# Patient Record
Sex: MALE | Race: WHITE | HISPANIC OR LATINO | URBAN - METROPOLITAN AREA
[De-identification: names, ages, dates, MRNs, and addresses within clinical notes are randomized per-mention and may not be internally consistent; named-entity substitution may affect disease eponyms.]

---

## 2017-11-30 ENCOUNTER — OUTPATIENT (OUTPATIENT)
Dept: OUTPATIENT SERVICES | Facility: HOSPITAL | Age: 67
LOS: 1 days | End: 2017-11-30

## 2017-11-30 ENCOUNTER — APPOINTMENT (OUTPATIENT)
Dept: NUCLEAR MEDICINE | Facility: HOSPITAL | Age: 67
End: 2017-11-30

## 2017-11-30 DIAGNOSIS — D75.1 SECONDARY POLYCYTHEMIA: ICD-10-CM

## 2020-02-04 ENCOUNTER — INPATIENT (INPATIENT)
Facility: HOSPITAL | Age: 70
LOS: 3 days | Discharge: HOME | End: 2020-02-08
Attending: HOSPITALIST | Admitting: HOSPITALIST
Payer: MEDICARE

## 2020-02-04 VITALS
HEART RATE: 121 BPM | TEMPERATURE: 100 F | OXYGEN SATURATION: 93 % | DIASTOLIC BLOOD PRESSURE: 72 MMHG | RESPIRATION RATE: 22 BRPM | SYSTOLIC BLOOD PRESSURE: 118 MMHG

## 2020-02-04 LAB
ALBUMIN SERPL ELPH-MCNC: 2.9 G/DL — LOW (ref 3.3–5)
ALP SERPL-CCNC: 38 U/L — LOW (ref 40–120)
ALT FLD-CCNC: 44 U/L — HIGH (ref 4–41)
ANION GAP SERPL CALC-SCNC: 12 MMO/L — SIGNIFICANT CHANGE UP (ref 7–14)
ANISOCYTOSIS BLD QL: SLIGHT — SIGNIFICANT CHANGE UP
AST SERPL-CCNC: 68 U/L — HIGH (ref 4–40)
B PERT DNA SPEC QL NAA+PROBE: NOT DETECTED — SIGNIFICANT CHANGE UP
BASOPHILS # BLD AUTO: 0.03 K/UL — SIGNIFICANT CHANGE UP (ref 0–0.2)
BASOPHILS NFR BLD AUTO: 0.5 % — SIGNIFICANT CHANGE UP (ref 0–2)
BASOPHILS NFR SPEC: 0.9 % — SIGNIFICANT CHANGE UP (ref 0–2)
BILIRUB SERPL-MCNC: 1.2 MG/DL — SIGNIFICANT CHANGE UP (ref 0.2–1.2)
BLASTS # FLD: 0 % — SIGNIFICANT CHANGE UP (ref 0–0)
BUN SERPL-MCNC: 45 MG/DL — HIGH (ref 7–23)
C PNEUM DNA SPEC QL NAA+PROBE: NOT DETECTED — SIGNIFICANT CHANGE UP
CALCIUM SERPL-MCNC: 8.4 MG/DL — SIGNIFICANT CHANGE UP (ref 8.4–10.5)
CHLORIDE SERPL-SCNC: 105 MMOL/L — SIGNIFICANT CHANGE UP (ref 98–107)
CO2 SERPL-SCNC: 20 MMOL/L — LOW (ref 22–31)
CREAT SERPL-MCNC: 2.66 MG/DL — HIGH (ref 0.5–1.3)
EOSINOPHIL # BLD AUTO: 0.01 K/UL — SIGNIFICANT CHANGE UP (ref 0–0.5)
EOSINOPHIL NFR BLD AUTO: 0.2 % — SIGNIFICANT CHANGE UP (ref 0–6)
EOSINOPHIL NFR FLD: 0 % — SIGNIFICANT CHANGE UP (ref 0–6)
FLUAV H1 2009 PAND RNA SPEC QL NAA+PROBE: NOT DETECTED — SIGNIFICANT CHANGE UP
FLUAV H1 RNA SPEC QL NAA+PROBE: NOT DETECTED — SIGNIFICANT CHANGE UP
FLUAV H3 RNA SPEC QL NAA+PROBE: NOT DETECTED — SIGNIFICANT CHANGE UP
FLUAV SUBTYP SPEC NAA+PROBE: NOT DETECTED — SIGNIFICANT CHANGE UP
FLUBV RNA SPEC QL NAA+PROBE: DETECTED — HIGH
GIANT PLATELETS BLD QL SMEAR: PRESENT — SIGNIFICANT CHANGE UP
GLUCOSE SERPL-MCNC: 129 MG/DL — HIGH (ref 70–99)
HADV DNA SPEC QL NAA+PROBE: NOT DETECTED — SIGNIFICANT CHANGE UP
HCOV PNL SPEC NAA+PROBE: SIGNIFICANT CHANGE UP
HCT VFR BLD CALC: 54.1 % — HIGH (ref 39–50)
HGB BLD-MCNC: 17.7 G/DL — HIGH (ref 13–17)
HMPV RNA SPEC QL NAA+PROBE: NOT DETECTED — SIGNIFICANT CHANGE UP
HPIV1 RNA SPEC QL NAA+PROBE: NOT DETECTED — SIGNIFICANT CHANGE UP
HPIV2 RNA SPEC QL NAA+PROBE: NOT DETECTED — SIGNIFICANT CHANGE UP
HPIV3 RNA SPEC QL NAA+PROBE: NOT DETECTED — SIGNIFICANT CHANGE UP
HPIV4 RNA SPEC QL NAA+PROBE: NOT DETECTED — SIGNIFICANT CHANGE UP
IMM GRANULOCYTES NFR BLD AUTO: 0.6 % — SIGNIFICANT CHANGE UP (ref 0–1.5)
LACTATE BLDV-MCNC: 1.6 MMOL/L — SIGNIFICANT CHANGE UP (ref 0.5–2)
LACTATE BLDV-MCNC: 4.6 MMOL/L — CRITICAL HIGH (ref 0.5–2)
LYMPHOCYTES # BLD AUTO: 1.77 K/UL — SIGNIFICANT CHANGE UP (ref 1–3.3)
LYMPHOCYTES # BLD AUTO: 28 % — SIGNIFICANT CHANGE UP (ref 13–44)
LYMPHOCYTES NFR SPEC AUTO: 10.3 % — LOW (ref 13–44)
MANUAL SMEAR VERIFICATION: SIGNIFICANT CHANGE UP
MCHC RBC-ENTMCNC: 25.7 PG — LOW (ref 27–34)
MCHC RBC-ENTMCNC: 32.7 % — SIGNIFICANT CHANGE UP (ref 32–36)
MCV RBC AUTO: 78.5 FL — LOW (ref 80–100)
METAMYELOCYTES # FLD: 0 % — SIGNIFICANT CHANGE UP (ref 0–1)
MONOCYTES # BLD AUTO: 0.43 K/UL — SIGNIFICANT CHANGE UP (ref 0–0.9)
MONOCYTES NFR BLD AUTO: 6.8 % — SIGNIFICANT CHANGE UP (ref 2–14)
MONOCYTES NFR BLD: 10.3 % — HIGH (ref 2–9)
MYELOCYTES NFR BLD: 0 % — SIGNIFICANT CHANGE UP (ref 0–0)
NEUTROPHIL AB SER-ACNC: 73.3 % — SIGNIFICANT CHANGE UP (ref 43–77)
NEUTROPHILS # BLD AUTO: 4.04 K/UL — SIGNIFICANT CHANGE UP (ref 1.8–7.4)
NEUTROPHILS NFR BLD AUTO: 63.9 % — SIGNIFICANT CHANGE UP (ref 43–77)
NEUTS BAND # BLD: 0 % — SIGNIFICANT CHANGE UP (ref 0–6)
NRBC # FLD: 0 K/UL — SIGNIFICANT CHANGE UP (ref 0–0)
OTHER - HEMATOLOGY %: 0 — SIGNIFICANT CHANGE UP
PLATELET # BLD AUTO: 19 K/UL — CRITICAL LOW (ref 150–400)
PLATELET COUNT - ESTIMATE: SIGNIFICANT CHANGE UP
PMV BLD: 11.1 FL — SIGNIFICANT CHANGE UP (ref 7–13)
POIKILOCYTOSIS BLD QL AUTO: SIGNIFICANT CHANGE UP
POLYCHROMASIA BLD QL SMEAR: SLIGHT — SIGNIFICANT CHANGE UP
POTASSIUM SERPL-MCNC: 4.9 MMOL/L — SIGNIFICANT CHANGE UP (ref 3.5–5.3)
POTASSIUM SERPL-SCNC: 4.9 MMOL/L — SIGNIFICANT CHANGE UP (ref 3.5–5.3)
PROMYELOCYTES # FLD: 0 % — SIGNIFICANT CHANGE UP (ref 0–0)
PROT SERPL-MCNC: 5.4 G/DL — LOW (ref 6–8.3)
RBC # BLD: 6.89 M/UL — HIGH (ref 4.2–5.8)
RBC # FLD: 16.1 % — HIGH (ref 10.3–14.5)
RSV RNA SPEC QL NAA+PROBE: NOT DETECTED — SIGNIFICANT CHANGE UP
RV+EV RNA SPEC QL NAA+PROBE: NOT DETECTED — SIGNIFICANT CHANGE UP
SMUDGE CELLS # BLD: PRESENT — SIGNIFICANT CHANGE UP
SODIUM SERPL-SCNC: 137 MMOL/L — SIGNIFICANT CHANGE UP (ref 135–145)
VARIANT LYMPHS # BLD: 5.2 % — SIGNIFICANT CHANGE UP
WBC # BLD: 6.32 K/UL — SIGNIFICANT CHANGE UP (ref 3.8–10.5)
WBC # FLD AUTO: 6.32 K/UL — SIGNIFICANT CHANGE UP (ref 3.8–10.5)

## 2020-02-04 PROCEDURE — 71045 X-RAY EXAM CHEST 1 VIEW: CPT | Mod: 26

## 2020-02-04 RX ORDER — BENZOCAINE AND MENTHOL 5; 1 G/100ML; G/100ML
1 LIQUID ORAL ONCE
Refills: 0 | Status: COMPLETED | OUTPATIENT
Start: 2020-02-04 | End: 2020-02-04

## 2020-02-04 RX ORDER — ACETAMINOPHEN 500 MG
650 TABLET ORAL ONCE
Refills: 0 | Status: COMPLETED | OUTPATIENT
Start: 2020-02-04 | End: 2020-02-04

## 2020-02-04 RX ORDER — SODIUM CHLORIDE 9 MG/ML
1000 INJECTION INTRAMUSCULAR; INTRAVENOUS; SUBCUTANEOUS ONCE
Refills: 0 | Status: COMPLETED | OUTPATIENT
Start: 2020-02-04 | End: 2020-02-04

## 2020-02-04 RX ORDER — CEFTRIAXONE 500 MG/1
1000 INJECTION, POWDER, FOR SOLUTION INTRAMUSCULAR; INTRAVENOUS ONCE
Refills: 0 | Status: COMPLETED | OUTPATIENT
Start: 2020-02-04 | End: 2020-02-04

## 2020-02-04 RX ORDER — AZITHROMYCIN 500 MG/1
500 TABLET, FILM COATED ORAL ONCE
Refills: 0 | Status: COMPLETED | OUTPATIENT
Start: 2020-02-04 | End: 2020-02-04

## 2020-02-04 RX ADMIN — CEFTRIAXONE 100 MILLIGRAM(S): 500 INJECTION, POWDER, FOR SOLUTION INTRAMUSCULAR; INTRAVENOUS at 20:45

## 2020-02-04 RX ADMIN — SODIUM CHLORIDE 1000 MILLILITER(S): 9 INJECTION INTRAMUSCULAR; INTRAVENOUS; SUBCUTANEOUS at 20:32

## 2020-02-04 RX ADMIN — Medication 650 MILLIGRAM(S): at 20:45

## 2020-02-04 RX ADMIN — SODIUM CHLORIDE 1000 MILLILITER(S): 9 INJECTION INTRAMUSCULAR; INTRAVENOUS; SUBCUTANEOUS at 21:45

## 2020-02-04 RX ADMIN — AZITHROMYCIN 255 MILLIGRAM(S): 500 TABLET, FILM COATED ORAL at 21:45

## 2020-02-04 RX ADMIN — BENZOCAINE AND MENTHOL 1 LOZENGE: 5; 1 LIQUID ORAL at 20:45

## 2020-02-04 RX ADMIN — SODIUM CHLORIDE 1000 MILLILITER(S): 9 INJECTION INTRAMUSCULAR; INTRAVENOUS; SUBCUTANEOUS at 20:20

## 2020-02-04 NOTE — ED PROVIDER NOTE - CLINICAL SUMMARY MEDICAL DECISION MAKING FREE TEXT BOX
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 CBC, CMP, lactate, blood cx x 2, CXR, EKG. Tylenol.

## 2020-02-04 NOTE — ED PROVIDER NOTE - OBJECTIVE STATEMENT
70 yo PMH Hairy Cell Leukemia, HLD p/w subjective fever/cough. Pt reports symptoms started end January, initially febrile no Temp at home, w/ rhinorrhea, dry cough and chills. Progressively worsened throughout week, went to urgent care past Sunday flu positive, was prescribed tamiflu. Went to PCP today, was prescribed tessalon pearls for cough, no antibiotics. Pt with worsening HAZEL over last 48 hours w/ associated chest tightness, non radiating pain, no prior cardiac history.  Denies any recent sick contacts or outside travel. 70 yo PMH Hairy Cell Leukemia, HLD p/w subjective fever/cough. Pt reports symptoms started end January, initially febrile no Temp at home, w/ rhinorrhea, dry cough and chills. Progressively worsened throughout week, went to urgent care past Sunday flu positive, was prescribed tamiflu. Went to PCP today, was prescribed tessalon pearls for cough, no antibiotics. Pt with worsening HAZEL over last 48 hours w/ associated chest tightness, non radiating pain, no prior cardiac history.  Denies any recent sick contacts or outside travel. Pt is Quaker.

## 2020-02-04 NOTE — ED PROVIDER NOTE - PROGRESS NOTE DETAILS
After 2 L NS BP 75/49, starting 3rd liter, will repeat lactate. Platelet 19, pt reports having chronic thrombocytopenia count (). Pt is Anabaptist does not accept blood products. pt SBP in mid 90 w a MAP 70's pt mentating, no distress. plan admit w IVF no pressor for now. pt refusing any blood product. Endorse to Dr Rubio.

## 2020-02-04 NOTE — ED ADULT NURSE NOTE - OBJECTIVE STATEMENT
patient alert ox3 came in c/o flu like symptoms since 1 week with cough and congestion. patient is tachypneic and coughing and hypotensive in the room. labs done for sepsis as ordered . IVF infusing well. continue to monitor.

## 2020-02-04 NOTE — ED PROVIDER NOTE - ATTENDING CONTRIBUTION TO CARE
Attending Statement: I have personally seen and examined this patient. I have fully participated in the care of this patient. I have reviewed all pertinent clinical information, including history physical exam, plan and the Resident's note and agree except as noted  70yo M hx of hairy cell leukemia, HLD< HTN, from home co fever, cough. DX w flu last week, saw pmd earlier today for persistent cough was given tessalon Perles. +non productive cough. no chest pain no sob. no abdominal pain no vomit or diarrhea +myalgia no travel no recent abx or hosp   Vital signs noted. coughing, nontoxic appearing. mmm. +tachycardic. +coarse, ronchi bl. obese male nt nondistended abdomen  no pedal edema. no calf tenderness. normal pulses bilateral feet.  plan sepsis workup, IVF, IV abx, rvp, admit

## 2020-02-04 NOTE — ED PROVIDER NOTE - PHYSICAL EXAMINATION
Constitutional: NAD, awake and alert  EYES: EOMI  ENT:  Normal Hearing, no tonsillar exudates, no lymphadenopathy   Neck: Soft and supple , no thyromegaly   Respiratory: Breath sounds are clear bilaterally, No wheezing, rales or rhonchi  Cardiovascular: S1 and S2, regular rate and rhythm, no Murmurs, gallops or rubs, no JVD,    Gastrointestinal: Bowel Sounds present, soft, nontender, nondistended, no guarding, no rebound  Extremities: No cyanosis or clubbing; warm to touch  Vascular: 2+ peripheral pulses lower ex  Neurological: No focal deficits, CN II-XII intact bilaterally, sensation to light touch intact in all extremities, gait intact. Pupils are equally reactive to light and symmetrical in size.   Musculoskeletal: 5/5 strength b/l upper and lower extremities; no joint swelling.  Skin: No rashes  HEME: no bruises, no nose bleeds

## 2020-02-04 NOTE — ED ADULT NURSE NOTE - CHIEF COMPLAINT QUOTE
Patient complains of shortness of breath, chest pain, fevers/chills, cough.  Patient states was diagnosed with the Flu Sunday at [facility], saw his PCP afterwards and sent for further eval for fevers, chest congestion.  On Tamiflu since Sunday.  Hx HLD, HTN, Thrombocytopenia.  Tachycardic to 120, BP Stable, respirations tachypneic, o2sat 93%. EKG in progress.

## 2020-02-04 NOTE — ED ADULT TRIAGE NOTE - CHIEF COMPLAINT QUOTE
Patient complains of shortness of breath, chest pain.  Patient states was diagnosed with the Flu this week at [facility], saw his PCP afterwards and sent for further eval.  On Tamiflu since Sunday.  Hx HLD, HTN, Thrombocytopenia.  Tachycardic to 120, BP Stable, respirations tachypneic, o2sat 93%. Patient complains of shortness of breath, chest pain, fevers/chills, cough.  Patient states was diagnosed with the Flu Sunday at [facility], saw his PCP afterwards and sent for further eval for fevers, chest congestion.  On Tamiflu since Sunday.  Hx HLD, HTN, Thrombocytopenia.  Tachycardic to 120, BP Stable, respirations tachypneic, o2sat 93%. EKG in progress.

## 2020-02-04 NOTE — ED ADULT NURSE REASSESSMENT NOTE - NS ED NURSE REASSESS COMMENT FT1
Patient vital signs as noted, MD aware of Patients Blood Pressure, Patient receiving third liter of fluid at present time. Patient has low platelet count, MD and patient aware, Patient reports being a Jehovah Witness and does not want any type of blood transfusion.

## 2020-02-04 NOTE — ED PROVIDER NOTE - PMH
Benign essential HTN    H/O bacterial pneumonia  2011- RLL, s/p course of avelox  H/O splenomegaly    Hairy cell leukemia    Hx of thrombocytopenia  refused blood products transfusion- Ep[employer]  Peripheral Neuropathy    Systolic heart failure    Varicella zoster    Vasculitis    Waldenstroms Macroglobulinemia

## 2020-02-04 NOTE — ED ADULT NURSE NOTE - PMH
Benign essential HTN    H/O bacterial pneumonia  2011- RLL, s/p course of avelox  H/O splenomegaly    Hairy cell leukemia    Hx of thrombocytopenia  refused blood products transfusion- Lutheran  Peripheral Neuropathy    Systolic heart failure    Varicella zoster    Vasculitis    Waldenstroms Macroglobulinemia

## 2020-02-04 NOTE — ED ADULT NURSE REASSESSMENT NOTE - NS ED NURSE REASSESS COMMENT FT1
Received report from HARRISON Dumont. Patient A&OX4, ambulatory. Facilitator HARRISON Rollins at bedside medicating patient at present time. Patient in no acute distress, respirations even and unlabored. Will continue to monitor.

## 2020-02-05 DIAGNOSIS — I10 ESSENTIAL (PRIMARY) HYPERTENSION: ICD-10-CM

## 2020-02-05 DIAGNOSIS — R19.7 DIARRHEA, UNSPECIFIED: ICD-10-CM

## 2020-02-05 DIAGNOSIS — Z29.9 ENCOUNTER FOR PROPHYLACTIC MEASURES, UNSPECIFIED: ICD-10-CM

## 2020-02-05 DIAGNOSIS — C91.40 HAIRY CELL LEUKEMIA NOT HAVING ACHIEVED REMISSION: ICD-10-CM

## 2020-02-05 DIAGNOSIS — J11.1 INFLUENZA DUE TO UNIDENTIFIED INFLUENZA VIRUS WITH OTHER RESPIRATORY MANIFESTATIONS: ICD-10-CM

## 2020-02-05 DIAGNOSIS — R07.89 OTHER CHEST PAIN: ICD-10-CM

## 2020-02-05 DIAGNOSIS — A41.9 SEPSIS, UNSPECIFIED ORGANISM: ICD-10-CM

## 2020-02-05 DIAGNOSIS — N17.9 ACUTE KIDNEY FAILURE, UNSPECIFIED: ICD-10-CM

## 2020-02-05 DIAGNOSIS — Z02.9 ENCOUNTER FOR ADMINISTRATIVE EXAMINATIONS, UNSPECIFIED: ICD-10-CM

## 2020-02-05 DIAGNOSIS — D69.6 THROMBOCYTOPENIA, UNSPECIFIED: ICD-10-CM

## 2020-02-05 DIAGNOSIS — E78.5 HYPERLIPIDEMIA, UNSPECIFIED: ICD-10-CM

## 2020-02-05 LAB
ALBUMIN SERPL ELPH-MCNC: 2.7 G/DL — LOW (ref 3.3–5)
ALP SERPL-CCNC: 36 U/L — LOW (ref 40–120)
ALT FLD-CCNC: 40 U/L — SIGNIFICANT CHANGE UP (ref 4–41)
ANION GAP SERPL CALC-SCNC: 13 MMO/L — SIGNIFICANT CHANGE UP (ref 7–14)
APPEARANCE UR: SIGNIFICANT CHANGE UP
APTT BLD: 34.8 SEC — SIGNIFICANT CHANGE UP (ref 27.5–36.3)
AST SERPL-CCNC: 56 U/L — HIGH (ref 4–40)
BACTERIA # UR AUTO: HIGH
BASOPHILS # BLD AUTO: 0.04 K/UL — SIGNIFICANT CHANGE UP (ref 0–0.2)
BASOPHILS NFR BLD AUTO: 0.6 % — SIGNIFICANT CHANGE UP (ref 0–2)
BILIRUB SERPL-MCNC: 0.8 MG/DL — SIGNIFICANT CHANGE UP (ref 0.2–1.2)
BILIRUB UR-MCNC: NEGATIVE — SIGNIFICANT CHANGE UP
BLOOD UR QL VISUAL: SIGNIFICANT CHANGE UP
BUN SERPL-MCNC: 46 MG/DL — HIGH (ref 7–23)
CALCIUM SERPL-MCNC: 8 MG/DL — LOW (ref 8.4–10.5)
CHLORIDE SERPL-SCNC: 108 MMOL/L — HIGH (ref 98–107)
CO2 SERPL-SCNC: 14 MMOL/L — LOW (ref 22–31)
COLOR SPEC: SIGNIFICANT CHANGE UP
CREAT SERPL-MCNC: 1.96 MG/DL — HIGH (ref 0.5–1.3)
EOSINOPHIL # BLD AUTO: 0.04 K/UL — SIGNIFICANT CHANGE UP (ref 0–0.5)
EOSINOPHIL NFR BLD AUTO: 0.6 % — SIGNIFICANT CHANGE UP (ref 0–6)
EPI CELLS # UR: SIGNIFICANT CHANGE UP
GLUCOSE SERPL-MCNC: 114 MG/DL — HIGH (ref 70–99)
GLUCOSE UR-MCNC: NEGATIVE — SIGNIFICANT CHANGE UP
GRAN CASTS # UR COMP ASSIST: SIGNIFICANT CHANGE UP
HCT VFR BLD CALC: 52.8 % — HIGH (ref 39–50)
HGB BLD-MCNC: 18 G/DL — HIGH (ref 13–17)
IMM GRANULOCYTES NFR BLD AUTO: 0.6 % — SIGNIFICANT CHANGE UP (ref 0–1.5)
INR BLD: 1.13 — SIGNIFICANT CHANGE UP (ref 0.88–1.17)
KETONES UR-MCNC: NEGATIVE — SIGNIFICANT CHANGE UP
LEUKOCYTE ESTERASE UR-ACNC: NEGATIVE — SIGNIFICANT CHANGE UP
LYMPHOCYTES # BLD AUTO: 1.59 K/UL — SIGNIFICANT CHANGE UP (ref 1–3.3)
LYMPHOCYTES # BLD AUTO: 23.3 % — SIGNIFICANT CHANGE UP (ref 13–44)
MAGNESIUM SERPL-MCNC: 1.8 MG/DL — SIGNIFICANT CHANGE UP (ref 1.6–2.6)
MANUAL SMEAR VERIFICATION: SIGNIFICANT CHANGE UP
MCHC RBC-ENTMCNC: 26.2 PG — LOW (ref 27–34)
MCHC RBC-ENTMCNC: 34.1 % — SIGNIFICANT CHANGE UP (ref 32–36)
MCV RBC AUTO: 76.7 FL — LOW (ref 80–100)
MONOCYTES # BLD AUTO: 0.45 K/UL — SIGNIFICANT CHANGE UP (ref 0–0.9)
MONOCYTES NFR BLD AUTO: 6.6 % — SIGNIFICANT CHANGE UP (ref 2–14)
NEUTROPHILS # BLD AUTO: 4.67 K/UL — SIGNIFICANT CHANGE UP (ref 1.8–7.4)
NEUTROPHILS NFR BLD AUTO: 68.3 % — SIGNIFICANT CHANGE UP (ref 43–77)
NITRITE UR-MCNC: NEGATIVE — SIGNIFICANT CHANGE UP
NRBC # FLD: 0 K/UL — SIGNIFICANT CHANGE UP (ref 0–0)
PH UR: 5.5 — SIGNIFICANT CHANGE UP (ref 5–8)
PHOSPHATE SERPL-MCNC: 2.5 MG/DL — SIGNIFICANT CHANGE UP (ref 2.5–4.5)
PLATELET # BLD AUTO: 26 K/UL — LOW (ref 150–400)
PMV BLD: SIGNIFICANT CHANGE UP FL (ref 7–13)
POTASSIUM SERPL-MCNC: 4.3 MMOL/L — SIGNIFICANT CHANGE UP (ref 3.5–5.3)
POTASSIUM SERPL-SCNC: 4.3 MMOL/L — SIGNIFICANT CHANGE UP (ref 3.5–5.3)
PROT SERPL-MCNC: 5.1 G/DL — LOW (ref 6–8.3)
PROT UR-MCNC: SIGNIFICANT CHANGE UP
PROTHROM AB SERPL-ACNC: 12.9 SEC — SIGNIFICANT CHANGE UP (ref 9.8–13.1)
RBC # BLD: 6.88 M/UL — HIGH (ref 4.2–5.8)
RBC # FLD: 16.2 % — HIGH (ref 10.3–14.5)
RBC CASTS # UR COMP ASSIST: SIGNIFICANT CHANGE UP (ref 0–?)
SODIUM SERPL-SCNC: 135 MMOL/L — SIGNIFICANT CHANGE UP (ref 135–145)
SP GR SPEC: 1.03 — SIGNIFICANT CHANGE UP (ref 1–1.04)
SPECIMEN SOURCE: SIGNIFICANT CHANGE UP
UROBILINOGEN FLD QL: NORMAL — SIGNIFICANT CHANGE UP
WBC # BLD: 6.83 K/UL — SIGNIFICANT CHANGE UP (ref 3.8–10.5)
WBC # FLD AUTO: 6.83 K/UL — SIGNIFICANT CHANGE UP (ref 3.8–10.5)
WBC UR QL: HIGH (ref 0–?)

## 2020-02-05 PROCEDURE — 99223 1ST HOSP IP/OBS HIGH 75: CPT

## 2020-02-05 PROCEDURE — 93970 EXTREMITY STUDY: CPT | Mod: 26

## 2020-02-05 PROCEDURE — 12345: CPT | Mod: NC

## 2020-02-05 PROCEDURE — 78582 LUNG VENTILAT&PERFUS IMAGING: CPT | Mod: 26,GC

## 2020-02-05 PROCEDURE — 71250 CT THORAX DX C-: CPT | Mod: 26

## 2020-02-05 PROCEDURE — 76770 US EXAM ABDO BACK WALL COMP: CPT | Mod: 26

## 2020-02-05 RX ORDER — METOPROLOL TARTRATE 50 MG
1 TABLET ORAL
Qty: 0 | Refills: 0 | DISCHARGE

## 2020-02-05 RX ORDER — ATORVASTATIN CALCIUM 80 MG/1
20 TABLET, FILM COATED ORAL AT BEDTIME
Refills: 0 | Status: DISCONTINUED | OUTPATIENT
Start: 2020-02-05 | End: 2020-02-08

## 2020-02-05 RX ORDER — CEFTRIAXONE 500 MG/1
1000 INJECTION, POWDER, FOR SOLUTION INTRAMUSCULAR; INTRAVENOUS EVERY 24 HOURS
Refills: 0 | Status: DISCONTINUED | OUTPATIENT
Start: 2020-02-05 | End: 2020-02-07

## 2020-02-05 RX ORDER — NOREPINEPHRINE BITARTRATE/D5W 8 MG/250ML
0.05 PLASTIC BAG, INJECTION (ML) INTRAVENOUS
Qty: 8 | Refills: 0 | Status: DISCONTINUED | OUTPATIENT
Start: 2020-02-05 | End: 2020-02-05

## 2020-02-05 RX ORDER — PREGABALIN 225 MG/1
1000 CAPSULE ORAL DAILY
Refills: 0 | Status: DISCONTINUED | OUTPATIENT
Start: 2020-02-05 | End: 2020-02-08

## 2020-02-05 RX ORDER — ATORVASTATIN CALCIUM 80 MG/1
1 TABLET, FILM COATED ORAL
Qty: 0 | Refills: 0 | DISCHARGE

## 2020-02-05 RX ORDER — AZITHROMYCIN 500 MG/1
500 TABLET, FILM COATED ORAL EVERY 24 HOURS
Refills: 0 | Status: DISCONTINUED | OUTPATIENT
Start: 2020-02-05 | End: 2020-02-07

## 2020-02-05 RX ADMIN — AZITHROMYCIN 255 MILLIGRAM(S): 500 TABLET, FILM COATED ORAL at 12:42

## 2020-02-05 RX ADMIN — Medication 100 MILLIGRAM(S): at 22:12

## 2020-02-05 RX ADMIN — CEFTRIAXONE 100 MILLIGRAM(S): 500 INJECTION, POWDER, FOR SOLUTION INTRAMUSCULAR; INTRAVENOUS at 09:33

## 2020-02-05 RX ADMIN — Medication 100 MILLIGRAM(S): at 09:40

## 2020-02-05 RX ADMIN — ATORVASTATIN CALCIUM 20 MILLIGRAM(S): 80 TABLET, FILM COATED ORAL at 22:12

## 2020-02-05 RX ADMIN — Medication 1 TABLET(S): at 12:42

## 2020-02-05 RX ADMIN — Medication 100 MILLIGRAM(S): at 13:45

## 2020-02-05 RX ADMIN — PREGABALIN 1000 MICROGRAM(S): 225 CAPSULE ORAL at 12:42

## 2020-02-05 RX ADMIN — Medication 30 MILLIGRAM(S): at 12:42

## 2020-02-05 NOTE — PROGRESS NOTE ADULT - PROBLEM SELECTOR PLAN 5
Patient with elevated Cr of 2.6 now improved to 1.96; unknown baseline Cr but per patient, no history of kidney disease; most likely pre-renal in the setting of decreased PO intake and diarrhea  -trend Cr  -s/p 3L IVF; if no improvement; send urine lytes and obtain renal US   - avoid nephrotoxic agents  -renal US Patient with elevated Cr of 2.6 now improved to 1.96 after IVFs; unknown baseline Cr but per patient, no history of kidney disease; most likely pre-renal in the setting of decreased PO intake and diarrhea  -trend Cr  -s/p 3L IVFs  -Renal US negative  - avoid nephrotoxic agents.

## 2020-02-05 NOTE — CONSULT NOTE ADULT - SUBJECTIVE AND OBJECTIVE BOX
Pt is not in the room, has gone for nuclear medicine scan, wife and daughters in the room. 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 No bleeding from any where as per the wife.     will recommend:  - monitor plts closely  - continue Rx as per medicine  - he is Jehova's witness and will accept blood products  - even IVIG may not be acceptable to him  - will treat ITP with steroids if needed  - all other supportive Rx  - discussed issues in great detail with the pt's family and PA    Please call for questions - [phone]

## 2020-02-05 NOTE — PROGRESS NOTE ADULT - PROBLEM SELECTOR PLAN 4
Patient with plt of 26; Patient with hairy cell leukemia (per patient is in remission) c/b severe sepsis  -pt is Quaker and refusing transfusion  -patient with petechiae but no bleeding; continue to monitor  -no pharmacologic AC in the setting of thrombocytopenia  -peripheral smear   -consult Dr. Anne (hematology in AM) Patient with plt of 26; Patient with hairy cell leukemia (per patient is in remission) c/b severe sepsis  -pt is Zoroastrianism and refusing transfusion  -patient with petechiae but no bleeding; continue to monitor  -no pharmacologic AC in the setting of thrombocytopenia  -peripheral smear   -consult Dr. Anne

## 2020-02-05 NOTE — H&P ADULT - ATTENDING COMMENTS
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 Has received 3 L IVF thus far with normalization of BP. CXR prelim with no obvious infiltrate. + Diarrhea, profound HAZEL for last week. LADI; reports baseline voiding habits. Platelets 19k.   -Will cover empirically for PNA pending non-con CT chest. C/w  2 more days tamiflu  -GI PCR   -Pleurisy, 2/2 flu and /or pna. Modified wells low, but given mild leg asymmetry (L>R), profound HAZEL, will order Duplex of legs. If suspicion for VTE persists , may require VQ scan. 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 Has received 3 L IVF thus far with normalization of BP. CXR prelim with no obvious infiltrate. + Diarrhea, profound HAZEL for last week. LADI; reports baseline voiding habits. Platelets 19k.   -Will cover empirically for PNA pending non-con CT chest, legionella. C/w  2 more days tamiflu  -GI PCR   -Pleurisy, 2/2 flu and /or pna. Modified wells low, but given mild leg asymmetry (L>R), profound HAZEL, will order Duplex of legs. If suspicion for VTE persists , may require VQ scan. 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 Has received 3 L IVF thus far with normalization of BP. CXR prelim with no obvious infiltrate. + Diarrhea, profound HAZEL for last week. LADI; reports baseline voiding habits. Platelets 19k.   -Will cover empirically for PNA pending non-con CT chest, legionella. C/w  2 more days tamiflu  -GI PCR   -Pleurisy, 2/2 flu and /or pna. Modified wells low, but given mild leg asymmetry (L>R), profound HAZEL, will order Duplex of legs. If suspicion for VTE persists , may require VQ scan. TTE ordered to evaluate EF  - platelet count 19K: spurious? sepsis? primary blood disorder? periph smear, repeat cbc.  Dr Anne (hem) to be called  -LADI: Unclear baseline. suspect prerenal component. repeat renal function, renal sono ordered

## 2020-02-05 NOTE — PROVIDER CONTACT NOTE (OTHER) - ASSESSMENT
patient presented to signs and symptoms of distress, patient didn't complain of shortness of breath or other symptoms

## 2020-02-05 NOTE — ED ADULT NURSE REASSESSMENT NOTE - NS ED NURSE REASSESS COMMENT FT1
break coverage RN: pt A&Ox4, resting comfortable, has persistent dry cough. flu B +, pt admitted to medicine. report given to HARRISON Phillips in ESSU 1, VS as noted.

## 2020-02-05 NOTE — PROGRESS NOTE ADULT - PROBLEM SELECTOR PLAN 3
-Pleurisy 2/2 flu and /or pna. Modified wells low, but given mild leg asymmetry (L>R), profound HAZEL, will order Duplex of legs. If suspicion for VTE persists , may require VQ scan. TTE ordered to evaluate EF. Consider Outpt stress test -Pleurisy 2/2 flu and /or pna. Modified wells low, but given mild leg asymmetry (L>R), profound HAZEL, will order Duplex of legs.   -will check V/Q scan r/o PE.   -check TTE to evaluate EF.

## 2020-02-05 NOTE — H&P ADULT - PROBLEM SELECTOR PLAN 9
DVT ppx: SCDs  Diet: DASH/TLC  Consults: None  Needs medrec in AM -- patient does not fully recall his medications

## 2020-02-05 NOTE — H&P ADULT - PROBLEM SELECTOR PLAN 2
-c/w tamiflu 75mg BID (day 4 of 5)  -c/w ceftriaxone and azithromycin for possible superimposed pneumonia ? -c/w tamiflu 30mg daily (renally dosed)  -c/w ceftriaxone and azithromycin for possible superimposed pneumonia  -obtain CT chest

## 2020-02-05 NOTE — H&P ADULT - HISTORY OF PRESENT ILLNESS
Patient is a 69 year old male (Alevism) with hairy cell lymphoma (not on chemo) and HLD presenting with worsening HAZEL and chest tightness.  Patient has been having subjective fever and cough for the past week.  Patient went to urgent care 3 days prior to hospitalization and tested flu positive and was prescribed tamiflu, currently day 4 of treatment.  1 day prior to hospitalization patient went to PCP and was prescribed tessalon pearle for cough.  Patietn continued to have chest tightness and HAZEL and decided to come to the ED.  Patient currently denies any chest pain, SOB, lightheadedness, dizziness.  Patient denies any sick contacts or recent travel.      In the ED, pt had Tmax of 104.5, , BP 76/57, 99% on RA.  Patient received 3L IVF and blood pressure improved to 128/80.  Labs significant for plt 19, Cr 2.66, lactate 4.6 --> 1.6.  UA with moderate bacteria but negative LE and nitrites.  RVP positive for influenza B. 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  Patient has been having subjective fever and cough for the past week.  Patient went to urgent care 3 days prior to hospitalization and tested flu positive and was prescribed tamiflu, currently day 4 of treatment.  1 day prior to hospitalization patient went to PCP and was prescribed tessalon pearle for cough.  Patient states he had chest tightness and HAZEL since the flu started.  Patient denies any SOB at rest or orthopnea.  Patient currently denies any chest pain, SOB, lightheadedness, dizziness.  Patient continues to have hacking cough.  Denies any abdominal pain, nausea, vomiting.  Does have diarrhea that started a day prior to hospitalization, watery, nonbloody.  Denies any dysuria or urinary incontinence.   Denies any bleeding.  Patient denies any sick contacts or recent travel.      In the ED, pt had Tmax of 104.5, , BP 76/57, 99% on RA.  Patient received 3L IVF and blood pressure improved to 128/80.  Labs significant for plt 19, Cr 2.66, lactate 4.6 --> 1.6.  UA with moderate bacteria but negative LE and nitrites.  RVP positive for influenza B.

## 2020-02-05 NOTE — H&P ADULT - NSICDXPASTMEDICALHX_GEN_ALL_CORE_FT
PAST MEDICAL HISTORY:  Benign essential HTN     H/O bacterial pneumonia 2011- RLL, s/p course of avelox    H/O splenomegaly     Hairy cell leukemia     Hx of thrombocytopenia refused blood products transfusion- Buddhist    Peripheral Neuropathy     Systolic heart failure     Varicella zoster     Vasculitis     Waldenstroms Macroglobulinemia PAST MEDICAL HISTORY:  Benign essential HTN     H/O splenomegaly     Hairy cell leukemia     Hx of thrombocytopenia refused blood products transfusion- Christian

## 2020-02-05 NOTE — H&P ADULT - NSHPLABSRESULTS_GEN_ALL_CORE
LABS:                          17.7   6.32  )-----------( 19       ( 2020 20:05 )             54.1       02-04    137  |  105  |  45<H>  ----------------------------<  129<H>  4.9   |  20<L>  |  2.66<H>    Ca    8.4      2020 20:05    TPro  5.4<L>  /  Alb  2.9<L>  /  TBili  1.2  /  DBili  x   /  AST  68<H>  /  ALT  44<H>  /  AlkPhos  38<L>  02-04       LIVER FUNCTIONS - ( 2020 20:05 )  Alb: 2.9 g/dL / Pro: 5.4 g/dL / ALK PHOS: 38 u/L / ALT: 44 u/L / AST: 68 u/L / GGT: x                    Urinalysis Basic - ( 2020 01:35 )    Color: ORANGE / Appearance: Lt TURBID / S.027 / pH: 5.5  Gluc: NEGATIVE / Ketone: NEGATIVE  / Bili: NEGATIVE / Urobili: NORMAL   Blood: TRACE / Protein: MODERATE / Nitrite: NEGATIVE   Leuk Esterase: NEGATIVE / RBC: 0-2 / WBC 6-10   Sq Epi: x / Non Sq Epi: FEW / Bacteria: MODERATE              RADIOLOGY & ADDITIONAL TESTS: Reviewed LABS:                          17.7   6.32  )-----------( 19       ( 2020 20:05 )             54.1       02-04    137  |  105  |  45<H>  ----------------------------<  129<H>  4.9   |  20<L>  |  2.66<H>    Ca    8.4      2020 20:05    TPro  5.4<L>  /  Alb  2.9<L>  /  TBili  1.2  /  DBili  x   /  AST  68<H>  /  ALT  44<H>  /  AlkPhos  38<L>  02-04       LIVER FUNCTIONS - ( 2020 20:05 )  Alb: 2.9 g/dL / Pro: 5.4 g/dL / ALK PHOS: 38 u/L / ALT: 44 u/L / AST: 68 u/L / GGT: x                    Urinalysis Basic - ( 2020 01:35 )    Color: ORANGE / Appearance: Lt TURBID / S.027 / pH: 5.5  Gluc: NEGATIVE / Ketone: NEGATIVE  / Bili: NEGATIVE / Urobili: NORMAL   Blood: TRACE / Protein: MODERATE / Nitrite: NEGATIVE   Leuk Esterase: NEGATIVE / RBC: 0-2 / WBC 6-10   Sq Epi: x / Non Sq Epi: FEW / Bacteria: MODERATE        EKG: TWI in aVR and aVL NSR      RADIOLOGY & ADDITIONAL TESTS: Reviewed

## 2020-02-05 NOTE — H&P ADULT - PROBLEM SELECTOR PLAN 5
Patient with elevated Cr of 2.6; unknown baseline Cr but per patient, no history of kidney disease; most likely pre-renal in the setting of decreased PO intake and diarrhea  -trend Cr  -s/p 3L IVF; if no improvement; send urine lytes and obtain renal US   - avoid nephrotoxic agents Patient with elevated Cr of 2.6; unknown baseline Cr but per patient, no history of kidney disease; most likely pre-renal in the setting of decreased PO intake and diarrhea  -trend Cr  -s/p 3L IVF; if no improvement; send urine lytes and obtain renal US   - avoid nephrotoxic agents  -renal US

## 2020-02-05 NOTE — H&P ADULT - NSHPREVIEWOFSYSTEMS_GEN_ALL_CORE
REVIEW OF SYSTEMS:    CONSTITUTIONAL: +weakness +fevers +chills  EYES/ENT: No visual changes;  No vertigo or throat pain   NECK: No pain or stiffness  RESPIRATORY: +cough, no wheezing, hemoptysis; No shortness of breath; +HAZEL  CARDIOVASCULAR: No chest pain or palpitations  GASTROINTESTINAL: No abdominal or epigastric pain. No nausea, vomiting, or hematemesis;+diarrhea. No melena or hematochezia.  GENITOURINARY: No dysuria, frequency or hematuria  NEUROLOGICAL: No numbness or weakness  SKIN: No itching, rashes

## 2020-02-05 NOTE — H&P ADULT - PROBLEM SELECTOR PLAN 4
Patient with plt of 19; Patient with hairy cell leukemia (per patient is in remission) c/b severe sepsis  -pt is Quaker and refusing transfusion  -patient with petechiae but no bleeding; continue to monitor  -no pharmacologic AC in the setting of thrombocytopenia Patient with plt of 19; Patient with hairy cell leukemia (per patient is in remission) c/b severe sepsis  -pt is Amish and refusing transfusion  -patient with petechiae but no bleeding; continue to monitor  -no pharmacologic AC in the setting of thrombocytopenia  -peripheral smear   -consult Dr. Anne (hematology in AM)

## 2020-02-05 NOTE — H&P ADULT - PROBLEM SELECTOR PLAN 7
-at home on metoprolol 25mg BID  -hold in the setting of severe sepsis Patient with hairy cell leukemia (follows with Dr. Anne every 3 months) and states currently in remission.    -consult Dr. Anne Patient with hairy cell leukemia (follows with Dr. Anne every 3 months) and states currently in remission.    -consult Dr. Anne in AM

## 2020-02-05 NOTE — H&P ADULT - PROBLEM SELECTOR PLAN 1
Patient presenting with T 104.5,  BP 76/57 with positive RVP for influenza and elevated lactate 4.6.    -CXR with clear lungs; unclear if patient has superimposed pneumonia with influenza  -s/p 3L IVF and 1x dose of ceftriaxone and azithromycin  -c/w ceftriaxone and azithromycin (day 2); since pt with severe sepsis, would continue with antibiotics  -c/w tamiflu 75mg BID; patient last dose was AM of 2/4 (day 4 of 5)  -UA with moderate bacteria --> aysmptomatic bacteruria   -f/u blood and urine culture Patient presenting with T 104.5,  BP 76/57 with positive RVP for influenza and elevated lactate 4.6.    -CXR with clear lungs; unclear if patient has superimposed pneumonia with influenza  -s/p 3L IVF and 1x dose of ceftriaxone and azithromycin  -c/w ceftriaxone and azithromycin (day 2); since pt with severe sepsis, would continue with antibiotics  -c/w tamiflu 30mg daily (renally dosed); patient last dose was AM of 2/4 (day 4 of 5)  -UA with moderate bacteria --> aysmptomatic bacteruria   -f/u blood and urine culture  -obtain CT chest to r/o pneumonia

## 2020-02-05 NOTE — H&P ADULT - NSHPPHYSICALEXAM_GEN_ALL_CORE
Vital Signs Last 24 Hrs  T(C): 37.1 (05 Feb 2020 02:39), Max: 40.3 (04 Feb 2020 20:13)  T(F): 98.7 (05 Feb 2020 02:39), Max: 104.5 (04 Feb 2020 20:13)  HR: 98 (05 Feb 2020 02:39) (73 - 121)  BP: 101/64 (05 Feb 2020 02:39) (76/57 - 128/80)  BP(mean): 71 (05 Feb 2020 00:39) (71 - 71)  RR: 19 (05 Feb 2020 02:39) (19 - 30)  SpO2: 95% (05 Feb 2020 02:39) (93% - 99%) Vital Signs Last 24 Hrs  T(C): 37.1 (05 Feb 2020 02:39), Max: 40.3 (04 Feb 2020 20:13)  T(F): 98.7 (05 Feb 2020 02:39), Max: 104.5 (04 Feb 2020 20:13)  HR: 98 (05 Feb 2020 02:39) (73 - 121)  BP: 101/64 (05 Feb 2020 02:39) (76/57 - 128/80)  BP(mean): 71 (05 Feb 2020 00:39) (71 - 71)  RR: 19 (05 Feb 2020 02:39) (19 - 30)  SpO2: 95% (05 Feb 2020 02:39) (93% - 99%)    PHYSICAL EXAM:  GENERAL: NAD, well-developed, obese  HEAD:  Atraumatic, Normocephalic  EYES: EOMI, conjunctiva and sclera clear  NECK: Supple,   MOUTH: erythematous pharynx   CHEST/LUNG: Intermittent ronchi in all lung fields   HEART: Regular rate and rhythm; No murmurs, rubs, or gallops  ABDOMEN: Soft, Nontender, Nondistended; Bowel sounds present  EXTREMITIES:  2+ Peripheral Pulses, No clubbing, cyanosis; +pitting edema in b/l LE   PSYCH: AAOx3  NEUROLOGY: non-focal  SKIN: +petecchiae on legs

## 2020-02-05 NOTE — H&P ADULT - PROBLEM SELECTOR PLAN 3
Patient with chest tightness associated with flu; Patient with TWI in aVR and avL on EKG (no baseline EKG in chart); currently asymptomatic;   -order trops but patient does have LADI so expect them to be elevated Patient with chest tightness associated with flu; Patient with TWI in aVR and avL on EKG (no baseline EKG in chart); currently asymptomatic; Patient with chest tightness associated with flu; Patient with TWI in aVR and avL on EKG (no baseline EKG in chart); currently asymptomatic;    ATTEND ADDEND   -Pleurisy 2/2 flu and /or pna. Modified wells low, but given mild leg asymmetry (L>R), profound HAZEL, will order Duplex of legs. If suspicion for VTE persists , may require VQ scan. TTE ordered to evaluate EF. Consider Outpt stress test

## 2020-02-05 NOTE — PROGRESS NOTE ADULT - PROBLEM SELECTOR PLAN 2
-c/w tamiflu 30mg daily (renally dosed)  -c/w ceftriaxone and azithromycin for possible superimposed pneumonia  -obtain CT chest -c/w Tamiflu 30mg daily (renally dosed)  -c/w ceftriaxone and azithromycin for possible superimposed pneumonia  -obtain CT chest

## 2020-02-05 NOTE — PROGRESS NOTE ADULT - PROBLEM SELECTOR PLAN 7
Patient with hairy cell leukemia (follows with Dr. Anne every 3 months) and states currently in remission.    -consult Dr. Anne in AM Patient with hairy cell leukemia (follows with Dr. Anne every 3 months) and states currently in remission.    -consult Dr. Anne

## 2020-02-05 NOTE — H&P ADULT - PROBLEM SELECTOR PLAN 10
Transitions of Care Status:  1.  Name of PCP: Dr. Fragoso   2.  PCP Contacted on Admission: [ ] Y    [x ] N    3.  PCP contacted at Discharge: [ ] Y    [ ] N    [ ] N/A  4.  Post-Discharge Appointment Date and Location:  5.  Summary of Handoff given to PCP:

## 2020-02-05 NOTE — PROGRESS NOTE ADULT - PROBLEM SELECTOR PLAN 1
Patient presenting with T 104.5,  BP 76/57 with positive RVP for influenza and elevated lactate 4.6.    -CXR with clear lungs; unclear if patient has superimposed pneumonia with influenza  -s/p 3L IVF and 1x dose of ceftriaxone and azithromycin  -c/w ceftriaxone and azithromycin (day 2); since pt with severe sepsis, would continue with antibiotics  -c/w tamiflu 30mg daily (renally dosed); patient last dose was AM of 2/4 (day 4 of 5)  -UA with moderate bacteria --> asymptomatic bacteruria   -f/u blood and urine culture  -obtain CT chest to r/o pneumonia

## 2020-02-05 NOTE — PROGRESS NOTE ADULT - PROBLEM SELECTOR PLAN 9
DVT ppx: SCDs  Diet: DASH/TLC  Consults: None  Needs medrec in AM -- patient does not fully recall his medications Transitions of Care Status:  1.  Name of PCP: Dr. Fragoso   2.  PCP Contacted on Admission: [ ] Y    [x ] N    3.  PCP contacted at Discharge: [ ] Y    [ ] N    [ ] N/A  4.  Post-Discharge Appointment Date and Location:  5.  Summary of Handoff given to PCP:

## 2020-02-05 NOTE — H&P ADULT - ASSESSMENT
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     #HLD   -c/w atorvastatin 20mg

## 2020-02-06 LAB
ALBUMIN SERPL ELPH-MCNC: 2.4 G/DL — LOW (ref 3.3–5)
ALP SERPL-CCNC: 30 U/L — LOW (ref 40–120)
ALT FLD-CCNC: 33 U/L — SIGNIFICANT CHANGE UP (ref 4–41)
ANION GAP SERPL CALC-SCNC: 13 MMO/L — SIGNIFICANT CHANGE UP (ref 7–14)
AST SERPL-CCNC: 44 U/L — HIGH (ref 4–40)
BACTERIA UR CULT: SIGNIFICANT CHANGE UP
BILIRUB SERPL-MCNC: 0.6 MG/DL — SIGNIFICANT CHANGE UP (ref 0.2–1.2)
BUN SERPL-MCNC: 45 MG/DL — HIGH (ref 7–23)
CALCIUM SERPL-MCNC: 7.8 MG/DL — LOW (ref 8.4–10.5)
CHLORIDE SERPL-SCNC: 105 MMOL/L — SIGNIFICANT CHANGE UP (ref 98–107)
CO2 SERPL-SCNC: 16 MMOL/L — LOW (ref 22–31)
CREAT SERPL-MCNC: 1.48 MG/DL — HIGH (ref 0.5–1.3)
GI PCR PANEL, STOOL: SIGNIFICANT CHANGE UP
GLUCOSE SERPL-MCNC: 91 MG/DL — SIGNIFICANT CHANGE UP (ref 70–99)
HCT VFR BLD CALC: 50.2 % — HIGH (ref 39–50)
HGB BLD-MCNC: 17.1 G/DL — HIGH (ref 13–17)
L PNEUMO AG UR QL: NEGATIVE — SIGNIFICANT CHANGE UP
MAGNESIUM SERPL-MCNC: 2.1 MG/DL — SIGNIFICANT CHANGE UP (ref 1.6–2.6)
MCHC RBC-ENTMCNC: 26.2 PG — LOW (ref 27–34)
MCHC RBC-ENTMCNC: 34.1 % — SIGNIFICANT CHANGE UP (ref 32–36)
MCV RBC AUTO: 77 FL — LOW (ref 80–100)
NRBC # FLD: 0 K/UL — SIGNIFICANT CHANGE UP (ref 0–0)
PLATELET # BLD AUTO: 22 K/UL — LOW (ref 150–400)
PMV BLD: 7.8 FL — SIGNIFICANT CHANGE UP (ref 7–13)
POTASSIUM SERPL-MCNC: 4.1 MMOL/L — SIGNIFICANT CHANGE UP (ref 3.5–5.3)
POTASSIUM SERPL-SCNC: 4.1 MMOL/L — SIGNIFICANT CHANGE UP (ref 3.5–5.3)
PROT SERPL-MCNC: 4.5 G/DL — LOW (ref 6–8.3)
RBC # BLD: 6.52 M/UL — HIGH (ref 4.2–5.8)
RBC # FLD: 16.8 % — HIGH (ref 10.3–14.5)
SODIUM SERPL-SCNC: 134 MMOL/L — LOW (ref 135–145)
SPECIMEN SOURCE: SIGNIFICANT CHANGE UP
SPECIMEN SOURCE: SIGNIFICANT CHANGE UP
WBC # BLD: 6.35 K/UL — SIGNIFICANT CHANGE UP (ref 3.8–10.5)
WBC # FLD AUTO: 6.35 K/UL — SIGNIFICANT CHANGE UP (ref 3.8–10.5)

## 2020-02-06 PROCEDURE — 99233 SBSQ HOSP IP/OBS HIGH 50: CPT

## 2020-02-06 RX ORDER — ALBUTEROL 90 UG/1
1 AEROSOL, METERED ORAL EVERY 4 HOURS
Refills: 0 | Status: DISCONTINUED | OUTPATIENT
Start: 2020-02-06 | End: 2020-02-08

## 2020-02-06 RX ORDER — ALBUTEROL 90 UG/1
2.5 AEROSOL, METERED ORAL EVERY 6 HOURS
Refills: 0 | Status: DISCONTINUED | OUTPATIENT
Start: 2020-02-06 | End: 2020-02-08

## 2020-02-06 RX ADMIN — Medication 1 TABLET(S): at 12:05

## 2020-02-06 RX ADMIN — ALBUTEROL 2.5 MILLIGRAM(S): 90 AEROSOL, METERED ORAL at 22:20

## 2020-02-06 RX ADMIN — ATORVASTATIN CALCIUM 20 MILLIGRAM(S): 80 TABLET, FILM COATED ORAL at 22:08

## 2020-02-06 RX ADMIN — CEFTRIAXONE 100 MILLIGRAM(S): 500 INJECTION, POWDER, FOR SOLUTION INTRAMUSCULAR; INTRAVENOUS at 09:25

## 2020-02-06 RX ADMIN — Medication 100 MILLIGRAM(S): at 14:42

## 2020-02-06 RX ADMIN — Medication 100 MILLIGRAM(S): at 05:38

## 2020-02-06 RX ADMIN — PREGABALIN 1000 MICROGRAM(S): 225 CAPSULE ORAL at 12:05

## 2020-02-06 RX ADMIN — Medication 30 MILLIGRAM(S): at 12:05

## 2020-02-06 RX ADMIN — Medication 100 MILLIGRAM(S): at 22:08

## 2020-02-06 RX ADMIN — ALBUTEROL 2.5 MILLIGRAM(S): 90 AEROSOL, METERED ORAL at 13:29

## 2020-02-06 RX ADMIN — AZITHROMYCIN 255 MILLIGRAM(S): 500 TABLET, FILM COATED ORAL at 12:05

## 2020-02-06 RX ADMIN — Medication 100 MILLIGRAM(S): at 12:06

## 2020-02-06 NOTE — PROGRESS NOTE ADULT - PROBLEM SELECTOR PLAN 6
Patient with watery nonbloody diarrhea  -obtain GI PCR and stool culture Patient with watery nonbloody diarrhea on admission which has now resolved.

## 2020-02-06 NOTE — PROGRESS NOTE ADULT - PROBLEM SELECTOR PLAN 1
sepsis resolving  Patient presenting with T 104.5,  BP 76/57 with positive RVP for influenza and elevated lactate 4.6.    -CT chest negative for Pneumonia  -s/p 3L IVF and 1x dose of ceftriaxone and azithromycin  -c/w ceftriaxone and azithromycin  -c/w tamiflu 30mg daily (renally dosed); patient last dose was AM of 2/4 (day 4 of 5)  blood and urine culture NTD sepsis resolving  Patient presenting with T 104.5,  BP 76/57 with positive RVP for influenza and elevated lactate 4.6.    -CT chest negative for Pneumonia  -s/p 3L IVF and 1x dose of ceftriaxone and azithromycin  -c/w ceftriaxone and azithromycin D#3; if negative cultures tomorrow will watch off abx  -c/w tamiflu 30mg daily (renally dosed); patient last dose was AM of 2/4 (day 4 of 5)  blood and urine culture NTD

## 2020-02-06 NOTE — PROGRESS NOTE ADULT - PROBLEM SELECTOR PLAN 3
-Pleurisy 2/2 flu.  V/Q scan negative for PE.   B/L Venous dopplers negative for DVT  -check TTE to evaluate EF.

## 2020-02-06 NOTE — PROGRESS NOTE ADULT - PROBLEM SELECTOR PLAN 4
Patient with plt in 20,000 range; Patient with hairy cell leukemia (per patient is in remission) c/b severe sepsis  -pt is Shinto and refusing transfusion  -patient with petechiae but no bleeding; continue to monitor  -no pharmacologic AC in the setting of thrombocytopenia  -peripheral smear   -F/U hematology/Oncology Dr. Omid miles Patient with plt in 20,000 range; Patient with hairy cell leukemia (per patient is in remission) c/b severe sepsis  -there was rectal bleeding but this has resolved. will monitor.  -pt is Voodoo and refusing transfusion  -no pharmacologic AC in the setting of thrombocytopenia  -F/U hematology/Oncology Dr. Omid miles

## 2020-02-06 NOTE — PROGRESS NOTE ADULT - PROBLEM SELECTOR PLAN 7
Patient with hairy cell leukemia (follows with Dr. Anne every 3 months) and states currently in remission.    -consult Dr. Anne

## 2020-02-06 NOTE — PROGRESS NOTE ADULT - SUBJECTIVE AND OBJECTIVE BOX
Patient is a 69y old  Male who presents with a chief complaint of Influenza (2020 17:31)      SUBJECTIVE / OVERNIGHT EVENTS:    MEDICATIONS  (STANDING):  atorvastatin 20 milliGRAM(s) Oral at bedtime  azithromycin  IVPB 500 milliGRAM(s) IV Intermittent every 24 hours  benzonatate 100 milliGRAM(s) Oral three times a day  cefTRIAXone   IVPB 1000 milliGRAM(s) IV Intermittent every 24 hours  cyanocobalamin 1000 MICROGram(s) Oral daily  multivitamin 1 Tablet(s) Oral daily  oseltamivir 30 milliGRAM(s) Oral daily    MEDICATIONS  (PRN):  guaiFENesin   Syrup  (Sugar-Free) 100 milliGRAM(s) Oral every 6 hours PRN Cough      Vital Signs Last 24 Hrs  T(C): 36.7 (2020 05:37), Max: 37 (2020 22:10)  T(F): 98.1 (2020 05:37), Max: 98.6 (2020 22:10)  HR: 94 (2020 05:37) (94 - 113)  BP: 105/65 (2020 05:37) (105/65 - 109/77)  BP(mean): --  RR: 18 (2020 05:37) (18 - 18)  SpO2: 96% (2020 05:37) (95% - 96%)  CAPILLARY BLOOD GLUCOSE        I&O's Summary      PHYSICAL EXAM:  GENERAL: NAD, well-developed  HEAD:  Atraumatic, Normocephalic  EYES: EOMI, PERRLA, conjunctiva and sclera clear  NECK: Supple, No JVD  CHEST/LUNG: Clear to auscultation bilaterally; No wheeze  HEART: Regular rate and rhythm; No murmurs, rubs, or gallops  ABDOMEN: Soft, Nontender, Nondistended; Bowel sounds present  EXTREMITIES:  2+ Peripheral Pulses, No clubbing, cyanosis, or edema  PSYCH: AAOx3  NEUROLOGY: non-focal  SKIN: No rashes or lesions    LABS:                        17.1   6.35  )-----------( 22       ( 2020 06:50 )             50.2     02-06    134<L>  |  105  |  45<H>  ----------------------------<  91  4.1   |  16<L>  |  1.48<H>    Ca    7.8<L>      2020 06:50  Phos  2.5     02-  Mg     2.1     02-06    TPro  4.5<L>  /  Alb  2.4<L>  /  TBili  0.6  /  DBili  x   /  AST  44<H>  /  ALT  33  /  AlkPhos  30<L>  02-06    PT/INR - ( 2020 09:50 )   PT: 12.9 SEC;   INR: 1.13          PTT - ( 2020 09:50 )  PTT:34.8 SEC      Urinalysis Basic - ( 2020 01:35 )    Color: ORANGE / Appearance: Lt TURBID / S.027 / pH: 5.5  Gluc: NEGATIVE / Ketone: NEGATIVE  / Bili: NEGATIVE / Urobili: NORMAL   Blood: TRACE / Protein: MODERATE / Nitrite: NEGATIVE   Leuk Esterase: NEGATIVE / RBC: 0-2 / WBC 6-10   Sq Epi: x / Non Sq Epi: FEW / Bacteria: MODERATE        RADIOLOGY & ADDITIONAL TESTS:    Imaging Personally Reviewed: < from: NM Pulmonary Ventilation/Perfusion Scan (20 @ 17:04) >  IMPRESSION: Low probability of pulmonary embolus.      < end of copied text >  < from: CT Chest No Cont (20 @ 14:37) >  IMPRESSION:     1.  No pneumonia.      < end of copied text >  < from: US Duplex Venous Lower Ext Complete, Bilateral (20 @ 12:22) >  IMPRESSION:     No evidence of deep venous thrombosis in either lower extremity.    < end of copied text >      Consultant(s) Notes Reviewed:      Care Discussed with Consultants/Other Providers: Patient is a 69y old  Male who presents with a chief complaint of Influenza (2020 17:31)      SUBJECTIVE / OVERNIGHT EVENTS:  Patient says he had BPRBR yesterday with diarrhea which is now resolved. He says overall he feels better but still weak and SOB. Denies cp,  abdominal pain, N/V/D     MEDICATIONS  (STANDING):  atorvastatin 20 milliGRAM(s) Oral at bedtime  azithromycin  IVPB 500 milliGRAM(s) IV Intermittent every 24 hours  benzonatate 100 milliGRAM(s) Oral three times a day  cefTRIAXone   IVPB 1000 milliGRAM(s) IV Intermittent every 24 hours  cyanocobalamin 1000 MICROGram(s) Oral daily  multivitamin 1 Tablet(s) Oral daily  oseltamivir 30 milliGRAM(s) Oral daily    MEDICATIONS  (PRN):  guaiFENesin   Syrup  (Sugar-Free) 100 milliGRAM(s) Oral every 6 hours PRN Cough      Vital Signs Last 24 Hrs  T(C): 36.7 (2020 05:37), Max: 37 (2020 22:10)  T(F): 98.1 (2020 05:37), Max: 98.6 (2020 22:10)  HR: 94 (2020 05:37) (94 - 113)  BP: 105/65 (2020 05:37) (105/65 - 109/77)  BP(mean): --  RR: 18 (2020 05:37) (18 - 18)  SpO2: 96% (2020 05:37) (95% - 96%)  CAPILLARY BLOOD GLUCOSE        I&O's Summary      PHYSICAL EXAM:  GENERAL: NAD, well-developed  HEAD:  Atraumatic, Normocephalic  EYES: EOMI, PERRLA, conjunctiva and sclera clear  NECK: Supple, No JVD  CHEST/LUNG: Clear to auscultation bilaterally; mild wheezes  HEART: Regular rate and rhythm; No murmurs, rubs, or gallops  ABDOMEN: Soft, Nontender, Nondistended; Bowel sounds present  EXTREMITIES:  2+ Peripheral Pulses, No clubbing, cyanosis, or edema  PSYCH: AAOx3  NEUROLOGY: non-focal  SKIN: No rashes or lesions    LABS:                        17.1   6.35  )-----------( 22       ( 2020 06:50 )             50.2     02-06    134<L>  |  105  |  45<H>  ----------------------------<  91  4.1   |  16<L>  |  1.48<H>    Ca    7.8<L>      2020 06:50  Phos  2.5     02-  Mg     2.1     02-06    TPro  4.5<L>  /  Alb  2.4<L>  /  TBili  0.6  /  DBili  x   /  AST  44<H>  /  ALT  33  /  AlkPhos  30<L>  02-    PT/INR - ( 2020 09:50 )   PT: 12.9 SEC;   INR: 1.13          PTT - ( 2020 09:50 )  PTT:34.8 SEC      Urinalysis Basic - ( 2020 01:35 )    Color: ORANGE / Appearance: Lt TURBID / S.027 / pH: 5.5  Gluc: NEGATIVE / Ketone: NEGATIVE  / Bili: NEGATIVE / Urobili: NORMAL   Blood: TRACE / Protein: MODERATE / Nitrite: NEGATIVE   Leuk Esterase: NEGATIVE / RBC: 0-2 / WBC 6-10   Sq Epi: x / Non Sq Epi: FEW / Bacteria: MODERATE        RADIOLOGY & ADDITIONAL TESTS:    Imaging Personally Reviewed: < from: NM Pulmonary Ventilation/Perfusion Scan (20 @ 17:04) >  IMPRESSION: Low probability of pulmonary embolus.      < end of copied text >  < from: CT Chest No Cont (20 @ 14:37) >  IMPRESSION:     1.  No pneumonia.      < end of copied text >  < from: US Duplex Venous Lower Ext Complete, Bilateral (20 @ 12:22) >  IMPRESSION:     No evidence of deep venous thrombosis in either lower extremity.    < end of copied text >      Consultant(s) Notes Reviewed:      Care Discussed with Consultants/Other Providers:

## 2020-02-06 NOTE — PROGRESS NOTE ADULT - PROBLEM SELECTOR PLAN 5
Patient with elevated Cr of 2.6 now improving after IVFs; unknown baseline Cr but per patient, no history of kidney disease; most likely pre-renal in the setting of decreased PO intake and diarrhea  -trend Cr  -s/p 3L IVFs  -Renal US negative  - avoid nephrotoxic agents.

## 2020-02-07 LAB
ANION GAP SERPL CALC-SCNC: 12 MMO/L — SIGNIFICANT CHANGE UP (ref 7–14)
BACTERIA STL CULT: SIGNIFICANT CHANGE UP
BASOPHILS # BLD AUTO: 0.03 K/UL — SIGNIFICANT CHANGE UP (ref 0–0.2)
BASOPHILS NFR BLD AUTO: 0.5 % — SIGNIFICANT CHANGE UP (ref 0–2)
BUN SERPL-MCNC: 38 MG/DL — HIGH (ref 7–23)
CALCIUM SERPL-MCNC: 8 MG/DL — LOW (ref 8.4–10.5)
CHLORIDE SERPL-SCNC: 108 MMOL/L — HIGH (ref 98–107)
CO2 SERPL-SCNC: 16 MMOL/L — LOW (ref 22–31)
CREAT SERPL-MCNC: 1.46 MG/DL — HIGH (ref 0.5–1.3)
EOSINOPHIL # BLD AUTO: 0.06 K/UL — SIGNIFICANT CHANGE UP (ref 0–0.5)
EOSINOPHIL NFR BLD AUTO: 1.1 % — SIGNIFICANT CHANGE UP (ref 0–6)
GLUCOSE SERPL-MCNC: 110 MG/DL — HIGH (ref 70–99)
HCT VFR BLD CALC: 49.3 % — SIGNIFICANT CHANGE UP (ref 39–50)
HGB BLD-MCNC: 16.4 G/DL — SIGNIFICANT CHANGE UP (ref 13–17)
IMM GRANULOCYTES NFR BLD AUTO: 1.1 % — SIGNIFICANT CHANGE UP (ref 0–1.5)
LYMPHOCYTES # BLD AUTO: 1.58 K/UL — SIGNIFICANT CHANGE UP (ref 1–3.3)
LYMPHOCYTES # BLD AUTO: 27.8 % — SIGNIFICANT CHANGE UP (ref 13–44)
MAGNESIUM SERPL-MCNC: 2.1 MG/DL — SIGNIFICANT CHANGE UP (ref 1.6–2.6)
MCHC RBC-ENTMCNC: 25.4 PG — LOW (ref 27–34)
MCHC RBC-ENTMCNC: 33.3 % — SIGNIFICANT CHANGE UP (ref 32–36)
MCV RBC AUTO: 76.4 FL — LOW (ref 80–100)
MONOCYTES # BLD AUTO: 0.63 K/UL — SIGNIFICANT CHANGE UP (ref 0–0.9)
MONOCYTES NFR BLD AUTO: 11.1 % — SIGNIFICANT CHANGE UP (ref 2–14)
NEUTROPHILS # BLD AUTO: 3.32 K/UL — SIGNIFICANT CHANGE UP (ref 1.8–7.4)
NEUTROPHILS NFR BLD AUTO: 58.4 % — SIGNIFICANT CHANGE UP (ref 43–77)
NRBC # FLD: 0 K/UL — SIGNIFICANT CHANGE UP (ref 0–0)
PLATELET # BLD AUTO: 24 K/UL — LOW (ref 150–400)
PMV BLD: SIGNIFICANT CHANGE UP FL (ref 7–13)
POTASSIUM SERPL-MCNC: 3.8 MMOL/L — SIGNIFICANT CHANGE UP (ref 3.5–5.3)
POTASSIUM SERPL-SCNC: 3.8 MMOL/L — SIGNIFICANT CHANGE UP (ref 3.5–5.3)
RBC # BLD: 6.45 M/UL — HIGH (ref 4.2–5.8)
RBC # FLD: 16.5 % — HIGH (ref 10.3–14.5)
SODIUM SERPL-SCNC: 136 MMOL/L — SIGNIFICANT CHANGE UP (ref 135–145)
WBC # BLD: 5.68 K/UL — SIGNIFICANT CHANGE UP (ref 3.8–10.5)
WBC # FLD AUTO: 5.68 K/UL — SIGNIFICANT CHANGE UP (ref 3.8–10.5)

## 2020-02-07 PROCEDURE — 93306 TTE W/DOPPLER COMPLETE: CPT | Mod: 26

## 2020-02-07 PROCEDURE — 99233 SBSQ HOSP IP/OBS HIGH 50: CPT

## 2020-02-07 RX ADMIN — ALBUTEROL 2.5 MILLIGRAM(S): 90 AEROSOL, METERED ORAL at 03:40

## 2020-02-07 RX ADMIN — AZITHROMYCIN 255 MILLIGRAM(S): 500 TABLET, FILM COATED ORAL at 13:08

## 2020-02-07 RX ADMIN — PREGABALIN 1000 MICROGRAM(S): 225 CAPSULE ORAL at 13:09

## 2020-02-07 RX ADMIN — ALBUTEROL 2.5 MILLIGRAM(S): 90 AEROSOL, METERED ORAL at 10:24

## 2020-02-07 RX ADMIN — ALBUTEROL 2.5 MILLIGRAM(S): 90 AEROSOL, METERED ORAL at 15:40

## 2020-02-07 RX ADMIN — CEFTRIAXONE 100 MILLIGRAM(S): 500 INJECTION, POWDER, FOR SOLUTION INTRAMUSCULAR; INTRAVENOUS at 10:15

## 2020-02-07 RX ADMIN — Medication 1 TABLET(S): at 13:09

## 2020-02-07 RX ADMIN — ALBUTEROL 2.5 MILLIGRAM(S): 90 AEROSOL, METERED ORAL at 21:40

## 2020-02-07 RX ADMIN — Medication 100 MILLIGRAM(S): at 05:51

## 2020-02-07 RX ADMIN — ATORVASTATIN CALCIUM 20 MILLIGRAM(S): 80 TABLET, FILM COATED ORAL at 22:05

## 2020-02-07 RX ADMIN — Medication 100 MILLIGRAM(S): at 22:05

## 2020-02-07 RX ADMIN — Medication 100 MILLIGRAM(S): at 13:09

## 2020-02-07 NOTE — PROGRESS NOTE ADULT - PROBLEM SELECTOR PLAN 4
Patient with plt in 20,000 range; Patient with hairy cell leukemia (per patient is in remission) c/b severe sepsis  -there was rectal bleeding but this has resolved. will monitor.  -pt is [employer] and refusing transfusion  -no pharmacologic AC in the setting of thrombocytopenia  -spoke to hematology/Oncology Dr. Anne and wants to watch PLTs for another day.

## 2020-02-07 NOTE — PROGRESS NOTE ADULT - SUBJECTIVE AND OBJECTIVE BOX
Pt feels better, cough is less, no SOB, no CP, no fevers or chills, no diarrhea, has a rash over the hands, feet and back. No bleeding from any sources. Overall, he is feeling better.      Meds:  ALBUTerol    0.083% 2.5 milliGRAM(s) Nebulizer every 6 hours  ALBUTerol    90 MICROgram(s) HFA Inhaler 1 Puff(s) Inhalation every 4 hours  atorvastatin 20 milliGRAM(s) Oral at bedtime  azithromycin  IVPB 500 milliGRAM(s) IV Intermittent every 24 hours  benzonatate 100 milliGRAM(s) Oral three times a day  cefTRIAXone   IVPB 1000 milliGRAM(s) IV Intermittent every 24 hours  cyanocobalamin 1000 MICROGram(s) Oral daily  guaiFENesin   Syrup  (Sugar-Free) 100 milliGRAM(s) Oral every 6 hours PRN  multivitamin 1 Tablet(s) Oral daily      Vital Signs Last 24 Hrs  T(C): 36.6 (07 Feb 2020 05:50), Max: 36.7 (06 Feb 2020 23:00)  T(F): 97.9 (07 Feb 2020 05:50), Max: 98 (06 Feb 2020 23:00)  HR: 101 (07 Feb 2020 05:50) (94 - 101)  BP: 100/70 (07 Feb 2020 05:50) (100/70 - 118/70)  BP(mean): --  RR: 18 (07 Feb 2020 05:50) (18 - 18)  SpO2: 95% (07 Feb 2020 05:50) (95% - 96%)                          16.4   5.68  )-----------( 24       ( 07 Feb 2020 07:55 )             49.3       02-07    136  |  108<H>  |  38<H>  ----------------------------<  110<H>  3.8   |  16<L>  |  1.46<H>    Ca    8.0<L>      07 Feb 2020 07:55  Phos  2.5     02-05  Mg     2.1     02-07    TPro  4.5<L>  /  Alb  2.4<L>  /  TBili  0.6  /  DBili  x   /  AST  44<H>  /  ALT  33  /  AlkPhos  30<L>  02-06              PT/INR - ( 05 Feb 2020 09:50 )   PT: 12.9 SEC;   INR: 1.13          PTT - ( 05 Feb 2020 09:50 )  PTT:34.8 SEC

## 2020-02-07 NOTE — PROGRESS NOTE ADULT - PROBLEM SELECTOR PLAN 1
sepsis resolving  Patient presenting with T 104.5,  BP 76/57 with positive RVP for influenza and elevated lactate 4.6.    -CT chest negative for Pneumonia  -s/p 3L IVF and 1x dose of ceftriaxone and azithromycin  -on ceftriaxone and azithromycin D#4;  -Blood Cx/Urine cx still NTD and imaging negative for pneumonia.  - will D/C abx today and monitor off abx.    -c/w tamiflu 30mg daily (renally dosed); patient last dose was AM of 2/4 (day 4 of 5) sepsis resolving  Patient presenting with T 104.5,  BP 76/57 with positive RVP for influenza and elevated lactate 4.6.    -CT chest negative for Pneumonia  -s/p 3L IVF and 1x dose of ceftriaxone and azithromycin  -on ceftriaxone and azithromycin D#4.  -Blood Cx/Urine cx still NTD and imaging negative for pneumonia.  - will D/C abx today and monitor off abx.    -c/w tamiflu 30mg daily (renally dosed)

## 2020-02-07 NOTE — PROGRESS NOTE ADULT - PROBLEM SELECTOR PLAN 7
Patient with hairy cell leukemia (follows with Dr. Anne every 3 months) and states currently in remission.    -Dr. Anne following

## 2020-02-07 NOTE — PROGRESS NOTE ADULT - SUBJECTIVE AND OBJECTIVE BOX
Patient is a 69y old  Male who presents with a chief complaint of Influenza (07 Feb 2020 09:38)      SUBJECTIVE / OVERNIGHT EVENTS:  Patient has no new complaints. Denies cp, SOB, abdominal pain, N/V/D     MEDICATIONS  (STANDING):  ALBUTerol    0.083% 2.5 milliGRAM(s) Nebulizer every 6 hours  ALBUTerol    90 MICROgram(s) HFA Inhaler 1 Puff(s) Inhalation every 4 hours  atorvastatin 20 milliGRAM(s) Oral at bedtime  azithromycin  IVPB 500 milliGRAM(s) IV Intermittent every 24 hours  benzonatate 100 milliGRAM(s) Oral three times a day  cefTRIAXone   IVPB 1000 milliGRAM(s) IV Intermittent every 24 hours  cyanocobalamin 1000 MICROGram(s) Oral daily  multivitamin 1 Tablet(s) Oral daily    MEDICATIONS  (PRN):  guaiFENesin   Syrup  (Sugar-Free) 100 milliGRAM(s) Oral every 6 hours PRN Cough      Vital Signs Last 24 Hrs  T(C): 36.6 (07 Feb 2020 05:50), Max: 36.7 (06 Feb 2020 23:00)  T(F): 97.9 (07 Feb 2020 05:50), Max: 98 (06 Feb 2020 23:00)  HR: 100 (07 Feb 2020 10:24) (94 - 101)  BP: 100/70 (07 Feb 2020 05:50) (100/70 - 118/70)  BP(mean): --  RR: 18 (07 Feb 2020 05:50) (18 - 18)  SpO2: 95% (07 Feb 2020 05:50) (95% - 96%)  CAPILLARY BLOOD GLUCOSE        I&O's Summary      PHYSICAL EXAM:  GENERAL: NAD, well-developed  HEAD:  Atraumatic, Normocephalic  EYES: EOMI, PERRLA, conjunctiva and sclera clear  NECK: Supple, No JVD  CHEST/LUNG: Clear to auscultation bilaterally; No wheeze  HEART: Regular rate and rhythm; No murmurs, rubs, or gallops  ABDOMEN: Soft, Nontender, Nondistended; Bowel sounds present  EXTREMITIES:  2+ Peripheral Pulses, No clubbing, cyanosis, or edema  PSYCH: AAOx3  NEUROLOGY: non-focal  SKIN: No rashes or lesions    LABS:                        16.4   5.68  )-----------( 24       ( 07 Feb 2020 07:55 )             49.3     02-07    136  |  108<H>  |  38<H>  ----------------------------<  110<H>  3.8   |  16<L>  |  1.46<H>    Ca    8.0<L>      07 Feb 2020 07:55  Mg     2.1     02-07    TPro  4.5<L>  /  Alb  2.4<L>  /  TBili  0.6  /  DBili  x   /  AST  44<H>  /  ALT  33  /  AlkPhos  30<L>  02-06              RADIOLOGY & ADDITIONAL TESTS:    Imaging Personally Reviewed:     Consultant(s) Notes Reviewed:      Care Discussed with Consultants/Other Providers: Patient is a 69y old  Male who presents with a chief complaint of Influenza (07 Feb 2020 09:38)      SUBJECTIVE / OVERNIGHT EVENTS:  Patient has no new complaints. He feels much better and denies further BRBPR or diarrhea.  Denies cp, SOB, abdominal pain, N/V.    MEDICATIONS  (STANDING):  ALBUTerol    0.083% 2.5 milliGRAM(s) Nebulizer every 6 hours  ALBUTerol    90 MICROgram(s) HFA Inhaler 1 Puff(s) Inhalation every 4 hours  atorvastatin 20 milliGRAM(s) Oral at bedtime  azithromycin  IVPB 500 milliGRAM(s) IV Intermittent every 24 hours  benzonatate 100 milliGRAM(s) Oral three times a day  cefTRIAXone   IVPB 1000 milliGRAM(s) IV Intermittent every 24 hours  cyanocobalamin 1000 MICROGram(s) Oral daily  multivitamin 1 Tablet(s) Oral daily    MEDICATIONS  (PRN):  guaiFENesin   Syrup  (Sugar-Free) 100 milliGRAM(s) Oral every 6 hours PRN Cough      Vital Signs Last 24 Hrs  T(C): 36.6 (07 Feb 2020 05:50), Max: 36.7 (06 Feb 2020 23:00)  T(F): 97.9 (07 Feb 2020 05:50), Max: 98 (06 Feb 2020 23:00)  HR: 100 (07 Feb 2020 10:24) (94 - 101)  BP: 100/70 (07 Feb 2020 05:50) (100/70 - 118/70)  BP(mean): --  RR: 18 (07 Feb 2020 05:50) (18 - 18)  SpO2: 95% (07 Feb 2020 05:50) (95% - 96%)  CAPILLARY BLOOD GLUCOSE        I&O's Summary      PHYSICAL EXAM:  GENERAL: NAD, well-developed  HEAD:  Atraumatic, Normocephalic  EYES: EOMI, PERRLA, conjunctiva and sclera clear  NECK: Supple, No JVD  CHEST/LUNG: Clear to auscultation bilaterally; No wheeze  HEART: Regular rate and rhythm; No murmurs, rubs, or gallops  ABDOMEN: Soft, Nontender, Nondistended; Bowel sounds present  EXTREMITIES:  2+ Peripheral Pulses, No clubbing, cyanosis, or edema  PSYCH: AAOx3  NEUROLOGY: non-focal  SKIN: No rashes or lesions    LABS:                        16.4   5.68  )-----------( 24       ( 07 Feb 2020 07:55 )             49.3     02-07    136  |  108<H>  |  38<H>  ----------------------------<  110<H>  3.8   |  16<L>  |  1.46<H>    Ca    8.0<L>      07 Feb 2020 07:55  Mg     2.1     02-07    TPro  4.5<L>  /  Alb  2.4<L>  /  TBili  0.6  /  DBili  x   /  AST  44<H>  /  ALT  33  /  AlkPhos  30<L>  02-06              RADIOLOGY & ADDITIONAL TESTS:    Imaging Personally Reviewed:     Consultant(s) Notes Reviewed:      Care Discussed with Consultants/Other Providers:

## 2020-02-08 ENCOUNTER — TRANSCRIPTION ENCOUNTER (OUTPATIENT)
Age: 70
End: 2020-02-08

## 2020-02-08 VITALS
DIASTOLIC BLOOD PRESSURE: 68 MMHG | RESPIRATION RATE: 17 BRPM | OXYGEN SATURATION: 98 % | TEMPERATURE: 98 F | HEART RATE: 90 BPM | SYSTOLIC BLOOD PRESSURE: 115 MMHG

## 2020-02-08 LAB
ANION GAP SERPL CALC-SCNC: 11 MMO/L — SIGNIFICANT CHANGE UP (ref 7–14)
BUN SERPL-MCNC: 27 MG/DL — HIGH (ref 7–23)
CALCIUM SERPL-MCNC: 8.1 MG/DL — LOW (ref 8.4–10.5)
CHLORIDE SERPL-SCNC: 107 MMOL/L — SIGNIFICANT CHANGE UP (ref 98–107)
CO2 SERPL-SCNC: 20 MMOL/L — LOW (ref 22–31)
CREAT SERPL-MCNC: 1.27 MG/DL — SIGNIFICANT CHANGE UP (ref 0.5–1.3)
GLUCOSE SERPL-MCNC: 93 MG/DL — SIGNIFICANT CHANGE UP (ref 70–99)
HCT VFR BLD CALC: 48.5 % — SIGNIFICANT CHANGE UP (ref 39–50)
HGB BLD-MCNC: 16.4 G/DL — SIGNIFICANT CHANGE UP (ref 13–17)
MAGNESIUM SERPL-MCNC: 2 MG/DL — SIGNIFICANT CHANGE UP (ref 1.6–2.6)
MCHC RBC-ENTMCNC: 26.3 PG — LOW (ref 27–34)
MCHC RBC-ENTMCNC: 33.8 % — SIGNIFICANT CHANGE UP (ref 32–36)
MCV RBC AUTO: 77.7 FL — LOW (ref 80–100)
NRBC # FLD: 0 K/UL — SIGNIFICANT CHANGE UP (ref 0–0)
PHOSPHATE SERPL-MCNC: 4 MG/DL — SIGNIFICANT CHANGE UP (ref 2.5–4.5)
PLATELET # BLD AUTO: 43 K/UL — LOW (ref 150–400)
PMV BLD: SIGNIFICANT CHANGE UP FL (ref 7–13)
POTASSIUM SERPL-MCNC: 4 MMOL/L — SIGNIFICANT CHANGE UP (ref 3.5–5.3)
POTASSIUM SERPL-SCNC: 4 MMOL/L — SIGNIFICANT CHANGE UP (ref 3.5–5.3)
RBC # BLD: 6.24 M/UL — HIGH (ref 4.2–5.8)
RBC # FLD: 17.4 % — HIGH (ref 10.3–14.5)
SODIUM SERPL-SCNC: 138 MMOL/L — SIGNIFICANT CHANGE UP (ref 135–145)
WBC # BLD: 7.48 K/UL — SIGNIFICANT CHANGE UP (ref 3.8–10.5)
WBC # FLD AUTO: 7.48 K/UL — SIGNIFICANT CHANGE UP (ref 3.8–10.5)

## 2020-02-08 PROCEDURE — 99239 HOSP IP/OBS DSCHRG MGMT >30: CPT

## 2020-02-08 RX ORDER — METOPROLOL TARTRATE 50 MG
1 TABLET ORAL
Qty: 0 | Refills: 0 | DISCHARGE

## 2020-02-08 RX ADMIN — Medication 1 TABLET(S): at 13:10

## 2020-02-08 RX ADMIN — PREGABALIN 1000 MICROGRAM(S): 225 CAPSULE ORAL at 13:10

## 2020-02-08 RX ADMIN — ALBUTEROL 2.5 MILLIGRAM(S): 90 AEROSOL, METERED ORAL at 10:10

## 2020-02-08 RX ADMIN — ALBUTEROL 2.5 MILLIGRAM(S): 90 AEROSOL, METERED ORAL at 04:30

## 2020-02-08 RX ADMIN — Medication 100 MILLIGRAM(S): at 06:30

## 2020-02-08 RX ADMIN — Medication 100 MILLIGRAM(S): at 13:10

## 2020-02-08 NOTE — PROGRESS NOTE ADULT - EXTREMITIES COMMENTS
trace both legs
there is fine, maculopapular, purple colored rash over the hands, feet and upper back

## 2020-02-08 NOTE — PROGRESS NOTE ADULT - ASSESSMENT
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 No bleeding from any where as per the wife.     will recommend:  - monitor plts closely - better today, no bleeding, rash is also going away  - continue Rx as per medicine  - all other supportive Rx  - discussed issues in great detail with the pt, wife and other family members    to f/u as oupt after discharge in 7-10 days, unless any bleeding, fr which, I will see him right away
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 No bleeding from any where as per the wife.     will recommend:  - monitor plts closely  - continue Rx as per medicine  - he is [employer] and will accept blood products - asked him again today, he will not accept blood products even if there is serious and life threatening bleeding  - even IVIG may not be acceptable to him  - will treat ITP with steroids if needed  - all other supportive Rx  - discussed issues in great detail with the pt and answered all his questions    Please call for questions - [phone]

## 2020-02-08 NOTE — PROGRESS NOTE ADULT - PROBLEM SELECTOR PLAN 4
Patient with plt in 20,000 range; Patient with hairy cell leukemia (per patient is in remission) c/b severe sepsis  -there was rectal bleeding but this has resolved. will monitor.  -pt is [employer] and refusing transfusion  -no pharmacologic AC in the setting of thrombocytopenia  -spoke to hematology/Oncology Dr. Anne and wants to watch PLTs for another day.  -plts stable, dc today

## 2020-02-08 NOTE — PROGRESS NOTE ADULT - SUBJECTIVE AND OBJECTIVE BOX
Pt has been doing much better, rash is much better, very little cough, no pain or bleeding and a detailed ROS unremarkable.       Meds:  ALBUTerol    0.083% 2.5 milliGRAM(s) Nebulizer every 6 hours  ALBUTerol    90 MICROgram(s) HFA Inhaler 1 Puff(s) Inhalation every 4 hours  atorvastatin 20 milliGRAM(s) Oral at bedtime  benzonatate 100 milliGRAM(s) Oral three times a day  cyanocobalamin 1000 MICROGram(s) Oral daily  guaiFENesin   Syrup  (Sugar-Free) 100 milliGRAM(s) Oral every 6 hours PRN  multivitamin 1 Tablet(s) Oral daily      Vital Signs Last 24 Hrs  T(C): 36.7 (08 Feb 2020 13:11), Max: 37.2 (08 Feb 2020 06:29)  T(F): 98 (08 Feb 2020 13:11), Max: 98.9 (08 Feb 2020 06:29)  HR: 90 (08 Feb 2020 13:11) (90 - 100)  BP: 115/68 (08 Feb 2020 13:11) (115/68 - 120/68)  BP(mean): --  RR: 17 (08 Feb 2020 13:11) (17 - 18)  SpO2: 98% (08 Feb 2020 13:11) (96% - 98%)                          16.4   7.48  )-----------( 43       ( 08 Feb 2020 08:05 )             48.5       02-08    138  |  107  |  27<H>  ----------------------------<  93  4.0   |  20<L>  |  1.27    Ca    8.1<L>      08 Feb 2020 08:05  Phos  4.0     02-08  Mg     2.0     02-08

## 2020-02-08 NOTE — PROGRESS NOTE ADULT - PROBLEM SELECTOR PLAN 1
resolved   Patient presenting with T 104.5,  BP 76/57 with positive RVP for influenza and elevated lactate 4.6.    -CT chest negative for Pneumonia  -s/p 3L IVF and 1x dose of ceftriaxone and azithromycin  -on ceftriaxone and azithromycin D#4.  -Blood Cx/Urine cx still NTD and imaging negative for pneumonia.  -s/p abx and tamiflu

## 2020-02-08 NOTE — DISCHARGE NOTE PROVIDER - PROVIDER TOKENS
PROVIDER:[TOKEN:[[id number]]] PROVIDER:[TOKEN:[[id number]]],PROVIDER:[TOKEN:[[id number]]]

## 2020-02-08 NOTE — DISCHARGE NOTE PROVIDER - HOSPITAL COURSE
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         ·1)  Severe sepsis.  Plan: sepsis resolving    Patient presenting with T 104.5,  BP 76/57 with positive RVP for influenza and elevated lactate 4.6.      -CT chest negative for Pneumonia    -s/p 3L IVF and 1x dose of ceftriaxone and azithromycin    -on ceftriaxone and azithromycin D#4. Now discontinued    -Blood Cx/Urine cx still NTD and imaging negative for pneumonia.    -c/w tamiflu 30mg daily (renally dosed).            · 2) Influenza.  Plan: -c/w Tamiflu 30mg daily (renally dosed)    -CT chest negative.         · 3) Plan: -Pleurisy 2/2 flu.    V/Q scan negative for PE.     B/L Venous dopplers negative for DVT    -check TTE to evaluate EF.          Problem/Plan - 4:    ·  Problem: Thrombocytopenia.  Plan: Patient with plt in 20,000 range; Patient with hairy cell leukemia (per patient is in remission) c/b severe sepsis    -there was rectal bleeding but this has resolved. will monitor.    -pt is Evangel[employer] and refusing transfusion    -no pharmacologic AC in the setting of thrombocytopenia    -spoke to hematology/Oncology Dr. Anne and wants to watch PLTs for another day.          Problem/Plan - 5:    ·  Problem: LADI (acute kidney injury).  Plan: Patient with elevated Cr of 2.6 now improving after IVFs; unknown baseline Cr but per patient, no history of kidney disease; most likely pre-renal in the setting of decreased PO intake and diarrhea    -trend Cr    -s/p 3L IVFs    -Renal US negative    - avoid nephrotoxic agents.          Problem/Plan - 6:    Problem: Diarrhea. Plan: Patient with watery nonbloody diarrhea on admission which has now resolved.         Problem/Plan - 7:    ·  Problem: Hairy cell leukemia.  Plan: Patient with hairy cell leukemia (follows with Dr. Anne every 3 months) and states currently in remission.      -Dr. Anne following.          Problem/Plan - 8:    ·  Problem: HTN (hypertension).  Plan: -at home on metoprolol 25mg BID    -hold in the setting of severe sepsis. 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         ·1)  Severe sepsis.  Plan: sepsis resolving    Patient presenting with T 104.5,  BP 76/57 with positive RVP for influenza and elevated lactate 4.6.      -CT chest negative for Pneumonia    -s/p 3L IVF and 1x dose of ceftriaxone and azithromycin    -on ceftriaxone and azithromycin D#4. Now discontinued    -Blood Cx/Urine cx still NTD and imaging negative for pneumonia.    -c/w tamiflu 30mg daily (renally dosed).            · 2) Influenza.  Plan: -c/w Tamiflu 30mg daily (renally dosed)    -CT chest negative.         · 3) Plan: -Pleurisy 2/2 flu.    V/Q scan negative for PE.     B/L Venous dopplers negative for DVT    -check TTE to evaluate EF.         · 4) Thrombocytopenia.  Plan: Patient with plt in 20,000 range; Patient with hairy cell leukemia (per patient is in remission) c/b severe sepsis    -there was rectal bleeding but this has resolved. will monitor.    -pt is Yarsanism and refusing transfusion    -no pharmacologic AC in the setting of thrombocytopenia    -spoke to hematology/Oncology Dr. Anne and wants to watch PLTs for another day.         · 5) LADI (acute kidney injury).  Plan: Patient with elevated Cr of 2.6 now improving after IVFs; unknown baseline Cr but per patient, no history of kidney disease; most likely pre-renal in the setting of decreased PO intake and diarrhea    -trend Cr    -s/p 3L IVFs    -Renal US negative    - avoid nephrotoxic agents.         6) Diarrhea. Plan: Patient with watery nonbloody diarrhea on admission which has now resolved.        · 7) Hairy cell leukemia.  Plan: Patient with hairy cell leukemia (follows with Dr. Anne every 3 months) and states currently in remission.      -Dr. Anne following.         ·  8) HTN (hypertension).  Plan: -at home on metoprolol 25mg BID    -hold in the setting of severe sepsis.     Case discussed with attending, Pt is stable for discharge Home. 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         ·1)  Severe sepsis.  Plan: sepsis resolving    Patient presenting with T 104.5,  BP 76/57 with positive RVP for influenza and elevated lactate 4.6.      -CT chest negative for Pneumonia    -s/p 3L IVF and 1x dose of ceftriaxone and azithromycin    -on ceftriaxone and azithromycin D#4. Now discontinued    -Blood Cx/Urine cx still NTD and imaging negative for pneumonia.    -c/w tamiflu 30mg daily (renally dosed).            · 2) Influenza.  Plan: -c/w Tamiflu 30mg daily (renally dosed)    -CT chest negative.         · 3) Plan: -Pleurisy 2/2 flu.    V/Q scan negative for PE.     B/L Venous dopplers negative for DVT            · 4) Thrombocytopenia.  Plan: Patient with plt in 20,000 range; Patient with hairy cell leukemia (per patient is in remission) c/b severe sepsis    -there was rectal bleeding but this has resolved. will monitor.    -pt is [employer] and refusing transfusion    -no pharmacologic AC in the setting of thrombocytopenia    -hematology/Oncology Dr. Anne aware         · 5) LADI (acute kidney injury).  Plan: Patient with elevated Cr of 2.6 now improving after IVFs; unknown baseline Cr but per patient, no history of kidney disease; most likely pre-renal in the setting of decreased PO intake and diarrhea    -trend Cr    -s/p 3L IVFs    -Renal US negative    - avoid nephrotoxic agents.         6) Diarrhea. Plan: Patient with watery nonbloody diarrhea on admission which has now resolved.        · 7) Hairy cell leukemia.  Plan: Patient with hairy cell leukemia (follows with Dr. Anne every 3 months) and states currently in remission.      -Dr. Anne following.         Case discussed with attending, Pt is stable for discharge Home.

## 2020-02-08 NOTE — DISCHARGE NOTE PROVIDER - NSDCMRMEDTOKEN_GEN_ALL_CORE_FT
Lipitor 20 mg oral tablet: 1 tab(s) orally once a day  oni stated pt not currently taking this medication  Metoprolol Tartrate 25 mg oral tablet: 1 tab(s) orally 2 times a day  oni stated pt not currently taking this medication  Multiple Vitamins oral tablet: 1 tab(s) orally once a day  Vitamin B-12 100 mcg oral tablet: 1 tab(s) orally once a day benzonatate 100 mg oral capsule: 1 cap(s) orally 3 times a day  guaiFENesin 100 mg/5 mL oral liquid: 5 milliliter(s) orally every 6 hours, As needed, Cough  Lipitor 20 mg oral tablet: 1 tab(s) orally once a day  oni stated pt not currently taking this medication  Multiple Vitamins oral tablet: 1 tab(s) orally once a day  Vitamin B-12 100 mcg oral tablet: 1 tab(s) orally once a day

## 2020-02-08 NOTE — DISCHARGE NOTE NURSING/CASE MANAGEMENT/SOCIAL WORK - PATIENT PORTAL LINK FT
You can access the FollowMyHealth Patient Portal offered by [facility] by registering at the following website: [email or web address]. By joining [facility]’s FollowMyHealth portal, you will also be able to view your health information using other applications (apps) compatible with our system.

## 2020-02-08 NOTE — PROGRESS NOTE ADULT - SUBJECTIVE AND OBJECTIVE BOX
[facility] Division of Hospital Medicine  Antwon RodriguezDO  Pager (M-F, 8A-5P): [phone]      Patient is a 69y old  Male who presents with a chief complaint of Influenza (08 Feb 2020 12:30)      SUBJECTIVE / OVERNIGHT EVENTS: feels well, ready to go home.   ADDITIONAL REVIEW OF SYSTEMS: no cp/sob    MEDICATIONS  (STANDING):  ALBUTerol    0.083% 2.5 milliGRAM(s) Nebulizer every 6 hours  ALBUTerol    90 MICROgram(s) HFA Inhaler 1 Puff(s) Inhalation every 4 hours  atorvastatin 20 milliGRAM(s) Oral at bedtime  benzonatate 100 milliGRAM(s) Oral three times a day  cyanocobalamin 1000 MICROGram(s) Oral daily  multivitamin 1 Tablet(s) Oral daily    MEDICATIONS  (PRN):  guaiFENesin   Syrup  (Sugar-Free) 100 milliGRAM(s) Oral every 6 hours PRN Cough      CAPILLARY BLOOD GLUCOSE        I&O's Summary      PHYSICAL EXAM:  Vital Signs Last 24 Hrs  T(C): 36.7 (08 Feb 2020 13:11), Max: 37.2 (08 Feb 2020 06:29)  T(F): 98 (08 Feb 2020 13:11), Max: 98.9 (08 Feb 2020 06:29)  HR: 90 (08 Feb 2020 13:11) (90 - 100)  BP: 115/68 (08 Feb 2020 13:11) (115/68 - 120/68)  BP(mean): --  RR: 17 (08 Feb 2020 13:11) (17 - 18)  SpO2: 98% (08 Feb 2020 13:11) (96% - 98%)  CONSTITUTIONAL: NAD, well-developed, well-groomed  RESPIRATORY: Normal respiratory effort; lungs are clear to auscultation bilaterally  CARDIOVASCULAR: Regular rate and rhythm, normal S1 and S2, no murmur/rub/gallop  ABDOMEN: Nontender to palpation, normoactive bowel sounds, no rebound/guarding  MUSKULOSKELETAL:  no clubbing or cyanosis of digits; no joint swelling or tenderness to palpation  PSYCH: A+O to person, place, and time; affect appropriate  NEUROLOGY: CN 2-12 are intact and symmetric; no gross sensory deficits;   SKIN: No rashes; no palpable lesions    LABS:                        16.4   7.48  )-----------( 43       ( 08 Feb 2020 08:05 )             48.5     02-08    138  |  107  |  27<H>  ----------------------------<  93  4.0   |  20<L>  |  1.27    Ca    8.1<L>      08 Feb 2020 08:05  Phos  4.0     02-08  Mg     2.0     02-08                  RADIOLOGY & ADDITIONAL TESTS:  Results Reviewed:   Imaging Personally Reviewed:  Electrocardiogram Personally Reviewed:    COORDINATION OF CARE:  Care Discussed with Consultants/Other Providers [Y/N]: Y  Prior or Outpatient Records Reviewed [Y/N]: Y

## 2020-02-08 NOTE — DISCHARGE NOTE PROVIDER - NSDCCPCAREPLAN_GEN_ALL_CORE_FT
PRINCIPAL DISCHARGE DIAGNOSIS  Diagnosis: Sepsis  Assessment and Plan of Treatment: In The setting of the Flu   Follow up with your PMD in 1 week      SECONDARY DISCHARGE DIAGNOSES  Diagnosis: Hairy cell leukemia  Assessment and Plan of Treatment: Follow up with your Oncologist in 1-2 weeks    Diagnosis: Diarrhea  Assessment and Plan of Treatment: Resolved    Diagnosis: Thrombocytopenia  Assessment and Plan of Treatment: Follow up with Your Oncologist    Diagnosis: HTN (hypertension)  Assessment and Plan of Treatment: Low sodium and fat diet, continue anti-hypertensive medications, and follow up with primary care physician.      Diagnosis: LADI (acute kidney injury)  Assessment and Plan of Treatment: Resolved    Diagnosis: Influenza  Assessment and Plan of Treatment: s/p Tamiflu

## 2020-02-08 NOTE — PROGRESS NOTE ADULT - PROBLEM SELECTOR PLAN 5
Patient with elevated Cr of 2.6 now improving after IVFs; unknown baseline Cr but per patient, no history of kidney disease; most likely pre-renal in the setting of decreased PO intake and diarrhea  -Cr downtrending   -s/p 3L IVFs  -Renal US negative  - avoid nephrotoxic agents.

## 2020-02-08 NOTE — DISCHARGE NOTE PROVIDER - CARE PROVIDER_API CALL
Anurag Fragoso)  Internal Medicine  [facility], [address]  [place] [address]  Phone: [phone]  Fax: [phone]  Follow Up Time: Anurag Fragoso)  Internal Medicine  [address], [address]  [place] [address]  Phone: [phone]  Fax: [phone]  Follow Up Time:     Troy Anen)  Hematology; Medical Oncology  [address]  [place] [address]  Phone: [phone]  Fax: [phone]  Follow Up Time:

## 2020-02-09 LAB
BACTERIA BLD CULT: SIGNIFICANT CHANGE UP
BACTERIA BLD CULT: SIGNIFICANT CHANGE UP

## 2020-02-09 NOTE — PATIENT PROFILE ADULT - DOES PATIENT HAVE ADVANCE DIRECTIVE
Received a page that despite going back on the pump and running the settings we programmed on Friday 2/7/20, she is having a lot of high sugars in the 200-400s. Prior to her pump breaking and her father passing away a week ago, her sugars were controlled on the current settings so I wonder how much stress is playing a role in her currently elevated sugars. I advised her to run a temp basal of 120% for the next 24-48 hours and give me an update on Tuesday or Wednesday before we reset her basal rates permanently. she voiced understanding of this plan. yes

## 2020-03-12 ENCOUNTER — LABORATORY RESULT (OUTPATIENT)
Age: 70
End: 2020-03-12

## 2020-03-12 ENCOUNTER — APPOINTMENT (OUTPATIENT)
Dept: DERMATOLOGY | Facility: CLINIC | Age: 70
End: 2020-03-12
Payer: MEDICARE

## 2020-03-12 DIAGNOSIS — D49.2 NEOPLASM OF UNSPECIFIED BEHAVIOR OF BONE, SOFT TISSUE, AND SKIN: ICD-10-CM

## 2020-03-12 PROCEDURE — 11105 PUNCH BX SKIN EA SEP/ADDL: CPT

## 2020-03-12 PROCEDURE — 99204 OFFICE O/P NEW MOD 45 MIN: CPT | Mod: 25

## 2020-03-12 PROCEDURE — 11104 PUNCH BX SKIN SINGLE LESION: CPT

## 2020-03-26 ENCOUNTER — APPOINTMENT (OUTPATIENT)
Dept: DERMATOLOGY | Facility: CLINIC | Age: 70
End: 2020-03-26

## 2020-03-31 ENCOUNTER — APPOINTMENT (OUTPATIENT)
Dept: DERMATOLOGY | Facility: CLINIC | Age: 70
End: 2020-03-31

## 2020-04-09 ENCOUNTER — APPOINTMENT (OUTPATIENT)
Dept: DERMATOLOGY | Facility: CLINIC | Age: 70
End: 2020-04-09
Payer: MEDICARE

## 2020-04-09 PROCEDURE — 99214 OFFICE O/P EST MOD 30 MIN: CPT | Mod: 95

## 2020-04-21 LAB
ALBUMIN SERPL ELPH-MCNC: 3.6 G/DL
ALP BLD-CCNC: 61 U/L
ALT SERPL-CCNC: 26 U/L
ANION GAP SERPL CALC-SCNC: 12 MMOL/L
AST SERPL-CCNC: 21 U/L
BILIRUB SERPL-MCNC: 0.5 MG/DL
BUN SERPL-MCNC: 13 MG/DL
CALCIUM SERPL-MCNC: 8.9 MG/DL
CHLORIDE SERPL-SCNC: 106 MMOL/L
CO2 SERPL-SCNC: 23 MMOL/L
CREAT SERPL-MCNC: 1.25 MG/DL
POTASSIUM SERPL-SCNC: 4.2 MMOL/L
PROT SERPL-MCNC: 6.3 G/DL
SODIUM SERPL-SCNC: 140 MMOL/L

## 2021-01-07 ENCOUNTER — APPOINTMENT (OUTPATIENT)
Dept: OTOLARYNGOLOGY | Facility: CLINIC | Age: 71
End: 2021-01-07
Payer: MEDICARE

## 2021-01-07 VITALS
HEIGHT: 67 IN | BODY MASS INDEX: 37.51 KG/M2 | DIASTOLIC BLOOD PRESSURE: 104 MMHG | SYSTOLIC BLOOD PRESSURE: 162 MMHG | HEART RATE: 82 BPM | WEIGHT: 239 LBS

## 2021-01-07 PROCEDURE — 31231 NASAL ENDOSCOPY DX: CPT

## 2021-01-07 PROCEDURE — 99204 OFFICE O/P NEW MOD 45 MIN: CPT | Mod: 25

## 2021-01-07 RX ORDER — PREDNISONE 10 MG/1
10 TABLET ORAL
Qty: 42 | Refills: 0 | Status: COMPLETED | COMMUNITY
Start: 2020-03-12 | End: 2021-01-07

## 2021-01-07 NOTE — PHYSICAL EXAM
[Nasal Endoscopy Performed] : nasal endoscopy was performed, see procedure section for findings [de-identified] : Appears to have had inferior turbinate resection bilaterally [de-identified] : Pus [Normal] : mucosa is normal

## 2021-01-07 NOTE — HISTORY OF PRESENT ILLNESS
[de-identified] : 70 year male presents for evaluation for nasal congestion. States nasal congestion started 04/2020, saw allergist, prescribed prednisone with some relief. States uses nasal rinse every 3-4 days weekly. Reports dark brown, hard mucus.  Patient has a history of previous sinus surgery by Dr. Interiano in [place] years ago which he stated worked for short period of time but his symptoms appear to have returned; a denies sinus pain/pressure, post nasal drip, nasal discharge. Denies anosmia, ageusia. Use of steroidal nasal sprays in the past with no relief. Denies CT scan of sinuses. Patient's blood pressure was 162/104. Patient was advised to follow up with PCP for high blood pressure. \par History of Hairy cell leukemia, s/p chemo, unsure when.  Also has possible platelet disorder and bleeding disorder.

## 2021-01-07 NOTE — PROCEDURE
[FreeTextEntry6] : Nasal Endoscopy (74175)\par \par After informed verbal consent, nasal endoscopy was performed due to anterior rhinoscopy insufficient to account for symptoms.  [ Flexible , scope # [] was used.  Topical vasoconstrictor and anesthetic was used.   \par The nasal endoscope is passed via the right nasal cavity. The inferior, middle, and superior turbinates responded to vasoconstrictors.  He appears to have had partial resection of the inferior turbinate with edema and crusting in the nasal cavity as well as purulence seen coming from the maxillary sinus ostium as well as superiorly. The choana is patent. \par \par The nasal endoscope is passed via the left nasal cavity. The inferior, middle, and superior turbinates responded to vasoconstrictors.  He appears to have had reduction of his inferior turbinate with edema and erythema in the nasal cavity with purulence seen superiorly coming from the nasal frontal area as well as some the sphenoethmoid recess . The choana is patent.  \par \par There is []% obstruction of the nasopharynx with adenoid tissue.\par \par

## 2021-02-04 ENCOUNTER — APPOINTMENT (OUTPATIENT)
Dept: OTOLARYNGOLOGY | Facility: CLINIC | Age: 71
End: 2021-02-04
Payer: MEDICARE

## 2021-02-04 VITALS
SYSTOLIC BLOOD PRESSURE: 134 MMHG | WEIGHT: 239 LBS | HEIGHT: 67 IN | DIASTOLIC BLOOD PRESSURE: 84 MMHG | HEART RATE: 78 BPM | BODY MASS INDEX: 37.51 KG/M2

## 2021-02-04 PROCEDURE — 99214 OFFICE O/P EST MOD 30 MIN: CPT | Mod: 25

## 2021-02-04 PROCEDURE — 31231 NASAL ENDOSCOPY DX: CPT

## 2021-02-04 RX ORDER — AMOXICILLIN AND CLAVULANATE POTASSIUM 875; 125 MG/1; MG/1
875-125 TABLET, COATED ORAL TWICE DAILY
Qty: 28 | Refills: 0 | Status: DISCONTINUED | COMMUNITY
Start: 2021-01-07 | End: 2021-02-04

## 2021-02-04 NOTE — PHYSICAL EXAM
[Nasal Endoscopy Performed] : nasal endoscopy was performed, see procedure section for findings [de-identified] : Appears to have had inferior turbinate resection bilaterally [Normal] : mucosa is normal

## 2021-02-04 NOTE — HISTORY OF PRESENT ILLNESS
[de-identified] : 70 year old male follow up for chronic pansinusitis, associated symptoms of nasal congestion, seen by Allergist, s/p Prednisone therapy with some relief. s/p surgery years ago with Dr. Interiano, prescribed Augmentin and recommended use of Saline irrigations, used with good relief.  States symptoms resolved with completion of antibiotics, but has returned.  Reports pus-like nasal discharge returned, with intermittent nasal congestion and dyspnea.  Denies sinus pain/pressure, anosmia, epistaxis, post nasal drip and recent fevers

## 2021-02-04 NOTE — PROCEDURE
[FreeTextEntry6] : Nasal Endoscopy (50112)\par \par After informed verbal consent, nasal endoscopy was performed due to anterior rhinoscopy insufficient to account for symptoms. Flexible  scope # [] was used.  Topical vasoconstrictor and anesthetic was used.  \par \par The nasal endoscope is passed via the right nasal cavity. The inferior, middle, and superior turbinates responded to vasoconstrictors. The inferior, middle and superior meati were examined. The osteomeatal complex was scarred to the lateral wall with some occasional openings there was no evidence of purulence such as seen during the last examination. The sphenoethmoidal recess is clear with no polyposis or purulence. The choana is patent. \par \par The nasal endoscope is passed via the left nasal cavity. The inferior, middle, and superior turbinates responded to vasoconstrictors. The inferior, middle and superior meati were examined. The osteomeatal complex is clear with no polyposis or purulence. The sphenoethmoidal recess is clear with no polyposis or purulence. The choana is patent.  \par \par There is []% obstruction of the nasopharynx with adenoid tissue.\par \par

## 2021-02-04 NOTE — REASON FOR VISIT
[Subsequent Evaluation] : a subsequent evaluation for [Other: _____] : [unfilled] [FreeTextEntry2] : follow up for chronic pansinusitis

## 2021-04-29 ENCOUNTER — APPOINTMENT (OUTPATIENT)
Dept: OTOLARYNGOLOGY | Facility: CLINIC | Age: 71
End: 2021-04-29
Payer: MEDICARE

## 2021-04-29 VITALS
TEMPERATURE: 98 F | HEART RATE: 65 BPM | SYSTOLIC BLOOD PRESSURE: 149 MMHG | BODY MASS INDEX: 37.51 KG/M2 | HEIGHT: 67 IN | DIASTOLIC BLOOD PRESSURE: 87 MMHG | WEIGHT: 239 LBS

## 2021-04-29 PROCEDURE — 31231 NASAL ENDOSCOPY DX: CPT

## 2021-04-29 PROCEDURE — 99214 OFFICE O/P EST MOD 30 MIN: CPT | Mod: 25

## 2021-04-29 NOTE — PHYSICAL EXAM
[Nasal Endoscopy Performed] : nasal endoscopy was performed, see procedure section for findings [de-identified] : Dry crusting in his nose [Normal] : mucosa is normal

## 2021-04-29 NOTE — HISTORY OF PRESENT ILLNESS
[de-identified] : 70 year old male follow up for chronic pansinusitis, associated symptoms of nasal congestion, , prescribed Augmentin and recommended use of Saline irrigations, used with good relief. States symptoms resolved with completion of antibiotics, Symptoms have returned completely 2 to 3 weeks after he finished antibiotic therapy.  At at the present time he feels congestion as well as nasal drainage symptoms are present on both sides right side more than the left side.  He has seen an allergist who gave him some type of ointment to place inside his nose and he has been using Flonase but still feels congested.  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  Had previous chemotherapy for cancer which also may have messed up his immune system.  He recommended infusions to bolster his immune system however the patient stated they cannot take it because of his [employer] not allowing the use of blood products.  Has a past history of sinus surgery\par

## 2021-04-29 NOTE — REASON FOR VISIT
[Subsequent Evaluation] : a subsequent evaluation for [FreeTextEntry2] : follow up for pansinusitis, associated symptoms of nasal congestion.

## 2021-04-29 NOTE — PROCEDURE
[FreeTextEntry6] : Nasal Endoscopy (90471)\par \par After informed verbal consent, nasal endoscopy was performed due to anterior rhinoscopy insufficient to account for symptoms Flexible scope # [] was used.  Topical vasoconstrictor and anesthetic was used.  The exam showed a deviated septum to  left \par \par The nasal endoscope is passed via the right nasal cavity. The inferior, middle, and superior turbinates responded to vasoconstrictors. The inferior, middle and superior meati were examined. The osteomeatal complex is open however large amount of crusting present inside the nose.  Maxillary antrostomy also appeared to be open but with thickened secretions along the nasal mucosa. The sphenoethmoidal recess is also has crusting.. The choana is patent. \par \par The nasal endoscope is passed via the left nasal cavity. The inferior, middle, and superior turbinates responded to vasoconstrictors. The inferior, middle and superior meati were examined. The osteomeatal The osteomeatal complex is open however large amount of crusting present inside the nose.  Maxillary antrostomy also appeared to be open but with thickened secretions along the nasal mucosa. The sphenoethmoidal recess is also has crusting.. The choana is patent.

## 2021-06-28 ENCOUNTER — INPATIENT (INPATIENT)
Facility: HOSPITAL | Age: 71
LOS: 8 days | Discharge: HOME HEALTH | End: 2021-07-07
Attending: INTERNAL MEDICINE | Admitting: INTERNAL MEDICINE
Payer: MEDICARE

## 2021-06-28 VITALS
RESPIRATION RATE: 18 BRPM | DIASTOLIC BLOOD PRESSURE: 73 MMHG | SYSTOLIC BLOOD PRESSURE: 121 MMHG | OXYGEN SATURATION: 96 % | TEMPERATURE: 99 F | HEART RATE: 115 BPM | HEIGHT: 67 IN

## 2021-06-28 DIAGNOSIS — D69.6 THROMBOCYTOPENIA, UNSPECIFIED: ICD-10-CM

## 2021-06-28 DIAGNOSIS — I10 ESSENTIAL (PRIMARY) HYPERTENSION: ICD-10-CM

## 2021-06-28 DIAGNOSIS — C91.40 HAIRY CELL LEUKEMIA NOT HAVING ACHIEVED REMISSION: ICD-10-CM

## 2021-06-28 DIAGNOSIS — A41.9 SEPSIS, UNSPECIFIED ORGANISM: ICD-10-CM

## 2021-06-28 DIAGNOSIS — R07.89 OTHER CHEST PAIN: ICD-10-CM

## 2021-06-28 DIAGNOSIS — J18.9 PNEUMONIA, UNSPECIFIED ORGANISM: ICD-10-CM

## 2021-06-28 DIAGNOSIS — N17.9 ACUTE KIDNEY FAILURE, UNSPECIFIED: ICD-10-CM

## 2021-06-28 LAB
ALBUMIN SERPL ELPH-MCNC: 3.4 G/DL — SIGNIFICANT CHANGE UP (ref 3.3–5)
ALP SERPL-CCNC: 93 U/L — SIGNIFICANT CHANGE UP (ref 40–120)
ALT FLD-CCNC: 57 U/L — HIGH (ref 4–41)
ANION GAP SERPL CALC-SCNC: 19 MMOL/L — HIGH (ref 7–14)
APPEARANCE UR: ABNORMAL
AST SERPL-CCNC: 48 U/L — HIGH (ref 4–40)
B PERT DNA SPEC QL NAA+PROBE: SIGNIFICANT CHANGE UP
BASOPHILS # BLD AUTO: 0.03 K/UL — SIGNIFICANT CHANGE UP (ref 0–0.2)
BASOPHILS NFR BLD AUTO: 0.2 % — SIGNIFICANT CHANGE UP (ref 0–2)
BILIRUB SERPL-MCNC: 2.4 MG/DL — HIGH (ref 0.2–1.2)
BILIRUB UR-MCNC: NEGATIVE — SIGNIFICANT CHANGE UP
BLOOD GAS VENOUS COMPREHENSIVE RESULT: SIGNIFICANT CHANGE UP
BUN SERPL-MCNC: 29 MG/DL — HIGH (ref 7–23)
C PNEUM DNA SPEC QL NAA+PROBE: SIGNIFICANT CHANGE UP
CALCIUM SERPL-MCNC: 8.8 MG/DL — SIGNIFICANT CHANGE UP (ref 8.4–10.5)
CHLORIDE SERPL-SCNC: 100 MMOL/L — SIGNIFICANT CHANGE UP (ref 98–107)
CO2 SERPL-SCNC: 17 MMOL/L — LOW (ref 22–31)
COLOR SPEC: YELLOW — SIGNIFICANT CHANGE UP
CREAT SERPL-MCNC: 2.18 MG/DL — HIGH (ref 0.5–1.3)
DIFF PNL FLD: NEGATIVE — SIGNIFICANT CHANGE UP
EOSINOPHIL # BLD AUTO: 0.03 K/UL — SIGNIFICANT CHANGE UP (ref 0–0.5)
EOSINOPHIL NFR BLD AUTO: 0.2 % — SIGNIFICANT CHANGE UP (ref 0–6)
FLUAV SUBTYP SPEC NAA+PROBE: SIGNIFICANT CHANGE UP
FLUBV RNA SPEC QL NAA+PROBE: SIGNIFICANT CHANGE UP
GLUCOSE SERPL-MCNC: 84 MG/DL — SIGNIFICANT CHANGE UP (ref 70–99)
GLUCOSE UR QL: NEGATIVE — SIGNIFICANT CHANGE UP
HADV DNA SPEC QL NAA+PROBE: SIGNIFICANT CHANGE UP
HCOV 229E RNA SPEC QL NAA+PROBE: SIGNIFICANT CHANGE UP
HCOV HKU1 RNA SPEC QL NAA+PROBE: SIGNIFICANT CHANGE UP
HCOV NL63 RNA SPEC QL NAA+PROBE: SIGNIFICANT CHANGE UP
HCOV OC43 RNA SPEC QL NAA+PROBE: SIGNIFICANT CHANGE UP
HCT VFR BLD CALC: 50.3 % — HIGH (ref 39–50)
HGB BLD-MCNC: 16.7 G/DL — SIGNIFICANT CHANGE UP (ref 13–17)
HMPV RNA SPEC QL NAA+PROBE: SIGNIFICANT CHANGE UP
HPIV1 RNA SPEC QL NAA+PROBE: SIGNIFICANT CHANGE UP
HPIV2 RNA SPEC QL NAA+PROBE: SIGNIFICANT CHANGE UP
HPIV3 RNA SPEC QL NAA+PROBE: SIGNIFICANT CHANGE UP
HPIV4 RNA SPEC QL NAA+PROBE: SIGNIFICANT CHANGE UP
IANC: 9.36 K/UL — HIGH (ref 1.5–8.5)
IMM GRANULOCYTES NFR BLD AUTO: 0.7 % — SIGNIFICANT CHANGE UP (ref 0–1.5)
KETONES UR-MCNC: NEGATIVE — SIGNIFICANT CHANGE UP
LEUKOCYTE ESTERASE UR-ACNC: NEGATIVE — SIGNIFICANT CHANGE UP
LIDOCAIN IGE QN: 23 U/L — SIGNIFICANT CHANGE UP (ref 7–60)
LYMPHOCYTES # BLD AUTO: 1.99 K/UL — SIGNIFICANT CHANGE UP (ref 1–3.3)
LYMPHOCYTES # BLD AUTO: 15.9 % — SIGNIFICANT CHANGE UP (ref 13–44)
MCHC RBC-ENTMCNC: 25.7 PG — LOW (ref 27–34)
MCHC RBC-ENTMCNC: 33.2 GM/DL — SIGNIFICANT CHANGE UP (ref 32–36)
MCV RBC AUTO: 77.4 FL — LOW (ref 80–100)
MONOCYTES # BLD AUTO: 1 K/UL — HIGH (ref 0–0.9)
MONOCYTES NFR BLD AUTO: 8 % — SIGNIFICANT CHANGE UP (ref 2–14)
NEUTROPHILS # BLD AUTO: 9.36 K/UL — HIGH (ref 1.8–7.4)
NEUTROPHILS NFR BLD AUTO: 75 % — SIGNIFICANT CHANGE UP (ref 43–77)
NITRITE UR-MCNC: NEGATIVE — SIGNIFICANT CHANGE UP
NRBC # BLD: 0 /100 WBCS — SIGNIFICANT CHANGE UP
NRBC # FLD: 0 K/UL — SIGNIFICANT CHANGE UP
PH UR: 6 — SIGNIFICANT CHANGE UP (ref 5–8)
PLATELET # BLD AUTO: 92 K/UL — LOW (ref 150–400)
POTASSIUM SERPL-MCNC: 4.8 MMOL/L — SIGNIFICANT CHANGE UP (ref 3.5–5.3)
POTASSIUM SERPL-SCNC: 4.8 MMOL/L — SIGNIFICANT CHANGE UP (ref 3.5–5.3)
PROT SERPL-MCNC: 7 G/DL — SIGNIFICANT CHANGE UP (ref 6–8.3)
PROT UR-MCNC: ABNORMAL
RAPID RVP RESULT: SIGNIFICANT CHANGE UP
RBC # BLD: 6.5 M/UL — HIGH (ref 4.2–5.8)
RBC # FLD: 15.4 % — HIGH (ref 10.3–14.5)
RSV RNA SPEC QL NAA+PROBE: SIGNIFICANT CHANGE UP
RV+EV RNA SPEC QL NAA+PROBE: SIGNIFICANT CHANGE UP
SARS-COV-2 RNA SPEC QL NAA+PROBE: SIGNIFICANT CHANGE UP
SODIUM SERPL-SCNC: 136 MMOL/L — SIGNIFICANT CHANGE UP (ref 135–145)
SP GR SPEC: 1.02 — SIGNIFICANT CHANGE UP (ref 1.01–1.02)
TROPONIN T, HIGH SENSITIVITY RESULT: 15 NG/L — SIGNIFICANT CHANGE UP
UROBILINOGEN FLD QL: ABNORMAL
WBC # BLD: 12.5 K/UL — HIGH (ref 3.8–10.5)
WBC # FLD AUTO: 12.5 K/UL — HIGH (ref 3.8–10.5)

## 2021-06-28 PROCEDURE — 74176 CT ABD & PELVIS W/O CONTRAST: CPT | Mod: 26

## 2021-06-28 PROCEDURE — 99223 1ST HOSP IP/OBS HIGH 75: CPT

## 2021-06-28 PROCEDURE — 71046 X-RAY EXAM CHEST 2 VIEWS: CPT | Mod: 26

## 2021-06-28 PROCEDURE — 99285 EMERGENCY DEPT VISIT HI MDM: CPT | Mod: CS,GC

## 2021-06-28 RX ORDER — ACETAMINOPHEN 500 MG
650 TABLET ORAL ONCE
Refills: 0 | Status: COMPLETED | OUTPATIENT
Start: 2021-06-28 | End: 2021-06-28

## 2021-06-28 RX ORDER — PREGABALIN 225 MG/1
1000 CAPSULE ORAL DAILY
Refills: 0 | Status: DISCONTINUED | OUTPATIENT
Start: 2021-06-28 | End: 2021-07-07

## 2021-06-28 RX ORDER — SODIUM CHLORIDE 9 MG/ML
1000 INJECTION, SOLUTION INTRAVENOUS
Refills: 0 | Status: DISCONTINUED | OUTPATIENT
Start: 2021-06-28 | End: 2021-06-29

## 2021-06-28 RX ORDER — PIPERACILLIN AND TAZOBACTAM 4; .5 G/20ML; G/20ML
3.38 INJECTION, POWDER, LYOPHILIZED, FOR SOLUTION INTRAVENOUS EVERY 8 HOURS
Refills: 0 | Status: COMPLETED | OUTPATIENT
Start: 2021-06-28 | End: 2021-07-05

## 2021-06-28 RX ORDER — CEFTRIAXONE 500 MG/1
1000 INJECTION, POWDER, FOR SOLUTION INTRAMUSCULAR; INTRAVENOUS ONCE
Refills: 0 | Status: COMPLETED | OUTPATIENT
Start: 2021-06-28 | End: 2021-06-28

## 2021-06-28 RX ORDER — ATORVASTATIN CALCIUM 80 MG/1
1 TABLET, FILM COATED ORAL
Qty: 0 | Refills: 0 | DISCHARGE

## 2021-06-28 RX ORDER — ACETAMINOPHEN 500 MG
975 TABLET ORAL ONCE
Refills: 0 | Status: COMPLETED | OUTPATIENT
Start: 2021-06-28 | End: 2021-06-28

## 2021-06-28 RX ORDER — AZITHROMYCIN 500 MG/1
500 TABLET, FILM COATED ORAL ONCE
Refills: 0 | Status: COMPLETED | OUTPATIENT
Start: 2021-06-28 | End: 2021-06-28

## 2021-06-28 RX ORDER — MORPHINE SULFATE 50 MG/1
4 CAPSULE, EXTENDED RELEASE ORAL ONCE
Refills: 0 | Status: DISCONTINUED | OUTPATIENT
Start: 2021-06-28 | End: 2021-06-28

## 2021-06-28 RX ORDER — PIPERACILLIN AND TAZOBACTAM 4; .5 G/20ML; G/20ML
3.38 INJECTION, POWDER, LYOPHILIZED, FOR SOLUTION INTRAVENOUS ONCE
Refills: 0 | Status: COMPLETED | OUTPATIENT
Start: 2021-06-28 | End: 2021-06-28

## 2021-06-28 RX ORDER — SODIUM CHLORIDE 9 MG/ML
1000 INJECTION INTRAMUSCULAR; INTRAVENOUS; SUBCUTANEOUS ONCE
Refills: 0 | Status: COMPLETED | OUTPATIENT
Start: 2021-06-28 | End: 2021-06-28

## 2021-06-28 RX ORDER — MORPHINE SULFATE 50 MG/1
2 CAPSULE, EXTENDED RELEASE ORAL EVERY 4 HOURS
Refills: 0 | Status: DISCONTINUED | OUTPATIENT
Start: 2021-06-28 | End: 2021-07-01

## 2021-06-28 RX ORDER — ACETAMINOPHEN 500 MG
650 TABLET ORAL EVERY 4 HOURS
Refills: 0 | Status: DISCONTINUED | OUTPATIENT
Start: 2021-06-28 | End: 2021-07-07

## 2021-06-28 RX ADMIN — Medication 975 MILLIGRAM(S): at 12:49

## 2021-06-28 RX ADMIN — SODIUM CHLORIDE 1000 MILLILITER(S): 9 INJECTION INTRAMUSCULAR; INTRAVENOUS; SUBCUTANEOUS at 11:30

## 2021-06-28 RX ADMIN — MORPHINE SULFATE 2 MILLIGRAM(S): 50 CAPSULE, EXTENDED RELEASE ORAL at 23:45

## 2021-06-28 RX ADMIN — SODIUM CHLORIDE 80 MILLILITER(S): 9 INJECTION, SOLUTION INTRAVENOUS at 19:11

## 2021-06-28 RX ADMIN — AZITHROMYCIN 255 MILLIGRAM(S): 500 TABLET, FILM COATED ORAL at 17:25

## 2021-06-28 RX ADMIN — MORPHINE SULFATE 4 MILLIGRAM(S): 50 CAPSULE, EXTENDED RELEASE ORAL at 13:08

## 2021-06-28 RX ADMIN — MORPHINE SULFATE 4 MILLIGRAM(S): 50 CAPSULE, EXTENDED RELEASE ORAL at 14:15

## 2021-06-28 RX ADMIN — CEFTRIAXONE 100 MILLIGRAM(S): 500 INJECTION, POWDER, FOR SOLUTION INTRAMUSCULAR; INTRAVENOUS at 17:01

## 2021-06-28 RX ADMIN — PIPERACILLIN AND TAZOBACTAM 25 GRAM(S): 4; .5 INJECTION, POWDER, LYOPHILIZED, FOR SOLUTION INTRAVENOUS at 22:51

## 2021-06-28 RX ADMIN — MORPHINE SULFATE 4 MILLIGRAM(S): 50 CAPSULE, EXTENDED RELEASE ORAL at 16:05

## 2021-06-28 RX ADMIN — Medication 975 MILLIGRAM(S): at 13:07

## 2021-06-28 RX ADMIN — MORPHINE SULFATE 2 MILLIGRAM(S): 50 CAPSULE, EXTENDED RELEASE ORAL at 23:15

## 2021-06-28 RX ADMIN — PIPERACILLIN AND TAZOBACTAM 3.38 GRAM(S): 4; .5 INJECTION, POWDER, LYOPHILIZED, FOR SOLUTION INTRAVENOUS at 16:22

## 2021-06-28 RX ADMIN — MORPHINE SULFATE 4 MILLIGRAM(S): 50 CAPSULE, EXTENDED RELEASE ORAL at 12:49

## 2021-06-28 RX ADMIN — PIPERACILLIN AND TAZOBACTAM 200 GRAM(S): 4; .5 INJECTION, POWDER, LYOPHILIZED, FOR SOLUTION INTRAVENOUS at 15:44

## 2021-06-28 RX ADMIN — Medication 650 MILLIGRAM(S): at 23:30

## 2021-06-28 NOTE — ED PROVIDER NOTE - OBJECTIVE STATEMENT
70M PMH HTN, hairy cell leukemia in remission p/w fever, abd pain, nausea. Pt reports constant, diffuse abd pain radiating to back x 1wk. Had 1 episode of nausea the other day, but no vomiting. Poor appetite. Then began having fever 102 today, prompting ED evaluation. +Constipation. No diarrhea, urinary sx. Also with mild CP and SOB. No h/o COVID-19, not vaccinated. Denies leg pain/swelling.

## 2021-06-28 NOTE — ED PROVIDER NOTE - ATTENDING CONTRIBUTION TO CARE
Attending note:   After face to face evaluation of this patient, I concur with above noted hx, pe, and care plan for this patient.  Huang: 70 yom with hx of hairy cell leukemia in remission, neuropathy. Pt complaining of one week of abdominal pain worsening, then with three days of fever, nausea, lack of appetite, pain is diffuse radiating into back, no diarrhea or vomiting, no sick contacts or travel. Not vaccinated for covid. Pt is ill appearing, warm to touch, clear lungs, dry mucus membranes, regular hr, abd diffusely tender, mainly suprapubic, RLQ and LUQ as worst areas of tn, no rebound, not distended, no CVAT. no pitting edema. Concern for intraabdominal infection - labs, fluids, antibiotics, anti-pypretics, Ct. ADMIT.

## 2021-06-28 NOTE — H&P ADULT - ASSESSMENT
70 y.o. M, V[employer]oo, w/ a hx of HTN, hairy cell leukemia (in remission) who presents with abdominal pain, nausea and fever to 102 of unclear etiology.

## 2021-06-28 NOTE — ED PROVIDER NOTE - CLINICAL SUMMARY MEDICAL DECISION MAKING FREE TEXT BOX
Yoan, PGY2 - 70M p/w fever, abd pain, nausea, poor appetite, mild CP/SOB/cough. Tachycardic, febrile, Non-toxic appearing. Sepsis w/u, will eval for infectious source. Plan: labs, cxr, CT A/P, ivf, pain control, abx, denies nausea currently

## 2021-06-28 NOTE — H&P ADULT - PROBLEM SELECTOR PLAN 1
Febrile to 101, tachycardic w/ leukocytosis, T. bili elevated to 2.4, unclear etiology. CT A/P w/o contrast negative for intra-abdominal infection- biliary tree wnl. Diffuse abdominal TTP including RUQ. CT chest w/ L pleural effusion, no respiratory sx and RVP negative.   - Check RUQ US, if neg, consider HIDA scan   - Cover w/ Zosyn  - IVF   - Pain control w/ Morphine PRN  - BCx pending   [ ] U/A to eval for pyelo Febrile to 101, tachycardic w/ leukocytosis, T. bili elevated to 2.4, unclear etiology. CT A/P w/o contrast negative for intra-abdominal infection- biliary tree wnl. Diffuse abdominal TTP including RUQ. CT chest w/ L pleural effusion, no respiratory sx and RVP negative.   - Check RUQ US, if neg, consider HIDA scan   - Cover w/ Zosyn  - IVF   - Pain control w/ Morphine PRN  - BCx pending   [ ] U/A to eval for pyelo  [ ] GI PCR, C diff

## 2021-06-28 NOTE — H&P ADULT - HISTORY OF PRESENT ILLNESS
70 y.o. M w/ a hx of HTN, hairy cell leukemia (in remission) who presents with abdominal pain, nausea and fever to 102.   Patient reports abdominal pain started 1 week ago, associated with 1 episode of nausea, no emesis. Patient had a fever to 102 today prompting ED evaluation. Patient also notes mild CP, SOB, denies any diarrhea.   In the ED, patient was febrile to 101.2, -120's.  70 y.o. M, Mu-ism, w/ a hx of HTN, hairy cell leukemia (in remission) who presents with abdominal pain, nausea and fever to 102. Patient was in his USOH until 5 days ago when he noted diffuse abdominal pain associated with 1 episode of nausea. Pain gradually worsened to max intensity on Saturday when he noted an associated fever to 102. Patient denies any vomiting. Pain and fevers persisted so patient presented to the ED. Abdominal pain is currently 7/10 sharp L sided, flank pain. Patient had a few formed BM's this AM. Has been passing gas. Denies any dysuria, hematuria, sick contacts. Patient also notes pleuritic epigastric pain with deep inspiration, SOB 2/2 pain and tightness in his chest. Patient has not been eating much 2/2 early satiety, denies any pain w/ eating. Has noticed change in sense of taste, preserved sense of smell.   In the ED, patient was febrile to 101.2, -120's. He received CTZ, Azithromycin, Zosyn in the ED. CT A/P w/o contrast negative for intra-abdominal pathology.  70 y.o. M, Bahai, w/ a hx of HTN, hairy cell leukemia (in remission) who presents with abdominal pain, nausea and fever to 102. Patient was in his USOH until 5 days ago when he noted diffuse abdominal pain associated with 1 episode of nausea. Pain gradually worsened to max intensity on Saturday when he noted an associated fever to 102. Patient denies any vomiting. Pain and fevers persisted so patient presented to the ED. Abdominal pain is currently 7/10 sharp L sided, flank pain. Patient had 3 formed BM's this AM. Has been passing gas. Denies any dysuria, hematuria, sick contacts. Patient also notes pleuritic epigastric pain with deep inspiration, SOB 2/2 pain and tightness in his chest. Patient has not been eating much 2/2 early satiety, denies any pain w/ eating. Has noticed change in sense of taste, preserved sense of smell.   In the ED, patient was febrile to 101.2, -120's. He received CTZ, Azithromycin, Zosyn in the ED. CT A/P w/o contrast negative for intra-abdominal pathology.

## 2021-06-28 NOTE — H&P ADULT - NSHPREVIEWOFSYSTEMS_GEN_ALL_CORE
ROS:    Constitutional: [x ] fevers [x ] chills [ ] weight loss [ ] weight gain  HEENT: [ ] dry eyes [ ] eye irritation [ ] postnasal drip [ ] nasal congestion  CV: [ ] chest pain [ ] orthopnea [ ] palpitations [ ] murmur  Resp: [ ] cough [ x] shortness of breath [ ] dyspnea [ ] wheezing [ ] sputum [ ] hemoptysis  GI: [x ] nausea [ ] vomiting [ ] diarrhea [ ] constipation [x ] abd pain [ ] dysphagia   : [ ] dysuria [x ] nocturia [ ] hematuria [ ] increased urinary frequency  Musculoskeletal: [ ] back pain [ ] myalgias [ ] arthralgias [ ] fracture  Skin: [ ] rash [ ] itch  Neurological: [ ] headache [ ] dizziness [ ] syncope [ ] weakness [ ] numbness  Psychiatric: [ ] anxiety [ ] depression  Endocrine: [ ] diabetes [ ] thyroid problem  Hematologic/Lymphatic: [ ] anemia [ ] bleeding problem  Allergic/Immunologic: [ ] itchy eyes [ ] nasal discharge [ ] hives [ ] angioedema  [x ] All other systems negative  [ ] Unable to assess ROS because ________

## 2021-06-28 NOTE — ED ADULT TRIAGE NOTE - CHIEF COMPLAINT QUOTE
pt c/o fever and abd pain with nausea. tmax 102. c/o generalized weakness. also c/o midsternal chest pain and cough. respirations even and unlabored.

## 2021-06-28 NOTE — PROVIDER CONTACT NOTE (OTHER) - ASSESSMENT
Patient has a fever of 100.4. Blood cx were drawn and sent in the ED. Tylenol is already ordered for pain, but not for fever

## 2021-06-28 NOTE — ED PROVIDER NOTE - PMH
Benign essential HTN    H/O splenomegaly    Hairy cell leukemia    Hx of thrombocytopenia  refused blood products transfusion- Buddhist

## 2021-06-28 NOTE — ED PROVIDER NOTE - PHYSICAL EXAMINATION
I have reviewed the triage vital signs.  Const: AAOx3, in NAD  Eyes: no conjunctival injection  HENT: NCAT, Neck supple, oral mucosa moist  CV: tachycardic, +S1, S2  Resp: CTAB, no respiratory distress  GI: Abdomen soft, diffuse TTP, no guarding, no CVA tenderness  Extremities: No peripheral edema  Skin: Warm, well perfused, no rash  MSK: No gross deformities appreciated  Neuro: No focal sensory or motor deficits  Psych: Appropriate mood and affect

## 2021-06-28 NOTE — H&P ADULT - NSHPLABSRESULTS_GEN_ALL_CORE
LABS:                        16.7   12.50 )-----------( 92       ( 28 Jun 2021 12:54 )             50.3     28 Jun 2021 12:54    136    |  100    |  29     ----------------------------<  84     4.8     |  17     |  2.18     Ca    8.8        28 Jun 2021 12:54    TPro  7.0    /  Alb  3.4    /  TBili  2.4    /  DBili  x      /  AST  48     /  ALT  57     /  AlkPhos  93     28 Jun 2021 12:54    CT A/P w/o contrast: No biliary obstruction, no intra-abdominal pathology

## 2021-06-28 NOTE — ED PROVIDER NOTE - NS ED ROS FT
General: +fevers  Eyes: Denies vision changes  ENMT: Denies sore throat  CV: +chest pain  Resp: +SOB  GI: +abdominal pain, nausea, constipation. No vomiting, diarrhea  : Denies painful urination  Skin: Denies new rashes  Neuro: Denies headache, focal weakness  MSK: Denies recent trauma

## 2021-06-28 NOTE — ED ADULT NURSE NOTE - OBJECTIVE STATEMENT
Pt arrives to room 1, A&Ox4. Ambulatory at baseline, but difficulty due to shortness of breath and chest tightness. Pt wife at bedside. Pt endorses SOB, fever of 102 chest pain all over his chest and upper abdomen, worsening during ventilation. Pt endorses nausea with no vomiting. Pt denies headache, bowel/urinary abnormalities. Chest pain and fever since saturday. Pt attached to cardiac monitor, Sinus tach MD notified. Awaiting orders will continue to monitor.

## 2021-06-28 NOTE — H&P ADULT - NSICDXPASTMEDICALHX_GEN_ALL_CORE_FT
PAST MEDICAL HISTORY:  Benign essential HTN     H/O splenomegaly     Hairy cell leukemia     History of ITP     Hx of thrombocytopenia refused blood products transfusion- Druze

## 2021-06-28 NOTE — H&P ADULT - NSHPPHYSICALEXAM_GEN_ALL_CORE
GENERAL: well-developed male in mild distress  HEAD: Atraumatic, Normocephalic  EYES: EOMI, PERRLA, conjunctiva and sclera clear  NECK: Supple, No JVD  CHEST/LUNG: Clear to auscultation bilaterally; No wheeze  HEART: Regular rate and rhythm; No murmurs, rubs, or gallops  ABDOMEN: Soft, diffuse tenderness to palpation including RUQ, LUQ, LLQ, L flank, no involuntary/voluntary guarding  EXTREMITIES:  2+ Peripheral Pulses, No clubbing, cyanosis. 1+ LE edema   PSYCH: AAOx3  NEUROLOGY: non-focal  SKIN: No rashes or lesions

## 2021-06-28 NOTE — H&P ADULT - PROBLEM SELECTOR PLAN 2
LADI, baseline Cr likely around 1.2 per HIE, likely pre-renal given poor PO intake x 5 days in the setting of Lisinopril use. No obstruction noted on CT A/P.   - Hold Lisinopril   - IVF   - Strict I/O's, MAP >65

## 2021-06-29 LAB
ANION GAP SERPL CALC-SCNC: 13 MMOL/L — SIGNIFICANT CHANGE UP (ref 7–14)
APPEARANCE UR: ABNORMAL
BILIRUB UR-MCNC: ABNORMAL
BUN SERPL-MCNC: 27 MG/DL — HIGH (ref 7–23)
CALCIUM SERPL-MCNC: 8.3 MG/DL — LOW (ref 8.4–10.5)
CHLORIDE SERPL-SCNC: 101 MMOL/L — SIGNIFICANT CHANGE UP (ref 98–107)
CO2 SERPL-SCNC: 22 MMOL/L — SIGNIFICANT CHANGE UP (ref 22–31)
COLOR SPEC: ABNORMAL
COVID-19 SPIKE DOMAIN AB INTERP: NEGATIVE — SIGNIFICANT CHANGE UP
COVID-19 SPIKE DOMAIN ANTIBODY RESULT: 0.4 U/ML — SIGNIFICANT CHANGE UP
CREAT SERPL-MCNC: 1.77 MG/DL — HIGH (ref 0.5–1.3)
CULTURE RESULTS: NO GROWTH — SIGNIFICANT CHANGE UP
DIFF PNL FLD: NEGATIVE — SIGNIFICANT CHANGE UP
GLUCOSE BLDC GLUCOMTR-MCNC: 88 MG/DL — SIGNIFICANT CHANGE UP (ref 70–99)
GLUCOSE SERPL-MCNC: 87 MG/DL — SIGNIFICANT CHANGE UP (ref 70–99)
GLUCOSE UR QL: NEGATIVE — SIGNIFICANT CHANGE UP
HCT VFR BLD CALC: 48.3 % — SIGNIFICANT CHANGE UP (ref 39–50)
HCV AB S/CO SERPL IA: 0.05 S/CO — SIGNIFICANT CHANGE UP (ref 0–0.99)
HCV AB SERPL-IMP: SIGNIFICANT CHANGE UP
HGB BLD-MCNC: 15.5 G/DL — SIGNIFICANT CHANGE UP (ref 13–17)
KETONES UR-MCNC: ABNORMAL
LEUKOCYTE ESTERASE UR-ACNC: NEGATIVE — SIGNIFICANT CHANGE UP
MAGNESIUM SERPL-MCNC: 2.2 MG/DL — SIGNIFICANT CHANGE UP (ref 1.6–2.6)
MCHC RBC-ENTMCNC: 25.2 PG — LOW (ref 27–34)
MCHC RBC-ENTMCNC: 32.1 GM/DL — SIGNIFICANT CHANGE UP (ref 32–36)
MCV RBC AUTO: 78.7 FL — LOW (ref 80–100)
NITRITE UR-MCNC: NEGATIVE — SIGNIFICANT CHANGE UP
NRBC # BLD: 0 /100 WBCS — SIGNIFICANT CHANGE UP
NRBC # FLD: 0 K/UL — SIGNIFICANT CHANGE UP
PH UR: 6 — SIGNIFICANT CHANGE UP (ref 5–8)
PHOSPHATE SERPL-MCNC: 2.9 MG/DL — SIGNIFICANT CHANGE UP (ref 2.5–4.5)
PLATELET # BLD AUTO: 90 K/UL — LOW (ref 150–400)
POTASSIUM SERPL-MCNC: 4.2 MMOL/L — SIGNIFICANT CHANGE UP (ref 3.5–5.3)
POTASSIUM SERPL-SCNC: 4.2 MMOL/L — SIGNIFICANT CHANGE UP (ref 3.5–5.3)
PROT UR-MCNC: ABNORMAL
RBC # BLD: 6.14 M/UL — HIGH (ref 4.2–5.8)
RBC # FLD: 15.5 % — HIGH (ref 10.3–14.5)
SARS-COV-2 IGG+IGM SERPL QL IA: 0.4 U/ML — SIGNIFICANT CHANGE UP
SARS-COV-2 IGG+IGM SERPL QL IA: NEGATIVE — SIGNIFICANT CHANGE UP
SODIUM SERPL-SCNC: 136 MMOL/L — SIGNIFICANT CHANGE UP (ref 135–145)
SP GR SPEC: 1.02 — SIGNIFICANT CHANGE UP (ref 1.01–1.02)
SPECIMEN SOURCE: SIGNIFICANT CHANGE UP
TROPONIN T, HIGH SENSITIVITY RESULT: 15 NG/L — SIGNIFICANT CHANGE UP
UROBILINOGEN FLD QL: ABNORMAL
WBC # BLD: 9.66 K/UL — SIGNIFICANT CHANGE UP (ref 3.8–10.5)
WBC # FLD AUTO: 9.66 K/UL — SIGNIFICANT CHANGE UP (ref 3.8–10.5)

## 2021-06-29 PROCEDURE — 78580 LUNG PERFUSION IMAGING: CPT | Mod: 26

## 2021-06-29 PROCEDURE — 99222 1ST HOSP IP/OBS MODERATE 55: CPT

## 2021-06-29 PROCEDURE — 76705 ECHO EXAM OF ABDOMEN: CPT | Mod: 26

## 2021-06-29 RX ORDER — HEPARIN SODIUM 5000 [USP'U]/ML
5000 INJECTION INTRAVENOUS; SUBCUTANEOUS EVERY 12 HOURS
Refills: 0 | Status: DISCONTINUED | OUTPATIENT
Start: 2021-06-29 | End: 2021-06-30

## 2021-06-29 RX ORDER — ACETAMINOPHEN 500 MG
650 TABLET ORAL ONCE
Refills: 0 | Status: COMPLETED | OUTPATIENT
Start: 2021-06-29 | End: 2021-06-29

## 2021-06-29 RX ORDER — SODIUM CHLORIDE 9 MG/ML
1000 INJECTION, SOLUTION INTRAVENOUS
Refills: 0 | Status: DISCONTINUED | OUTPATIENT
Start: 2021-06-29 | End: 2021-07-01

## 2021-06-29 RX ADMIN — MORPHINE SULFATE 2 MILLIGRAM(S): 50 CAPSULE, EXTENDED RELEASE ORAL at 19:00

## 2021-06-29 RX ADMIN — Medication 650 MILLIGRAM(S): at 00:34

## 2021-06-29 RX ADMIN — Medication 650 MILLIGRAM(S): at 23:15

## 2021-06-29 RX ADMIN — MORPHINE SULFATE 2 MILLIGRAM(S): 50 CAPSULE, EXTENDED RELEASE ORAL at 18:42

## 2021-06-29 RX ADMIN — PREGABALIN 1000 MICROGRAM(S): 225 CAPSULE ORAL at 13:51

## 2021-06-29 RX ADMIN — Medication 650 MILLIGRAM(S): at 22:17

## 2021-06-29 RX ADMIN — MORPHINE SULFATE 2 MILLIGRAM(S): 50 CAPSULE, EXTENDED RELEASE ORAL at 10:28

## 2021-06-29 RX ADMIN — PIPERACILLIN AND TAZOBACTAM 25 GRAM(S): 4; .5 INJECTION, POWDER, LYOPHILIZED, FOR SOLUTION INTRAVENOUS at 21:12

## 2021-06-29 RX ADMIN — PIPERACILLIN AND TAZOBACTAM 25 GRAM(S): 4; .5 INJECTION, POWDER, LYOPHILIZED, FOR SOLUTION INTRAVENOUS at 13:50

## 2021-06-29 RX ADMIN — HEPARIN SODIUM 5000 UNIT(S): 5000 INJECTION INTRAVENOUS; SUBCUTANEOUS at 18:39

## 2021-06-29 RX ADMIN — Medication 50 MILLIGRAM(S): at 13:51

## 2021-06-29 RX ADMIN — PIPERACILLIN AND TAZOBACTAM 25 GRAM(S): 4; .5 INJECTION, POWDER, LYOPHILIZED, FOR SOLUTION INTRAVENOUS at 06:26

## 2021-06-29 RX ADMIN — MORPHINE SULFATE 2 MILLIGRAM(S): 50 CAPSULE, EXTENDED RELEASE ORAL at 10:58

## 2021-06-29 NOTE — PROGRESS NOTE ADULT - SUBJECTIVE AND OBJECTIVE BOX
Patient is a 70y old  Male who presents with a chief complaint of Fever, abdominal pain (2021 17:55)  patient not known to me, assigned this AM to assume care  chart reviewed and events thus far noted   admitted overnight by faculty hospitalist     DATE OF SERVICE: 21 @ 11:37    SUBJECTIVE / OVERNIGHT EVENTS: overnight events noted    ROS:  Resp: No cough no sputum production  CVS: + pleuritic chest pain no palpitations no orthopnea  GI: no N/V/D  : no dysuria, no hematuria  Neuro: no weakness no paresthesias  Heme: No petechiae no easy bruising  Msk: diffuse chest wall and back pain   Skin: No rash no itching        MEDICATIONS  (STANDING):  cyanocobalamin 1000 MICROGram(s) Oral daily  lactated ringers. 1000 milliLiter(s) (80 mL/Hr) IV Continuous <Continuous>  piperacillin/tazobactam IVPB.. 3.375 Gram(s) IV Intermittent every 8 hours  testosterone 1% Gel 50 milliGRAM(s) Topical daily    MEDICATIONS  (PRN):  acetaminophen   Tablet .. 650 milliGRAM(s) Oral every 4 hours PRN Mild Pain (1 - 3)  morphine  - Injectable 2 milliGRAM(s) IV Push every 4 hours PRN Moderate Pain (4 - 6)        CAPILLARY BLOOD GLUCOSE        I&O's Summary    2021 07:01  -  2021 07:00  --------------------------------------------------------  IN: 0 mL / OUT: 350 mL / NET: -350 mL        Vital Signs Last 24 Hrs  T(C): 36.8 (2021 05:05), Max: 38.4 (2021 11:42)  T(F): 98.3 (2021 05:05), Max: 101.2 (2021 11:42)  HR: 108 (2021 05:05) (88 - 120)  BP: 112/55 (2021 05:05) (100/58 - 139/60)  BP(mean): --  RR: 20 (2021 05:05) (16 - 20)  SpO2: 96% (2021 05:05) (96% - 100%)    PHYSICAL EXAM:  GENERAL: in mild distress on movement  HEAD:  Atraumatic, Normocephalic  EYES: EOMI, PERRLA, sclera clear  NECK: Supple, No JVD  CHEST/LUNG: decreased air entry secondary to pleuritic chest pain   HEART: S1 S2; no murmurs appreciated  ABDOMEN: Soft, Nontender, Bowel sounds present  EXTREMITIES:  No clubbing or cyanosis,  no edema  NEUROLOGY: AO x 3 non-focal  SKIN: No rashes or lesions    LABS:                        15.5   9.66  )-----------( 90       ( 2021 06:42 )             48.3     -    136  |  101  |  27<H>  ----------------------------<  87  4.2   |  22  |  1.77<H>    Ca    8.3<L>      2021 06:42  Phos  2.9       Mg     2.20         TPro  7.0  /  Alb  3.4  /  TBili  2.4<H>  /  DBili  x   /  AST  48<H>  /  ALT  57<H>  /  AlkPhos  93            Urinalysis Basic - ( 2021 06:31 )    Color: Laila / Appearance: Slightly Turbid / S.025 / pH: x  Gluc: x / Ketone: Small  / Bili: Small / Urobili: 6 mg/dL   Blood: x / Protein: 100 mg/dL / Nitrite: Negative   Leuk Esterase: Negative / RBC: 3 /HPF / WBC 6 /HPF   Sq Epi: x / Non Sq Epi: 2 /HPF / Bacteria: Negative          All consultant(s) notes reviewed and care discussed with other providers        Contact Number, Dr Huang [phone]

## 2021-06-29 NOTE — CHART NOTE - NSCHARTNOTEFT_GEN_A_CORE
Called by radiologist - Called by radiologist VQ scan results are indeterminate for pulmonary embolus. Multiple new segmental perfusion defects, especially on left lung. S/w Dr. Huang medical attending, will start patient on heparin gtt full anticoagulation, no initial bolus. Patient seen at bedside. Results of VQ scan explained and notified patient of need for heparin gtt. Informed patient blood to be drawn every 6 hours, explained risks and benefits. All patients questions answered. Will send stat PTT and order heparin gtt. Will monitor patient closely.

## 2021-06-29 NOTE — PROGRESS NOTE ADULT - ASSESSMENT
70 y.o. M, Christian, w/ a hx of HTN, hairy cell leukemia (in remission) who presents with abdominal pain, nausea and fever to 102 of unclear etiology.

## 2021-06-29 NOTE — CONSULT NOTE ADULT - ATTENDING COMMENTS
I am concerned about his breathing : ct chest noted: lower part of lungs: has opacity with small pleural effusion: his ct scan findings in the chest does not explain his severity of SOB: sepsis can also do this: but ct abdomen is fine: would do vq scan today

## 2021-06-30 ENCOUNTER — RESULT REVIEW (OUTPATIENT)
Age: 71
End: 2021-06-30

## 2021-06-30 LAB
ALBUMIN FLD-MCNC: 2.2 G/DL — SIGNIFICANT CHANGE UP
ALBUMIN SERPL ELPH-MCNC: 2.7 G/DL — LOW (ref 3.3–5)
ALP SERPL-CCNC: 82 U/L — SIGNIFICANT CHANGE UP (ref 40–120)
ALT FLD-CCNC: 51 U/L — HIGH (ref 4–41)
ANION GAP SERPL CALC-SCNC: 13 MMOL/L — SIGNIFICANT CHANGE UP (ref 7–14)
APTT BLD: 30.6 SEC — SIGNIFICANT CHANGE UP (ref 27–36.3)
APTT BLD: 31.2 SEC — SIGNIFICANT CHANGE UP (ref 27–36.3)
APTT BLD: 51.4 SEC — HIGH (ref 27–36.3)
AST SERPL-CCNC: 62 U/L — HIGH (ref 4–40)
B PERT IGG+IGM PNL SER: ABNORMAL
BILIRUB SERPL-MCNC: 1.6 MG/DL — HIGH (ref 0.2–1.2)
BUN SERPL-MCNC: 22 MG/DL — SIGNIFICANT CHANGE UP (ref 7–23)
CALCIUM SERPL-MCNC: 8.4 MG/DL — SIGNIFICANT CHANGE UP (ref 8.4–10.5)
CHLORIDE SERPL-SCNC: 100 MMOL/L — SIGNIFICANT CHANGE UP (ref 98–107)
CO2 SERPL-SCNC: 21 MMOL/L — LOW (ref 22–31)
COLOR FLD: ABNORMAL
CREAT SERPL-MCNC: 1.33 MG/DL — HIGH (ref 0.5–1.3)
CRP SERPL-MCNC: 233.1 MG/L — HIGH
EOSINOPHIL # FLD: 0 % — SIGNIFICANT CHANGE UP
ERYTHROCYTE [SEDIMENTATION RATE] IN BLOOD: 12 MM/HR — SIGNIFICANT CHANGE UP (ref 1–15)
FLUID INTAKE SUBSTANCE CLASS: SIGNIFICANT CHANGE UP
FLUID SEGMENTED GRANULOCYTES: 81 % — SIGNIFICANT CHANGE UP
FOLATE+VIT B12 SERBLD-IMP: 0 % — SIGNIFICANT CHANGE UP
GLUCOSE FLD-MCNC: 77 MG/DL — SIGNIFICANT CHANGE UP
GLUCOSE SERPL-MCNC: 94 MG/DL — SIGNIFICANT CHANGE UP (ref 70–99)
HCT VFR BLD CALC: 43.2 % — SIGNIFICANT CHANGE UP (ref 39–50)
HGB BLD-MCNC: 14.1 G/DL — SIGNIFICANT CHANGE UP (ref 13–17)
LDH SERPL L TO P-CCNC: 1139 U/L — SIGNIFICANT CHANGE UP
LYMPHOCYTES # FLD: 13 % — SIGNIFICANT CHANGE UP
MAGNESIUM SERPL-MCNC: 2.2 MG/DL — SIGNIFICANT CHANGE UP (ref 1.6–2.6)
MCHC RBC-ENTMCNC: 25.2 PG — LOW (ref 27–34)
MCHC RBC-ENTMCNC: 32.6 GM/DL — SIGNIFICANT CHANGE UP (ref 32–36)
MCV RBC AUTO: 77.3 FL — LOW (ref 80–100)
MESOTHL CELL # FLD: 0 % — SIGNIFICANT CHANGE UP
MONOS+MACROS # FLD: 6 % — SIGNIFICANT CHANGE UP
NRBC # BLD: 0 /100 WBCS — SIGNIFICANT CHANGE UP
NRBC # FLD: 0 K/UL — SIGNIFICANT CHANGE UP
OTHER CELLS FLD MANUAL: 0 % — SIGNIFICANT CHANGE UP
PHOSPHATE SERPL-MCNC: 2.5 MG/DL — SIGNIFICANT CHANGE UP (ref 2.5–4.5)
PLATELET # BLD AUTO: 106 K/UL — LOW (ref 150–400)
POTASSIUM SERPL-MCNC: 3.7 MMOL/L — SIGNIFICANT CHANGE UP (ref 3.5–5.3)
POTASSIUM SERPL-SCNC: 3.7 MMOL/L — SIGNIFICANT CHANGE UP (ref 3.5–5.3)
PROT FLD-MCNC: 3.5 G/DL — SIGNIFICANT CHANGE UP
PROT SERPL-MCNC: 5.8 G/DL — LOW (ref 6–8.3)
RBC # BLD: 5.59 M/UL — SIGNIFICANT CHANGE UP (ref 4.2–5.8)
RBC # FLD: 14.6 % — HIGH (ref 10.3–14.5)
RCV VOL RI: HIGH CELLS/UL (ref 0–5)
SODIUM SERPL-SCNC: 134 MMOL/L — LOW (ref 135–145)
SPECIMEN SOURCE FLD: SIGNIFICANT CHANGE UP
TOTAL CELLS COUNTED, BODY FLUID: 100 CELLS — SIGNIFICANT CHANGE UP
TOTAL NUCLEATED CELL COUNT, BODY FLUID: 8063 CELLS/UL — HIGH (ref 0–5)
TSH SERPL-MCNC: 2.02 UIU/ML — SIGNIFICANT CHANGE UP (ref 0.27–4.2)
TUBE TYPE: SIGNIFICANT CHANGE UP
WBC # BLD: 7.83 K/UL — SIGNIFICANT CHANGE UP (ref 3.8–10.5)
WBC # FLD AUTO: 7.83 K/UL — SIGNIFICANT CHANGE UP (ref 3.8–10.5)

## 2021-06-30 PROCEDURE — 71045 X-RAY EXAM CHEST 1 VIEW: CPT | Mod: 26

## 2021-06-30 PROCEDURE — 71250 CT THORAX DX C-: CPT | Mod: 26

## 2021-06-30 PROCEDURE — 88305 TISSUE EXAM BY PATHOLOGIST: CPT | Mod: 26

## 2021-06-30 PROCEDURE — 88112 CYTOPATH CELL ENHANCE TECH: CPT | Mod: 26

## 2021-06-30 PROCEDURE — 32555 ASPIRATE PLEURA W/ IMAGING: CPT | Mod: GC

## 2021-06-30 PROCEDURE — 99233 SBSQ HOSP IP/OBS HIGH 50: CPT | Mod: GC,25

## 2021-06-30 PROCEDURE — 76604 US EXAM CHEST: CPT | Mod: 26,GC

## 2021-06-30 RX ORDER — HEPARIN SODIUM 5000 [USP'U]/ML
4000 INJECTION INTRAVENOUS; SUBCUTANEOUS EVERY 6 HOURS
Refills: 0 | Status: DISCONTINUED | OUTPATIENT
Start: 2021-06-30 | End: 2021-06-30

## 2021-06-30 RX ORDER — HEPARIN SODIUM 5000 [USP'U]/ML
8500 INJECTION INTRAVENOUS; SUBCUTANEOUS EVERY 6 HOURS
Refills: 0 | Status: DISCONTINUED | OUTPATIENT
Start: 2021-06-30 | End: 2021-07-04

## 2021-06-30 RX ORDER — HEPARIN SODIUM 5000 [USP'U]/ML
8500 INJECTION INTRAVENOUS; SUBCUTANEOUS EVERY 6 HOURS
Refills: 0 | Status: DISCONTINUED | OUTPATIENT
Start: 2021-06-30 | End: 2021-06-30

## 2021-06-30 RX ORDER — HEPARIN SODIUM 5000 [USP'U]/ML
INJECTION INTRAVENOUS; SUBCUTANEOUS
Qty: 25000 | Refills: 0 | Status: DISCONTINUED | OUTPATIENT
Start: 2021-06-30 | End: 2021-07-04

## 2021-06-30 RX ORDER — HEPARIN SODIUM 5000 [USP'U]/ML
INJECTION INTRAVENOUS; SUBCUTANEOUS
Qty: 25000 | Refills: 0 | Status: DISCONTINUED | OUTPATIENT
Start: 2021-06-30 | End: 2021-06-30

## 2021-06-30 RX ORDER — HEPARIN SODIUM 5000 [USP'U]/ML
4000 INJECTION INTRAVENOUS; SUBCUTANEOUS EVERY 6 HOURS
Refills: 0 | Status: DISCONTINUED | OUTPATIENT
Start: 2021-06-30 | End: 2021-07-04

## 2021-06-30 RX ADMIN — Medication 50 MILLIGRAM(S): at 14:27

## 2021-06-30 RX ADMIN — PIPERACILLIN AND TAZOBACTAM 25 GRAM(S): 4; .5 INJECTION, POWDER, LYOPHILIZED, FOR SOLUTION INTRAVENOUS at 14:26

## 2021-06-30 RX ADMIN — PIPERACILLIN AND TAZOBACTAM 25 GRAM(S): 4; .5 INJECTION, POWDER, LYOPHILIZED, FOR SOLUTION INTRAVENOUS at 05:00

## 2021-06-30 RX ADMIN — MORPHINE SULFATE 2 MILLIGRAM(S): 50 CAPSULE, EXTENDED RELEASE ORAL at 19:40

## 2021-06-30 RX ADMIN — HEPARIN SODIUM 2000 UNIT(S)/HR: 5000 INJECTION INTRAVENOUS; SUBCUTANEOUS at 07:50

## 2021-06-30 RX ADMIN — PREGABALIN 1000 MICROGRAM(S): 225 CAPSULE ORAL at 14:29

## 2021-06-30 RX ADMIN — HEPARIN SODIUM 1800 UNIT(S)/HR: 5000 INJECTION INTRAVENOUS; SUBCUTANEOUS at 23:22

## 2021-06-30 RX ADMIN — HEPARIN SODIUM 1800 UNIT(S)/HR: 5000 INJECTION INTRAVENOUS; SUBCUTANEOUS at 00:23

## 2021-06-30 RX ADMIN — MORPHINE SULFATE 2 MILLIGRAM(S): 50 CAPSULE, EXTENDED RELEASE ORAL at 19:26

## 2021-06-30 RX ADMIN — HEPARIN SODIUM 4000 UNIT(S): 5000 INJECTION INTRAVENOUS; SUBCUTANEOUS at 07:52

## 2021-06-30 RX ADMIN — PIPERACILLIN AND TAZOBACTAM 25 GRAM(S): 4; .5 INJECTION, POWDER, LYOPHILIZED, FOR SOLUTION INTRAVENOUS at 21:18

## 2021-06-30 NOTE — PROGRESS NOTE ADULT - ASSESSMENT
69 y/o M with PMH of HTN, hairy cell leukemia (in remission, last chemo 7 yrs ago per patient), Bahai. Presents with abdominal pain, nausea, and fever (tmax 102F at home), SOB, pleurtic CP x3-4 days. On triage to ED, febrile to 101.2 F, tachycardic 110-120s , O2 sats 96% on room air. Received CTZ, Azithromycin, Zosyn in ED. CT A/P w/o contrast negative for intra-abdominal pathology. Pulmonary called to consult for SOB. On exam, is visibly SOB with minimal exertion, O2 sats 92-93% on room air.     SOB w/ pleuritic chest pain  -On exam pt is visibly SOB at rest and with minimal exertion, O2 sats 92-93% on room air  -F/u CT chest non-contrast   -Do VQ scan and B/L LE duplex for r/o PE/DVT given hx of malignancy, fevers, and tachycardia  -CT A/P 6/28 read as small L pleural effusion w/ adjacent compressive LLL atelectasis - appears more sick clinically than what this shows (?infarct vs PNA) - f/u VQ scan and CT chest results  Fevers  -Tmax 101.2 F in ED with leukocytosis, afebrile now  -CT A/P notes no intraabdominal pathology  -RVP negative  -F/u CT chest  -On ABX   Abdominal pain  -CT A/P notes no intraabdominal pathology  -F/u abdominal US  Hairy cell leukemia  -in remission per patient, s/p tx with chemo (last 7 years ago)      6/30:  SOB w/ pleuritic chest pain: -F CT chest non-contrast -no official report yet?; but to me has small left sided effusion as well as pneumonia and lingular nodule/ opacity: await for official report: but arranged for tap on the left sided diagnostic as he is having lot of pain and is febrile:  VQ scan is intermediate probability: has perfusion defects: on empiric heparin: check dopplers and echo: stopped now in anticipation of tap on left side: will restart after tap   -CT A/P 6/28 read as small L pleural effusion w/ adjacent compressive LLL atelectasis - appears more sick clinically than what this shows (?infarct vs PNA) -as above  Fevers -Tmax 101.2 F in ED with leukocytosis, afebrile now -CT A/P notes no intraabdominal pathology: On ABX   Abdominal pain -CT A/P notes no intraabdominal pathology  Hairy cell leukemia  -in remission per patient, s/p tx with chemo (last 7 years ago)    alli smith     69 y/o M with PMH of HTN, hairy cell leukemia (in remission, last chemo 7 yrs ago per patient), Buddhist. Presents with abdominal pain, nausea, and fever (tmax 102F at home), SOB, pleurtic CP x3-4 days. On triage to ED, febrile to 101.2 F, tachycardic 110-120s , O2 sats 96% on room air. Received CTZ, Azithromycin, Zosyn in ED. CT A/P w/o contrast negative for intra-abdominal pathology. Pulmonary called to consult for SOB. On exam, is visibly SOB with minimal exertion, O2 sats 92-93% on room air.     SOB w/ pleuritic chest pain  -On exam pt is visibly SOB at rest and with minimal exertion, O2 sats 92-93% on room air  -F/u CT chest non-contrast   -Do VQ scan and B/L LE duplex for r/o PE/DVT given hx of malignancy, fevers, and tachycardia  -CT A/P 6/28 read as small L pleural effusion w/ adjacent compressive LLL atelectasis - appears more sick clinically than what this shows (?infarct vs PNA) - f/u VQ scan and CT chest results  Fevers  -Tmax 101.2 F in ED with leukocytosis, afebrile now  -CT A/P notes no intraabdominal pathology  -RVP negative  -F/u CT chest  -On ABX   Abdominal pain  -CT A/P notes no intraabdominal pathology  -F/u abdominal US  Hairy cell leukemia  -in remission per patient, s/p tx with chemo (last 7 years ago)      6/30:  SOB w/ pleuritic chest pain: -F CT chest non-contrast -no official report yet?; but to me has small left sided effusion as well as pneumonia and lingular nodule/ opacity: await for official report: but arranged for tap on the left sided diagnostic as he is having lot of pain and is febrile:  VQ scan is intermediate probability: has perfusion defects: on empiric heparin: check dopplers and echo: stopped now in anticipation of tap on left side: will restart after tap   -CT A/P 6/28 read as small L pleural effusion w/ adjacent compressive LLL atelectasis - appears more sick clinically than what this shows (?infarct vs PNA) -as above  Fevers -Tmax 101.2 F in ED with leukocytosis, afebrile now -CT A/P notes no intraabdominal pathology: On ABX   Abdominal pain -CT A/P notes no intraabdominal pathology  Hairy cell leukemia  -in remission per patient, s/p tx with chemo (last 7 years ago)    alli smith    7/1:    SOB w/ pleuritic chest pain: -s/p tap: complicated exudative parapneumonia effusion: no growth so far: he feels much better now awaiting cta : cont heparin till pe is ruled out on cta   Fevers resolved cont antibiotics id following  await for pleural fluid culture   Abdominal pain -CT A/P notes no intraabdominal pathology  Hairy cell leukemia  -in remission per patient, s/p tx with chemo (last 7 years ago)    marleni and np

## 2021-06-30 NOTE — PROGRESS NOTE ADULT - ASSESSMENT
70 y.o. M, Anabaptism, w/ a hx of HTN, hairy cell leukemia (in remission) who presents with abdominal pain, nausea and fever to 102 of unclear etiology.

## 2021-06-30 NOTE — PROGRESS NOTE ADULT - SUBJECTIVE AND OBJECTIVE BOX
Interventional Pulmonology Note Interventional Pulmonology Progress Note    69 y/o M presenting with fevers and chest pain, noted to have a left pleural effusion with atelectasis. Interventional Pulmonology consulted for diagnostic and therapeutic thoracentesis due to clinical concern for parapneumonic effusion.      MEDICATIONS  (STANDING):  cyanocobalamin 1000 MICROGram(s) Oral daily  heparin  Infusion.  Unit(s)/Hr (18 mL/Hr) IV Continuous <Continuous>  lactated ringers. 1000 milliLiter(s) (80 mL/Hr) IV Continuous <Continuous>  piperacillin/tazobactam IVPB.. 3.375 Gram(s) IV Intermittent every 8 hours  testosterone 1% Gel 50 milliGRAM(s) Topical daily    MEDICATIONS  (PRN):  acetaminophen   Tablet .. 650 milliGRAM(s) Oral every 4 hours PRN Mild Pain (1 - 3)  heparin   Injectable 8500 Unit(s) IV Push every 6 hours PRN For aPTT less than 40  heparin   Injectable 4000 Unit(s) IV Push every 6 hours PRN For aPTT between 40 - 57  morphine  - Injectable 2 milliGRAM(s) IV Push every 4 hours PRN Moderate Pain (4 - 6)    Vital Signs Last 24 Hrs  T(C): 37.2 (2021 05:50), Max: 38.3 (2021 21:52)  T(F): 99 (2021 05:50), Max: 101 (2021 21:52)  HR: 94 (2021 05:50) (94 - 107)  BP: 120/64 (2021 05:50) (115/72 - 126/65)  RR: 19 (2021 05:50) (19 - 20)  SpO2: 99% (2021 05:50) (96% - 100%)    I&O's Summary    2021 07:01  -  2021 07:00  --------------------------------------------------------  IN: 0 mL / OUT: 600 mL / NET: -600 mL          Physical Exam:   GENERAL: NAD, well-groomed, well-developed  HEENT: NUNU/   Atraumatic, Normocephalic  ENMT: No tonsillar erythema, exudates, or enlargement; Moist mucous membranes, Good dentition, No lesions  NECK: Supple, No JVD, Normal thyroid  CHEST/LUNG: Reduced breath sounds at the left base  CVS: Regular rate and rhythm; No murmurs, rubs, or gallops  GI: : Soft, Nontender, Nondistended; Bowel sounds present  NERVOUS SYSTEM:  Alert & Oriented X3  EXTREMITIES:- edema  LYMPH: No lymphadenopathy noted  SKIN: No rashes or lesions  ENDOCRINOLOGY: No Thyromegaly  PSYCH: Appropriate    Labs:  23                            14.1   7.83  )-----------( 106      ( 2021 07:04 )             43.2                         15.5   9.66  )-----------( 90       ( 2021 06:42 )             48.3                         16.7   12.50 )-----------( 92       ( 2021 12:54 )             50.3     06-30    134<L>  |  100  |  22  ----------------------------<  94  3.7   |  21<L>  |  1.33<H>      136  |  101  |  27<H>  ----------------------------<  87  4.2   |  22  |  1.77<H>  06    136  |  100  |  29<H>  ----------------------------<  84  4.8   |  17<L>  |  2.18<H>    Ca    8.4      2021 07:04  Ca    8.3<L>      2021 06:42  Phos  2.5     -30  Phos  2.9     -  Mg     2.20     06-30  Mg     2.20         TPro  5.8<L>  /  Alb  2.7<L>  /  TBili  1.6<H>  /  DBili  x   /  AST  62<H>  /  ALT  51<H>  /  AlkPhos  82    TPro  7.0  /  Alb  3.4  /  TBili  2.4<H>  /  DBili  x   /  AST  48<H>  /  ALT  57<H>  /  AlkPhos  93      CAPILLARY BLOOD GLUCOSE      POCT Blood Glucose.: 88 mg/dL (2021 13:04)      LIVER FUNCTIONS - ( 2021 07:04 )  Alb: 2.7 g/dL / Pro: 5.8 g/dL / ALK PHOS: 82 U/L / ALT: 51 U/L / AST: 62 U/L / GGT: x           PTT - ( 2021 07:04 )  PTT:51.4 sec  Urinalysis Basic - ( 2021 06:31 )    Color: Laila / Appearance: Slightly Turbid / S.025 / pH: x  Gluc: x / Ketone: Small  / Bili: Small / Urobili: 6 mg/dL   Blood: x / Protein: 100 mg/dL / Nitrite: Negative   Leuk Esterase: Negative / RBC: 3 /HPF / WBC 6 /HPF   Sq Epi: x / Non Sq Epi: 2 /HPF / Bacteria: Negative            RECENT CULTURES:   @ 19:40 .Urine Clean Catch (Midstream)     R< from: NM Pulmonary Perfusion Scan (21 @ 22:44) >    EXAM:  NM PULM PERFUSION IMG        PROCEDURE DATE:  2021       INTERPRETATION:  CLINICAL INFORMATION: 70 year old male with dyspnea; referred to evaluate for pulmonary embolism.    RADIOPHARMACEUTICAL:  3.0 mCi Tc-99m-MAA, I.V.    TECHNIQUE:  Perfusion images of the lungs were obtained following administration of Tc-99m-MAA. Images were obtained in the anterior, posterior, both lateral, and all 4 oblique projections. The study was interpreted in conjunction with a chest radiograph of 2021.    COMPARISON: VQ scan 2020.    FINDINGS: There is heterogenous distribution of radiopharmaceutical in the left lung. There are multiple new segmental perfusion defects in the left lung, involving the apicoposterior segment left upperlobe, superior, anterobasal and laterobasal segments of the left lower lobe.    IMPRESSION: Indeterminate for pulmonary embolus. Multiple new segmental perfusion defects since VQ scan 2020.    Discussed with YOLANDA Adhikari on the medicine service, by Dr. Santacruz, with read back on 2021 at approximately 11pm.                CRISTHIAN SANTACRUZ MD; Attending Radiologist  This document has been electronically signed. 2021 11:11PM    < end of copied text >             No growth     @ 15:09 .Blood Blood-Peripheral                No growth to date.          RESPIRATORY CULTURES:          Studies  CT SCAN Chest: Reviewed by my personally. My interpretation: Left sided atelectasis/consolidation with associated small pleural effusion.     Ultrasound CHEST: Please see separate chest ultrasound report.

## 2021-06-30 NOTE — CHART NOTE - NSCHARTNOTEFT_GEN_A_CORE
: Lv uCadra / Antonio Richey    INDICATION: Pleural effusion    PROCEDURE:  [ ] LIMITED ECHO  [x ] LIMITED CHEST  [ ] LIMITED RETROPERITONEAL  [ ] LIMITED ABDOMINAL  [ ] LIMITED DVT  [ ] NEEDLE GUIDANCE VASCULAR  [ ] NEEDLE GUIDANCE THORACENTESIS  [ ] NEEDLE GUIDANCE PARACENTESIS  [ ] NEEDLE GUIDANCE PERICARDIOCENTESIS  [ ] OTHER    FINDINGS: A line predominant pattern in anterior chest with focal B lines. Small left pleural effusion. No right side pleural effusion.     INTERPRETATION: Small left side pleural effusion with pocket for diagnostic thoracentesis    Images stored in [facility]    --------------------------------------------------------  Lv Cuadra, PGY-5  Pulmonary/Critical Care Fellow  Pager: [phone] ([facility]), [phone] ([place])  Pulmonary spectra: [id number] : Lv Cuadra /Tawana    INDICATION: Pleural effusion    PROCEDURE:  [ ] LIMITED ECHO  [x ] LIMITED CHEST  [ ] LIMITED RETROPERITONEAL  [ ] LIMITED ABDOMINAL  [ ] LIMITED DVT  [ ] NEEDLE GUIDANCE VASCULAR  [ ] NEEDLE GUIDANCE THORACENTESIS  [ ] NEEDLE GUIDANCE PARACENTESIS  [ ] NEEDLE GUIDANCE PERICARDIOCENTESIS  [ ] OTHER    FINDINGS: A line predominant pattern in anterior chest with focal B lines. Small left pleural effusion. No right side pleural effusion.     INTERPRETATION: Small left side pleural effusion with pocket for diagnostic thoracentesis    Images stored in [employer]    --------------------------------------------------------  Lv Cuadra, PGY-5  Pulmonary/Critical Care Fellow  Pager: [phone] ([facility]), [phone] ([place])  Pulmonary spectra: [id number]      Attending Addendum:     I was present during the entire procedure and agree with the above findings and interpretation. TIme spent on the procedure was separate from the critical care time spent caring for the patient.     Carroll Gilliam MD

## 2021-06-30 NOTE — PROCEDURE NOTE - NSPROCDETAILS_GEN_ALL_CORE
location identified, draped/prepped, sterile technique used, needle inserted/introduced/catheter inserted over needle/ultrasound assessment of effusion (localization)

## 2021-06-30 NOTE — PROGRESS NOTE ADULT - SUBJECTIVE AND OBJECTIVE BOX
Date of Service: 21 @ 12:58    Patient is a 70y old  Male who presents with a chief complaint of Fever, abdominal pain (2021 12:06)      Any change in ROS: STILL sob AND HAS CHEST PAIN ON LEFT SIDE     MEDICATIONS  (STANDING):  cyanocobalamin 1000 MICROGram(s) Oral daily  heparin  Infusion.  Unit(s)/Hr (18 mL/Hr) IV Continuous <Continuous>  lactated ringers. 1000 milliLiter(s) (80 mL/Hr) IV Continuous <Continuous>  piperacillin/tazobactam IVPB.. 3.375 Gram(s) IV Intermittent every 8 hours  testosterone 1% Gel 50 milliGRAM(s) Topical daily    MEDICATIONS  (PRN):  acetaminophen   Tablet .. 650 milliGRAM(s) Oral every 4 hours PRN Mild Pain (1 - 3)  heparin   Injectable 8500 Unit(s) IV Push every 6 hours PRN For aPTT less than 40  heparin   Injectable 4000 Unit(s) IV Push every 6 hours PRN For aPTT between 40 - 57  morphine  - Injectable 2 milliGRAM(s) IV Push every 4 hours PRN Moderate Pain (4 - 6)    Vital Signs Last 24 Hrs  T(C): 37.2 (2021 05:50), Max: 38.3 (2021 21:52)  T(F): 99 (2021 05:50), Max: 101 (2021 21:52)  HR: 94 (2021 05:50) (94 - 107)  BP: 120/64 (2021 05:50) (115/72 - 126/65)  BP(mean): --  RR: 19 (2021 05:50) (19 - 20)  SpO2: 99% (2021 05:50) (96% - 100%)    I&O's Summary    2021 07:01  -  2021 07:00  --------------------------------------------------------  IN: 0 mL / OUT: 600 mL / NET: -600 mL          Physical Exam:   GENERAL: NAD, well-groomed, well-developed  HEENT: NUNU/   Atraumatic, Normocephalic  ENMT: No tonsillar erythema, exudates, or enlargement; Moist mucous membranes, Good dentition, No lesions  NECK: Supple, No JVD, Normal thyroid  CHEST/LUNG: DECREASED AIR ENTRY LEFT SIDE; BRONCHIAL BREATH SOUNDS  CVS: Regular rate and rhythm; No murmurs, rubs, or gallops  GI: : Soft, Nontender, Nondistended; Bowel sounds present  NERVOUS SYSTEM:  Alert & Oriented X3  EXTREMITIES:- edema  LYMPH: No lymphadenopathy noted  SKIN: No rashes or lesions  ENDOCRINOLOGY: No Thyromegaly  PSYCH: Appropriate    Labs:  23                            14.1   7.83  )-----------( 106      ( 2021 07:04 )             43.2                         15.5   9.66  )-----------( 90       ( 2021 06:42 )             48.3                         16.7   12.50 )-----------( 92       ( 2021 12:54 )             50.3     06-30    134<L>  |  100  |  22  ----------------------------<  94  3.7   |  21<L>  |  1.33<H>      136  |  101  |  27<H>  ----------------------------<  87  4.2   |  22  |  1.77<H>      136  |  100  |  29<H>  ----------------------------<  84  4.8   |  17<L>  |  2.18<H>    Ca    8.4      2021 07:04  Ca    8.3<L>      2021 06:42  Phos  2.5     -30  Phos  2.9     -  Mg     2.20     30  Mg     2.20         TPro  5.8<L>  /  Alb  2.7<L>  /  TBili  1.6<H>  /  DBili  x   /  AST  62<H>  /  ALT  51<H>  /  AlkPhos  82    TPro  7.0  /  Alb  3.4  /  TBili  2.4<H>  /  DBili  x   /  AST  48<H>  /  ALT  57<H>  /  AlkPhos  93      CAPILLARY BLOOD GLUCOSE      POCT Blood Glucose.: 88 mg/dL (2021 13:04)      LIVER FUNCTIONS - ( 2021 07:04 )  Alb: 2.7 g/dL / Pro: 5.8 g/dL / ALK PHOS: 82 U/L / ALT: 51 U/L / AST: 62 U/L / GGT: x           PTT - ( 2021 07:04 )  PTT:51.4 sec  Urinalysis Basic - ( 2021 06:31 )    Color: Laila / Appearance: Slightly Turbid / S.025 / pH: x  Gluc: x / Ketone: Small  / Bili: Small / Urobili: 6 mg/dL   Blood: x / Protein: 100 mg/dL / Nitrite: Negative   Leuk Esterase: Negative / RBC: 3 /HPF / WBC 6 /HPF   Sq Epi: x / Non Sq Epi: 2 /HPF / Bacteria: Negative            RECENT CULTURES:   @ 19:40 .Urine Clean Catch (Midstream)     R< from: NM Pulmonary Perfusion Scan (21 @ 22:44) >    EXAM:  NM PULM PERFUSION IMG        PROCEDURE DATE:  2021       INTERPRETATION:  CLINICAL INFORMATION: 70 year old male with dyspnea; referred to evaluate for pulmonary embolism.    RADIOPHARMACEUTICAL:  3.0 mCi Tc-99m-MAA, I.V.    TECHNIQUE:  Perfusion images of the lungs were obtained following administration of Tc-99m-MAA. Images were obtained in the anterior, posterior, both lateral, and all 4 oblique projections. The study was interpreted in conjunction with a chest radiograph of 2021.    COMPARISON: VQ scan 2020.    FINDINGS: There is heterogenous distribution of radiopharmaceutical in the left lung. There are multiple new segmental perfusion defects in the left lung, involving the apicoposterior segment left upperlobe, superior, anterobasal and laterobasal segments of the left lower lobe.    IMPRESSION: Indeterminate for pulmonary embolus. Multiple new segmental perfusion defects since VQ scan 2020.    Discussed with YOLANDA Adhikari on the medicine service, by Dr. Santacruz, with read back on 2021 at approximately 11pm.                CRISTHIAN SANTACRUZ MD; Attending Radiologist  This document has been electronically signed. 2021 11:11PM    < end of copied text >             No growth     @ 15:09 .Blood Blood-Peripheral                No growth to date.          RESPIRATORY CULTURES:          Studies  Chest X-RAY  CT SCAN Chest   Venous Dopplers: LE:   CT Abdomen  Others               Date of Service: 21 @ 12:58    Patient is a 70y old  Male who presents with a chief complaint of Fever, abdominal pain (2021 12:06)      Any change in ROS: much better: tap done: 400 cc of turbid fluid removed    MEDICATIONS  (STANDING):  cyanocobalamin 1000 MICROGram(s) Oral daily  heparin  Infusion.  Unit(s)/Hr (18 mL/Hr) IV Continuous <Continuous>  lactated ringers. 1000 milliLiter(s) (80 mL/Hr) IV Continuous <Continuous>  piperacillin/tazobactam IVPB.. 3.375 Gram(s) IV Intermittent every 8 hours  testosterone 1% Gel 50 milliGRAM(s) Topical daily    MEDICATIONS  (PRN):  acetaminophen   Tablet .. 650 milliGRAM(s) Oral every 4 hours PRN Mild Pain (1 - 3)  heparin   Injectable 8500 Unit(s) IV Push every 6 hours PRN For aPTT less than 40  heparin   Injectable 4000 Unit(s) IV Push every 6 hours PRN For aPTT between 40 - 57  morphine  - Injectable 2 milliGRAM(s) IV Push every 4 hours PRN Moderate Pain (4 - 6)    Vital Signs Last 24 Hrs  T(C): 37.2 (2021 05:50), Max: 38.3 (2021 21:52)  T(F): 99 (2021 05:50), Max: 101 (2021 21:52)  HR: 94 (2021 05:50) (94 - 107)  BP: 120/64 (2021 05:50) (115/72 - 126/65)  BP(mean): --  RR: 19 (2021 05:50) (19 - 20)  SpO2: 99% (2021 05:50) (96% - 100%)    I&O's Summary    2021 07:01  -  2021 07:00  --------------------------------------------------------  IN: 0 mL / OUT: 600 mL / NET: -600 mL          Physical Exam:   GENERAL: NAD, well-groomed, well-developed  HEENT: NUNU/   Atraumatic, Normocephalic  ENMT: No tonsillar erythema, exudates, or enlargement; Moist mucous membranes, Good dentition, No lesions  NECK: Supple, No JVD, Normal thyroid  CHEST/LUNG: better air entry left base:   CVS: Regular rate and rhythm; No murmurs, rubs, or gallops  GI: : Soft, Nontender, Nondistended; Bowel sounds present  NERVOUS SYSTEM:  Alert & Oriented X3  EXTREMITIES:- edema  LYMPH: No lymphadenopathy noted  SKIN: No rashes or lesions  ENDOCRINOLOGY: No Thyromegaly  PSYCH: Appropriate    Labs:  23                            14.1   7.83  )-----------( 106      ( 2021 07:04 )             43.2                         15.5   9.66  )-----------( 90       ( 2021 06:42 )             48.3                         16.7   12.50 )-----------( 92       ( 2021 12:54 )             50.3     0630    134<L>  |  100  |  22  ----------------------------<  94  3.7   |  21<L>  |  1.33<H>      136  |  101  |  27<H>  ----------------------------<  87  4.2   |  22  |  1.77<H>      136  |  100  |  29<H>  ----------------------------<  84  4.8   |  17<L>  |  2.18<H>    Ca    8.4      2021 07:04  Ca    8.3<L>      2021 06:42  Phos  2.5     30  Phos  2.9       Mg     2.20     30  Mg     2.20         TPro  5.8<L>  /  Alb  2.7<L>  /  TBili  1.6<H>  /  DBili  x   /  AST  62<H>  /  ALT  51<H>  /  AlkPhos  82    TPro  7.0  /  Alb  3.4  /  TBili  2.4<H>  /  DBili  x   /  AST  48<H>  /  ALT  57<H>  /  AlkPhos  93      CAPILLARY BLOOD GLUCOSE      POCT Blood Glucose.: 88 mg/dL (2021 13:04)      LIVER FUNCTIONS - ( 2021 07:04 )  Alb: 2.7 g/dL / Pro: 5.8 g/dL / ALK PHOS: 82 U/L / ALT: 51 U/L / AST: 62 U/L / GGT: x           PTT - ( 2021 07:04 )  PTT:51.4 sec  Urinalysis Basic - ( 2021 06:31 )    Color: Laila / Appearance: Slightly Turbid / S.025 / pH: x  Gluc: x / Ketone: Small  / Bili: Small / Urobili: 6 mg/dL   Blood: x / Protein: 100 mg/dL / Nitrite: Negative   Leuk Esterase: Negative / RBC: 3 /HPF / WBC 6 /HPF   Sq Epi: x / Non Sq Epi: 2 /HPF / Bacteria: Negative            RECENT CULTURES:   @ 19:40 .Urine Clean Catch (Midstream)     R< from: NM Pulmonary Perfusion Scan (21 @ 22:44) >    EXAM:  NM PULM PERFUSION IMG        PROCEDURE DATE:  2021       INTERPRETATION:  CLINICAL INFORMATION: 70 year old male with dyspnea; referred to evaluate for pulmonary embolism.    RADIOPHARMACEUTICAL:  3.0 mCi Tc-99m-MAA, I.V.    TECHNIQUE:  Perfusion images of the lungs were obtained following administration of Tc-99m-MAA. Images were obtained in the anterior, posterior, both lateral, and all 4 oblique projections. The study was interpreted in conjunction with a chest radiograph of 2021.    COMPARISON: VQ scan 2020.    FINDINGS: There is heterogenous distribution of radiopharmaceutical in the left lung. There are multiple new segmental perfusion defects in the left lung, involving the apicoposterior segment left upperlobe, superior, anterobasal and laterobasal segments of the left lower lobe.    IMPRESSION: Indeterminate for pulmonary embolus. Multiple new segmental perfusion defects since VQ scan 2020.    Discussed with YOLANDA Adhikari on the medicine service, by Dr. Santacruz, with read back on 2021 at approximately 11pm.                CRISTHIAN SANTACRUZ MD; Attending Radiologist  This document has been electronically signed. 2021 11:11PM    < end of copied text >      < from: Xray Chest 1 View- PORTABLE-Urgent (Xray Chest 1 View- PORTABLE-Urgent .) (21 @ 19:55) >  EXAM:  XR CHEST PORTABLE URGENT 1V        PROCEDURE DATE:  2021         INTERPRETATION:  Chest one view    HISTORY: Cough    COMPARISON STUDY: CT chest obtained earlier the same day    Frontal expiratory view of the chest shows the heart to be normal in size. The lungs show left lower lobe infiltrate and there is no evidence of pneumothorax nor pleural effusion.    IMPRESSION:  Left lower lobe infiltrate.      Thank you for the courtesy of this referral.              PENNY CASTRO MD; Attending Interventional Radiologist  This document has been electronically signed. 2021  1:36PM    < end of copied text >         No growth     @ 15:09 .Blood Blood-Peripheral                No growth to date.          RESPIRATORY CULTURES:          Studies  Chest X-RAY  CT SCAN Chest   Venous Dopplers: LE:   CT Abdomen  Others

## 2021-06-30 NOTE — PROGRESS NOTE ADULT - SUBJECTIVE AND OBJECTIVE BOX
Patient is a 70y old  Male who presents with a chief complaint of Fever, abdominal pain (2021 16:41)      DATE OF SERVICE: 21 @ 12:06    SUBJECTIVE / OVERNIGHT EVENTS: overnight events noted    ROS: minimally improved but still generalized pain  Resp: No cough no sputum production  CVS: + bilateral pleuritic chest pain  GI: no N/V/D  : no dysuria, no hematuria  Neuro: no weakness no paresthesias  Heme: No petechiae no easy bruising          MEDICATIONS  (STANDING):  cyanocobalamin 1000 MICROGram(s) Oral daily  heparin  Infusion.  Unit(s)/Hr (18 mL/Hr) IV Continuous <Continuous>  lactated ringers. 1000 milliLiter(s) (80 mL/Hr) IV Continuous <Continuous>  piperacillin/tazobactam IVPB.. 3.375 Gram(s) IV Intermittent every 8 hours  testosterone 1% Gel 50 milliGRAM(s) Topical daily    MEDICATIONS  (PRN):  acetaminophen   Tablet .. 650 milliGRAM(s) Oral every 4 hours PRN Mild Pain (1 - 3)  heparin   Injectable 8500 Unit(s) IV Push every 6 hours PRN For aPTT less than 40  heparin   Injectable 4000 Unit(s) IV Push every 6 hours PRN For aPTT between 40 - 57  morphine  - Injectable 2 milliGRAM(s) IV Push every 4 hours PRN Moderate Pain (4 - 6)        CAPILLARY BLOOD GLUCOSE      POCT Blood Glucose.: 88 mg/dL (2021 13:04)    I&O's Summary    2021 07:01  -  2021 07:00  --------------------------------------------------------  IN: 0 mL / OUT: 600 mL / NET: -600 mL        Vital Signs Last 24 Hrs  T(C): 37.2 (2021 05:50), Max: 38.3 (2021 21:52)  T(F): 99 (2021 05:50), Max: 101 (2021 21:52)  HR: 94 (2021 05:50) (94 - 107)  BP: 120/64 (2021 05:50) (115/72 - 130/62)  BP(mean): --  RR: 19 (2021 05:50) (18 - 20)  SpO2: 99% (2021 05:50) (96% - 100%)    PHYSICAL EXAM:  HEAD:  Atraumatic, Normocephalic  EYES: EOMI, PERRLA, sclera clear  NECK: Supple, No JVD  CHEST/LUNG: decreased air entry bilaterally  HEART: S1 S2; no murmurs   ABDOMEN: Soft, Nontender  EXTREMITIES:  no edema  NEUROLOGY: AO x 3 non-focal  SKIN: No rashes or lesions    LABS:                        14.1   7.83  )-----------( 106      ( 2021 07:04 )             43.2     06-30    134<L>  |  100  |  22  ----------------------------<  94  3.7   |  21<L>  |  1.33<H>    Ca    8.4      2021 07:04  Phos  2.5     06-30  Mg     2.20     06-30    TPro  5.8<L>  /  Alb  2.7<L>  /  TBili  1.6<H>  /  DBili  x   /  AST  62<H>  /  ALT  51<H>  /  AlkPhos  82  06-30    PTT - ( 2021 07:04 )  PTT:51.4 sec      Urinalysis Basic - ( 2021 06:31 )    Color: Laila / Appearance: Slightly Turbid / S.025 / pH: x  Gluc: x / Ketone: Small  / Bili: Small / Urobili: 6 mg/dL   Blood: x / Protein: 100 mg/dL / Nitrite: Negative   Leuk Esterase: Negative / RBC: 3 /HPF / WBC 6 /HPF   Sq Epi: x / Non Sq Epi: 2 /HPF / Bacteria: Negative          All consultant(s) notes reviewed and care discussed with other providers        Contact Number, Dr Huang [phone]

## 2021-06-30 NOTE — CHART NOTE - NSCHARTNOTEFT_GEN_A_CORE
Called by pulm fellow, thoracentesis performed at 7:15. 400ml removed. okay to restart heparin gtt in 4 hours. CXR ordered. Will monitor patient.

## 2021-06-30 NOTE — PROGRESS NOTE ADULT - ASSESSMENT
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 Presenting with pleuritic left chest pain, and fevers, chest CT revealed a left sided consolidation/atelectasis, clinical concern for parapneumonic effusion, Interventional Pulmonology consulted for diagnostic and therapeutic thoracentesis. Discussed with patient. Risks and benefits discussed. Heparin on hold since 1 pm.     - Planned for diagnostic and therapeutic thoracentesis today. Performed at bedside. 400cc of muddy looking serosanguinous fluid drained and sent to lab for analysis.   - Post-procedure chest xray  - Ok to restart heparin infusion per primary team. No contra-indications from Interventional Pulmonology perspective.   - Please call with questions.  - Fluid sent for culture too.  - Further interventions per fluid analysis.  - Continue antibiotics for pneumonia/parapneumonic effusion in the interim.  - Please page house pulmonary or IP team immediately if any change in clinical status or with questions.    D/w patient, primary team, nurse and consulting pulmonologist Dr. Cardoso.

## 2021-07-01 DIAGNOSIS — Z71.89 OTHER SPECIFIED COUNSELING: ICD-10-CM

## 2021-07-01 DIAGNOSIS — I26.99 OTHER PULMONARY EMBOLISM WITHOUT ACUTE COR PULMONALE: ICD-10-CM

## 2021-07-01 LAB
ALBUMIN SERPL ELPH-MCNC: 2.7 G/DL — LOW (ref 3.3–5)
ALP SERPL-CCNC: 97 U/L — SIGNIFICANT CHANGE UP (ref 40–120)
ALT FLD-CCNC: 85 U/L — HIGH (ref 4–41)
ANION GAP SERPL CALC-SCNC: 13 MMOL/L — SIGNIFICANT CHANGE UP (ref 7–14)
APTT BLD: 54.6 SEC — HIGH (ref 27–36.3)
APTT BLD: 78.1 SEC — HIGH (ref 27–36.3)
APTT BLD: 83.6 SEC — HIGH (ref 27–36.3)
AST SERPL-CCNC: 95 U/L — HIGH (ref 4–40)
BILIRUB SERPL-MCNC: 1 MG/DL — SIGNIFICANT CHANGE UP (ref 0.2–1.2)
BUN SERPL-MCNC: 18 MG/DL — SIGNIFICANT CHANGE UP (ref 7–23)
CALCIUM SERPL-MCNC: 8.4 MG/DL — SIGNIFICANT CHANGE UP (ref 8.4–10.5)
CHLORIDE SERPL-SCNC: 101 MMOL/L — SIGNIFICANT CHANGE UP (ref 98–107)
CO2 SERPL-SCNC: 22 MMOL/L — SIGNIFICANT CHANGE UP (ref 22–31)
CREAT SERPL-MCNC: 1.28 MG/DL — SIGNIFICANT CHANGE UP (ref 0.5–1.3)
GLUCOSE SERPL-MCNC: 96 MG/DL — SIGNIFICANT CHANGE UP (ref 70–99)
GRAM STN FLD: SIGNIFICANT CHANGE UP
HCT VFR BLD CALC: 42.4 % — SIGNIFICANT CHANGE UP (ref 39–50)
HGB BLD-MCNC: 14.1 G/DL — SIGNIFICANT CHANGE UP (ref 13–17)
LDH SERPL L TO P-CCNC: 219 U/L — SIGNIFICANT CHANGE UP (ref 135–225)
MCHC RBC-ENTMCNC: 25.8 PG — LOW (ref 27–34)
MCHC RBC-ENTMCNC: 33.3 GM/DL — SIGNIFICANT CHANGE UP (ref 32–36)
MCV RBC AUTO: 77.5 FL — LOW (ref 80–100)
NRBC # BLD: 0 /100 WBCS — SIGNIFICANT CHANGE UP
NRBC # FLD: 0 K/UL — SIGNIFICANT CHANGE UP
PH FLD: 7.6 — SIGNIFICANT CHANGE UP
PLATELET # BLD AUTO: 96 K/UL — LOW (ref 150–400)
POTASSIUM SERPL-MCNC: 3.7 MMOL/L — SIGNIFICANT CHANGE UP (ref 3.5–5.3)
POTASSIUM SERPL-SCNC: 3.7 MMOL/L — SIGNIFICANT CHANGE UP (ref 3.5–5.3)
PROT SERPL-MCNC: 6 G/DL — SIGNIFICANT CHANGE UP (ref 6–8.3)
RBC # BLD: 5.47 M/UL — SIGNIFICANT CHANGE UP (ref 4.2–5.8)
RBC # FLD: 14.6 % — HIGH (ref 10.3–14.5)
SODIUM SERPL-SCNC: 136 MMOL/L — SIGNIFICANT CHANGE UP (ref 135–145)
SPECIMEN SOURCE: SIGNIFICANT CHANGE UP
WBC # BLD: 5.72 K/UL — SIGNIFICANT CHANGE UP (ref 3.8–10.5)
WBC # FLD AUTO: 5.72 K/UL — SIGNIFICANT CHANGE UP (ref 3.8–10.5)

## 2021-07-01 PROCEDURE — 71275 CT ANGIOGRAPHY CHEST: CPT | Mod: 26

## 2021-07-01 PROCEDURE — 99232 SBSQ HOSP IP/OBS MODERATE 35: CPT

## 2021-07-01 RX ORDER — MORPHINE SULFATE 50 MG/1
2 CAPSULE, EXTENDED RELEASE ORAL EVERY 4 HOURS
Refills: 0 | Status: DISCONTINUED | OUTPATIENT
Start: 2021-07-01 | End: 2021-07-07

## 2021-07-01 RX ORDER — LISINOPRIL 2.5 MG/1
2.5 TABLET ORAL DAILY
Refills: 0 | Status: DISCONTINUED | OUTPATIENT
Start: 2021-07-01 | End: 2021-07-07

## 2021-07-01 RX ORDER — SODIUM CHLORIDE 9 MG/ML
500 INJECTION INTRAMUSCULAR; INTRAVENOUS; SUBCUTANEOUS
Refills: 0 | Status: DISCONTINUED | OUTPATIENT
Start: 2021-07-01 | End: 2021-07-02

## 2021-07-01 RX ADMIN — PIPERACILLIN AND TAZOBACTAM 25 GRAM(S): 4; .5 INJECTION, POWDER, LYOPHILIZED, FOR SOLUTION INTRAVENOUS at 05:42

## 2021-07-01 RX ADMIN — HEPARIN SODIUM 2000 UNIT(S)/HR: 5000 INJECTION INTRAVENOUS; SUBCUTANEOUS at 22:27

## 2021-07-01 RX ADMIN — LISINOPRIL 2.5 MILLIGRAM(S): 2.5 TABLET ORAL at 14:39

## 2021-07-01 RX ADMIN — Medication 50 MILLIGRAM(S): at 13:57

## 2021-07-01 RX ADMIN — HEPARIN SODIUM 2000 UNIT(S)/HR: 5000 INJECTION INTRAVENOUS; SUBCUTANEOUS at 14:37

## 2021-07-01 RX ADMIN — HEPARIN SODIUM 4000 UNIT(S): 5000 INJECTION INTRAVENOUS; SUBCUTANEOUS at 06:25

## 2021-07-01 RX ADMIN — SODIUM CHLORIDE 50 MILLILITER(S): 9 INJECTION INTRAMUSCULAR; INTRAVENOUS; SUBCUTANEOUS at 14:40

## 2021-07-01 RX ADMIN — HEPARIN SODIUM 2000 UNIT(S)/HR: 5000 INJECTION INTRAVENOUS; SUBCUTANEOUS at 06:22

## 2021-07-01 RX ADMIN — PREGABALIN 1000 MICROGRAM(S): 225 CAPSULE ORAL at 13:59

## 2021-07-01 RX ADMIN — PIPERACILLIN AND TAZOBACTAM 25 GRAM(S): 4; .5 INJECTION, POWDER, LYOPHILIZED, FOR SOLUTION INTRAVENOUS at 13:56

## 2021-07-01 RX ADMIN — PIPERACILLIN AND TAZOBACTAM 25 GRAM(S): 4; .5 INJECTION, POWDER, LYOPHILIZED, FOR SOLUTION INTRAVENOUS at 22:26

## 2021-07-01 NOTE — PROGRESS NOTE ADULT - SUBJECTIVE AND OBJECTIVE BOX
CC: Patient is a 70y old  Male who presents with a chief complaint of Fever, abdominal pain (01 Jul 2021 10:53)    ID following for fever    Interval History/ROS: Pt s/p thoracentesis yesterday - cultures in lab. Fever curve improving. No abd pain today.    Rest of ROS negative.    Allergies  No Known Allergies    ANTIMICROBIALS:  piperacillin/tazobactam IVPB.. 3.375 every 8 hours    OTHER MEDS:  acetaminophen   Tablet .. 650 milliGRAM(s) Oral every 4 hours PRN  cyanocobalamin 1000 MICROGram(s) Oral daily  heparin   Injectable 8500 Unit(s) IV Push every 6 hours PRN  heparin   Injectable 4000 Unit(s) IV Push every 6 hours PRN  heparin  Infusion.  Unit(s)/Hr IV Continuous <Continuous>  lactated ringers. 1000 milliLiter(s) IV Continuous <Continuous>  lisinopril 2.5 milliGRAM(s) Oral daily  morphine  - Injectable 2 milliGRAM(s) IV Push every 4 hours PRN  testosterone 1% Gel 50 milliGRAM(s) Topical daily    PE:    Vital Signs Last 24 Hrs  T(C): 36.7 (01 Jul 2021 11:59), Max: 37.9 (30 Jun 2021 14:19)  T(F): 98.1 (01 Jul 2021 11:59), Max: 100.2 (30 Jun 2021 14:19)  HR: 85 (01 Jul 2021 11:59) (85 - 101)  BP: 115/70 (01 Jul 2021 11:59) (111/64 - 144/53)  BP(mean): --  RR: 16 (01 Jul 2021 11:59) (16 - 20)  SpO2: 96% (01 Jul 2021 11:59) (95% - 96%)    Gen: AOx3, NAD  CV: S1+S2 normal, no murmurs  Resp: Decreased in left base  Abd: Soft, nontender, +BS  Ext: No LE edema, no wounds  : No Magallanes  IV/Skin: No thrombophlebitis  Neuro: no focal deficits    LABS:                          14.1   5.72  )-----------( 96       ( 01 Jul 2021 05:49 )             42.4       07-01    136  |  101  |  18  ----------------------------<  96  3.7   |  22  |  1.28    Ca    8.4      01 Jul 2021 05:49  Phos  2.5     06-30  Mg     2.20     06-30    TPro  6.0  /  Alb  2.7<L>  /  TBili  1.0  /  DBili  x   /  AST  95<H>  /  ALT  85<H>  /  AlkPhos  97  07-01    MICROBIOLOGY:  v  .Body Fluid Pleural Fluid  06-30-21   Testing in progress  --  --      .Smear Other, Pleural Fluid  06-30-21 --  --    polymorphonuclear leukocytes seen  No organisms seen  by cytocentrifuge      .Urine Clean Catch (Midstream)  06-28-21   No growth  --  --      .Blood Blood-Peripheral  06-28-21   No growth to date.  --  --    Rapid RVP Result: NotDetec (06-28 @ 13:15)    RADIOLOGY:    < from: CT Chest No Cont (06.30.21 @ 10:10) >  IMPRESSION:  1.  Left pleural effusion is increased and there is increased left lower lobe passive atelectasis      < end of copied text >

## 2021-07-01 NOTE — PROGRESS NOTE ADULT - ASSESSMENT
70 year old male with HTN, Hairy cell leukemia in remission about 5 years ago presenting with abd pain and fevers. Patient noticed not feeling well starting Friday, then went to an outdoor picnic bbq on Sat and when he returned that night started to develop abd pain, no diarrhea though. He then started to develop fevers, and did not improve so came to the hospital.     CT A/P with unchanged splenomegaly consistent with Waldenstrom macroglobulinemia.    Elevated LFTs, RUQ US normal.  CXR with left lung patchy opacity, CT chest with left pleural effusion s/p thoracentesis 6/30/21.    Recommend:  #SIRS (fevers, leukocytosis)  -?Parapneumonic effusion  -Blood cxs so far no growth  -Continue zosyn   -F/U thoracentesis cultures  -Appreciate pulm following  -Monitor fever curve  -Leukocytosis resolved    #Hairy cell leukemia  -In remission    Stephon Rubio MD  Pager [phone]  After 5pm/weekends call [phone]

## 2021-07-01 NOTE — PROGRESS NOTE ADULT - ASSESSMENT
70 y.o. M, [employer]ology, w/ a hx of HTN, hairy cell leukemia (in remission) who presents with abdominal pain, nausea and fever to 102 of unclear etiology.

## 2021-07-01 NOTE — PHARMACOTHERAPY INTERVENTION NOTE - COMMENTS
Patient and wife, Allie, counseled on relevant medication indications, dosing, administration, side effects, and monitoring.  Also reviewed importance of follow-up with outpatient care providers, medication adherence, and self-care.  Patient and wife demonstrated understanding.    Brent Brady, PharmD, BCPS  Clinical Pharmacist- Internal Medicine  Spectra [phone]

## 2021-07-01 NOTE — PROGRESS NOTE ADULT - SUBJECTIVE AND OBJECTIVE BOX
Patient is a 70y old  Male who presents with a chief complaint of Fever, abdominal pain (30 Jun 2021 19:07)      DATE OF SERVICE: 07-01-21 @ 10:53    SUBJECTIVE / OVERNIGHT EVENTS: overnight events noted  "I feel much better, doctor!"    ROS:  Resp: No cough no sputum production  CVS: No chest pain today no palpitations no orthopnea  GI: no N/V/D  : no dysuria, no hematuria  Neuro: no weakness no paresthesias  Heme: No petechiae no easy bruising  Msk: No joint pain no swelling  Skin: No rash no itching        MEDICATIONS  (STANDING):  cyanocobalamin 1000 MICROGram(s) Oral daily  heparin  Infusion.  Unit(s)/Hr (18 mL/Hr) IV Continuous <Continuous>  lactated ringers. 1000 milliLiter(s) (80 mL/Hr) IV Continuous <Continuous>  piperacillin/tazobactam IVPB.. 3.375 Gram(s) IV Intermittent every 8 hours  testosterone 1% Gel 50 milliGRAM(s) Topical daily    MEDICATIONS  (PRN):  acetaminophen   Tablet .. 650 milliGRAM(s) Oral every 4 hours PRN Mild Pain (1 - 3)  heparin   Injectable 8500 Unit(s) IV Push every 6 hours PRN For aPTT less than 40  heparin   Injectable 4000 Unit(s) IV Push every 6 hours PRN For aPTT between 40 - 57  morphine  - Injectable 2 milliGRAM(s) IV Push every 4 hours PRN Moderate Pain (4 - 6)        CAPILLARY BLOOD GLUCOSE        I&O's Summary    30 Jun 2021 07:01  -  01 Jul 2021 07:00  --------------------------------------------------------  IN: 0 mL / OUT: 700 mL / NET: -700 mL        Vital Signs Last 24 Hrs  T(C): 36.8 (01 Jul 2021 05:39), Max: 37.9 (30 Jun 2021 14:19)  T(F): 98.2 (01 Jul 2021 05:39), Max: 100.2 (30 Jun 2021 14:19)  HR: 86 (01 Jul 2021 05:39) (86 - 101)  BP: 111/64 (01 Jul 2021 05:39) (111/64 - 144/53)  BP(mean): --  RR: 19 (01 Jul 2021 05:39) (19 - 20)  SpO2: 96% (01 Jul 2021 05:39) (95% - 96%)    PHYSICAL EXAM:  HEAD:  Atraumatic, Normocephalic  EYES: EOMI, PERRLA, sclera clear  NECK: Supple, No JVD  CHEST/LUNG: decreased air entry left base  HEART: S1 S2; no murmurs   ABDOMEN: Soft, Nontender  EXTREMITIES:  no edema  NEUROLOGY: AO x 3 non-focal  SKIN: No rashes or lesions    LABS:                        14.1   5.72  )-----------( 96       ( 01 Jul 2021 05:49 )             42.4     07-01    136  |  101  |  18  ----------------------------<  96  3.7   |  22  |  1.28    Ca    8.4      01 Jul 2021 05:49  Phos  2.5     06-30  Mg     2.20     06-30    TPro  6.0  /  Alb  2.7<L>  /  TBili  1.0  /  DBili  x   /  AST  95<H>  /  ALT  85<H>  /  AlkPhos  97  07-01    PTT - ( 01 Jul 2021 05:49 )  PTT:54.6 sec            All consultant(s) notes reviewed and care discussed with other providers        Contact Number, Dr Huang [phone]

## 2021-07-02 LAB
ALBUMIN SERPL ELPH-MCNC: 2.7 G/DL — LOW (ref 3.3–5)
ALP SERPL-CCNC: 122 U/L — HIGH (ref 40–120)
ALT FLD-CCNC: 154 U/L — HIGH (ref 4–41)
ANION GAP SERPL CALC-SCNC: 12 MMOL/L — SIGNIFICANT CHANGE UP (ref 7–14)
APTT BLD: 84.8 SEC — HIGH (ref 27–36.3)
AST SERPL-CCNC: 152 U/L — HIGH (ref 4–40)
BILIRUB SERPL-MCNC: 0.7 MG/DL — SIGNIFICANT CHANGE UP (ref 0.2–1.2)
BUN SERPL-MCNC: 14 MG/DL — SIGNIFICANT CHANGE UP (ref 7–23)
CALCIUM SERPL-MCNC: 8.4 MG/DL — SIGNIFICANT CHANGE UP (ref 8.4–10.5)
CHLORIDE SERPL-SCNC: 104 MMOL/L — SIGNIFICANT CHANGE UP (ref 98–107)
CO2 SERPL-SCNC: 22 MMOL/L — SIGNIFICANT CHANGE UP (ref 22–31)
CREAT SERPL-MCNC: 1.26 MG/DL — SIGNIFICANT CHANGE UP (ref 0.5–1.3)
GLUCOSE SERPL-MCNC: 93 MG/DL — SIGNIFICANT CHANGE UP (ref 70–99)
HCT VFR BLD CALC: 43.1 % — SIGNIFICANT CHANGE UP (ref 39–50)
HGB BLD-MCNC: 14.2 G/DL — SIGNIFICANT CHANGE UP (ref 13–17)
MAGNESIUM SERPL-MCNC: 2 MG/DL — SIGNIFICANT CHANGE UP (ref 1.6–2.6)
MCHC RBC-ENTMCNC: 25.2 PG — LOW (ref 27–34)
MCHC RBC-ENTMCNC: 32.9 GM/DL — SIGNIFICANT CHANGE UP (ref 32–36)
MCV RBC AUTO: 76.4 FL — LOW (ref 80–100)
NON-GYNECOLOGICAL CYTOLOGY STUDY: SIGNIFICANT CHANGE UP
NRBC # BLD: 0 /100 WBCS — SIGNIFICANT CHANGE UP
NRBC # FLD: 0 K/UL — SIGNIFICANT CHANGE UP
PHOSPHATE SERPL-MCNC: 3.1 MG/DL — SIGNIFICANT CHANGE UP (ref 2.5–4.5)
PLATELET # BLD AUTO: 114 K/UL — LOW (ref 150–400)
POTASSIUM SERPL-MCNC: 3.9 MMOL/L — SIGNIFICANT CHANGE UP (ref 3.5–5.3)
POTASSIUM SERPL-SCNC: 3.9 MMOL/L — SIGNIFICANT CHANGE UP (ref 3.5–5.3)
PROT SERPL-MCNC: 5.7 G/DL — LOW (ref 6–8.3)
RBC # BLD: 5.64 M/UL — SIGNIFICANT CHANGE UP (ref 4.2–5.8)
RBC # FLD: 14.7 % — HIGH (ref 10.3–14.5)
SODIUM SERPL-SCNC: 138 MMOL/L — SIGNIFICANT CHANGE UP (ref 135–145)
WBC # BLD: 5.58 K/UL — SIGNIFICANT CHANGE UP (ref 3.8–10.5)
WBC # FLD AUTO: 5.58 K/UL — SIGNIFICANT CHANGE UP (ref 3.8–10.5)

## 2021-07-02 PROCEDURE — 93306 TTE W/DOPPLER COMPLETE: CPT | Mod: 26

## 2021-07-02 PROCEDURE — 99232 SBSQ HOSP IP/OBS MODERATE 35: CPT

## 2021-07-02 RX ADMIN — PIPERACILLIN AND TAZOBACTAM 25 GRAM(S): 4; .5 INJECTION, POWDER, LYOPHILIZED, FOR SOLUTION INTRAVENOUS at 05:08

## 2021-07-02 RX ADMIN — PREGABALIN 1000 MICROGRAM(S): 225 CAPSULE ORAL at 13:11

## 2021-07-02 RX ADMIN — PIPERACILLIN AND TAZOBACTAM 25 GRAM(S): 4; .5 INJECTION, POWDER, LYOPHILIZED, FOR SOLUTION INTRAVENOUS at 13:13

## 2021-07-02 RX ADMIN — HEPARIN SODIUM 2000 UNIT(S)/HR: 5000 INJECTION INTRAVENOUS; SUBCUTANEOUS at 09:03

## 2021-07-02 RX ADMIN — Medication 50 MILLIGRAM(S): at 17:57

## 2021-07-02 RX ADMIN — PIPERACILLIN AND TAZOBACTAM 25 GRAM(S): 4; .5 INJECTION, POWDER, LYOPHILIZED, FOR SOLUTION INTRAVENOUS at 21:15

## 2021-07-02 RX ADMIN — LISINOPRIL 2.5 MILLIGRAM(S): 2.5 TABLET ORAL at 05:08

## 2021-07-02 NOTE — PROGRESS NOTE ADULT - ASSESSMENT
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 Presents with abdominal pain, nausea, and fever (tmax 102F at home), SOB, pleurtic CP x3-4 days. On triage to ED, febrile to 101.2 F, tachycardic 110-120s , O2 sats 96% on room air. Received CTZ, Azithromycin, Zosyn in ED. CT A/P w/o contrast negative for intra-abdominal pathology. Pulmonary called to consult for SOB. On exam, is visibly SOB with minimal exertion, O2 sats 92-93% on room air.     SOB w/ pleuritic chest pain  -On exam pt is visibly SOB at rest and with minimal exertion, O2 sats 92-93% on room air  -F/u CT chest non-contrast   -Do VQ scan and B/L LE duplex for r/o PE/DVT given hx of malignancy, fevers, and tachycardia  -CT A/P 6/28 read as small L pleural effusion w/ adjacent compressive LLL atelectasis - appears more sick clinically than what this shows (?infarct vs PNA) - f/u VQ scan and CT chest results  Fevers  -Tmax 101.2 F in ED with leukocytosis, afebrile now  -CT A/P notes no intraabdominal pathology  -RVP negative  -F/u CT chest  -On ABX   Abdominal pain  -CT A/P notes no intraabdominal pathology  -F/u abdominal US  Hairy cell leukemia  -in remission per patient, s/p tx with chemo (last 7 years ago)      6/30:  SOB w/ pleuritic chest pain: -F CT chest non-contrast -no official report yet?; but to me has small left sided effusion as well as pneumonia and lingular nodule/ opacity: await for official report: but arranged for tap on the left sided diagnostic as he is having lot of pain and is febrile:  VQ scan is intermediate probability: has perfusion defects: on empiric heparin: check dopplers and echo: stopped now in anticipation of tap on left side: will restart after tap   -CT A/P 6/28 read as small L pleural effusion w/ adjacent compressive LLL atelectasis - appears more sick clinically than what this shows (?infarct vs PNA) -as above  Fevers -Tmax 101.2 F in ED with leukocytosis, afebrile now -CT A/P notes no intraabdominal pathology: On ABX   Abdominal pain -CT A/P notes no intraabdominal pathology  Hairy cell leukemia  -in remission per patient, s/p tx with chemo (last 7 years ago)    alli smith    7/1:    SOB w/ pleuritic chest pain: -s/p tap: complicated exudative parapneumonia effusion: no growth so far: he feels much better now awaiting cta : cont heparin till pe is ruled out on cta   Fevers resolved cont antibiotics id following  await for pleural fluid culture   Abdominal pain -CT A/P notes no intraabdominal pathology  Hairy cell leukemia  -in remission per patient, s/p tx with chemo (last 7 years ago)    dwpmd and np      7/2:    SOB w/ pleuritic chest pain: -s/p tap: complicated exudative parapneumonia effusion: no growth so far: he feels much better now cta done: can not fully exclude pe: await dopplers as well as echo to see the rigth side of the heart   Fevers resolved cont antibiotics id following  await for pleural fluid culture : [id number] is negative too    Abdominal pain -CT A/P notes no intraabdominal pathology  Hairy cell leukemia  -in remission per patient, s/p tx with chemo (last 7 years ago)    chris

## 2021-07-02 NOTE — PROGRESS NOTE ADULT - SUBJECTIVE AND OBJECTIVE BOX
CC: Patient is a 70y old  Male who presents with a chief complaint of Fever, abdominal pain (02 Jul 2021 15:27)    ID following for fever    Interval History/ROS: Patient has no new complaints. No fevers. Feeling better. Thoracentesis cultures so far negative.    Rest of ROS negative.    Allergies  Drug Allergies Not Recorded  shellfish (Unknown)    ANTIMICROBIALS:  piperacillin/tazobactam IVPB.. 3.375 every 8 hours    OTHER MEDS:  acetaminophen   Tablet .. 650 milliGRAM(s) Oral every 4 hours PRN  cyanocobalamin 1000 MICROGram(s) Oral daily  heparin   Injectable 8500 Unit(s) IV Push every 6 hours PRN  heparin   Injectable 4000 Unit(s) IV Push every 6 hours PRN  heparin  Infusion.  Unit(s)/Hr IV Continuous <Continuous>  lisinopril 2.5 milliGRAM(s) Oral daily  morphine  - Injectable 2 milliGRAM(s) IV Push every 4 hours PRN  testosterone 1% Gel 50 milliGRAM(s) Topical daily    PE:    Vital Signs Last 24 Hrs  T(C): 36.7 (02 Jul 2021 14:19), Max: 37.2 (01 Jul 2021 21:08)  T(F): 98 (02 Jul 2021 14:19), Max: 99 (01 Jul 2021 21:08)  HR: 78 (02 Jul 2021 14:19) (78 - 90)  BP: 115/70 (02 Jul 2021 14:19) (113/60 - 125/79)  BP(mean): --  RR: 16 (02 Jul 2021 14:19) (16 - 17)  SpO2: 98% (02 Jul 2021 14:19) (97% - 98%)    Gen: AOx3, NAD  CV: S1+S2 normal, no murmurs  Resp: Decreased on left side  Abd: Soft, nontender, +BS  Ext: No LE edema, no wounds  : No Magallanes  IV/Skin: No thrombophlebitis  Neuro: no focal deficits    LABS:                          14.2   5.58  )-----------( 114      ( 02 Jul 2021 08:12 )             43.1       07-02    138  |  104  |  14  ----------------------------<  93  3.9   |  22  |  1.26    Ca    8.4      02 Jul 2021 08:12  Phos  3.1     07-02  Mg     2.00     07-02    TPro  5.7<L>  /  Alb  2.7<L>  /  TBili  0.7  /  DBili  x   /  AST  152<H>  /  ALT  154<H>  /  AlkPhos  122<H>  07-02          MICROBIOLOGY:  v  .Body Fluid Pleural Fluid  06-30-21   Testing in progress  --  --      .Body Fluid Pleural Fluid  06-30-21   No growth  --  --      .Smear Other, Pleural Fluid  06-30-21 --  --    polymorphonuclear leukocytes seen  No organisms seen  by cytocentrifuge      .Urine Clean Catch (Midstream)  06-28-21   No growth  --  --      .Blood Blood-Peripheral  06-28-21   No growth to date.  --  --    Rapid RVP Result: NotDetec (06-28 @ 13:15)    RADIOLOGY:    < from: CT Angio Chest PE Protocol w/ IV Cont (07.01.21 @ 20:53) >  IMPRESSION:  No pulmonary embolism to the lobar arteries. Unable to evaluate more distal branches.    Decreased small left pleural effusion and improved compressive atelectasis.      < end of copied text >

## 2021-07-02 NOTE — PROGRESS NOTE ADULT - SUBJECTIVE AND OBJECTIVE BOX
Date of Service: 07-02-21 @ 15:28    Patient is a 70y old  Male who presents with a chief complaint of Fever, abdominal pain. +sepsis (02 Jul 2021 14:22)      Any change in ROS: He seems better : not much of sob     MEDICATIONS  (STANDING):  cyanocobalamin 1000 MICROGram(s) Oral daily  heparin  Infusion.  Unit(s)/Hr (18 mL/Hr) IV Continuous <Continuous>  lisinopril 2.5 milliGRAM(s) Oral daily  piperacillin/tazobactam IVPB.. 3.375 Gram(s) IV Intermittent every 8 hours  testosterone 1% Gel 50 milliGRAM(s) Topical daily    MEDICATIONS  (PRN):  acetaminophen   Tablet .. 650 milliGRAM(s) Oral every 4 hours PRN Mild Pain (1 - 3)  heparin   Injectable 8500 Unit(s) IV Push every 6 hours PRN For aPTT less than 40  heparin   Injectable 4000 Unit(s) IV Push every 6 hours PRN For aPTT between 40 - 57  morphine  - Injectable 2 milliGRAM(s) IV Push every 4 hours PRN Moderate Pain (4 - 6)    Vital Signs Last 24 Hrs  T(C): 36.7 (02 Jul 2021 14:19), Max: 37.2 (01 Jul 2021 21:08)  T(F): 98 (02 Jul 2021 14:19), Max: 99 (01 Jul 2021 21:08)  HR: 78 (02 Jul 2021 14:19) (78 - 90)  BP: 115/70 (02 Jul 2021 14:19) (113/60 - 125/79)  BP(mean): --  RR: 16 (02 Jul 2021 14:19) (16 - 17)  SpO2: 98% (02 Jul 2021 14:19) (97% - 98%)    I&O's Summary        Physical Exam:   GENERAL: NAD, well-groomed, well-developed  HEENT: NUNU/   Atraumatic, Normocephalic  ENMT: No tonsillar erythema, exudates, or enlargement; Moist mucous membranes, Good dentition, No lesions  NECK: Supple, No JVD, Normal thyroid  CHEST/LUNG: decreased air entry left base:   CVS: Regular rate and rhythm; No murmurs, rubs, or gallops  GI: : Soft, Nontender, Nondistended; Bowel sounds present  NERVOUS SYSTEM:  Alert & Oriented X3  EXTREMITIES:  2+ Peripheral Pulses, No clubbing, cyanosis, or edema  LYMPH: No lymphadenopathy noted  SKIN: No rashes or lesions  ENDOCRINOLOGY: No Thyromegaly  PSYCH: Appropriate    Labs:  23                            14.2   5.58  )-----------( 114      ( 02 Jul 2021 08:12 )             43.1                         14.1   5.72  )-----------( 96       ( 01 Jul 2021 05:49 )             42.4                         14.1   7.83  )-----------( 106      ( 30 Jun 2021 07:04 )             43.2                         15.5   9.66  )-----------( 90       ( 29 Jun 2021 06:42 )             48.3     07-02    138  |  104  |  14  ----------------------------<  93  3.9   |  22  |  1.26  07-01    136  |  101  |  18  ----------------------------<  96  3.7   |  22  |  1.28  06-30    134<L>  |  100  |  22  ----------------------------<  94  3.7   |  21<L>  |  1.33<H>  06-29    136  |  101  |  27<H>  ----------------------------<  87  4.2   |  22  |  1.77<H>    Ca    8.4      02 Jul 2021 08:12  Ca    8.4      01 Jul 2021 05:49  Phos  3.1     07-02  Mg     2.00     07-02    TPro  5.7<L>  /  Alb  2.7<L>  /  TBili  0.7  /  DBili  x   /  AST  152<H>  /  ALT  154<H>  /  AlkPhos  122<H>  07-02  TPro  6.0  /  Alb  2.7<L>  /  TBili  1.0  /  DBili  x   /  AST  95<H>  /  ALT  85<H>  /  AlkPhos  97  07-01  TPro  5.8<L>  /  Alb  2.7<L>  /  TBili  1.6<H>  /  DBili  x   /  AST  62<H>  /  ALT  51<H>  /  AlkPhos  82  06-30    CAPILLARY BLOOD GLUCOSE         (06-30 @ 18:40)    LIVER FUNCTIONS - ( 02 Jul 2021 08:12 )  Alb: 2.7 g/dL / Pro: 5.7 g/dL / ALK PHOS: 122 U/L / ALT: 154 U/L / AST: 152 U/L / GGT: x           PTT - ( 02 Jul 2021 08:12 )  PTT:84.8 sec    Fluid Source --  Albumin, Fluid--  Glucose, Fluid--  Protein total, Fluid--  Lacatate Dehydrogenase, Fluid--  pH, Fluid7.6  Cytopathology-Non Gyn Report--  Fluid Source PLEURAL  Albumin, Fluid2.2 g/dL  Glucose, Fluid77 mg/dL  Protein total, Fluid3.5 g/dL  Lacatate Dehydrogenase, [id number] U/L  pH, Fluid--  Cytopathology-Non Gyn Report--  Fluid Source --  Albumin, Fluid--  Glucose, Fluid--  Protein total, Fluid--  Lacatate Dehydrogenase, Fluid--  pH, Fluid--  Cytopathology-Non Gyn Report  ACCESSION No:  [id number]    ANNE GRISSOM                          1        Cytopathology Report            Specimen(s) Submitted  PLEURAL FLUID, LEFT      Clinical History  70 year old male  Left pleural effusion.      Gross Description  Received: 60 ml of pinkish fluid in CytoLyt  Prepared: 1 ThinPrep slide, 1 cell block , 1 smear      Final Diagnosis  PLEURAL FLUID, LEFT  NEGATIVE FOR MALIGNANT CELLS.    Cytology slides and cell block section shows many benign and  reactive mesothelial cells, histiocytes, abundant mixed  inflammation and fibrin  material.  Clinical and radiological correlation is essential.    Screened by: Craig Mims SCT(ASCP)  Verified by: Ayad Hand M.D.  (Electronic Signature)  Reported on: 07/02/21 14:03 EDT, [address] [address]  Phone: [phone]   Fax: [phone]  Cytology technical processing performed at [address] [place] [id number]  _________________________________________________________________        RECENT CULTURES:  06-30 @ 22:58 .Body Fluid Pleural Fluid       rad< from: CT Angio Chest PE Protocol w/ IV Cont (07.01.21 @ 20:53) >  : Omnipaque 350  150 cc administered   50 cc discarded  Oral Contrast: NONE  Complications: None reported at time of study completion    PROCEDURE:  CT of the Chest was performed.  Sagittal and coronal reformats were performed.    FINDINGS:    LUNGS, PLEURA, and AIRWAYS: Patent central airways. Decreased small left pleural effusion and improved compressive atelectasis. Unchanged small nodule in the RUL, likely benign.  MEDIASTINUM AND MATTY: No lymphadenopathy.  VESSELS: Poor opacification of the pulmonary arteries limits evaluation. No pulmonary embolism to the lobar arteries. Limited evaluation of the subsegmental arteries. Aortic calcification.  HEART: Heart size is normal. No pericardial effusion. Coronary artery calcification.  CHEST WALL AND LOWER NECK: Within normal limits.  VISUALIZED UPPER ABDOMEN: Partially imaged splenomegaly.  BONES: Degenerative changes of the spine.    IMPRESSION:  No pulmonary embolism to the lobar arteries. Unable to evaluate more distal branches.    Decreased small left pleural effusion and improved compressive atelectasis.              ROSALINDA LAU MD; Resident Radiologist  This document has been electronically signed.  KENDY SWAIN M.D., ATTENDING RADIOGIST  This document has been electronically signed. Jul 2 2021 11:15AM    < end of copied text >           Testing in progress    06-30 @ 22:57 .Body Fluid Pleural Fluid                No growth    06-30 @ 20:35 .Smear Other, Pleural Fluid       polymorphonuclear leukocytes seen  No organisms seen  by cytocentrifuge             06-28 @ 19:40 .Urine Clean Catch (Midstream)                No growth    06-28 @ 15:09 .Blood Blood-Peripheral                No growth to date.          RESPIRATORY CULTURES:          Studies  Chest X-RAY  CT SCAN Chest   Venous Dopplers: LE:   CT Abdomen  Others

## 2021-07-02 NOTE — DIETITIAN INITIAL EVALUATION ADULT. - PERTINENT MEDS FT
MEDICATIONS  (STANDING):  cyanocobalamin 1000 MICROGram(s) Oral daily  heparin  Infusion.  Unit(s)/Hr (18 mL/Hr) IV Continuous <Continuous>  lisinopril 2.5 milliGRAM(s) Oral daily  piperacillin/tazobactam IVPB.. 3.375 Gram(s) IV Intermittent every 8 hours  testosterone 1% Gel 50 milliGRAM(s) Topical daily

## 2021-07-02 NOTE — PROGRESS NOTE ADULT - ASSESSMENT
70 year old male with HTN, Hairy cell leukemia in remission about 5 years ago presenting with abd pain and fevers. Patient noticed not feeling well starting Friday, then went to an outdoor picnic bbq on Sat and when he returned that night started to develop abd pain, no diarrhea though. He then started to develop fevers, and did not improve so came to the hospital.     CT A/P with unchanged splenomegaly consistent with Waldenstrom macroglobulinemia.    Elevated LFTs, RUQ US normal.  CXR with left lung patchy opacity, CT chest with left pleural effusion s/p thoracentesis 6/30/21. Cultures so far negative. Cytology with no malignant cells.    Recommend:  #SIRS (fevers, leukocytosis)  -?Parapneumonic effusion  -Blood cxs so far no growth  -Continue zosyn   -F/U thoracentesis cultures  -Appreciate pulm following  -Monitor fever curve  -Leukocytosis resolved  -If cultures remain negative, can transition to oral augmentin 875 mg PO BID for an additional 2 weeks.  -F/U TTE and LE dopplers    #Hairy cell leukemia  -In remission    Stephon Rubio MD  Pager [phone]  After 5pm/weekends call [phone]    Discussed plan with primary team.

## 2021-07-02 NOTE — PROGRESS NOTE ADULT - SUBJECTIVE AND OBJECTIVE BOX
Patient is a 70y old  Male who presents with a chief complaint of Fever, abdominal pain (02 Jul 2021 08:39)      DATE OF SERVICE: 07-02-21 @ 11:15    SUBJECTIVE / OVERNIGHT EVENTS: overnight events noted    ROS:  Resp: No cough no sputum production  CVS: No chest pain no palpitations no orthopnea  GI: no N/V/D  : no dysuria, no hematuria  Neuro: no weakness no paresthesias  Heme: No petechiae no easy bruising  Msk: No joint pain no swelling  Skin: No rash no itching        MEDICATIONS  (STANDING):  cyanocobalamin 1000 MICROGram(s) Oral daily  heparin  Infusion.  Unit(s)/Hr (18 mL/Hr) IV Continuous <Continuous>  lisinopril 2.5 milliGRAM(s) Oral daily  piperacillin/tazobactam IVPB.. 3.375 Gram(s) IV Intermittent every 8 hours  sodium chloride 0.9%. 500 milliLiter(s) (50 mL/Hr) IV Continuous <Continuous>  testosterone 1% Gel 50 milliGRAM(s) Topical daily    MEDICATIONS  (PRN):  acetaminophen   Tablet .. 650 milliGRAM(s) Oral every 4 hours PRN Mild Pain (1 - 3)  heparin   Injectable 8500 Unit(s) IV Push every 6 hours PRN For aPTT less than 40  heparin   Injectable 4000 Unit(s) IV Push every 6 hours PRN For aPTT between 40 - 57  morphine  - Injectable 2 milliGRAM(s) IV Push every 4 hours PRN Moderate Pain (4 - 6)        CAPILLARY BLOOD GLUCOSE        I&O's Summary      Vital Signs Last 24 Hrs  T(C): 36.5 (02 Jul 2021 05:00), Max: 37.2 (01 Jul 2021 21:08)  T(F): 97.7 (02 Jul 2021 05:00), Max: 99 (01 Jul 2021 21:08)  HR: 83 (02 Jul 2021 05:00) (83 - 90)  BP: 113/60 (02 Jul 2021 05:00) (113/60 - 125/79)  BP(mean): --  RR: 17 (02 Jul 2021 05:00) (16 - 17)  SpO2: 97% (02 Jul 2021 05:00) (96% - 98%)    PHYSICAL EXAM:  NECK: Supple, No JVD  CHEST/LUNG: decreased air entry left base  HEART: S1 S2; no murmurs   ABDOMEN: Soft, Nontender  EXTREMITIES:  no edema  NEUROLOGY: AO x 3 non-focal  SKIN: No rashes or lesions    LABS:                        14.2   5.58  )-----------( 114      ( 02 Jul 2021 08:12 )             43.1     07-02    138  |  104  |  14  ----------------------------<  93  3.9   |  22  |  1.26    Ca    8.4      02 Jul 2021 08:12  Phos  3.1     07-02  Mg     2.00     07-02    TPro  5.7<L>  /  Alb  2.7<L>  /  TBili  0.7  /  DBili  x   /  AST  152<H>  /  ALT  154<H>  /  AlkPhos  122<H>  07-02    PTT - ( 02 Jul 2021 08:12 )  PTT:84.8 sec            All consultant(s) notes reviewed and care discussed with other providers        Contact Number, Dr Huang [phone]

## 2021-07-02 NOTE — PROGRESS NOTE ADULT - ASSESSMENT
70 y.o. M, L[employer], w/ a hx of HTN, hairy cell leukemia (in remission) who presents with abdominal pain, nausea and fever to 102 of unclear etiology.

## 2021-07-02 NOTE — DIETITIAN INITIAL EVALUATION ADULT. - OTHER INFO
Pt with PNA associated with pleural effusion and sepsis. Pt reports his appetite is reduced because he is feeling sick. However, usually good PO intake eating 3 meals/day.  pounds. Denies any GI issues (nausea/vomiting/diarrhea/constipation.) Denies any chewing or swallowing difficulties at this time. Reports allergy to shellfish. Pt without any food preferences at this time.

## 2021-07-02 NOTE — DIETITIAN INITIAL EVALUATION ADULT. - PROBLEM SELECTOR PLAN 1
Febrile to 101, tachycardic w/ leukocytosis, T. bili elevated to 2.4, unclear etiology. CT A/P w/o contrast negative for intra-abdominal infection- biliary tree wnl. Diffuse abdominal TTP including RUQ. CT chest w/ L pleural effusion, no respiratory sx and RVP negative.   - Check RUQ US, if neg, consider HIDA scan   - Cover w/ Zosyn  - IVF   - Pain control w/ Morphine PRN  - BCx pending   [ ] U/A to eval for pyelo  [ ] GI PCR, C diff

## 2021-07-02 NOTE — DIETITIAN INITIAL EVALUATION ADULT. - ADD RECOMMEND
Encourage PO intake and honor food preferences as able...monitor need for oral nutrition supplement.

## 2021-07-03 LAB
ANION GAP SERPL CALC-SCNC: 14 MMOL/L — SIGNIFICANT CHANGE UP (ref 7–14)
APTT BLD: 108.9 SEC — HIGH (ref 27–36.3)
APTT BLD: 125.6 SEC — SIGNIFICANT CHANGE UP (ref 27–36.3)
APTT BLD: 71.6 SEC — HIGH (ref 27–36.3)
BUN SERPL-MCNC: 13 MG/DL — SIGNIFICANT CHANGE UP (ref 7–23)
CALCIUM SERPL-MCNC: 8.8 MG/DL — SIGNIFICANT CHANGE UP (ref 8.4–10.5)
CHLORIDE SERPL-SCNC: 102 MMOL/L — SIGNIFICANT CHANGE UP (ref 98–107)
CO2 SERPL-SCNC: 22 MMOL/L — SIGNIFICANT CHANGE UP (ref 22–31)
CREAT SERPL-MCNC: 1.3 MG/DL — SIGNIFICANT CHANGE UP (ref 0.5–1.3)
CULTURE RESULTS: SIGNIFICANT CHANGE UP
CULTURE RESULTS: SIGNIFICANT CHANGE UP
GLUCOSE SERPL-MCNC: 89 MG/DL — SIGNIFICANT CHANGE UP (ref 70–99)
HCT VFR BLD CALC: 44 % — SIGNIFICANT CHANGE UP (ref 39–50)
HGB BLD-MCNC: 14.2 G/DL — SIGNIFICANT CHANGE UP (ref 13–17)
MAGNESIUM SERPL-MCNC: 2 MG/DL — SIGNIFICANT CHANGE UP (ref 1.6–2.6)
MCHC RBC-ENTMCNC: 25 PG — LOW (ref 27–34)
MCHC RBC-ENTMCNC: 32.3 GM/DL — SIGNIFICANT CHANGE UP (ref 32–36)
MCV RBC AUTO: 77.6 FL — LOW (ref 80–100)
NRBC # BLD: 0 /100 WBCS — SIGNIFICANT CHANGE UP
NRBC # FLD: 0 K/UL — SIGNIFICANT CHANGE UP
PHOSPHATE SERPL-MCNC: 3.7 MG/DL — SIGNIFICANT CHANGE UP (ref 2.5–4.5)
PLATELET # BLD AUTO: 138 K/UL — LOW (ref 150–400)
POTASSIUM SERPL-MCNC: 3.8 MMOL/L — SIGNIFICANT CHANGE UP (ref 3.5–5.3)
POTASSIUM SERPL-SCNC: 3.8 MMOL/L — SIGNIFICANT CHANGE UP (ref 3.5–5.3)
RBC # BLD: 5.67 M/UL — SIGNIFICANT CHANGE UP (ref 4.2–5.8)
RBC # FLD: 14.7 % — HIGH (ref 10.3–14.5)
SODIUM SERPL-SCNC: 138 MMOL/L — SIGNIFICANT CHANGE UP (ref 135–145)
SPECIMEN SOURCE: SIGNIFICANT CHANGE UP
SPECIMEN SOURCE: SIGNIFICANT CHANGE UP
WBC # BLD: 6.22 K/UL — SIGNIFICANT CHANGE UP (ref 3.8–10.5)
WBC # FLD AUTO: 6.22 K/UL — SIGNIFICANT CHANGE UP (ref 3.8–10.5)

## 2021-07-03 RX ADMIN — PIPERACILLIN AND TAZOBACTAM 25 GRAM(S): 4; .5 INJECTION, POWDER, LYOPHILIZED, FOR SOLUTION INTRAVENOUS at 05:13

## 2021-07-03 RX ADMIN — HEPARIN SODIUM 1600 UNIT(S)/HR: 5000 INJECTION INTRAVENOUS; SUBCUTANEOUS at 21:18

## 2021-07-03 RX ADMIN — PIPERACILLIN AND TAZOBACTAM 25 GRAM(S): 4; .5 INJECTION, POWDER, LYOPHILIZED, FOR SOLUTION INTRAVENOUS at 14:33

## 2021-07-03 RX ADMIN — HEPARIN SODIUM 1800 UNIT(S)/HR: 5000 INJECTION INTRAVENOUS; SUBCUTANEOUS at 07:57

## 2021-07-03 RX ADMIN — PREGABALIN 1000 MICROGRAM(S): 225 CAPSULE ORAL at 12:37

## 2021-07-03 RX ADMIN — HEPARIN SODIUM 1600 UNIT(S)/HR: 5000 INJECTION INTRAVENOUS; SUBCUTANEOUS at 14:45

## 2021-07-03 RX ADMIN — PIPERACILLIN AND TAZOBACTAM 25 GRAM(S): 4; .5 INJECTION, POWDER, LYOPHILIZED, FOR SOLUTION INTRAVENOUS at 21:18

## 2021-07-03 RX ADMIN — Medication 50 MILLIGRAM(S): at 12:50

## 2021-07-03 NOTE — PROGRESS NOTE ADULT - ASSESSMENT
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 Presents with abdominal pain, nausea, and fever (tmax 102F at home), SOB, pleurtic CP x3-4 days. On triage to ED, febrile to 101.2 F, tachycardic 110-120s , O2 sats 96% on room air. Received CTZ, Azithromycin, Zosyn in ED. CT A/P w/o contrast negative for intra-abdominal pathology. Pulmonary called to consult for SOB. On exam, is visibly SOB with minimal exertion, O2 sats 92-93% on room air.     SOB w/ pleuritic chest pain  -On exam pt is visibly SOB at rest and with minimal exertion, O2 sats 92-93% on room air  -F/u CT chest non-contrast   -Do VQ scan and B/L LE duplex for r/o PE/DVT given hx of malignancy, fevers, and tachycardia  -CT A/P 6/28 read as small L pleural effusion w/ adjacent compressive LLL atelectasis - appears more sick clinically than what this shows (?infarct vs PNA) - f/u VQ scan and CT chest results  Fevers  -Tmax 101.2 F in ED with leukocytosis, afebrile now  -CT A/P notes no intraabdominal pathology  -RVP negative  -F/u CT chest  -On ABX   Abdominal pain  -CT A/P notes no intraabdominal pathology  -F/u abdominal US  Hairy cell leukemia  -in remission per patient, s/p tx with chemo (last 7 years ago)      6/30:  SOB w/ pleuritic chest pain: -F CT chest non-contrast -no official report yet?; but to me has small left sided effusion as well as pneumonia and lingular nodule/ opacity: await for official report: but arranged for tap on the left sided diagnostic as he is having lot of pain and is febrile:  VQ scan is intermediate probability: has perfusion defects: on empiric heparin: check dopplers and echo: stopped now in anticipation of tap on left side: will restart after tap   -CT A/P 6/28 read as small L pleural effusion w/ adjacent compressive LLL atelectasis - appears more sick clinically than what this shows (?infarct vs PNA) -as above  Fevers -Tmax 101.2 F in ED with leukocytosis, afebrile now -CT A/P notes no intraabdominal pathology: On ABX   Abdominal pain -CT A/P notes no intraabdominal pathology  Hairy cell leukemia  -in remission per patient, s/p tx with chemo (last 7 years ago)    dw aarin    7/1:    SOB w/ pleuritic chest pain: -s/p tap: complicated exudative parapneumonia effusion: no growth so far: he feels much better now awaiting cta : cont heparin till pe is ruled out on cta   Fevers resolved cont antibiotics id following  await for pleural fluid culture   Abdominal pain -CT A/P notes no intraabdominal pathology  Hairy cell leukemia  -in remission per patient, s/p tx with chemo (last 7 years ago)    dwpmd and np      7/2:    SOB w/ pleuritic chest pain: -s/p tap: complicated exudative parapneumonia effusion: no growth so far: he feels much better now cta done: can not fully exclude pe: await dopplers as well as echo to see the rigth side of the heart   Fevers resolved cont antibiotics id following  await for pleural fluid culture : [id number] is negative too    Abdominal pain -CT A/P notes no intraabdominal pathology  Hairy cell leukemia  -in remission per patient, s/p tx with chemo (last 7 years ago)    dwnp    7/3  SOB w/ pleuritic chest pain - resolving per patient  -CT 6/30 with increasing L pleural effusion  -s/p L Thoracentesis 6/30 with 400 ml output - complicated exudative parapneumonia  -Cytology negative, f/u fluid cultures (no growth so far)  -CTA chest 7/1 with improvement in L pleural effusion, cannot fully exlcude PE  -TTE noted  -F/u dopplers, will reevalaute need for heparin gtt after results  -Comfortable on room air, O2 sats 94%   Fevers   -resolved   -cont antibiotics per ID  -f/u pleural fluid culture   Abdominal pain - resolving   -CT A/P notes no intraabdominal pathology  Hairy cell leukemia  -in remission per patient, s/p tx with chemo (last 7 years ago)

## 2021-07-03 NOTE — PROGRESS NOTE ADULT - SUBJECTIVE AND OBJECTIVE BOX
Date of Service  : 07-03-21 @ 12:28    INTERVAL HPI/OVERNIGHT EVENTS: I feel fine.   Vital Signs Last 24 Hrs  T(C): 37.1 (03 Jul 2021 05:02), Max: 37.1 (02 Jul 2021 20:56)  T(F): 98.8 (03 Jul 2021 05:02), Max: 98.8 (02 Jul 2021 20:56)  HR: 81 (03 Jul 2021 05:02) (78 - 89)  BP: 106/53 (03 Jul 2021 05:02) (106/53 - 128/76)  BP(mean): --  RR: 18 (03 Jul 2021 05:02) (16 - 18)  SpO2: 96% (03 Jul 2021 05:02) (96% - 98%)  I&O's Summary    MEDICATIONS  (STANDING):  cyanocobalamin 1000 MICROGram(s) Oral daily  heparin  Infusion.  Unit(s)/Hr (18 mL/Hr) IV Continuous <Continuous>  lisinopril 2.5 milliGRAM(s) Oral daily  piperacillin/tazobactam IVPB.. 3.375 Gram(s) IV Intermittent every 8 hours  testosterone 1% Gel 50 milliGRAM(s) Topical daily    MEDICATIONS  (PRN):  acetaminophen   Tablet .. 650 milliGRAM(s) Oral every 4 hours PRN Mild Pain (1 - 3)  heparin   Injectable 8500 Unit(s) IV Push every 6 hours PRN For aPTT less than 40  heparin   Injectable 4000 Unit(s) IV Push every 6 hours PRN For aPTT between 40 - 57  morphine  - Injectable 2 milliGRAM(s) IV Push every 4 hours PRN Moderate Pain (4 - 6)    LABS:                        14.2   6.22  )-----------( 138      ( 03 Jul 2021 07:14 )             44.0     07-03    138  |  102  |  13  ----------------------------<  89  3.8   |  22  |  1.30    Ca    8.8      03 Jul 2021 07:14  Phos  3.7     07-03  Mg     2.00     07-03    TPro  5.7<L>  /  Alb  2.7<L>  /  TBili  0.7  /  DBili  x   /  AST  152<H>  /  ALT  154<H>  /  AlkPhos  122<H>  07-02    PTT - ( 03 Jul 2021 07:14 )  PTT:125.6 sec    CAPILLARY BLOOD GLUCOSE              REVIEW OF SYSTEMS:  CONSTITUTIONAL: No fever, weight loss, or fatigue  EYES: No eye pain, visual disturbances, or discharge  ENMT:  No difficulty hearing, tinnitus, vertigo; No sinus or throat pain  NECK: No pain or stiffness  RESPIRATORY: No cough, wheezing, chills or hemoptysis; No shortness of breath  CARDIOVASCULAR: No chest pain, palpitations, dizziness, or leg swelling  GASTROINTESTINAL: No abdominal or epigastric pain. No nausea, vomiting, or hematemesis; No diarrhea or constipation. No melena or hematochezia.  GENITOURINARY: No dysuria, frequency, hematuria, or incontinence  NEUROLOGICAL: No headaches, memory loss, loss of strength, numbness, or tremors      Consultant(s) Notes Reviewed:  [x ] YES  [ ] NO    PHYSICAL EXAM:  GENERAL: NAD, well-groomed, well-developed, not in any distress ,  HEAD:  Atraumatic, Normocephalic  EYES: EOMI, PERRLA, conjunctiva and sclera clear  ENMT: No tonsillar erythema, exudates, or enlargement; Moist mucous membranes, Good dentition, No lesions  NECK: Supple, No JVD, Normal thyroid  NERVOUS SYSTEM:  Alert & Oriented X3, No focal deficit   CHEST/LUNG: Good air entry bilateral with no  rales, rhonchi, wheezing, or rubs  HEART: Regular rate and rhythm; No murmurs, rubs, or gallops  ABDOMEN: Soft, Nontender, Nondistended; Bowel sounds present  EXTREMITIES:  2+ Peripheral Pulses, No clubbing, cyanosis, or edema      Care Discussed with Consultants/Other Providers [ x] YES  [ ] NO

## 2021-07-03 NOTE — PROGRESS NOTE ADULT - SUBJECTIVE AND OBJECTIVE BOX
Date of Service: 07-03-21 @ 13:22    Patient is a 70y old  Male who presents with a chief complaint of Fever, abdominal pain (03 Jul 2021 12:28)    Any change in ROS:   Reports feeling much better today  Still with mild dyspnea on exertion, when laying flat  Pleuritic chest pain and abdominal pain improving  Breathing comfortably on room air     MEDICATIONS  (STANDING):  cyanocobalamin 1000 MICROGram(s) Oral daily  heparin  Infusion.  Unit(s)/Hr (18 mL/Hr) IV Continuous <Continuous>  lisinopril 2.5 milliGRAM(s) Oral daily  piperacillin/tazobactam IVPB.. 3.375 Gram(s) IV Intermittent every 8 hours  testosterone 1% Gel 50 milliGRAM(s) Topical daily    MEDICATIONS  (PRN):  acetaminophen   Tablet .. 650 milliGRAM(s) Oral every 4 hours PRN Mild Pain (1 - 3)  heparin   Injectable 8500 Unit(s) IV Push every 6 hours PRN For aPTT less than 40  heparin   Injectable 4000 Unit(s) IV Push every 6 hours PRN For aPTT between 40 - 57  morphine  - Injectable 2 milliGRAM(s) IV Push every 4 hours PRN Moderate Pain (4 - 6)    Vital Signs Last 24 Hrs  T(C): 36.7 (03 Jul 2021 12:29), Max: 37.1 (02 Jul 2021 20:56)  T(F): 98 (03 Jul 2021 12:29), Max: 98.8 (02 Jul 2021 20:56)  HR: 86 (03 Jul 2021 12:29) (78 - 89)  BP: 120/75 (03 Jul 2021 12:29) (106/53 - 128/76)  BP(mean): --  RR: 18 (03 Jul 2021 12:29) (16 - 18)  SpO2: 96% (03 Jul 2021 12:29) (96% - 98%)    Physical Exam:   GENERAL: NAD, well-groomed, well-developed  HEENT: NUNU/   Atraumatic, Normocephalic  ENMT: No tonsillar erythema, exudates, or enlargement; Moist mucous membranes, Good dentition, No lesions  NECK: Supple, No JVD, Normal thyroid  CHEST/LUNG: Clear to auscultaion, ; No rales, rhonchi, wheezing, or rubs  CVS: Regular rate and rhythm; No murmurs, rubs, or gallops  GI: : Soft, Nontender, Nondistended; Bowel sounds present  NERVOUS SYSTEM:  Alert & Oriented X3, Good concentration; Motor Strength 5/5 B/L upper and lower extremities; DTRs 2+ intact and symmetric  EXTREMITIES:  2+ Peripheral Pulses, No clubbing, cyanosis, or edema  LYMPH: No lymphadenopathy noted  SKIN: No rashes or lesions  ENDOCRINOLOGY: No Thyromegaly  PSYCH: Appropriate    Labs:  23                  14.2   6.22  )-----------( 138      ( 03 Jul 2021 07:14 )             44.0                         14.2   5.58  )-----------( 114      ( 02 Jul 2021 08:12 )             43.1                         14.1   5.72  )-----------( 96       ( 01 Jul 2021 05:49 )             42.4                         14.1   7.83  )-----------( 106      ( 30 Jun 2021 07:04 )             43.2     07-03    138  |  102  |  13  ----------------------------<  89  3.8   |  22  |  1.30  07-02    138  |  104  |  14  ----------------------------<  93  3.9   |  22  |  1.26  07-01    136  |  101  |  18  ----------------------------<  96  3.7   |  22  |  1.28  06-30    134<L>  |  100  |  22  ----------------------------<  94  3.7   |  21<L>  |  1.33<H>    Ca    8.8      03 Jul 2021 07:14  Ca    8.4      02 Jul 2021 08:12  Phos  3.7     07-03  Phos  3.1     07-02  Mg     2.00     07-03  Mg     2.00     07-02    TPro  5.7<L>  /  Alb  2.7<L>  /  TBili  0.7  /  DBili  x   /  AST  152<H>  /  ALT  154<H>  /  AlkPhos  122<H>  07-02  TPro  6.0  /  Alb  2.7<L>  /  TBili  1.0  /  DBili  x   /  AST  95<H>  /  ALT  85<H>  /  AlkPhos  97  07-01  TPro  5.8<L>  /  Alb  2.7<L>  /  TBili  1.6<H>  /  DBili  x   /  AST  62<H>  /  ALT  51<H>  /  AlkPhos  82  06-30     (06-30 @ 18:40)    LIVER FUNCTIONS - ( 02 Jul 2021 08:12 )  Alb: 2.7 g/dL / Pro: 5.7 g/dL / ALK PHOS: 122 U/L / ALT: 154 U/L / AST: 152 U/L / GGT: x           PTT - ( 03 Jul 2021 07:14 )  PTT:125.6 sec    Fluid Source --  Albumin, Fluid--  Glucose, Fluid--  Protein total, Fluid--  Lacatate Dehydrogenase, Fluid--  pH, Fluid7.6  Cytopathology-Non Gyn Report--  Fluid Source PLEURAL  Albumin, Fluid2.2 g/dL  Glucose, Fluid77 mg/dL  Protein total, Fluid3.5 g/dL  Lacatate Dehydrogenase, [id number] U/L  pH, Fluid--  Cytopathology-Non Gyn Report--  Fluid Source --  Albumin, Fluid--  Glucose, Fluid--  Protein total, Fluid--  Lacatate Dehydrogenase, Fluid--  pH, Fluid--  Cytopathology-Non Gyn Report  ACCESSION No:  [id number]    ANNE GRISSOM                          1    Cytopathology Report    Specimen(s) Submitted  PLEURAL FLUID, LEFT      Clinical History  70 year old male  Left pleural effusion.    Gross Description  Received: 60 ml of pinkish fluid in CytoLyt  Prepared: 1 ThinPrep slide, 1 cell block , 1 smear    Final Diagnosis  PLEURAL FLUID, LEFT  NEGATIVE FOR MALIGNANT CELLS.    Cytology slides and cell block section shows many benign and  reactive mesothelial cells, histiocytes, abundant mixed  inflammation and fibrin  material.  Clinical and radiological correlation is essential.    Screened by: Craig Mims SCT(ASCP)  Verified by: Ayad Hand M.D.  (Electronic Signature)  Reported on: 07/02/21 14:03 EDT, [address] [address]  Phone: [phone]   Fax: [phone]  Cytology technical processing performed at [address] [place] [address], [place] [address]  _________________________________________________________________    RECENT CULTURES:  06-30 @ 22:58 .Body Fluid Pleural Fluid     Testing in progress    06-30 @ 22:57 .Body Fluid Pleural Fluid     No growth    06-30 @ 20:35 .Smear Other, Pleural Fluid     polymorphonuclear leukocytes seen  No organisms seen  by cytocentrifuge      06-28 @ 19:40 .Urine Clean Catch (Midstream)     No growth    06-28 @ 15:09 .Blood Blood-Peripheral     No growth to date.    Studies    < from: CT Chest No Cont (06.30.21 @ 10:10) >    FINDINGS:    LUNGS, PLEURA, AND AIRWAYS: Secretions in the left mainstem bronchus. Left pleural effusion has increased in size since 6/28/2021. Left lower lobe passive atelectasis.  MEDIASTINUM AND MATTY: No lymphadenopathy.  VESSELS: Left-sided aortic arch and left-sided descending thoracic aorta. The aorta is normalin caliber. Aortic calcification.  HEART: Heart size is normal. No pericardial effusion. Coronary artery calcification. Calcification of the aortic valve.  CHEST WALL AND LOWER NECK: Within normal limits.  VISUALIZED UPPER ABDOMEN: Partially imaged splenomegaly.  BONES: Degenerative changes of the spine.    IMPRESSION:  1.  Left pleural effusion is increased and there is increased left lower lobe passive atelectasis      < end of copied text >      CT SCAN Chest < from: CT Angio Chest PE Protocol w/ IV Cont (07.01.21 @ 20:53) >  FINDINGS:    LUNGS, PLEURA, and AIRWAYS: Patent central airways. Decreased small left pleural effusion and improved compressive atelectasis. Unchanged small nodule in the RUL, likely benign.  MEDIASTINUM AND MATTY: No lymphadenopathy.  VESSELS: Poor opacification of the pulmonary arteries limits evaluation. No pulmonary embolism to the lobar arteries. Limited evaluation of the subsegmental arteries. Aortic calcification.  HEART: Heart size is normal. No pericardial effusion. Coronary artery calcification.  CHEST WALL AND LOWER NECK: Within normal limits.  VISUALIZED UPPER ABDOMEN: Partially imaged splenomegaly.  BONES: Degenerative changes of the spine.    IMPRESSION:  No pulmonary embolism to the lobar arteries. Unable to evaluate more distal branches.    Decreased small left pleural effusion and improved compressive atelectasis.    < end of copied text >    CT Abdomen < from: CT Abdomen and Pelvis No Cont (06.28.21 @ 14:31) >    FINDINGS:  LOWER CHEST: Coronary artery calcifications. Small left pleural effusion with adjacent compressive left lower lobe atelectasis. Unchanged 3 mm nodule along the minor fissure, likely intrapulmonary lymph node (2:1).    LIVER: Within normal limits.  BILE DUCTS: Normal caliber.  GALLBLADDER: Within normal limits.  SPLEEN: Unchanged splenomegaly measuring up to 17.9 cm in craniocaudad dimension.  PANCREAS: Within normal limits.  ADRENALS: Unchanged left adrenal nodular thickening.  KIDNEYS/URETERS: Within normal limits.    BLADDER: Within normal limits.  REPRODUCTIVE ORGANS: Prostate is enlarged.    BOWEL: No bowel obstruction. Colonic diverticulosis. Appendix is not visualized. No evidence of inflammation in the pericecal region.  PERITONEUM: No ascites.  VESSELS: Atherosclerotic changes.  RETROPERITONEUM/LYMPH NODES: No lymphadenopathy.  ABDOMINAL WALL: Fat-containing umbilical hernia.  BONES: Degenerative changes.    IMPRESSION:  Small left pleural effusion with adjacent compressive atelectasis.    Unchanged splenomegaly, consistent with history of Waldenstrom macroglobulinemia.    < end of copied text >    Others < from: TTE with Doppler (w/Cont) (07.02.21 @ 16:38) >  OBSERVATIONS:  Mitral Valve: Mitral annular calcification, otherwise  normal mitral valve. Minimal mitral regurgitation.  Aortic Root: Normal aortic root.  Aortic Valve: Aortic valve leaflet morphology not well  visualized.  Left Atrium: Normal left atrium.  Left Ventricle: Endocardium not well visualized; grossly  moderate global left ventricular systolic dysfunction.  Endocardial visualization enhanced with intravenous  injection of echo contrast (Definity).  Right Heart: Normal right atrium. The right ventricle is  not well visualized. Tricuspid valve not well visualized.  Pulmonic valve not well visualized.  Pericardium/PleuraNormal pericardium with no pericardial  effusion.  ------------------------------------------------------------------------  CONCLUSIONS:  Technically very difficult study.  1. Mitral annular calcification, otherwise normal mitral  valve. Minimal mitral regurgitation.  2. Endocardium notwell visualized; grossly moderate global  left ventricular systolic dysfunction.  Endocardial  visualization enhanced with intravenous injection of echo  contrast (Definity).  3. The right ventricle is not well visualized.    < end of copied text >

## 2021-07-03 NOTE — PROGRESS NOTE ADULT - ASSESSMENT
70 y.o. M, Yarsani, w/ a hx of HTN, hairy cell leukemia (in remission) who presents with abdominal pain, nausea and fever to 102 of unclear etiology.      Problem/Plan - 1:  ·  Problem: Sepsis.  Plan: likely secondary to LLL pneumonia with associated pleural effusion   continue to monitor closely  clinically improving   continue antibiotics  CTA negative PE (prelim)  await final read.      Problem/Plan - 2:  ·  Problem: Pulmonary embolism.  Plan: suspected secondary to abnormal VQ scan  continue IV heparin  d/w pulmonary   CTA prelim negative PE  defer recommendations to  pulmonary.      Problem/Plan - 3:  ·  Problem: LADI (acute kidney injury).  Plan: resolved   will continue to monitor   likely ATN from sepsis on admission.      Problem/Plan - 4:  ·  Problem: Thrombocytopenia.  Plan: at baseline  will continue to monitor   no intervention required at this time.      Problem/Plan - 5:  ·  Problem: Benign essential HTN.  Plan: resume Lisinopril LADI fully resolved 2.5 po qd with hold parameters.      Problem/Plan - 6:  Problem: Hairy cell leukemia. Plan: outpatient follow up with oncology  no intervention required at this time.     Problem/Plan - 7:  ·  Problem: Goals of care, counseling/discussion.  Plan: discussed with patient   remains FULL CODE.

## 2021-07-03 NOTE — PROGRESS NOTE ADULT - ATTENDING COMMENTS
pt is doing much better: He has complicated parapneumonic effusion : ph is still NA: has exudative effusion: Pl fluid cultures are negative: cont antibiotics: he is on room air:  DW NP

## 2021-07-04 LAB
ANION GAP SERPL CALC-SCNC: 14 MMOL/L — SIGNIFICANT CHANGE UP (ref 7–14)
APTT BLD: 63.4 SEC — HIGH (ref 27–36.3)
BUN SERPL-MCNC: 13 MG/DL — SIGNIFICANT CHANGE UP (ref 7–23)
CALCIUM SERPL-MCNC: 8.8 MG/DL — SIGNIFICANT CHANGE UP (ref 8.4–10.5)
CHLORIDE SERPL-SCNC: 99 MMOL/L — SIGNIFICANT CHANGE UP (ref 98–107)
CO2 SERPL-SCNC: 21 MMOL/L — LOW (ref 22–31)
CREAT SERPL-MCNC: 1.37 MG/DL — HIGH (ref 0.5–1.3)
GLUCOSE SERPL-MCNC: 90 MG/DL — SIGNIFICANT CHANGE UP (ref 70–99)
HCT VFR BLD CALC: 43.9 % — SIGNIFICANT CHANGE UP (ref 39–50)
HGB BLD-MCNC: 13.9 G/DL — SIGNIFICANT CHANGE UP (ref 13–17)
MAGNESIUM SERPL-MCNC: 2 MG/DL — SIGNIFICANT CHANGE UP (ref 1.6–2.6)
MCHC RBC-ENTMCNC: 25 PG — LOW (ref 27–34)
MCHC RBC-ENTMCNC: 31.7 GM/DL — LOW (ref 32–36)
MCV RBC AUTO: 78.8 FL — LOW (ref 80–100)
NRBC # BLD: 0 /100 WBCS — SIGNIFICANT CHANGE UP
NRBC # FLD: 0 K/UL — SIGNIFICANT CHANGE UP
PHOSPHATE SERPL-MCNC: 4.1 MG/DL — SIGNIFICANT CHANGE UP (ref 2.5–4.5)
PLATELET # BLD AUTO: 150 K/UL — SIGNIFICANT CHANGE UP (ref 150–400)
POTASSIUM SERPL-MCNC: 3.8 MMOL/L — SIGNIFICANT CHANGE UP (ref 3.5–5.3)
POTASSIUM SERPL-SCNC: 3.8 MMOL/L — SIGNIFICANT CHANGE UP (ref 3.5–5.3)
RBC # BLD: 5.57 M/UL — SIGNIFICANT CHANGE UP (ref 4.2–5.8)
RBC # FLD: 14.7 % — HIGH (ref 10.3–14.5)
SODIUM SERPL-SCNC: 134 MMOL/L — LOW (ref 135–145)
WBC # BLD: 5.88 K/UL — SIGNIFICANT CHANGE UP (ref 3.8–10.5)
WBC # FLD AUTO: 5.88 K/UL — SIGNIFICANT CHANGE UP (ref 3.8–10.5)

## 2021-07-04 PROCEDURE — 93970 EXTREMITY STUDY: CPT | Mod: 26

## 2021-07-04 RX ADMIN — HEPARIN SODIUM 1600 UNIT(S)/HR: 5000 INJECTION INTRAVENOUS; SUBCUTANEOUS at 03:49

## 2021-07-04 RX ADMIN — PIPERACILLIN AND TAZOBACTAM 25 GRAM(S): 4; .5 INJECTION, POWDER, LYOPHILIZED, FOR SOLUTION INTRAVENOUS at 05:46

## 2021-07-04 RX ADMIN — Medication 50 MILLIGRAM(S): at 12:35

## 2021-07-04 RX ADMIN — PIPERACILLIN AND TAZOBACTAM 25 GRAM(S): 4; .5 INJECTION, POWDER, LYOPHILIZED, FOR SOLUTION INTRAVENOUS at 13:06

## 2021-07-04 RX ADMIN — MORPHINE SULFATE 2 MILLIGRAM(S): 50 CAPSULE, EXTENDED RELEASE ORAL at 21:52

## 2021-07-04 RX ADMIN — PREGABALIN 1000 MICROGRAM(S): 225 CAPSULE ORAL at 12:35

## 2021-07-04 RX ADMIN — PIPERACILLIN AND TAZOBACTAM 25 GRAM(S): 4; .5 INJECTION, POWDER, LYOPHILIZED, FOR SOLUTION INTRAVENOUS at 21:28

## 2021-07-04 RX ADMIN — MORPHINE SULFATE 2 MILLIGRAM(S): 50 CAPSULE, EXTENDED RELEASE ORAL at 21:27

## 2021-07-04 NOTE — CONSULT NOTE ADULT - ASSESSMENT
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 Presents with abdominal pain, nausea, and fever (tmax 102F at home), SOB, pleurtic CP x3-4 days. On triage to ED, febrile to 101.2 F, tachycardic 110-120s , O2 sats 96% on room air. Received CTZ, Azithromycin, Zosyn in ED. CT A/P w/o contrast negative for intra-abdominal pathology. Pulmonary called to consult for SOB. On exam, is visibly SOB with minimal exertion, O2 sats 92-93% on room air.     SOB w/ pleuritic chest pain  -On exam pt is visibly SOB at rest and with minimal exertion, O2 sats 92-93% on room air  -F/u CT chest non-contrast   -Do VQ scan and B/L LE duplex for r/o PE/DVT given hx of malignancy, fevers, and tachycardia  -CT A/P 6/28 read as small L pleural effusion w/ adjacent compressive LLL atelectasis - appears more sick clinically than what this shows (?infarct vs PNA) - f/u VQ scan and CT chest results  Fevers  -Tmax 101.2 F in ED with leukocytosis, afebrile now  -CT A/P notes no intraabdominal pathology  -RVP negative  -F/u CT chest  -On ABX   Abdominal pain  -CT A/P notes no intraabdominal pathology  -F/u abdominal US  Hairy cell leukemia  -in remission per patient, s/p tx with chemo (last 7 years ago)  
70 year old man with HTN, hairy cell leukemia (in remission) who presents with abdominal pain, nausea and fever to 102 of unclear etiology.     #Sepsis, LLL pneumonia, parapneumonic effusion  -s/p LEFT thoracentesis   -clinically improved  -abx per med/pulmonary  -f/u fluid studies  -A/C stopped, CTA no obv PE  -med, pulmo f/u    #LV Dysfunction on TTE  -LV dysfunction seen on TTE  -new per patient  -previous ischemic testing and echo approx 2-3 years ago reportedly normal   -pleuritic chest pain not concerning for angina   -dyspnea related to effusion  -no decomp HF  -etiology for cmp ? stress induced in setting of infection  -continue medical therapy for cmp for now   -will defer ischemic eval for now in setting of pna, parapneumonic effusion  -once recovered from  medical issues will need ischemic eval as an outpt     #LADI  -resolved     #HTN  -stable  -Lisinopril  -add toprol xl 12.5 qd for cmp    #Hairy cell leukemia  -outpatient follow up with oncology    dvt ppx    70 minutes spent on total encounter; more than 50% of the visit was spent counseling and/or coordinating care by the attending physician.       Problem/Plan - 7:  ·  Problem: Goals of care, counseling/discussion.  Plan: discussed with patient   remains FULL CODE. 
70 year old male with HTN, Hairy cell leukemia in remission about 5 years ago presenting with abd pain and fevers. Patient noticed not feeling well starting Friday, then went to an outdoor picnic bbq on Sat and when he returned that night started to develop abd pain, no diarrhea though. He then started to develop fevers, and did not improve so came to the hospital.     CT A/P with unchanged splenomegaly consistent with Waldenstrom macroglobulinemia.    Elevated LFTs, RUQ US normal.  CXR with left lung patchy opacity    Recommend:  #SIRS (fevers, leukocytosis)  -Remains with left sided abd pain - CT A/P noted above  -F/U blood cultures  -Continue zosyn for now  -Gastroenteritis? though no diarrhea  -Monitor fever curve  -Check CT chest given CXR findings - ?PNA    #Hairy cell leukemia  -In remission    Stephon Rubio MD  Pager [phone]  After 5pm/weekends call [phone]  
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

## 2021-07-04 NOTE — PROGRESS NOTE ADULT - ASSESSMENT
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 Presents with abdominal pain, nausea, and fever (tmax 102F at home), SOB, pleurtic CP x3-4 days. On triage to ED, febrile to 101.2 F, tachycardic 110-120s , O2 sats 96% on room air. Received CTZ, Azithromycin, Zosyn in ED. CT A/P w/o contrast negative for intra-abdominal pathology. Pulmonary called to consult for SOB. On exam, is visibly SOB with minimal exertion, O2 sats 92-93% on room air.     SOB w/ pleuritic chest pain  -On exam pt is visibly SOB at rest and with minimal exertion, O2 sats 92-93% on room air  -F/u CT chest non-contrast   -Do VQ scan and B/L LE duplex for r/o PE/DVT given hx of malignancy, fevers, and tachycardia  -CT A/P 6/28 read as small L pleural effusion w/ adjacent compressive LLL atelectasis - appears more sick clinically than what this shows (?infarct vs PNA) - f/u VQ scan and CT chest results  Fevers  -Tmax 101.2 F in ED with leukocytosis, afebrile now  -CT A/P notes no intraabdominal pathology  -RVP negative  -F/u CT chest  -On ABX   Abdominal pain  -CT A/P notes no intraabdominal pathology  -F/u abdominal US  Hairy cell leukemia  -in remission per patient, s/p tx with chemo (last 7 years ago)      6/30:  SOB w/ pleuritic chest pain: -F CT chest non-contrast -no official report yet?; but to me has small left sided effusion as well as pneumonia and lingular nodule/ opacity: await for official report: but arranged for tap on the left sided diagnostic as he is having lot of pain and is febrile:  VQ scan is intermediate probability: has perfusion defects: on empiric heparin: check dopplers and echo: stopped now in anticipation of tap on left side: will restart after tap   -CT A/P 6/28 read as small L pleural effusion w/ adjacent compressive LLL atelectasis - appears more sick clinically than what this shows (?infarct vs PNA) -as above  Fevers -Tmax 101.2 F in ED with leukocytosis, afebrile now -CT A/P notes no intraabdominal pathology: On ABX   Abdominal pain -CT A/P notes no intraabdominal pathology  Hairy cell leukemia  -in remission per patient, s/p tx with chemo (last 7 years ago)    dw aarin    7/1:    SOB w/ pleuritic chest pain: -s/p tap: complicated exudative parapneumonia effusion: no growth so far: he feels much better now awaiting cta : cont heparin till pe is ruled out on cta   Fevers resolved cont antibiotics id following  await for pleural fluid culture   Abdominal pain -CT A/P notes no intraabdominal pathology  Hairy cell leukemia  -in remission per patient, s/p tx with chemo (last 7 years ago)    dwpmd and np      7/2:    SOB w/ pleuritic chest pain: -s/p tap: complicated exudative parapneumonia effusion: no growth so far: he feels much better now cta done: can not fully exclude pe: await dopplers as well as echo to see the rigth side of the heart   Fevers resolved cont antibiotics id following  await for pleural fluid culture : [id number] is negative too    Abdominal pain -CT A/P notes no intraabdominal pathology  Hairy cell leukemia  -in remission per patient, s/p tx with chemo (last 7 years ago)    dwnp    7/3  SOB w/ pleuritic chest pain - resolving per patient  -CT 6/30 with increasing L pleural effusion  -s/p L Thoracentesis 6/30 with 400 ml output - complicated exudative parapneumonia  -Cytology negative, f/u fluid cultures (no growth so far)  -CTA chest 7/1 with improvement in L pleural effusion, cannot fully exlcude PE  -TTE noted  -F/u dopplers, will reevalaute need for heparin gtt after results  -Comfortable on room air, O2 sats 94%   Fevers   -resolved   -cont antibiotics per ID  -f/u pleural fluid culture   Abdominal pain - resolving   -CT A/P notes no intraabdominal pathology  Hairy cell leukemia  -in remission per patient, s/p tx with chemo (last 7 years ago)    7/4:    SOB w/ pleuritic chest pain - resolving per patient CT 6/30 with increasing L pleural effusion s/p L Thoracentesis 6/30 with 400 ml output - complicated exudative parapneumonia  -Cytology negative, f/u fluid cultures (no growth so far) ?: CTA chest 7/1 with improvement in L pleural effusion, cannot fully exlcude PE  dopplers,: NEGATIVE: CTA IS LIMITED BUT NO MAJOR PE: ECHO IS NOT VERY HELPFUL TO EVALUATE RT SIDE BUT HAS MOD LV DYSFUCNTION!? CARDS TO SEE-DC HEPARIN Comfortable on room air, O2 sats 94%   Fevers  :resolved  :cont antibiotics per ID  pleural fluid cultureS ARE NEAGTIVE SO FAR   Abdominal pain - RESOLVED: CT A/P notes no intraabdominal pathology  Hairy cell leukemia  -in remission per patient, s/p tx with chemo (last 7 years ago)  DW ACP

## 2021-07-04 NOTE — PROGRESS NOTE ADULT - SUBJECTIVE AND OBJECTIVE BOX
Date of Service  : 07-04-21 @ 14:52    INTERVAL HPI/OVERNIGHT EVENTS: I feel better.   Vital Signs Last 24 Hrs  T(C): 36.8 (04 Jul 2021 12:18), Max: 37.1 (03 Jul 2021 21:55)  T(F): 98.3 (04 Jul 2021 12:18), Max: 98.8 (03 Jul 2021 21:55)  HR: 82 (04 Jul 2021 12:18) (74 - 82)  BP: 117/77 (04 Jul 2021 12:18) (103/54 - 117/77)  BP(mean): --  RR: 18 (04 Jul 2021 12:18) (18 - 18)  SpO2: 96% (04 Jul 2021 12:18) (96% - 97%)  I&O's Summary    MEDICATIONS  (STANDING):  cyanocobalamin 1000 MICROGram(s) Oral daily  lisinopril 2.5 milliGRAM(s) Oral daily  piperacillin/tazobactam IVPB.. 3.375 Gram(s) IV Intermittent every 8 hours  testosterone 1% Gel 50 milliGRAM(s) Topical daily    MEDICATIONS  (PRN):  acetaminophen   Tablet .. 650 milliGRAM(s) Oral every 4 hours PRN Mild Pain (1 - 3)  morphine  - Injectable 2 milliGRAM(s) IV Push every 4 hours PRN Moderate Pain (4 - 6)    LABS:                        13.9   5.88  )-----------( 150      ( 04 Jul 2021 03:18 )             43.9     07-04    134<L>  |  99  |  13  ----------------------------<  90  3.8   |  21<L>  |  1.37<H>    Ca    8.8      04 Jul 2021 03:18  Phos  4.1     07-04  Mg     2.00     07-04      PTT - ( 04 Jul 2021 03:18 )  PTT:63.4 sec    CAPILLARY BLOOD GLUCOSE              REVIEW OF SYSTEMS:  CONSTITUTIONAL: No fever, weight loss, or fatigue  EYES: No eye pain, visual disturbances, or discharge  ENMT:  No difficulty hearing, tinnitus, vertigo; No sinus or throat pain  NECK: No pain or stiffness  RESPIRATORY: No cough, wheezing, chills or hemoptysis; No shortness of breath  CARDIOVASCULAR: No chest pain, palpitations, dizziness, or leg swelling  GASTROINTESTINAL: No abdominal or epigastric pain. No nausea, vomiting, or hematemesis; No diarrhea or constipation. No melena or hematochezia.  GENITOURINARY: No dysuria, frequency, hematuria, or incontinence  NEUROLOGICAL: No headaches, memory loss, loss of strength, numbness, or tremors      Consultant(s) Notes Reviewed:  [x ] YES  [ ] NO    PHYSICAL EXAM:  GENERAL: NAD, well-groomed, well-developed,not in any distress ,  HEAD:  Atraumatic, Normocephalic  EYES: EOMI, PERRLA, conjunctiva and sclera clear  ENMT: No tonsillar erythema, exudates, or enlargement; Moist mucous membranes, Good dentition, No lesions  NECK: Supple, No JVD, Normal thyroid  NERVOUS SYSTEM:  Alert & Oriented X3, No focal deficit   CHEST/LUNG: Good air entry bilateral with no  rales, rhonchi, wheezing, or rubs  HEART: Regular rate and rhythm; No murmurs, rubs, or gallops  ABDOMEN: Soft, Nontender, Nondistended; Bowel sounds present  EXTREMITIES:  2+ Peripheral Pulses, No clubbing, cyanosis, or edema  SKIN: No rashes or lesions    Care Discussed with Consultants/Other Providers [ x] YES  [ ] NO

## 2021-07-04 NOTE — PROGRESS NOTE ADULT - ASSESSMENT
70 y.o. M, Amish, w/ a hx of HTN, hairy cell leukemia (in remission) who presents with abdominal pain, nausea and fever to 102 of unclear etiology.      Problem/Plan - 1:  ·  Problem: Sepsis.  Plan: likely secondary to LLL pneumonia with associated pleural effusion   continue to monitor closely  clinically improving   continue antibiotics  < from: CT Angio Chest PE Protocol w/ IV Cont (07.01.21 @ 20:53) >  IMPRESSION:  No pulmonary embolism to the lobar arteries. Unable to evaluate more distal branches.    Decreased small left pleural effusion and improved compressive atelectasis.    < end of copied text >       Problem/Plan - 2:  ·  Problem: LV Dysfunction .  Plan: Cardiology consulted.     < from: TTE with Doppler (w/Cont) (07.02.21 @ 16:38) >CONCLUSIONS:  Technically very difficult study.  1. Mitral annular calcification, otherwise normal mitral  valve. Minimal mitral regurgitation.  2. Endocardium notwell visualized; grossly moderate global  left ventricular systolic dysfunction.  Endocardial  visualization enhanced with intravenous injection of echo  contrast (Definity).  3. The right ventricle is not well visualized.    < end of copied text >     Problem/Plan - 3:  ·  Problem: LADI (acute kidney injury).  Plan: resolved   will continue to monitor   likely ATN from sepsis on admission.      Problem/Plan - 4:  ·  Problem: Thrombocytopenia.  Plan: at baseline  will continue to monitor   no intervention required at this time.      Problem/Plan - 5:  ·  Problem: Benign essential HTN.  Plan: BP readings fine.   resume Lisinopril LADI fully resolved 2.5 po qd with hold parameters.      Problem/Plan - 6:  Problem: Hairy cell leukemia. Plan: outpatient follow up with oncology  no intervention required at this time.     Problem/Plan - 7:  ·  Problem: Goals of care, counseling/discussion.  Plan: discussed with patient   remains FULL CODE.

## 2021-07-04 NOTE — CONSULT NOTE ADULT - SUBJECTIVE AND OBJECTIVE BOX
CARDIOLOGY CONSULT NOTE - DR. RAZO    HPI:  70 y.o. M, Caodaism, w/ a hx of HTN, hairy cell leukemia (in remission) who presents with abdominal pain, nausea and fever to 102. Patient was in his USOH until 5 days ago when he noted diffuse abdominal pain associated with 1 episode of nausea. Pain gradually worsened to max intensity on Saturday when he noted an associated fever to 102. Patient denies any vomiting. Pain and fevers persisted so patient presented to the ED. Abdominal pain is currently 7/10 sharp L sided, flank pain. Patient had 3 formed BM's this AM. Has been passing gas. Denies any dysuria, hematuria, sick contacts. Patient also notes pleuritic epigastric pain with deep inspiration, SOB 2/2 pain and tightness in his chest. Patient has not been eating much 2/2 early satiety, denies any pain w/ eating. Has noticed change in sense of taste, preserved sense of smell.   In the ED, patient was febrile to 101.2, -120's. He received CTZ, Azithromycin, Zosyn in the ED. CT A/P w/o contrast negative for intra-abdominal pathology.  (28 Jun 2021 17:55)    PAST MEDICAL & SURGICAL HISTORY:  Hairy cell leukemia    H/O splenomegaly    Hx of thrombocytopenia  refused blood products transfusion- Rakesh&#x27;s Witness    Benign essential HTN    History of ITP    History of Rotator Cuff Surgery    Patellar fracture          PREVIOUS DIAGNOSTIC TESTING:    [ ] Echocardiogram:  [ ]  Catheterization:  [ ] Stress Test:  	    MEDICATIONS:    Home Medications:  AndroGel Pump 20.25 mg/actuation (1.62%) transdermal gel: 2 pump(s) transdermal once a day (in the morning) (28 Jun 2021 18:38)  lisinopril 2.5 mg oral tablet: 1 tab(s) orally once a day (28 Jun 2021 18:38)  Vitamin B-12 100 mcg oral tablet: 1 tab(s) orally once a day (28 Jun 2021 18:38)      MEDICATIONS  (STANDING):  cyanocobalamin 1000 MICROGram(s) Oral daily  lisinopril 2.5 milliGRAM(s) Oral daily  piperacillin/tazobactam IVPB.. 3.375 Gram(s) IV Intermittent every 8 hours  testosterone 1% Gel 50 milliGRAM(s) Topical daily      FAMILY HISTORY:  FH: myocardial infarction        SOCIAL HISTORY:    [x ] Non-smoker  [ ] Smoker  [ ] Alcohol    Allergies    Drug Allergies Not Recorded  shellfish (Unknown)    Intolerances    	    REVIEW OF SYSTEMS:  CONSTITUTIONAL: No fever, weight loss, or fatigue  EYES: No eye pain, visual disturbances, or discharge  ENMT:  No difficulty hearing, tinnitus, vertigo; No sinus or throat pain  NECK: No pain or stiffness  RESPIRATORY: No cough, wheezing, chills or hemoptysis; + Shortness of Breath  CARDIOVASCULAR: as HPI  GASTROINTESTINAL: No abdominal or epigastric pain. No nausea, vomiting, or hematemesis; No diarrhea or constipation. No melena or hematochezia.  GENITOURINARY: No dysuria, frequency, hematuria, or incontinence  NEUROLOGICAL: No headaches, memory loss, loss of strength, numbness, or tremors  SKIN: No itching, burning, rashes, or lesions   	  [ ] All others negative	  [ ] Unable to obtain    PHYSICAL EXAM:    T(C): 36.8 (07-04-21 @ 12:18), Max: 37.1 (07-03-21 @ 21:55)  HR: 82 (07-04-21 @ 12:18) (74 - 82)  BP: 117/77 (07-04-21 @ 12:18) (103/54 - 117/77)  RR: 18 (07-04-21 @ 12:18) (18 - 18)  SpO2: 96% (07-04-21 @ 12:18) (96% - 97%)  Wt(kg): --  I&O's Summary    Daily     Daily     Appearance: Normal	  Psychiatry: A & O x 3, Mood & affect appropriate  HEENT:   Normal oral mucosa, PERRL, EOMI	  Lymphatic: No lymphadenopathy  Cardiovascular: Normal S1 S2,RRR, No JVD, No murmurs  Respiratory: Lungs clear to auscultation	  Gastrointestinal:  Soft, Non-tender, + BS	  Skin: No rashes, No ecchymoses, No cyanosis	  Neurologic: Non-focal  Extremities: Normal range of motion, No clubbing, cyanosis or edema  Vascular: Peripheral pulses palpable 2+ bilaterally    TELEMETRY: 	    ECG:  	sr, no acute ischemic abnl   RADIOLOGY:  OTHER: 	  	  LABS:	 	    CARDIAC MARKERS:        proBNP:     Lipid Profile:   HgA1c:   TSH: Thyroid Stimulating Hormone, Serum: 2.02 uIU/mL (06-30-21 @ 07:04)                            13.9   5.88  )-----------( 150      ( 04 Jul 2021 03:18 )             43.9     07-04    134<L>  |  99  |  13  ----------------------------<  90  3.8   |  21<L>  |  1.37<H>    Ca    8.8      04 Jul 2021 03:18  Phos  4.1     07-04  Mg     2.00     07-04      PTT - ( 04 Jul 2021 03:18 )  PTT:63.4 sec    Creatinine, Serum: 1.37 mg/dL (07-04-21 @ 03:18)  Creatinine, Serum: 1.30 mg/dL (07-03-21 @ 07:14)  Creatinine, Serum: 1.26 mg/dL (07-02-21 @ 08:12)        ASSESSMENT/PLAN: 	              
Patient is a 70y old  Male who presents with a chief complaint of Fever, abdominal pain (2021 11:36)    HPI: 71 y/o M with PMH of HTN, hairy cell leukemia (in remission, last chemo 7 yrs ago per patient), Gnosticist. Presents with abdominal pain, nausea, and fever (tmax 102F at home) for past few days. Also notes worsening associated SOB with these symptoms that has been worsening and pleuritic chest pain that is worse with deep inspiration. Denies any vomiting, diarrhea. On triage to ED, febrile to 101.2 F, tachycardic 110-120s , O2 sats 96% on room air. Received CTZ, Azithromycin, Zosyn in ED. CT A/P w/o contrast negative for intra-abdominal pathology. Pulmonary called to consult for SOB. Patient denies any hx of lung disease, MAYA, DVT, PE inhaler use at home. Former smoker, quit 40+ years ago. On exam, is visibly SOB with minimal exertion, O2 sats 92-93% on room air. Remains tachycardic with complaints of pleuritic chest pain & abdominal pain. Endorses dry, nonproductive cough. Denies nausea, vomiting, palpitations, dizziness.     MEDICATIONS  (STANDING):  cyanocobalamin 1000 MICROGram(s) Oral daily  heparin   Injectable 5000 Unit(s) SubCutaneous every 12 hours  lactated ringers. 1000 milliLiter(s) (80 mL/Hr) IV Continuous <Continuous>  piperacillin/tazobactam IVPB.. 3.375 Gram(s) IV Intermittent every 8 hours  testosterone 1% Gel 50 milliGRAM(s) Topical daily    MEDICATIONS  (PRN):  acetaminophen   Tablet .. 650 milliGRAM(s) Oral every 4 hours PRN Mild Pain (1 - 3)  morphine  - Injectable 2 milliGRAM(s) IV Push every 4 hours PRN Moderate Pain (4 - 6)    No Known Allergies    PAST MEDICAL & SURGICAL HISTORY:  Hairy cell leukemia  H/O splenomegaly  Hx of thrombocytopenia  refused blood products transfusion- Jehovahs Witness  Benign essential HTN  History of ITP  History of Rotator Cuff Surgery  Patellar fracture    FAMILY HISTORY:  FH: myocardial infarction    SOCIAL HISTORY  Smoking History: former smoker, quit 40+ years ago  Alcohol: denies  Drugs: denies  Occupation: retired     REVIEW OF SYSTEMS:  CONSTITUTIONAL: Fevers   EYES/ENT: No visual changes;  No vertigo or throat pain   NECK: No pain or stiffness  RESPIRATORY: SOB, dry nonproductive cough   CARDIOVASCULAR: Pleuritic chest pain   GASTROINTESTINAL: + abdominal pain, no diarrhea or constipation, passing gas, no melena  GENITOURINARY: No dysuria, frequency or hematuria  NEUROLOGICAL: No numbness or weakness  SKIN: No itching, burning, rashes, or lesions   All other review of systems is negative unless indicated above.    PHYSICAL EXAM:  Vital Signs Last 24 Hrs  T(C): 37.6 (2021 15:21), Max: 38 (2021 23:04)  T(F): 99.7 (2021 15:21), Max: 100.4 (2021 23:04)  HR: 107 (2021 15:) (88 - 114)  BP: 126/65 (2021 15:21) (100/58 - 134/67)  BP(mean): --  RR: 19 (2021 15:21) (16 - 20)  SpO2: 100% (2021 15:21) (96% - 100%)    GENERAL: visibly SOB with minimal exertion, able to speak full sentences   HEAD:  Atraumatic, Normocephalic  EYES: PERRLA   ENMT: No tonsillar erythema, exudates, or enlargement  NECK: Supple, No JVD  NERVOUS SYSTEM:  Alert & Oriented X3, Good concentration  CHEST/LUNG: Decreased at bases, CTA bilaterally otherwise   HEART: Regular rate and rhythm; tachycardic   ABDOMEN: Soft, nondistended tender to palpation, Bowel sounds present  EXTREMITIES:  2+ Peripheral Pulses, trace LE edema   LYMPH: No lymphadenopathy noted  SKIN: No rashes or lesions    LABS:                        15.5   9.66  )-----------( 90       ( 2021 06:42 )             48.3     06-29    136  |  101  |  27<H>  ----------------------------<  87  4.2   |  22  |  1.77<H>    Ca    8.3<L>      2021 06:42  Phos  2.9     06-29  Mg     2.20     06-29    TPro  7.0  /  Alb  3.4  /  TBili  2.4<H>  /  DBili  x   /  AST  48<H>  /  ALT  57<H>  /  AlkPhos  93        Urinalysis Basic - ( 2021 06:31 )  Color: Laila / Appearance: Slightly Turbid / S.025 / pH: x  Gluc: x / Ketone: Small  / Bili: Small / Urobili: 6 mg/dL   Blood: x / Protein: 100 mg/dL / Nitrite: Negative   Leuk Esterase: Negative / RBC: 3 /HPF / WBC 6 /HPF   Sq Epi: x / Non Sq Epi: 2 /HPF / Bacteria: Negative    RECENT CULTURES:   @ 15:09 .Blood Blood-Peripheral   No growth to date.    Microbiology  Culture Results:   No growth to date. ( @ 15:09)  Culture Results:   No growth to date. ( @ 15:09)    < from: Xray Chest 2 Views PA/Lat (21 @ 13:30) >  INTERPRETATION:    Heart size and the mediastinum cannot be accurately evaluated on this projection.  The left hemidiaphragm is mildly elevated.  The right lung is clear.  There are patchy left lower lung opacities.  No pleural effusion or pneumothorax seen.  There is osteoarthritic degenerative change of the spine.      IMPRESSION:  Mildly elevated left hemidiaphragm.    Patchy left lower lung opacities which could be due to subsegmental atelectasis or pneumonia. Suggest short-term follow-up chest x-ray.    < end of copied text >    < from: CT Abdomen and Pelvis No Cont (21 @ 14:31) >    FINDINGS:  LOWER CHEST: Coronary artery calcifications. Small left pleural effusion with adjacent compressive left lower lobe atelectasis. Unchanged 3 mm nodule along the minor fissure, likely intrapulmonary lymph node (2:1).    LIVER: Within normal limits.  BILE DUCTS: Normal caliber.  GALLBLADDER: Within normal limits.  SPLEEN: Unchanged splenomegaly measuring up to 17.9 cm in craniocaudad dimension.  PANCREAS: Within normal limits.  ADRENALS: Unchanged left adrenal nodular thickening.  KIDNEYS/URETERS: Within normal limits.    BLADDER: Within normal limits.  REPRODUCTIVE ORGANS: Prostate is enlarged.    BOWEL: No bowel obstruction. Colonic diverticulosis. Appendix is not visualized. No evidence of inflammation in the pericecal region.  PERITONEUM: No ascites.  VESSELS: Atherosclerotic changes.  RETROPERITONEUM/LYMPH NODES: No lymphadenopathy.  ABDOMINAL WALL: Fat-containing umbilical hernia.  BONES: Degenerative changes.    IMPRESSION:  Small left pleural effusion with adjacent compressive atelectasis.    Unchanged splenomegaly, consistent with history of Waldenstrom macroglobulinemia.    < end of copied text >    
HPI:  70 y.o. M, Congregational, w/ a hx of HTN, hairy cell leukemia (in remission) who presents with abdominal pain, nausea and fever to 102. Patient was in his USOH until 5 days ago when he noted diffuse abdominal pain associated with 1 episode of nausea. Pain gradually worsened to max intensity on Saturday when he noted an associated fever to 102. Patient denies any vomiting. Pain and fevers persisted so patient presented to the ED. Abdominal pain is currently 7/10 sharp L sided, flank pain. Patient had 3 formed BM's this AM. Has been passing gas. Denies any dysuria, hematuria, sick contacts. Patient also notes pleuritic epigastric pain with deep inspiration, SOB 2/2 pain and tightness in his chest. Patient has not been eating much 2/2 early satiety, denies any pain w/ eating. Has noticed change in sense of taste, preserved sense of smell.   In the ED, patient was febrile to 101.2, -120's. He received CTZ, Azithromycin, Zosyn in the ED. CT A/P w/o contrast negative for intra-abdominal pathology.      Patient remains with left sided abd pain. Remained with low grade fever overnight. Blood cultures sent. Leukocytosis resolved. LADI improving. RUQ US normal.    PAST MEDICAL & SURGICAL HISTORY:  Hairy cell leukemia    H/O splenomegaly    Hx of thrombocytopenia  refused blood products transfusion- Rakesh&#x27;s Witness    Benign essential HTN    History of ITP    History of Rotator Cuff Surgery    Patellar fracture    Allergies    No Known Allergies    Intolerances    ANTIMICROBIALS:  piperacillin/tazobactam IVPB.. 3.375 every 8 hours    OTHER MEDS:  acetaminophen   Tablet .. 650 milliGRAM(s) Oral every 4 hours PRN  cyanocobalamin 1000 MICROGram(s) Oral daily  heparin   Injectable 5000 Unit(s) SubCutaneous every 12 hours  lactated ringers. 1000 milliLiter(s) IV Continuous <Continuous>  morphine  - Injectable 2 milliGRAM(s) IV Push every 4 hours PRN  testosterone 1% Gel 50 milliGRAM(s) Topical daily    SOCIAL HISTORY: Denies smoking, alcohol, drug use.    FAMILY HISTORY:  FH: myocardial infarction    Drug Dosing Weight  Height (cm): 170.2 (2021 23:04)  Weight (kg): 104.326 (2021 23:04)  BMI (kg/m2): 36 (2021 23:04)  BSA (m2): 2.15 (2021 23:04)    PE:    Vital Signs Last 24 Hrs  T(C): 37.6 (2021 15:21), Max: 38 (2021 23:04)  T(F): 99.7 (2021 15:21), Max: 100.4 (2021 23:04)  HR: 107 (2021 15:21) (88 - 114)  BP: 126/65 (2021 15:21) (100/58 - 134/67)  BP(mean): --  RR: 19 (2021 15:21) (16 - 20)  SpO2: 100% (2021 15:21) (96% - 100%)    Gen: AOx3, NAD  CV: S1+S2 normal, no murmurs  Resp: Clear bilat, no resp distress  Abd: Soft, nontender, +BS  Ext: No LE edema, no wounds  : No Magallanes  IV/Skin: No thrombophlebitis  Msk: No low back pain, no arthralgias, no joint swelling  Neuro: No sensory deficits, no motor deficits    LABS:                          15.5   9.66  )-----------( 90       ( 2021 06:42 )             48.3           136  |  101  |  27<H>  ----------------------------<  87  4.2   |  22  |  1.77<H>    Ca    8.3<L>      2021 06:42  Phos  2.9       Mg     2.20         TPro  7.0  /  Alb  3.4  /  TBili  2.4<H>  /  DBili  x   /  AST  48<H>  /  ALT  57<H>  /  AlkPhos  93        Urinalysis Basic - ( 2021 06:31 )    Color: Laila / Appearance: Slightly Turbid / S.025 / pH: x  Gluc: x / Ketone: Small  / Bili: Small / Urobili: 6 mg/dL   Blood: x / Protein: 100 mg/dL / Nitrite: Negative   Leuk Esterase: Negative / RBC: 3 /HPF / WBC 6 /HPF   Sq Epi: x / Non Sq Epi: 2 /HPF / Bacteria: Negative    MICROBIOLOGY:  v  .Blood Blood-Peripheral  21   No growth to date.  --  --    Rapid RVP Result: NotDetec ( @ 13:15)    RADIOLOGY:    < from: US Abdomen Upper Quadrant Right (21 @ 12:28) >    IMPRESSION:    Normal right upper quadrant abdominal ultrasound.      < end of copied text >    < from: CT Abdomen and Pelvis No Cont (21 @ 14:31) >  IMPRESSION:  Small left pleural effusion with adjacent compressive atelectasis.    Unchanged splenomegaly, consistent with history of Waldenstrom macroglobulinemia.    < end of copied text >    
Patient is a 70y old  Male who presents with a chief complaint of Fever, abdominal pain (01 Jul 2021 12:31), pt know to me for a few years, treated for HCL with pentostatin + rituxan several years ago, has chromic TCP, had an episode of palpable purpura after a flu shot a couple of years ago, has been on testosterone and there is increased hgb in the past, here with possible PNA and PE, on abx and heparin IV, s/p thoracentesis, is feeling OK, no more fevers or chills, no cough, and a detailed ROS is otherwise unremarkable. Has chronic neuropathy.      PAST MEDICAL & SURGICAL HISTORY:  Hairy cell leukemia    H/O splenomegaly    Hx of thrombocytopenia  refused blood products transfusion- Rakesh&#x27;s Witness    Benign essential HTN    History of ITP    History of Rotator Cuff Surgery    Patellar fracture        acetaminophen   Tablet .. 650 milliGRAM(s) Oral every 4 hours PRN  cyanocobalamin 1000 MICROGram(s) Oral daily  heparin   Injectable 8500 Unit(s) IV Push every 6 hours PRN  heparin   Injectable 4000 Unit(s) IV Push every 6 hours PRN  heparin  Infusion.  Unit(s)/Hr IV Continuous <Continuous>  lisinopril 2.5 milliGRAM(s) Oral daily  morphine  - Injectable 2 milliGRAM(s) IV Push every 4 hours PRN  piperacillin/tazobactam IVPB.. 3.375 Gram(s) IV Intermittent every 8 hours  sodium chloride 0.9%. 500 milliLiter(s) IV Continuous <Continuous>  testosterone 1% Gel 50 milliGRAM(s) Topical daily      No Known Allergies  Had worsening of TCP and palpable purpura after a flu shot a couple of years ago      FAMILY HISTORY:  FH: myocardial infarction        Vital Signs Last 24 Hrs  T(C): 36.5 (02 Jul 2021 05:00), Max: 37.2 (01 Jul 2021 21:08)  T(F): 97.7 (02 Jul 2021 05:00), Max: 99 (01 Jul 2021 21:08)  HR: 83 (02 Jul 2021 05:00) (83 - 90)  BP: 113/60 (02 Jul 2021 05:00) (113/60 - 125/79)  BP(mean): --  RR: 17 (02 Jul 2021 05:00) (16 - 17)  SpO2: 97% (02 Jul 2021 05:00) (96% - 98%)                          14.1   5.72  )-----------( 96       ( 01 Jul 2021 05:49 )             42.4       07-01    136  |  101  |  18  ----------------------------<  96  3.7   |  22  |  1.28    Ca    8.4      01 Jul 2021 05:49    TPro  6.0  /  Alb  2.7<L>  /  TBili  1.0  /  DBili  x   /  AST  95<H>  /  ALT  85<H>  /  AlkPhos  97  07-01      PTT - ( 02 Jul 2021 08:12 )  PTT:84.8 sec    V/Q scan: IMPRESSION: Indeterminate for pulmonary embolus. Multiple new segmental perfusion defects since VQ scan 2/5/2020.    Discussed with YOLANDA Adhikari on the medicine service, by Dr. Santacruz, with read back on June 29, 2021 at approximately 11pm.                CRISTHIAN SANTACRUZ MD; Attending Radiologist  This document has been electronically signed. Jun 29 2021 11:11PM      CT a/p:  IMPRESSION:  Small left pleural effusion with adjacent compressive atelectasis.    Unchanged splenomegaly, consistent with history of Waldenstrom macroglobulinemia.            KELLE JAIMES MD; Resident Interventional Radiology  This document has been electronically signed.  CHANTEL MATA MD; Attending Radiologist  This document has been electronically signed. Jun 28 2021  4:02PM        CT chest: IMPRESSION:  1.  Left pleural effusion is increased and there is increased left lower lobe passive atelectasis            ROSALINDA LAU MD; Resident Radiologist  This document has been electronically signed.  FRANCE TAYLOR MD; Attending Radiologist  This document has been electronically signed. Jun 30 2021  1:35PM        CT PA: ******PRELIMINARY REPORT******    ******PRELIMINARY REPORT******            EXAM:  CT ANGIO CHEST PULM ART [facility]AWIC        PROCEDURE DATE:  Jul 1 2021     ******PRELIMINARY REPORT******    ******PRELIMINARY REPORT******            INTERPRETATION:  Small left pleural effusion with compressive atelectasis. Suboptimal contrast opacification of pulmonary arteries limits evaluate for PE. No overt main, right, left pulmonary artery embolus. Evaluation of the lobar, segmental, and subsegmental pulmonaryarteries is limited. F/u official report.          ******PRELIMINARY REPORT******    ******PRELIMINARY REPORT******          DIMAS CALHOUN MD; Resident Radiology

## 2021-07-05 ENCOUNTER — TRANSCRIPTION ENCOUNTER (OUTPATIENT)
Age: 71
End: 2021-07-05

## 2021-07-05 LAB
ANION GAP SERPL CALC-SCNC: 11 MMOL/L — SIGNIFICANT CHANGE UP (ref 7–14)
APTT BLD: 33.5 SEC — SIGNIFICANT CHANGE UP (ref 27–36.3)
BUN SERPL-MCNC: 13 MG/DL — SIGNIFICANT CHANGE UP (ref 7–23)
CALCIUM SERPL-MCNC: 9.1 MG/DL — SIGNIFICANT CHANGE UP (ref 8.4–10.5)
CHLORIDE SERPL-SCNC: 103 MMOL/L — SIGNIFICANT CHANGE UP (ref 98–107)
CO2 SERPL-SCNC: 22 MMOL/L — SIGNIFICANT CHANGE UP (ref 22–31)
CREAT SERPL-MCNC: 1.32 MG/DL — HIGH (ref 0.5–1.3)
CULTURE RESULTS: SIGNIFICANT CHANGE UP
GLUCOSE SERPL-MCNC: 90 MG/DL — SIGNIFICANT CHANGE UP (ref 70–99)
HCT VFR BLD CALC: 45 % — SIGNIFICANT CHANGE UP (ref 39–50)
HGB BLD-MCNC: 14.6 G/DL — SIGNIFICANT CHANGE UP (ref 13–17)
MAGNESIUM SERPL-MCNC: 2.2 MG/DL — SIGNIFICANT CHANGE UP (ref 1.6–2.6)
MCHC RBC-ENTMCNC: 25.3 PG — LOW (ref 27–34)
MCHC RBC-ENTMCNC: 32.4 GM/DL — SIGNIFICANT CHANGE UP (ref 32–36)
MCV RBC AUTO: 78.1 FL — LOW (ref 80–100)
NRBC # BLD: 0 /100 WBCS — SIGNIFICANT CHANGE UP
NRBC # FLD: 0 K/UL — SIGNIFICANT CHANGE UP
PHOSPHATE SERPL-MCNC: 3.7 MG/DL — SIGNIFICANT CHANGE UP (ref 2.5–4.5)
PLATELET # BLD AUTO: 153 K/UL — SIGNIFICANT CHANGE UP (ref 150–400)
POTASSIUM SERPL-MCNC: 4 MMOL/L — SIGNIFICANT CHANGE UP (ref 3.5–5.3)
POTASSIUM SERPL-SCNC: 4 MMOL/L — SIGNIFICANT CHANGE UP (ref 3.5–5.3)
RBC # BLD: 5.76 M/UL — SIGNIFICANT CHANGE UP (ref 4.2–5.8)
RBC # FLD: 15 % — HIGH (ref 10.3–14.5)
SODIUM SERPL-SCNC: 136 MMOL/L — SIGNIFICANT CHANGE UP (ref 135–145)
SPECIMEN SOURCE: SIGNIFICANT CHANGE UP
WBC # BLD: 5.85 K/UL — SIGNIFICANT CHANGE UP (ref 3.8–10.5)
WBC # FLD AUTO: 5.85 K/UL — SIGNIFICANT CHANGE UP (ref 3.8–10.5)

## 2021-07-05 RX ORDER — ENOXAPARIN SODIUM 100 MG/ML
40 INJECTION SUBCUTANEOUS DAILY
Refills: 0 | Status: DISCONTINUED | OUTPATIENT
Start: 2021-07-05 | End: 2021-07-07

## 2021-07-05 RX ADMIN — PIPERACILLIN AND TAZOBACTAM 25 GRAM(S): 4; .5 INJECTION, POWDER, LYOPHILIZED, FOR SOLUTION INTRAVENOUS at 05:50

## 2021-07-05 RX ADMIN — ENOXAPARIN SODIUM 40 MILLIGRAM(S): 100 INJECTION SUBCUTANEOUS at 13:47

## 2021-07-05 RX ADMIN — Medication 50 MILLIGRAM(S): at 12:16

## 2021-07-05 RX ADMIN — PREGABALIN 1000 MICROGRAM(S): 225 CAPSULE ORAL at 12:16

## 2021-07-05 RX ADMIN — PIPERACILLIN AND TAZOBACTAM 25 GRAM(S): 4; .5 INJECTION, POWDER, LYOPHILIZED, FOR SOLUTION INTRAVENOUS at 13:45

## 2021-07-05 NOTE — DISCHARGE NOTE PROVIDER - NSDCFUADDAPPT_GEN_ALL_CORE_FT
Please follow up with your primary care provider within 1 week of discharge from the hospital. If you do not have a primary care provider please follow up with the [facility], call [phone]

## 2021-07-05 NOTE — PROGRESS NOTE ADULT - SUBJECTIVE AND OBJECTIVE BOX
Date of Service  : 07-05-21     INTERVAL HPI/OVERNIGHT EVENTS: Wife in room . I feel much better . Had D/W cardiologist and outpt Stress test.   Vital Signs Last 24 Hrs  T(C): 36.8 (05 Jul 2021 12:27), Max: 36.9 (04 Jul 2021 21:27)  T(F): 98.3 (05 Jul 2021 12:27), Max: 98.5 (04 Jul 2021 21:27)  HR: 80 (05 Jul 2021 12:27) (73 - 85)  BP: 109/80 (05 Jul 2021 12:27) (100/55 - 115/82)  BP(mean): --  RR: 16 (05 Jul 2021 15:11) (16 - 17)  SpO2: 98% (05 Jul 2021 15:11) (98% - 99%)  I&O's Summary    MEDICATIONS  (STANDING):  cyanocobalamin 1000 MICROGram(s) Oral daily  enoxaparin Injectable 40 milliGRAM(s) SubCutaneous daily  lisinopril 2.5 milliGRAM(s) Oral daily  testosterone 1% Gel 50 milliGRAM(s) Topical daily    MEDICATIONS  (PRN):  acetaminophen   Tablet .. 650 milliGRAM(s) Oral every 4 hours PRN Mild Pain (1 - 3)  morphine  - Injectable 2 milliGRAM(s) IV Push every 4 hours PRN Moderate Pain (4 - 6)    LABS:                        14.6   5.85  )-----------( 153      ( 05 Jul 2021 06:42 )             45.0     07-05    136  |  103  |  13  ----------------------------<  90  4.0   |  22  |  1.32<H>    Ca    9.1      05 Jul 2021 06:42  Phos  3.7     07-05  Mg     2.20     07-05      PTT - ( 05 Jul 2021 06:42 )  PTT:33.5 sec    CAPILLARY BLOOD GLUCOSE              REVIEW OF SYSTEMS:  CONSTITUTIONAL: No fever, weight loss, or fatigue  EYES: No eye pain, visual disturbances, or discharge  ENMT:  No difficulty hearing, tinnitus, vertigo; No sinus or throat pain  NECK: No pain or stiffness  RESPIRATORY: No cough, wheezing, chills or hemoptysis; No shortness of breath  CARDIOVASCULAR: No chest pain, palpitations, dizziness, or leg swelling  GASTROINTESTINAL: No abdominal or epigastric pain. No nausea, vomiting, or hematemesis; No diarrhea or constipation. No melena or hematochezia.  GENITOURINARY: No dysuria, frequency, hematuria, or incontinence  NEUROLOGICAL: No headaches, memory loss, loss of strength, numbness, or tremors      RADIOLOGY & ADDITIONAL TESTS:    Consultant(s) Notes Reviewed:  [x ] YES  [ ] NO    PHYSICAL EXAM:  GENERAL: NAD, well-groomed, well-developed, not in any distress ,  HEAD:  Atraumatic, Normocephalic  EYES: EOMI, PERRLA, conjunctiva and sclera clear  ENMT: No tonsillar erythema, exudates, or enlargement; Moist mucous membranes, Good dentition, No lesions  NECK: Supple, No JVD, Normal thyroid  NERVOUS SYSTEM:  Alert & Oriented X3, No focal deficit   CHEST/LUNG: Good air entry bilateral except bases   HEART: Regular rate and rhythm; No murmurs, rubs, or gallops  ABDOMEN: Soft, Nontender, Nondistended; Bowel sounds present  EXTREMITIES:  2+ Peripheral Pulses, No clubbing, cyanosis, or edema  SKIN: No rashes or lesions    Care Discussed with Consultants/Other Providers [ x] YES  [ ] NO

## 2021-07-05 NOTE — PROGRESS NOTE ADULT - ASSESSMENT
ECHO 7/2/21: min MR, grossly mod global lv sys dysfx   ECHO 2/7/20: endocardium not well visualzied, grossly nl LV sys fx      a/p   70 year old man with HTN, hairy cell leukemia (in remission) who presents with abdominal pain, nausea and fever to 102 of unclear etiology.     #Sepsis, LLL pneumonia, parapneumonic effusion  -s/p LEFT thoracentesis   -clinically improved  -abx per med/pulmonary fu noted-- -Cytology negative, f/u fluid cultures (no growth so far)  -A/C stopped, CTA no obv PE  -med, pulmo f/u    #LV Dysfunction on TTE  -LV dysfunction seen on TTE  -new per patient  -previous ischemic testing and echo approx 2-3 years ago reportedly normal   -pleuritic chest pain not concerning for angina   -dyspnea related to effusion  -no decomp HF  -etiology for cmp ? stress induced in setting of infection  -continue medical therapy for cmp for now - c/w lisinopril, eventually add  low dose toprol if sys bp remains stable   -will defer ischemic eval for now in setting of pna, parapneumonic effusion  -once recovered from  medical issues will need ischemic eval as an outpt     #LADI  -resolved     #HTN  -borderline low normal bp noted;  c/w  Lisinopril 2.5 mg daily    -add toprol xl 12.5 qd for cmp if sys bp remains stable     #Hairy cell leukemia  -hem/onc f/u    dvt ppx

## 2021-07-05 NOTE — DISCHARGE NOTE PROVIDER - NSDCMRMEDTOKEN_GEN_ALL_CORE_FT
AndroGel Pump 20.25 mg/actuation (1.62%) transdermal gel: 2 pump(s) transdermal once a day (in the morning)  lisinopril 2.5 mg oral tablet: 1 tab(s) orally once a day  Vitamin B-12 100 mcg oral tablet: 1 tab(s) orally once a day   acetaminophen 325 mg oral tablet: 2 tab(s) orally every 4 hours, As needed, Mild Pain (1 - 3)  amoxicillin-clavulanate 875 mg-125 mg oral tablet: 1 tab(s) orally 2 times a day  AndroGel Pump 20.25 mg/actuation (1.62%) transdermal gel: 2 pump(s) transdermal once a day (in the morning)  cyanocobalamin 1000 mcg oral tablet: 1 tab(s) orally once a day  lisinopril 2.5 mg oral tablet: 1 tab(s) orally once a day

## 2021-07-05 NOTE — DISCHARGE NOTE PROVIDER - PROVIDER TOKENS
FREE:[LAST:[Primary Care provider],PHONE:[(   )    -],FAX:[(   )    -],FOLLOWUP:[1 week],ESTABLISHEDPATIENT:[T]],PROVIDER:[TOKEN:[[id number]],FOLLOWUP:[1 week]],PROVIDER:[TOKEN:[[id number]],FOLLOWUP:[2 weeks],ESTABLISHEDPATIENT:[T]] PROVIDER:[TOKEN:[[id number]],FOLLOWUP:[1 week]],PROVIDER:[TOKEN:[[id number]],FOLLOWUP:[2 weeks],ESTABLISHEDPATIENT:[T]],PROVIDER:[TOKEN:[[id number]]],PROVIDER:[TOKEN:[[id number]]],FREE:[LAST:[Primary Care provider],PHONE:[(   )    -],FAX:[(   )    -],FOLLOWUP:[1 week],ESTABLISHEDPATIENT:[T]],PROVIDER:[TOKEN:[[id number]]]

## 2021-07-05 NOTE — PROGRESS NOTE ADULT - ASSESSMENT
70 y.o. M, Mandaeism, w/ a hx of HTN, hairy cell leukemia (in remission) who presents with abdominal pain, nausea and fever to 102 of unclear etiology.      Problem/Plan - 1:  ·  Problem: Sepsis.  Plan: likely secondary to LLL pneumonia with associated pleural effusion   Pulmonary and ID helping.   clinically improving   continue antibiotics IV and then PO Augmentin per ID   < from: CT Angio Chest PE Protocol w/ IV Cont (07.01.21 @ 20:53) >  IMPRESSION:  No pulmonary embolism to the lobar arteries. Unable to evaluate more distal branches.    Decreased small left pleural effusion and improved compressive atelectasis.    < end of copied text >       Problem/Plan - 2:  ·  Problem: LV Dysfunction .  Plan: Cardiology consulted.     < from: TTE with Doppler (w/Cont) (07.02.21 @ 16:38) >CONCLUSIONS:  Technically very difficult study.  1. Mitral annular calcification, otherwise normal mitral  valve. Minimal mitral regurgitation.  2. Endocardium notwell visualized; grossly moderate global  left ventricular systolic dysfunction.  Endocardial  visualization enhanced with intravenous injection of echo  contrast (Definity).  3. The right ventricle is not well visualized.    < end of copied text >     Problem/Plan - 3:  ·  Problem: LADI (acute kidney injury).  Plan: resolved   will continue to monitor   likely ATN from sepsis on admission.      Problem/Plan - 4:  ·  Problem: Thrombocytopenia.  Plan: at baseline  will continue to monitor   no intervention required at this time.      Problem/Plan - 5:  ·  Problem: Benign essential HTN.  Plan: BP readings fine.   resume Lisinopril LADI fully resolved 2.5 po qd with hold parameters.      Problem/Plan - 6:  Problem: Hairy cell leukemia. Plan: outpatient follow up with oncology  Hematology helping.      Problem/Plan - 7:  ·  Problem: Goals of care, counseling/discussion.  Plan: discussed with patient   remains FULL CODE.

## 2021-07-05 NOTE — PROGRESS NOTE ADULT - SUBJECTIVE AND OBJECTIVE BOX
Date of Service: 07-05-21 @ 13:30    Patient is a 70y old  Male who presents with a chief complaint of Fever, abdominal pain (05 Jul 2021 12:51)      Any change in ROS: Seems to be doing pretty good: on room air:        MEDICATIONS  (STANDING):  cyanocobalamin 1000 MICROGram(s) Oral daily  enoxaparin Injectable 40 milliGRAM(s) SubCutaneous daily  lisinopril 2.5 milliGRAM(s) Oral daily  piperacillin/tazobactam IVPB.. 3.375 Gram(s) IV Intermittent every 8 hours  testosterone 1% Gel 50 milliGRAM(s) Topical daily    MEDICATIONS  (PRN):  acetaminophen   Tablet .. 650 milliGRAM(s) Oral every 4 hours PRN Mild Pain (1 - 3)  morphine  - Injectable 2 milliGRAM(s) IV Push every 4 hours PRN Moderate Pain (4 - 6)    Vital Signs Last 24 Hrs  T(C): 36.8 (05 Jul 2021 05:50), Max: 36.9 (04 Jul 2021 21:27)  T(F): 98.2 (05 Jul 2021 05:50), Max: 98.5 (04 Jul 2021 21:27)  HR: 73 (05 Jul 2021 05:50) (73 - 85)  BP: 100/55 (05 Jul 2021 05:50) (100/55 - 115/82)  BP(mean): --  RR: 16 (05 Jul 2021 05:50) (16 - 17)  SpO2: 99% (05 Jul 2021 05:50) (99% - 99%)    I&O's Summary        Physical Exam:   GENERAL: NAD, well-groomed, well-developed  HEENT: NUNU/   Atraumatic, Normocephalic  ENMT: No tonsillar erythema, exudates, or enlargement; Moist mucous membranes, Good dentition, No lesions  NECK: Supple, No JVD, Normal thyroid  CHEST/LUNG: Clear to auscultaion  CVS: Regular rate and rhythm; No murmurs, rubs, or gallops  GI: : Soft, Nontender, Nondistended; Bowel sounds present  NERVOUS SYSTEM:  Alert & Oriented X3  EXTREMITIES:  2+ Peripheral Pulses, No clubbing, cyanosis, or edema  LYMPH: No lymphadenopathy noted  SKIN: No rashes or lesions  ENDOCRINOLOGY: No Thyromegaly  PSYCH: Appropriate    Labs:                              14.6   5.85  )-----------( 153      ( 05 Jul 2021 06:42 )             45.0                         13.9   5.88  )-----------( 150      ( 04 Jul 2021 03:18 )             43.9                         14.2   6.22  )-----------( 138      ( 03 Jul 2021 07:14 )             44.0                         14.2   5.58  )-----------( 114      ( 02 Jul 2021 08:12 )             43.1     07-05    136  |  103  |  13  ----------------------------<  90  4.0   |  22  |  1.32<H>  07-04    134<L>  |  99  |  13  ----------------------------<  90  3.8   |  21<L>  |  1.37<H>  07-03    138  |  102  |  13  ----------------------------<  89  3.8   |  22  |  1.30  07-02    138  |  104  |  14  ----------------------------<  93  3.9   |  22  |  1.26    Ca    9.1      05 Jul 2021 06:42  Ca    8.8      04 Jul 2021 03:18  Phos  3.7     07-05  Phos  4.1     07-04  Mg     2.20     07-05  Mg     2.00     07-04    TPro  5.7<L>  /  Alb  2.7<L>  /  TBili  0.7  /  DBili  x   /  AST  152<H>  /  ALT  154<H>  /  AlkPhos  122<H>  07-02    CAPILLARY BLOOD GLUCOSE         (06-30 @ 18:40)      PTT - ( 05 Jul 2021 06:42 )  PTT:33.5 sec    Fluid Source --  Albumin, Fluid--  Glucose, Fluid--  Protein total, Fluid--  Lacatate Dehydrogenase, Fluid--  pH, Fluid7.6  Cytopathology-Non Gyn Report--  Fluid Source PLEURAL  Albumin, Fluid2.2 g/dL  Glucose, Fluid77 mg/dL  Protein total, Fluid3.5 g/dL  Lacatate Dehydrogenase, [id number] U/L  pH, Fluid--  Cytopathology-Non Gyn Report--  Fluid Source --  Albumin, Fluid--  Glucose, Fluid--  Protein total, Fluid--  Lacatate Dehydrogenase, Fluid--  pH, Fluid--  Cytopathology-Non Gyn Report  ACCESSION No:  [id number]    FARIBA GRISSOMDRO                          1        Cytopathology Report            Specimen(s) Submitted  PLEURAL FLUID, LEFT      Clinical History  70 year old male  Left pleural effusion.      Gross Description  Received: 60 ml of pinkish fluid in CytoLyt  Prepared: 1 ThinPrep slide, 1 cell block , 1 smear      Final Diagnosis  PLEURAL FLUID, LEFT  NEGATIVE FOR MALIGNANT CELLS.    Cytology slides and cell block section shows many benign and  reactive mesothelial cells, histiocytes, abundant mixed  inflammation and fibrin  material.  Clinical and radiological correlation is essential.    Screened by: Craig MATHEWS(ASCP)  Verified by: Ayad Hand M.D.  (Electronic Signature)  Reported on: 07/02/21 14:03 EDT, [address] [facility] [address] [place] [address]  Phone: [phone]   Fax: [phone]  Cytology technical processing performed at [address] [place] [address]  _________________________________________________________________        RECENT CULTURES:  06-30 @ 22:58 .Body Fluid Pleural Fluid       rad< from: CT Angio Chest PE Protocol w/ IV Cont (07.01.21 @ 20:53) >  INTERPRETATION:  CLINICAL INFORMATION: Sepsis, fever, rule out pulmonary embolism, abnormal lung perfusion scan    COMPARISON: CT chest 6/30/2021.    CONTRAST/COMPLICATIONS:  IV Contrast: Omnipaque 350  150 cc administered   50 cc discarded  Oral Contrast: NONE  Complications: None reported at time of study completion    PROCEDURE:  CT of the Chest was performed.  Sagittal and coronal reformats were performed.    FINDINGS:    LUNGS, PLEURA, and AIRWAYS: Patent central airways. Decreased small left pleural effusion and improved compressive atelectasis. Unchanged small nodule in the RUL, likely benign.  MEDIASTINUM AND MATTY: No lymphadenopathy.  VESSELS: Poor opacification of the pulmonary arteries limits evaluation. No pulmonary embolism to the lobar arteries. Limited evaluation of the subsegmental arteries. Aortic calcification.  HEART: Heart size is normal. No pericardial effusion. Coronary artery calcification.  CHEST WALL AND LOWER NECK: Within normal limits.  VISUALIZED UPPER ABDOMEN: Partially imaged splenomegaly.  BONES: Degenerative changes of the spine.    IMPRESSION:  No pulmonary embolism to the lobar arteries. Unable to evaluate more distal branches.    Decreased small left pleural effusion and improved compressive atelectasis.              ROSALINDA LAU MD; Resident Radiologist  This document has been electronically signed.  KENDY SWAIN M.D., ATTENDING RADIOGIST  This document has been electronically signed. Jul 2 2021 11:15AM    < end of copied text >           Testing in progress    06-30 @ 22:57 .Body Fluid Pleural Fluid                No growth    06-30 @ 20:35 .Smear Other, Pleural Fluid       polymorphonuclear leukocytes seen  No organisms seen  by cytocentrifuge             06-28 @ 19:40 .Urine Clean Catch (Midstream)                No growth          RESPIRATORY CULTURES:          Studies  Chest X-RAY  CT SCAN Chest   Venous Dopplers: LE:   CT Abdomen  Others

## 2021-07-05 NOTE — DISCHARGE NOTE PROVIDER - CARE PROVIDERS DIRECT ADDRESSES
,DirectAddress_Unknown,DirectAddress_Unknown,DirectAddress_Unknown ,DirectAddress_Unknown,DirectAddress_Unknown,DirectAddress_Unknown,DirectAddress_Unknown,DirectAddress_Unknown,[email or web address]

## 2021-07-05 NOTE — DISCHARGE NOTE PROVIDER - NPI NUMBER (FOR SYSADMIN USE ONLY) :
[[id number]],[[id number]],[[id number]] [[id number]],[[id number]],[[id number]],[[id number]],[[id number]],[[id number]]

## 2021-07-05 NOTE — PROGRESS NOTE ADULT - SUBJECTIVE AND OBJECTIVE BOX
Pt is feeling better, no fevers, chills, no cough, SOb and a detailed ROS unremarkable to unchanged. eating well. Wife is with him today.         Meds:  acetaminophen   Tablet .. 650 milliGRAM(s) Oral every 4 hours PRN  cyanocobalamin 1000 MICROGram(s) Oral daily  lisinopril 2.5 milliGRAM(s) Oral daily  morphine  - Injectable 2 milliGRAM(s) IV Push every 4 hours PRN  piperacillin/tazobactam IVPB.. 3.375 Gram(s) IV Intermittent every 8 hours  testosterone 1% Gel 50 milliGRAM(s) Topical daily      Vital Signs Last 24 Hrs  T(C): 36.8 (05 Jul 2021 05:50), Max: 36.9 (04 Jul 2021 21:27)  T(F): 98.2 (05 Jul 2021 05:50), Max: 98.5 (04 Jul 2021 21:27)  HR: 73 (05 Jul 2021 05:50) (73 - 85)  BP: 100/55 (05 Jul 2021 05:50) (100/55 - 115/82)  BP(mean): --  RR: 16 (05 Jul 2021 05:50) (16 - 17)  SpO2: 99% (05 Jul 2021 05:50) (99% - 99%)                          14.6   5.85  )-----------( 153      ( 05 Jul 2021 06:42 )             45.0       07-05    136  |  103  |  13  ----------------------------<  90  4.0   |  22  |  1.32<H>    Ca    9.1      05 Jul 2021 06:42  Phos  3.7     07-05  Mg     2.20     07-05                PTT - ( 05 Jul 2021 06:42 )  PTT:33.5 sec

## 2021-07-05 NOTE — PROGRESS NOTE ADULT - ASSESSMENT
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

## 2021-07-05 NOTE — DISCHARGE NOTE PROVIDER - NSDCCPCAREPLAN_GEN_ALL_CORE_FT
PRINCIPAL DISCHARGE DIAGNOSIS  Diagnosis: Pneumonia  Assessment and Plan of Treatment: You were treated for pneumonia with intravenous antibitoics.  Continue your oral antibiotic regimen, complete your entire course of antibiotics even if you feel better.      SECONDARY DISCHARGE DIAGNOSES  Diagnosis: Hairy cell leukemia  Assessment and Plan of Treatment: Follow up with Dr. Anne    Diagnosis: Benign essential HTN  Assessment and Plan of Treatment: Continue current blood pressure medication regimen as directed. Monitor for any visual changes, headaches or dizziness.  Monitor blood pressure regularly.  Follow up with your PCP for further management for high blood pressure, please call to make appointment within 1 week of discharge    Diagnosis: LV dysfunction  Assessment and Plan of Treatment: You had an abnormal echocardiogram.  Follow up with your cardiologist within 2 weeks for further monitoring.  You will need a repeat echocardiogram.     PRINCIPAL DISCHARGE DIAGNOSIS  Diagnosis: Pneumonia  Assessment and Plan of Treatment: - CT angiogram of the chest: No pulmonary embolism to the lobar arteries. Unable to evaluate more distal branches.  - Lower extremity ultrasound: Negative for Deep Vein Thrombosis   -TTE : Global Left Ventricle systolic dysfunction difficult study  You were treated for pneumonia with intravenous antibitoics.  Continue your oral antibiotic regimen, complete your entire course of antibiotics even if you feel better. Monitor for worsening of disease, such as, increased cough/sputum, difficulty breathing, fever/chills, or changes in mental status. Follow-up with your PCP as an outpatient for further medical care/recommendations.      SECONDARY DISCHARGE DIAGNOSES  Diagnosis: Elevated liver function tests  Assessment and Plan of Treatment: Your liver enymes were found to be elevated. Abdominal ultrasound negative for liver pathology. Acute hepatitis panel normal. liver function tests on day of discharge 7/7/2021:    Protein Total, Serum: 6.8 g/dL    Albumin, Serum: 3.0 g/dL    Bilirubin Total, Serum: 0.5 mg/dL    Alkaline Phosphatase, Serum: 88 U/L    Aspartate Aminotransferase (AST/SGOT): 130 U/L    Alanine Aminotransferase (ALT/SGPT): 227 U/L  PLEASE FOLLOW UP WIHT YOUR PRIMARY CARE PROVIDER AND HEMTOLOGIST WITHIN 1 WEEK OF DISCHARGE FROM THE HOSPITAL FOR REPEAT BLOOD WORK AND FURTHER MANAGEMENT.    Diagnosis: LADI (acute kidney injury)  Assessment and Plan of Treatment: You were seen by nephrology. You were given IV fludis and your lisinopril was held. Your creatinine improved. creatinine 1.34 on day of discharge 7/7/2021. Please follow upw ith nephrologist within 1 week of discharge from the hospital for further management.    Diagnosis: Sepsis  Assessment and Plan of Treatment: Complete antibiotic therapy as directed.  Monitor for any further signs and symptoms of further infection, including but not limited to, fevers/chills, shortness of breath, increased heart rate, dizziness, or abrupt changes in mental status.    Diagnosis: Benign essential HTN  Assessment and Plan of Treatment: Continue current blood pressure medication regimen as directed. Monitor for any visual changes, headaches or dizziness.  Monitor blood pressure regularly.  Follow up with your PCP for further management for high blood pressure, please call to make appointment within 1 week of discharge.    Diagnosis: Chest tightness  Assessment and Plan of Treatment: You reproted chest tightness, now resolved. You were seen by cardiology:  Transthoracic echocardiogram was performed:  1. Normal left ventricular internal dimensions and wall  thicknesses.  2. Mild segmental left ventricular systolic dysfunction  with hypokinesis of the inferolateral wall.  Please follow up with cardiology as outpatient for further cardiac workup/management.    Diagnosis: Thrombocytopenia  Assessment and Plan of Treatment: Your platelet coutn was found to below. You were seen byt he hematologist, Dr. Anne. Platelet count 187 on day of discharge 7/7/2021. Follow up with Dr. Anne within one week of discharge from the hospital for [facility] management.    Diagnosis: Hairy cell leukemia  Assessment and Plan of Treatment: Follow up with Dr. Anne within one week of discharge from the hospital for furhter management.    Diagnosis: LV dysfunction  Assessment and Plan of Treatment: You had an abnormal echocardiogram.  Follow up with your cardiologist within 2 weeks for further monitoring.  You will need a repeat echocardiogram.

## 2021-07-05 NOTE — PROGRESS NOTE ADULT - ASSESSMENT
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 Presents with abdominal pain, nausea, and fever (tmax 102F at home), SOB, pleurtic CP x3-4 days. On triage to ED, febrile to 101.2 F, tachycardic 110-120s , O2 sats 96% on room air. Received CTZ, Azithromycin, Zosyn in ED. CT A/P w/o contrast negative for intra-abdominal pathology. Pulmonary called to consult for SOB. On exam, is visibly SOB with minimal exertion, O2 sats 92-93% on room air.     SOB w/ pleuritic chest pain  -On exam pt is visibly SOB at rest and with minimal exertion, O2 sats 92-93% on room air  -F/u CT chest non-contrast   -Do VQ scan and B/L LE duplex for r/o PE/DVT given hx of malignancy, fevers, and tachycardia  -CT A/P 6/28 read as small L pleural effusion w/ adjacent compressive LLL atelectasis - appears more sick clinically than what this shows (?infarct vs PNA) - f/u VQ scan and CT chest results  Fevers  -Tmax 101.2 F in ED with leukocytosis, afebrile now  -CT A/P notes no intraabdominal pathology  -RVP negative  -F/u CT chest  -On ABX   Abdominal pain  -CT A/P notes no intraabdominal pathology  -F/u abdominal US  Hairy cell leukemia  -in remission per patient, s/p tx with chemo (last 7 years ago)      6/30:  SOB w/ pleuritic chest pain: -F CT chest non-contrast -no official report yet?; but to me has small left sided effusion as well as pneumonia and lingular nodule/ opacity: await for official report: but arranged for tap on the left sided diagnostic as he is having lot of pain and is febrile:  VQ scan is intermediate probability: has perfusion defects: on empiric heparin: check dopplers and echo: stopped now in anticipation of tap on left side: will restart after tap   -CT A/P 6/28 read as small L pleural effusion w/ adjacent compressive LLL atelectasis - appears more sick clinically than what this shows (?infarct vs PNA) -as above  Fevers -Tmax 101.2 F in ED with leukocytosis, afebrile now -CT A/P notes no intraabdominal pathology: On ABX   Abdominal pain -CT A/P notes no intraabdominal pathology  Hairy cell leukemia  -in remission per patient, s/p tx with chemo (last 7 years ago)    dw aarin    7/1:    SOB w/ pleuritic chest pain: -s/p tap: complicated exudative parapneumonia effusion: no growth so far: he feels much better now awaiting cta : cont heparin till pe is ruled out on cta   Fevers resolved cont antibiotics id following  await for pleural fluid culture   Abdominal pain -CT A/P notes no intraabdominal pathology  Hairy cell leukemia  -in remission per patient, s/p tx with chemo (last 7 years ago)    dwpmd and np      7/2:    SOB w/ pleuritic chest pain: -s/p tap: complicated exudative parapneumonia effusion: no growth so far: he feels much better now cta done: can not fully exclude pe: await dopplers as well as echo to see the rigth side of the heart   Fevers resolved cont antibiotics id following  await for pleural fluid culture : [id number] is negative too    Abdominal pain -CT A/P notes no intraabdominal pathology  Hairy cell leukemia  -in remission per patient, s/p tx with chemo (last 7 years ago)    dwnp    7/3  SOB w/ pleuritic chest pain - resolving per patient  -CT 6/30 with increasing L pleural effusion  -s/p L Thoracentesis 6/30 with 400 ml output - complicated exudative parapneumonia  -Cytology negative, f/u fluid cultures (no growth so far)  -CTA chest 7/1 with improvement in L pleural effusion, cannot fully exlcude PE  -TTE noted  -F/u dopplers, will reevalaute need for heparin gtt after results  -Comfortable on room air, O2 sats 94%   Fevers   -resolved   -cont antibiotics per ID  -f/u pleural fluid culture   Abdominal pain - resolving   -CT A/P notes no intraabdominal pathology  Hairy cell leukemia  -in remission per patient, s/p tx with chemo (last 7 years ago)    7/4:    SOB w/ pleuritic chest pain - resolving per patient CT 6/30 with increasing L pleural effusion s/p L Thoracentesis 6/30 with 400 ml output - complicated exudative parapneumonia  -Cytology negative, f/u fluid cultures (no growth so far) ?: CTA chest 7/1 with improvement in L pleural effusion, cannot fully exlcude PE  dopplers,: NEGATIVE: CTA IS LIMITED BUT NO MAJOR PE: ECHO IS NOT VERY HELPFUL TO EVALUATE RT SIDE BUT HAS MOD LV DYSFUCNTION!? CARDS TO SEE-DC HEPARIN Comfortable on room air, O2 sats 94%   Fevers  :resolved  :cont antibiotics per ID  pleural fluid cultureS ARE NEAGTIVE SO FAR   Abdominal pain - RESOLVED: CT A/P notes no intraabdominal pathology  Hairy cell leukemia  -in remission per patient, s/p tx with chemo (last 7 years ago)  DW ACP    7/5:    SOB w/ pleuritic chest pain - resolving per patient CT 6/30 with increasing L pleural effusion s/p L Thoracentesis 6/30 with 400 ml output - complicated exudative parapneumonia: cta with no PE   -Cytology negative, f/u fluid cultures (no growth so far) ?: CTA chest 7/1 with improvement in L pleural effusion, cannot fully exclude PE- his vq scan with intermmediate probability:   dopplers,: NEGATIVE: CTA IS LIMITED BUT NO MAJOR PE: ECHO IS NOT VERY HELPFUL TO EVALUATE RT SIDE BUT HAS MOD LV DYSFUNCTION CARDS TO SEE-DC HEPARIN Comfortable on room air, O2 sats 94%   Fevers  :resolved  :cont antibiotics per ID  pleural fluid cultures ARE NEGATIVE SO FAR   Abdominal pain - RESOLVED: CT A/P notes no intraabdominal pathology  Hairy cell leukemia  -in remission per patient, s/p tx with chemo (last 7 years ago)  DW ACP: seems to be doing pretty good: check ambulatory sao2:

## 2021-07-05 NOTE — DISCHARGE NOTE PROVIDER - CARE PROVIDER_API CALL
Primary Care provider,   Phone: (   )    -  Fax: (   )    -  Established Patient  Follow Up Time: 1 week    José Luis Cardoso)  Critical Care Medicine; Internal Medicine; Pulmonary Disease  [address] [place] [address]  Phone: [phone]  Fax: [phone]  Follow Up Time: 1 week    Hubert Elaine)  Cardiovascular Disease; Internal Medicine  [facility] [facility], [address]  [place] [address]  Phone: [phone]  Fax: [phone]  Established Patient  Follow Up Time: 2 weeks   José Luis Cardoso)  Critical Care Medicine; Internal Medicine; Pulmonary Disease  [address] [place] [address]  Phone: [phone]  Fax: [phone]  Follow Up Time: 1 week    Hubert Elaine)  Cardiovascular Disease; Internal Medicine  [address], [address]  [place] [address]  Phone: [phone]  Fax: [phone]  Established Patient  Follow Up Time: 2 weeks    Troy Anne)  Hematology; Medical Oncology  [address], [address]  [place] [address]  Phone: [phone]  Fax: [phone]  Follow Up Time:     Brock Bowens)  Cardiology  [address] [facility], [address]  [place] [address]  Phone: [phone]  Fax: [phone]  Follow Up Time:     Primary Care provider,   Phone: (   )    -  Fax: (   )    -  Established Patient  Follow Up Time: 1 week    Stephon Rubio)  Internal Medicine  [address] [place] [address]  Phone: [phone]  Fax: [phone]  Follow Up Time:

## 2021-07-05 NOTE — PROGRESS NOTE ADULT - SUBJECTIVE AND OBJECTIVE BOX
CARDIOLOGY FOLLOW UP - Dr. Bowens  DATE OF SERVICE: 7/5/21     CC no cp or sob       REVIEW OF SYSTEMS:  CONSTITUTIONAL: No fever, weight loss, or fatigue  RESPIRATORY: No cough, wheezing, chills or hemoptysis; No Shortness of Breath  CARDIOVASCULAR: No chest pain, palpitations, passing out, dizziness, or leg swelling  GASTROINTESTINAL: No abdominal or epigastric pain. No nausea, vomiting, or hematemesis; No diarrhea or constipation. No melena or hematochezia.  VASCULAR: No edema     PHYSICAL EXAM:  T(C): 36.8 (07-05-21 @ 05:50), Max: 36.9 (07-04-21 @ 21:27)  HR: 73 (07-05-21 @ 05:50) (73 - 85)  BP: 100/55 (07-05-21 @ 05:50) (100/55 - 117/77)  RR: 16 (07-05-21 @ 05:50) (16 - 18)  SpO2: 99% (07-05-21 @ 05:50) (96% - 99%)  Wt(kg): --  I&O's Summary      Appearance: Normal	  Cardiovascular: Normal S1 S2,RRR, No JVD, No murmurs  Respiratory: Lungs clear to auscultation	  Gastrointestinal:  Soft, Non-tender, + BS	  Extremities: Normal range of motion, No clubbing, cyanosis or edema      Home Medications:  AndroGel Pump 20.25 mg/actuation (1.62%) transdermal gel: 2 pump(s) transdermal once a day (in the morning) (28 Jun 2021 18:38)  lisinopril 2.5 mg oral tablet: 1 tab(s) orally once a day (28 Jun 2021 18:38)  Vitamin B-12 100 mcg oral tablet: 1 tab(s) orally once a day (28 Jun 2021 18:38)      MEDICATIONS  (STANDING):  cyanocobalamin 1000 MICROGram(s) Oral daily  lisinopril 2.5 milliGRAM(s) Oral daily  piperacillin/tazobactam IVPB.. 3.375 Gram(s) IV Intermittent every 8 hours  testosterone 1% Gel 50 milliGRAM(s) Topical daily      TELEMETRY: 	    ECG:  	  RADIOLOGY:   DIAGNOSTIC TESTING:  [ ] Echocardiogram:  [ ]  Catheterization:  [ ] Stress Test:    OTHER: 	    LABS:	 	    Troponin T, High Sensitivity Result: 15 ng/L (06-29 @ 06:58)  Troponin T, High Sensitivity Result: 15 ng/L (06-28 @ 22:25)                          14.6   5.85  )-----------( 153      ( 05 Jul 2021 06:42 )             45.0     07-05    136  |  103  |  13  ----------------------------<  90  4.0   |  22  |  1.32<H>    Ca    9.1      05 Jul 2021 06:42  Phos  3.7     07-05  Mg     2.20     07-05      PTT - ( 05 Jul 2021 06:42 )  PTT:33.5 sec

## 2021-07-05 NOTE — DISCHARGE NOTE PROVIDER - HOSPITAL COURSE
70 year old male with HTN, Hairy cell leukemia in remission about 5 years ago presenting with abd pain and fevers. Patient noticed not feeling well starting Friday, then went to an outdoor picnic bbq on Sat and when he returned that night started to develop abd pain, no diarrhea though. He then started to develop fevers, and did not improve so came to the hospital.     CT A/P with unchanged splenomegaly consistent with Waldenstrom macroglobulinemia.    Elevated LFTs, RUQ US normal.  CXR with left lung patchy opacity, CT chest with left pleural effusion s/p thoracentesis 6/30/21. Cultures so far negative. Cytology with no malignant cells      SIRS (fevers, leukocytosis)  -?Parapneumonic effusion  -Blood cxs so far no growth  -Continue zosyn   -F/U thoracentesis cultures  -Appreciate pulm following  -Monitor fever curve  -Leukocytosis resolved  -If cultures remain negative, can transition to oral augmentin 875 mg PO BID for an additional 2 weeks    Abnormal perfusion study  -VQ scan w/mismatch, placed on heparin drip empirically pending workup  -CTA No PE to the lobar arteries. Unable to evaluate more distal branches  -dopplers negative for DVT  -Heparin drip discontinued    LV Dysfunction   -Cardiology consulted.   -Likely due to infection, cardiology recommends outpatient follow up upon discharge    LADI (acute kidney injury)  -resolved  -likely ATN from sepsis on admission.     Thrombocytopenia.    -at baseline  -no intervention required at this time.     Benign essential HTN.    -BP readings fine.   -resume Lisinopril LADI fully resolved 2.5 po qd with hold parameters.     Hairy cell leukemia.   -outpatient follow up with oncology  -in remission    On __________ patient medically cleared for discharge home by  _____________________     70 year old male with HTN, Hairy cell leukemia in remission about 5 years ago presenting with abd pain and fevers. Patient noticed not feeling well starting Friday, then went to an outdoor picnic bbq on Sat and when he returned that night started to develop abd pain, no diarrhea though. He then started to develop fevers, and did not improve so came to the hospital.     CT A/P with unchanged splenomegaly consistent with Waldenstrom macroglobulinemia.    Elevated LFTs, RUQ US normal.  CXR with left lung patchy opacity, CT chest with left pleural effusion s/p thoracentesis 6/30/21. Cultures so far negative. Cytology with no malignant cells    SIRS (fevers, leukocytosis)  -?Parapneumonic effusion  -Blood cxs so far no growth  -Continue zosyn   -F/U thoracentesis cultures  -Appreciate pulm following  -Monitor fever curve  -Leukocytosis resolved  -If cultures remain negative, can transition to oral augmentin 875 mg PO BID for an additional 2 weeks    Abnormal perfusion study  -VQ scan w/mismatch, placed on heparin drip empirically pending workup  -CTA No PE to the lobar arteries. Unable to evaluate more distal branches  -dopplers negative for DVT  -Heparin drip discontinued    LV Dysfunction   -Cardiology consulted.   -Likely due to infection, cardiology recommends outpatient follow up upon discharge    LADI (acute kidney injury)  -resolved  -likely ATN from sepsis on admission.     Thrombocytopenia.    -at baseline  -no intervention required at this time.     Benign essential HTN.    -BP readings fine.   -resume Lisinopril LADI fully resolved 2.5 po qd with hold parameters.     Hairy cell leukemia.   -outpatient follow up with oncology  -in remission    On 7/6/2021 patient medically cleared for discharge home by Dr. Huang

## 2021-07-05 NOTE — DISCHARGE NOTE PROVIDER - NSFTFHOMEHTHYNRD_GEN_ALL_CORE
S Plasty Text: Given the location and shape of the defect, and the orientation of relaxed skin tension lines, an S-plasty was deemed most appropriate for repair.  Using a sterile surgical marker, the appropriate outline of the S-plasty was drawn, incorporating the defect and placing the expected incisions within the relaxed skin tension lines where possible.  The area thus outlined was incised deep to adipose tissue with a #15 scalpel blade.  The skin margins were undermined to an appropriate distance in all directions utilizing iris scissors. The skin flaps were advanced over the defect.  The opposing margins were then approximated with interrupted buried subcutaneous sutures. Z Plasty Text: The lesion was extirpated to the level of the fat with a #15 scalpel blade.  Given the location of the defect, shape of the defect and the proximity to free margins a Z-plasty was deemed most appropriate for repair.  Using a sterile surgical marker, the appropriate transposition arms of the Z-plasty were drawn incorporating the defect and placing the expected incisions within the relaxed skin tension lines where possible.    The area thus outlined was incised deep to adipose tissue with a #15 scalpel blade.  The skin margins were undermined to an appropriate distance in all directions utilizing iris scissors.  The opposing transposition arms were then transposed into place in opposite direction and anchored with interrupted buried subcutaneous sutures. Detail Level: Detailed Island Pedicle Flap Text: The defect edges were debeveled with a #15 scalpel blade.  Given the location of the defect, shape of the defect and the proximity to free margins an island pedicle advancement flap was deemed most appropriate.  Using a sterile surgical marker, an appropriate advancement flap was drawn incorporating the defect, outlining the appropriate donor tissue and placing the expected incisions within the relaxed skin tension lines where possible.    The area thus outlined was incised deep to adipose tissue with a #15 scalpel blade.  The skin margins were undermined to an appropriate distance in all directions around the primary defect and laterally outward around the island pedicle utilizing iris scissors.  There was minimal undermining beneath the pedicle flap. Rhombic Flap Text: The defect edges were debeveled with a #15 scalpel blade.  Given the location of the defect and the proximity to free margins a rhombic flap was deemed most appropriate.  Using a sterile surgical marker, an appropriate rhombic flap was drawn incorporating the defect.    The area thus outlined was incised deep to adipose tissue with a #15 scalpel blade.  The skin margins were undermined to an appropriate distance in all directions utilizing iris scissors. Xenograft Text: The defect edges were debeveled with a #15 scalpel blade.  Given the location of the defect, shape of the defect and the proximity to free margins a xenograft was deemed most appropriate.  The graft was then trimmed to fit the size of the defect.  The graft was then placed in the primary defect and oriented appropriately. Alar Island Pedicle Flap Text: The defect edges were debeveled with a #15 scalpel blade.  Given the location of the defect, shape of the defect and the proximity to the alar rim an island pedicle advancement flap was deemed most appropriate.  Using a sterile surgical marker, an appropriate advancement flap was drawn incorporating the defect, outlining the appropriate donor tissue and placing the expected incisions within the nasal ala running parallel to the alar rim. The area thus outlined was incised with a #15 scalpel blade.  The skin margins were undermined minimally to an appropriate distance in all directions around the primary defect and laterally outward around the island pedicle utilizing iris scissors.  There was minimal undermining beneath the pedicle flap. Number Of Deep Sutures (Optional): 1 Complex Repair And Xenograft Text: The defect edges were debeveled with a #15 scalpel blade.  The primary defect was closed partially with a complex linear closure.  Given the location of the defect, shape of the defect and the proximity to free margins a xenograft was deemed most appropriate to repair the remaining defect.  The graft was trimmed to fit the size of the remaining defect.  The graft was then placed in the primary defect, oriented appropriately, and sutured into place. Advancement-Rotation Flap Text: The defect edges were debeveled with a #15 scalpel blade.  Given the location of the defect, shape of the defect and the proximity to free margins an advancement-rotation flap was deemed most appropriate.  Using a sterile surgical marker, an appropriate flap was drawn incorporating the defect and placing the expected incisions within the relaxed skin tension lines where possible. The area thus outlined was incised deep to adipose tissue with a #15 scalpel blade.  The skin margins were undermined to an appropriate distance in all directions utilizing iris scissors. Complex Repair And O-L Flap Text: The defect edges were debeveled with a #15 scalpel blade.  The primary defect was closed partially with a complex linear closure.  Given the location of the remaining defect, shape of the defect and the proximity to free margins an O-L flap was deemed most appropriate for complete closure of the defect.  Using a sterile surgical marker, an appropriate flap was drawn incorporating the defect and placing the expected incisions within the relaxed skin tension lines where possible.    The area thus outlined was incised deep to adipose tissue with a #15 scalpel blade.  The skin margins were undermined to an appropriate distance in all directions utilizing iris scissors. Show Referring Physician Variable: Yes Complex Repair And Ftsg Text: The defect edges were debeveled with a #15 scalpel blade.  The primary defect was closed partially with a complex linear closure.  Given the location of the defect, shape of the defect and the proximity to free margins a full thickness skin graft was deemed most appropriate to repair the remaining defect.  The graft was trimmed to fit the size of the remaining defect.  The graft was then placed in the primary defect, oriented appropriately, and sutured into place. Helical Rim Advancement Flap Text: The defect edges were debeveled with a #15 blade scalpel.  Given the location of the defect and the proximity to free margins (helical rim) a double helical rim advancement flap was deemed most appropriate.  Using a sterile surgical marker, the appropriate advancement flaps were drawn incorporating the defect and placing the expected incisions between the helical rim and antihelix where possible.  The area thus outlined was incised through and through with a #15 scalpel blade.  With a skin hook and iris scissors, the flaps were gently and sharply undermined and freed up. Crescentic Intermediate Repair Preamble Text (Leave Blank If You Do Not Want): Undermining was performed with blunt dissection. Complex Repair Preamble Text (Leave Blank If You Do Not Want): Extensive wide undermining was performed. Mercedes Flap Text: The defect edges were debeveled with a #15 scalpel blade.  Given the location of the defect, shape of the defect and the proximity to free margins a Mercedes flap was deemed most appropriate.  Using a sterile surgical marker, an appropriate advancement flap was drawn incorporating the defect and placing the expected incisions within the relaxed skin tension lines where possible. The area thus outlined was incised deep to adipose tissue with a #15 scalpel blade.  The skin margins were undermined to an appropriate distance in all directions utilizing iris scissors. Complex Repair And Rotation Flap Text: The defect edges were debeveled with a #15 scalpel blade.  The primary defect was closed partially with a complex linear closure.  Given the location of the remaining defect, shape of the defect and the proximity to free margins a rotation flap was deemed most appropriate for complete closure of the defect.  Using a sterile surgical marker, an appropriate advancement flap was drawn incorporating the defect and placing the expected incisions within the relaxed skin tension lines where possible.    The area thus outlined was incised deep to adipose tissue with a #15 scalpel blade.  The skin margins were undermined to an appropriate distance in all directions utilizing iris scissors. Yes Scalpel Size: 15 blade Partial Purse String (Simple) Text: Given the location of the defect and the characteristics of the surrounding skin a simple purse string closure was deemed most appropriate.  Undermining was performed circumferentially around the surgical defect.  A purse string suture was then placed and tightened. Wound tension of the circular defect prevented complete closure of the wound. Spiral Flap Text: The defect edges were debeveled with a #15 scalpel blade.  Given the location of the defect, shape of the defect and the proximity to free margins a spiral flap was deemed most appropriate.  Using a sterile surgical marker, an appropriate rotation flap was drawn incorporating the defect and placing the expected incisions within the relaxed skin tension lines where possible. The area thus outlined was incised deep to adipose tissue with a #15 scalpel blade.  The skin margins were undermined to an appropriate distance in all directions utilizing iris scissors. Skin Substitute Units (Will Override Primary Defect Units If Greater Than 0): 0 Advancement Flap (Double) Text: The defect edges were debeveled with a #15 scalpel blade.  Given the location of the defect and the proximity to free margins a double advancement flap was deemed most appropriate.  Using a sterile surgical marker, the appropriate advancement flaps were drawn incorporating the defect and placing the expected incisions within the relaxed skin tension lines where possible.    The area thus outlined was incised deep to adipose tissue with a #15 scalpel blade.  The skin margins were undermined to an appropriate distance in all directions utilizing iris scissors. Estimated Blood Loss (Cc): minimal Partial Purse String (Intermediate) Text: Given the location of the defect and the characteristics of the surrounding skin an intermediate purse string closure was deemed most appropriate.  Undermining was performed circumferentially around the surgical defect.  A purse string suture was then placed and tightened. Wound tension of the circular defect prevented complete closure of the wound. Excisional Biopsy Additional Text (Leave Blank If You Do Not Want): The margin was drawn around the clinically apparent lesion. An elliptical shape was then drawn on the skin incorporating the lesion and margins.  Incisions were then made along these lines to the appropriate tissue plane and the lesion was extirpated. O-Z Flap Text: The defect edges were debeveled with a #15 scalpel blade.  Given the location of the defect, shape of the defect and the proximity to free margins an O-Z flap was deemed most appropriate.  Using a sterile surgical marker, an appropriate transposition flap was drawn incorporating the defect and placing the expected incisions within the relaxed skin tension lines where possible. The area thus outlined was incised deep to adipose tissue with a #15 scalpel blade.  The skin margins were undermined to an appropriate distance in all directions utilizing iris scissors. Double O-Z Plasty Text: The defect edges were debeveled with a #15 scalpel blade.  Given the location of the defect, shape of the defect and the proximity to free margins a Double O-Z plasty (double transposition flap) was deemed most appropriate.  Using a sterile surgical marker, the appropriate transposition flaps were drawn incorporating the defect and placing the expected incisions within the relaxed skin tension lines where possible. The area thus outlined was incised deep to adipose tissue with a #15 scalpel blade.  The skin margins were undermined to an appropriate distance in all directions utilizing iris scissors.  Hemostasis was achieved with electrocautery.  The flaps were then transposed into place, one clockwise and the other counterclockwise, and anchored with interrupted buried subcutaneous sutures. V-Y Plasty Text: The defect edges were debeveled with a #15 scalpel blade.  Given the location of the defect, shape of the defect and the proximity to free margins an V-Y advancement flap was deemed most appropriate.  Using a sterile surgical marker, an appropriate advancement flap was drawn incorporating the defect and placing the expected incisions within the relaxed skin tension lines where possible.    The area thus outlined was incised deep to adipose tissue with a #15 scalpel blade.  The skin margins were undermined to an appropriate distance in all directions utilizing iris scissors. Cheek-To-Nose Interpolation Flap Text: A decision was made to reconstruct the defect utilizing an interpolation axial flap and a staged reconstruction.  A telfa template was made of the defect.  This telfa template was then used to outline the Cheek-To-Nose Interpolation flap.  The donor area for the pedicle flap was then injected with anesthesia.  The flap was excised through the skin and subcutaneous tissue down to the layer of the underlying musculature.  The interpolation flap was carefully excised within this deep plane to maintain its blood supply.  The edges of the donor site were undermined.   The donor site was closed in a primary fashion.  The pedicle was then rotated into position and sutured.  Once the tube was sutured into place, adequate blood supply was confirmed with blanching and refill.  The pedicle was then wrapped with xeroform gauze and dressed appropriately with a telfa and gauze bandage to ensure continued blood supply and protect the attached pedicle. Complex Repair And O-T Advancement Flap Text: The defect edges were debeveled with a #15 scalpel blade.  The primary defect was closed partially with a complex linear closure.  Given the location of the remaining defect, shape of the defect and the proximity to free margins an O-T advancement flap was deemed most appropriate for complete closure of the defect.  Using a sterile surgical marker, an appropriate advancement flap was drawn incorporating the defect and placing the expected incisions within the relaxed skin tension lines where possible.    The area thus outlined was incised deep to adipose tissue with a #15 scalpel blade.  The skin margins were undermined to an appropriate distance in all directions utilizing iris scissors. Banner Transposition Flap Text: The defect edges were debeveled with a #15 scalpel blade.  Given the location of the defect and the proximity to free margins a Banner transposition flap was deemed most appropriate.  Using a sterile surgical marker, an appropriate flap drawn around the defect. The area thus outlined was incised deep to adipose tissue with a #15 scalpel blade.  The skin margins were undermined to an appropriate distance in all directions utilizing iris scissors. Melolabial Transposition Flap Text: The defect edges were debeveled with a #15 scalpel blade.  Given the location of the defect and the proximity to free margins a melolabial flap was deemed most appropriate.  Using a sterile surgical marker, an appropriate melolabial transposition flap was drawn incorporating the defect.    The area thus outlined was incised deep to adipose tissue with a #15 scalpel blade.  The skin margins were undermined to an appropriate distance in all directions utilizing iris scissors. Composite Graft Text: The defect edges were debeveled with a #15 scalpel blade.  Given the location of the defect, shape of the defect, the proximity to free margins and the fact the defect was full thickness a composite graft was deemed most appropriate.  The defect was outline and then transferred to the donor site.  A full thickness graft was then excised from the donor site. The graft was then placed in the primary defect, oriented appropriately and then sutured into place.  The secondary defect was then repaired using a primary closure. Complex Repair And Single Advancement Flap Text: The defect edges were debeveled with a #15 scalpel blade.  The primary defect was closed partially with a complex linear closure.  Given the location of the remaining defect, shape of the defect and the proximity to free margins a single advancement flap was deemed most appropriate for complete closure of the defect.  Using a sterile surgical marker, an appropriate advancement flap was drawn incorporating the defect and placing the expected incisions within the relaxed skin tension lines where possible.    The area thus outlined was incised deep to adipose tissue with a #15 scalpel blade.  The skin margins were undermined to an appropriate distance in all directions utilizing iris scissors. Additional Anesthesia Volume In Cc: 6 Epidermal Closure: running Fusiform Excision Additional Text (Leave Blank If You Do Not Want): The margin was drawn around the clinically apparent lesion.  A fusiform shape was then drawn on the skin incorporating the lesion and margins.  Incisions were then made along these lines to the appropriate tissue plane and the lesion was extirpated. Complex Repair And Transposition Flap Text: The defect edges were debeveled with a #15 scalpel blade.  The primary defect was closed partially with a complex linear closure.  Given the location of the remaining defect, shape of the defect and the proximity to free margins a transposition flap was deemed most appropriate for complete closure of the defect.  Using a sterile surgical marker, an appropriate advancement flap was drawn incorporating the defect and placing the expected incisions within the relaxed skin tension lines where possible.    The area thus outlined was incised deep to adipose tissue with a #15 scalpel blade.  The skin margins were undermined to an appropriate distance in all directions utilizing iris scissors. Island Pedicle Flap With Canthal Suspension Text: The defect edges were debeveled with a #15 scalpel blade.  Given the location of the defect, shape of the defect and the proximity to free margins an island pedicle advancement flap was deemed most appropriate.  Using a sterile surgical marker, an appropriate advancement flap was drawn incorporating the defect, outlining the appropriate donor tissue and placing the expected incisions within the relaxed skin tension lines where possible. The area thus outlined was incised deep to adipose tissue with a #15 scalpel blade.  The skin margins were undermined to an appropriate distance in all directions around the primary defect and laterally outward around the island pedicle utilizing iris scissors.  There was minimal undermining beneath the pedicle flap. A suspension suture was placed in the canthal tendon to prevent tension and prevent ectropion. Mucosal Advancement Flap Text: Given the location of the defect, shape of the defect and the proximity to free margins a mucosal advancement flap was deemed most appropriate. Incisions were made with a 15 blade scalpel in the appropriate fashion along the cutaneous vermilion border and the mucosal lip. The remaining actinically damaged mucosal tissue was excised.  The mucosal advancement flap was then elevated to the gingival sulcus with care taken to preserve the neurovascular structures and advanced into the primary defect. Care was taken to ensure that precise realignment of the vermilion border was achieved. Cartilage Graft Text: The defect edges were debeveled with a #15 scalpel blade.  Given the location of the defect, shape of the defect, the fact the defect involved a full thickness cartilage defect a cartilage graft was deemed most appropriate.  An appropriate donor site was identified, cleansed, and anesthetized. The cartilage graft was then harvested and transferred to the recipient site, oriented appropriately and then sutured into place.  The secondary defect was then repaired using a primary closure. Cheek Interpolation Flap Text: A decision was made to reconstruct the defect utilizing an interpolation axial flap and a staged reconstruction.  A telfa template was made of the defect.  This telfa template was then used to outline the Cheek Interpolation flap.  The donor area for the pedicle flap was then injected with anesthesia.  The flap was excised through the skin and subcutaneous tissue down to the layer of the underlying musculature.  The interpolation flap was carefully excised within this deep plane to maintain its blood supply.  The edges of the donor site were undermined.   The donor site was closed in a primary fashion.  The pedicle was then rotated into position and sutured.  Once the tube was sutured into place, adequate blood supply was confirmed with blanching and refill.  The pedicle was then wrapped with xeroform gauze and dressed appropriately with a telfa and gauze bandage to ensure continued blood supply and protect the attached pedicle. Keystone Flap Text: The defect edges were debeveled with a #15 scalpel blade.  Given the location of the defect, shape of the defect a keystone flap was deemed most appropriate.  Using a sterile surgical marker, an appropriate keystone flap was drawn incorporating the defect, outlining the appropriate donor tissue and placing the expected incisions within the relaxed skin tension lines where possible. The area thus outlined was incised deep to adipose tissue with a #15 scalpel blade.  The skin margins were undermined to an appropriate distance in all directions around the primary defect and laterally outward around the flap utilizing iris scissors. Perilesional Excision Additional Text (Leave Blank If You Do Not Want): The margin was drawn around the clinically apparent lesion. Incisions were then made along these lines to the appropriate tissue plane and the lesion was extirpated. Size Of Margin In Cm: 0.2 Dermal Closure: running subcuticular Complex Repair And Double M Plasty Text: The defect edges were debeveled with a #15 scalpel blade.  The primary defect was closed partially with a complex linear closure.  Given the location of the remaining defect, shape of the defect and the proximity to free margins a double M plasty was deemed most appropriate for complete closure of the defect.  Using a sterile surgical marker, an appropriate advancement flap was drawn incorporating the defect and placing the expected incisions within the relaxed skin tension lines where possible.    The area thus outlined was incised deep to adipose tissue with a #15 scalpel blade.  The skin margins were undermined to an appropriate distance in all directions utilizing iris scissors. Complex Repair And Split-Thickness Skin Graft Text: The defect edges were debeveled with a #15 scalpel blade.  The primary defect was closed partially with a complex linear closure.  Given the location of the defect, shape of the defect and the proximity to free margins a split thickness skin graft was deemed most appropriate to repair the remaining defect.  The graft was trimmed to fit the size of the remaining defect.  The graft was then placed in the primary defect, oriented appropriately, and sutured into place. A-T Advancement Flap Text: The defect edges were debeveled with a #15 scalpel blade.  Given the location of the defect, shape of the defect and the proximity to free margins an A-T advancement flap was deemed most appropriate.  Using a sterile surgical marker, an appropriate advancement flap was drawn incorporating the defect and placing the expected incisions within the relaxed skin tension lines where possible.    The area thus outlined was incised deep to adipose tissue with a #15 scalpel blade.  The skin margins were undermined to an appropriate distance in all directions utilizing iris scissors. Number Of Epidermal Sutures (Optional): 4 Crescentic Advancement Flap Text: The defect edges were debeveled with a #15 scalpel blade.  Given the location of the defect and the proximity to free margins a crescentic advancement flap was deemed most appropriate.  Using a sterile surgical marker, the appropriate advancement flap was drawn incorporating the defect and placing the expected incisions within the relaxed skin tension lines where possible.    The area thus outlined was incised deep to adipose tissue with a #15 scalpel blade.  The skin margins were undermined to an appropriate distance in all directions utilizing iris scissors. Bill For Surgical Tray: no Island Pedicle Flap-Requiring Vessel Identification Text: The defect edges were debeveled with a #15 scalpel blade.  Given the location of the defect, shape of the defect and the proximity to free margins an island pedicle advancement flap was deemed most appropriate.  Using a sterile surgical marker, an appropriate advancement flap was drawn, based on the axial vessel mentioned above, incorporating the defect, outlining the appropriate donor tissue and placing the expected incisions within the relaxed skin tension lines where possible.    The area thus outlined was incised deep to adipose tissue with a #15 scalpel blade.  The skin margins were undermined to an appropriate distance in all directions around the primary defect and laterally outward around the island pedicle utilizing iris scissors.  There was minimal undermining beneath the pedicle flap. Mastoid Interpolation Flap Text: A decision was made to reconstruct the defect utilizing an interpolation axial flap and a staged reconstruction.  A telfa template was made of the defect.  This telfa template was then used to outline the mastoid interpolation flap.  The donor area for the pedicle flap was then injected with anesthesia.  The flap was excised through the skin and subcutaneous tissue down to the layer of the underlying musculature.  The pedicle flap was carefully excised within this deep plane to maintain its blood supply.  The edges of the donor site were undermined.   The donor site was closed in a primary fashion.  The pedicle was then rotated into position and sutured.  Once the tube was sutured into place, adequate blood supply was confirmed with blanching and refill.  The pedicle was then wrapped with xeroform gauze and dressed appropriately with a telfa and gauze bandage to ensure continued blood supply and protect the attached pedicle. Muscle Hinge Flap Text: The defect edges were debeveled with a #15 scalpel blade.  Given the size, depth and location of the defect and the proximity to free margins a muscle hinge flap was deemed most appropriate.  Using a sterile surgical marker, an appropriate hinge flap was drawn incorporating the defect. The area thus outlined was incised with a #15 scalpel blade.  The skin margins were undermined to an appropriate distance in all directions utilizing iris scissors. V-Y Flap Text: The defect edges were debeveled with a #15 scalpel blade.  Given the location of the defect, shape of the defect and the proximity to free margins a V-Y flap was deemed most appropriate.  Using a sterile surgical marker, an appropriate advancement flap was drawn incorporating the defect and placing the expected incisions within the relaxed skin tension lines where possible.    The area thus outlined was incised deep to adipose tissue with a #15 scalpel blade.  The skin margins were undermined to an appropriate distance in all directions utilizing iris scissors. Complex Repair And W Plasty Text: The defect edges were debeveled with a #15 scalpel blade.  The primary defect was closed partially with a complex linear closure.  Given the location of the remaining defect, shape of the defect and the proximity to free margins a W plasty was deemed most appropriate for complete closure of the defect.  Using a sterile surgical marker, an appropriate advancement flap was drawn incorporating the defect and placing the expected incisions within the relaxed skin tension lines where possible.    The area thus outlined was incised deep to adipose tissue with a #15 scalpel blade.  The skin margins were undermined to an appropriate distance in all directions utilizing iris scissors. Burow's Advancement Flap Text: The defect edges were debeveled with a #15 scalpel blade.  Given the location of the defect and the proximity to free margins a Burow's advancement flap was deemed most appropriate.  Using a sterile surgical marker, the appropriate advancement flap was drawn incorporating the defect and placing the expected incisions within the relaxed skin tension lines where possible.    The area thus outlined was incised deep to adipose tissue with a #15 scalpel blade.  The skin margins were undermined to an appropriate distance in all directions utilizing iris scissors. Complex Repair And Melolabial Flap Text: The defect edges were debeveled with a #15 scalpel blade.  The primary defect was closed partially with a complex linear closure.  Given the location of the remaining defect, shape of the defect and the proximity to free margins a melolabial flap was deemed most appropriate for complete closure of the defect.  Using a sterile surgical marker, an appropriate advancement flap was drawn incorporating the defect and placing the expected incisions within the relaxed skin tension lines where possible.    The area thus outlined was incised deep to adipose tissue with a #15 scalpel blade.  The skin margins were undermined to an appropriate distance in all directions utilizing iris scissors. Skin Substitute Text: The defect edges were debeveled with a #15 scalpel blade.  Given the location of the defect, shape of the defect and the proximity to free margins a skin substitute graft was deemed most appropriate.  The graft material was trimmed to fit the size of the defect. The graft was then placed in the primary defect and oriented appropriately. Graft Donor Site Bandage (Optional-Leave Blank If You Don't Want In Note): Steri-strips and a pressure bandage were applied to the donor site. Ftsg Text: The defect edges were debeveled with a #15 scalpel blade.  Given the location of the defect, shape of the defect and the proximity to free margins a full thickness skin graft was deemed most appropriate.  Using a sterile surgical marker, the primary defect shape was transferred to the donor site. The area thus outlined was incised deep to adipose tissue with a #15 scalpel blade.  The harvested graft was then trimmed of adipose tissue until only dermis and epidermis was left.  The skin margins of the secondary defect were undermined to an appropriate distance in all directions utilizing iris scissors.  The secondary defect was closed with interrupted buried subcutaneous sutures.  The skin edges were then re-apposed with running  sutures.  The skin graft was then placed in the primary defect and oriented appropriately. Lip Wedge Excision Repair Text: Given the location of the defect and the proximity to free margins a full thickness wedge repair was deemed most appropriate.  Using a sterile surgical marker, the appropriate repair was drawn incorporating the defect and placing the expected incisions perpendicular to the vermilion border.  The vermilion border was also meticulously outlined to ensure appropriate reapproximation during the repair.  The area thus outlined was incised through and through with a #15 scalpel blade.  The muscularis and dermis were reaproximated with deep sutures following hemostasis. Care was taken to realign the vermilion border before proceeding with the superficial closure.  Once the vermilion was realigned the superfical and mucosal closure was finished. Complex Repair And Dermal Autograft Text: The defect edges were debeveled with a #15 scalpel blade.  The primary defect was closed partially with a complex linear closure.  Given the location of the defect, shape of the defect and the proximity to free margins an dermal autograft was deemed most appropriate to repair the remaining defect.  The graft was trimmed to fit the size of the remaining defect.  The graft was then placed in the primary defect, oriented appropriately, and sutured into place. Wound Care: Petrolatum Post-Care Instructions: I reviewed with the patient in detail post-care instructions. Patient is not to engage in any heavy lifting, exercise, or swimming for the next 14 days. Should the patient develop any fevers, chills, bleeding, severe pain patient will contact the office immediately. Deep Sutures: 4-0 Vicryl Path Notes (To The Dermatopathologist): Please check margins. Ear Star Wedge Flap Text: The defect edges were debeveled with a #15 blade scalpel.  Given the location of the defect and the proximity to free margins (helical rim) an ear star wedge flap was deemed most appropriate.  Using a sterile surgical marker, the appropriate flap was drawn incorporating the defect and placing the expected incisions between the helical rim and antihelix where possible.  The area thus outlined was incised through and through with a #15 scalpel blade. Hemostasis: Electrocautery Complex Repair And Z Plasty Text: The defect edges were debeveled with a #15 scalpel blade.  The primary defect was closed partially with a complex linear closure.  Given the location of the remaining defect, shape of the defect and the proximity to free margins a Z plasty was deemed most appropriate for complete closure of the defect.  Using a sterile surgical marker, an appropriate advancement flap was drawn incorporating the defect and placing the expected incisions within the relaxed skin tension lines where possible.    The area thus outlined was incised deep to adipose tissue with a #15 scalpel blade.  The skin margins were undermined to an appropriate distance in all directions utilizing iris scissors. Billing Type: Third-Party Bill Dorsal Nasal Flap Text: The defect edges were debeveled with a #15 scalpel blade.  Given the location of the defect and the proximity to free margins a dorsal nasal flap was deemed most appropriate.  Using a sterile surgical marker, an appropriate dorsal nasal flap was drawn around the defect.    The area thus outlined was incised deep to adipose tissue with a #15 scalpel blade.  The skin margins were undermined to an appropriate distance in all directions utilizing iris scissors. Slit Excision Additional Text (Leave Blank If You Do Not Want): A linear line was drawn on the skin overlying the lesion. An incision was made slowly until the lesion was visualized.  Once visualized, the lesion was removed with blunt dissection. Consent was obtained from the patient. The risks and benefits to therapy were discussed in detail. Specifically, the risks of infection, scarring, bleeding, prolonged wound healing, incomplete removal, allergy to anesthesia, nerve injury and recurrence were addressed. Prior to the procedure, the treatment site was clearly identified and confirmed by the patient. All components of Universal Protocol/PAUSE Rule completed. Complex Repair And Bilobe Flap Text: The defect edges were debeveled with a #15 scalpel blade.  The primary defect was closed partially with a complex linear closure.  Given the location of the remaining defect, shape of the defect and the proximity to free margins a bilobe flap was deemed most appropriate for complete closure of the defect.  Using a sterile surgical marker, an appropriate advancement flap was drawn incorporating the defect and placing the expected incisions within the relaxed skin tension lines where possible.    The area thus outlined was incised deep to adipose tissue with a #15 scalpel blade.  The skin margins were undermined to an appropriate distance in all directions utilizing iris scissors. Complex Repair And A-T Advancement Flap Text: The defect edges were debeveled with a #15 scalpel blade.  The primary defect was closed partially with a complex linear closure.  Given the location of the remaining defect, shape of the defect and the proximity to free margins an A-T advancement flap was deemed most appropriate for complete closure of the defect.  Using a sterile surgical marker, an appropriate advancement flap was drawn incorporating the defect and placing the expected incisions within the relaxed skin tension lines where possible.    The area thus outlined was incised deep to adipose tissue with a #15 scalpel blade.  The skin margins were undermined to an appropriate distance in all directions utilizing iris scissors. Melolabial Interpolation Flap Text: A decision was made to reconstruct the defect utilizing an interpolation axial flap and a staged reconstruction.  A telfa template was made of the defect.  This telfa template was then used to outline the melolabial interpolation flap.  The donor area for the pedicle flap was then injected with anesthesia.  The flap was excised through the skin and subcutaneous tissue down to the layer of the underlying musculature.  The pedicle flap was carefully excised within this deep plane to maintain its blood supply.  The edges of the donor site were undermined.   The donor site was closed in a primary fashion.  The pedicle was then rotated into position and sutured.  Once the tube was sutured into place, adequate blood supply was confirmed with blanching and refill.  The pedicle was then wrapped with xeroform gauze and dressed appropriately with a telfa and gauze bandage to ensure continued blood supply and protect the attached pedicle. Pre-Excision Curettage Text (Leave Blank If You Do Not Want): Prior to drawing the surgical margin the visible lesion was removed with electrodesiccation and curettage to clearly define the lesion size. Double Island Pedicle Flap Text: The defect edges were debeveled with a #15 scalpel blade.  Given the location of the defect, shape of the defect and the proximity to free margins a double island pedicle advancement flap was deemed most appropriate.  Using a sterile surgical marker, an appropriate advancement flap was drawn incorporating the defect, outlining the appropriate donor tissue and placing the expected incisions within the relaxed skin tension lines where possible.    The area thus outlined was incised deep to adipose tissue with a #15 scalpel blade.  The skin margins were undermined to an appropriate distance in all directions around the primary defect and laterally outward around the island pedicle utilizing iris scissors.  There was minimal undermining beneath the pedicle flap. Trilobed Flap Text: The defect edges were debeveled with a #15 scalpel blade.  Given the location of the defect and the proximity to free margins a trilobed flap was deemed most appropriate.  Using a sterile surgical marker, an appropriate trilobed flap drawn around the defect.    The area thus outlined was incised deep to adipose tissue with a #15 scalpel blade.  The skin margins were undermined to an appropriate distance in all directions utilizing iris scissors. O-T Plasty Text: The defect edges were debeveled with a #15 scalpel blade.  Given the location of the defect, shape of the defect and the proximity to free margins an O-T plasty was deemed most appropriate.  Using a sterile surgical marker, an appropriate O-T plasty was drawn incorporating the defect and placing the expected incisions within the relaxed skin tension lines where possible.    The area thus outlined was incised deep to adipose tissue with a #15 scalpel blade.  The skin margins were undermined to an appropriate distance in all directions utilizing iris scissors. Suture Removal: 14 days Complex Repair And Modified Advancement Flap Text: The defect edges were debeveled with a #15 scalpel blade.  The primary defect was closed partially with a complex linear closure.  Given the location of the remaining defect, shape of the defect and the proximity to free margins a modified advancement flap was deemed most appropriate for complete closure of the defect.  Using a sterile surgical marker, an appropriate advancement flap was drawn incorporating the defect and placing the expected incisions within the relaxed skin tension lines where possible.    The area thus outlined was incised deep to adipose tissue with a #15 scalpel blade.  The skin margins were undermined to an appropriate distance in all directions utilizing iris scissors. H Plasty Text: Given the location of the defect, shape of the defect and the proximity to free margins a H-plasty was deemed most appropriate for repair.  Using a sterile surgical marker, the appropriate advancement arms of the H-plasty were drawn incorporating the defect and placing the expected incisions within the relaxed skin tension lines where possible. The area thus outlined was incised deep to adipose tissue with a #15 scalpel blade. The skin margins were undermined to an appropriate distance in all directions utilizing iris scissors.  The opposing advancement arms were then advanced into place in opposite direction and anchored with interrupted buried subcutaneous sutures. Elliptical Excision Additional Text (Leave Blank If You Do Not Want): The margin was drawn around the clinically apparent lesion.  An elliptical shape was then drawn on the skin incorporating the lesion and margins.  Incisions were then made along these lines to the appropriate tissue plane and the lesion was extirpated. Home Suture Removal Text: Patient was provided a home suture removal kit and will remove their sutures at home.  If they have any questions or difficulties they will call the office. Curvilinear Excision Additional Text (Leave Blank If You Do Not Want): The margin was drawn around the clinically apparent lesion.  A curvilinear shape was then drawn on the skin incorporating the lesion and margins.  Incisions were then made along these lines to the appropriate tissue plane and the lesion was extirpated. Modified Advancement Flap Text: The defect edges were debeveled with a #15 scalpel blade.  Given the location of the defect, shape of the defect and the proximity to free margins a modified advancement flap was deemed most appropriate.  Using a sterile surgical marker, an appropriate advancement flap was drawn incorporating the defect and placing the expected incisions within the relaxed skin tension lines where possible.    The area thus outlined was incised deep to adipose tissue with a #15 scalpel blade.  The skin margins were undermined to an appropriate distance in all directions utilizing iris scissors. Complex Repair And Dorsal Nasal Flap Text: The defect edges were debeveled with a #15 scalpel blade.  The primary defect was closed partially with a complex linear closure.  Given the location of the remaining defect, shape of the defect and the proximity to free margins a dorsal nasal flap was deemed most appropriate for complete closure of the defect.  Using a sterile surgical marker, an appropriate flap was drawn incorporating the defect and placing the expected incisions within the relaxed skin tension lines where possible.    The area thus outlined was incised deep to adipose tissue with a #15 scalpel blade.  The skin margins were undermined to an appropriate distance in all directions utilizing iris scissors. Dermal Autograft Text: The defect edges were debeveled with a #15 scalpel blade.  Given the location of the defect, shape of the defect and the proximity to free margins a dermal autograft was deemed most appropriate.  Using a sterile surgical marker, the primary defect shape was transferred to the donor site. The area thus outlined was incised deep to adipose tissue with a #15 scalpel blade.  The harvested graft was then trimmed of adipose and epidermal tissue until only dermis was left.  The skin graft was then placed in the primary defect and oriented appropriately. Complex Repair And V-Y Plasty Text: The defect edges were debeveled with a #15 scalpel blade.  The primary defect was closed partially with a complex linear closure.  Given the location of the remaining defect, shape of the defect and the proximity to free margins a V-Y plasty was deemed most appropriate for complete closure of the defect.  Using a sterile surgical marker, an appropriate advancement flap was drawn incorporating the defect and placing the expected incisions within the relaxed skin tension lines where possible.    The area thus outlined was incised deep to adipose tissue with a #15 scalpel blade.  The skin margins were undermined to an appropriate distance in all directions utilizing iris scissors. Complex Repair And Double Advancement Flap Text: The defect edges were debeveled with a #15 scalpel blade.  The primary defect was closed partially with a complex linear closure.  Given the location of the remaining defect, shape of the defect and the proximity to free margins a double advancement flap was deemed most appropriate for complete closure of the defect.  Using a sterile surgical marker, an appropriate advancement flap was drawn incorporating the defect and placing the expected incisions within the relaxed skin tension lines where possible.    The area thus outlined was incised deep to adipose tissue with a #15 scalpel blade.  The skin margins were undermined to an appropriate distance in all directions utilizing iris scissors. Dressing: dry sterile dressing Complex Repair And Rhombic Flap Text: The defect edges were debeveled with a #15 scalpel blade.  The primary defect was closed partially with a complex linear closure.  Given the location of the remaining defect, shape of the defect and the proximity to free margins a rhombic flap was deemed most appropriate for complete closure of the defect.  Using a sterile surgical marker, an appropriate advancement flap was drawn incorporating the defect and placing the expected incisions within the relaxed skin tension lines where possible.    The area thus outlined was incised deep to adipose tissue with a #15 scalpel blade.  The skin margins were undermined to an appropriate distance in all directions utilizing iris scissors. Complex Repair And Tissue Cultured Epidermal Autograft Text: The defect edges were debeveled with a #15 scalpel blade.  The primary defect was closed partially with a complex linear closure.  Given the location of the defect, shape of the defect and the proximity to free margins an tissue cultured epidermal autograft was deemed most appropriate to repair the remaining defect.  The graft was trimmed to fit the size of the remaining defect.  The graft was then placed in the primary defect, oriented appropriately, and sutured into place. Advancement Flap (Single) Text: The defect edges were debeveled with a #15 scalpel blade.  Given the location of the defect and the proximity to free margins a single advancement flap was deemed most appropriate.  Using a sterile surgical marker, an appropriate advancement flap was drawn incorporating the defect and placing the expected incisions within the relaxed skin tension lines where possible.    The area thus outlined was incised deep to adipose tissue with a #15 scalpel blade.  The skin margins were undermined to an appropriate distance in all directions utilizing iris scissors. No Repair - Repaired With Adjacent Surgical Defect Text (Leave Blank If You Do Not Want): After the excision the defect was repaired concurrently with another surgical defect which was in close approximation. Complex Repair And Skin Substitute Graft Text: The defect edges were debeveled with a #15 scalpel blade.  The primary defect was closed partially with a complex linear closure.  Given the location of the remaining defect, shape of the defect and the proximity to free margins a skin substitute graft was deemed most appropriate to repair the remaining defect.  The graft was trimmed to fit the size of the remaining defect.  The graft was then placed in the primary defect, oriented appropriately, and sutured into place. Size Of Lesion In Cm: 0.5 Hatchet Flap Text: The defect edges were debeveled with a #15 scalpel blade.  Given the location of the defect, shape of the defect and the proximity to free margins a hatchet flap was deemed most appropriate.  Using a sterile surgical marker, an appropriate hatchet flap was drawn incorporating the defect and placing the expected incisions within the relaxed skin tension lines where possible.    The area thus outlined was incised deep to adipose tissue with a #15 scalpel blade.  The skin margins were undermined to an appropriate distance in all directions utilizing iris scissors. Repair Performed By Another Provider Text (Leave Blank If You Do Not Want): After the tissue was excised the defect was repaired by another provider. O-T Advancement Flap Text: The defect edges were debeveled with a #15 scalpel blade.  Given the location of the defect, shape of the defect and the proximity to free margins an O-T advancement flap was deemed most appropriate.  Using a sterile surgical marker, an appropriate advancement flap was drawn incorporating the defect and placing the expected incisions within the relaxed skin tension lines where possible.    The area thus outlined was incised deep to adipose tissue with a #15 scalpel blade.  The skin margins were undermined to an appropriate distance in all directions utilizing iris scissors. Bi-Rhombic Flap Text: The defect edges were debeveled with a #15 scalpel blade.  Given the location of the defect and the proximity to free margins a bi-rhombic flap was deemed most appropriate.  Using a sterile surgical marker, an appropriate rhombic flap was drawn incorporating the defect. The area thus outlined was incised deep to adipose tissue with a #15 scalpel blade.  The skin margins were undermined to an appropriate distance in all directions utilizing iris scissors. Complex Repair And M Plasty Text: The defect edges were debeveled with a #15 scalpel blade.  The primary defect was closed partially with a complex linear closure.  Given the location of the remaining defect, shape of the defect and the proximity to free margins an M plasty was deemed most appropriate for complete closure of the defect.  Using a sterile surgical marker, an appropriate advancement flap was drawn incorporating the defect and placing the expected incisions within the relaxed skin tension lines where possible.    The area thus outlined was incised deep to adipose tissue with a #15 scalpel blade.  The skin margins were undermined to an appropriate distance in all directions utilizing iris scissors. Rhomboid Transposition Flap Text: The defect edges were debeveled with a #15 scalpel blade.  Given the location of the defect and the proximity to free margins a rhomboid transposition flap was deemed most appropriate.  Using a sterile surgical marker, an appropriate rhomboid flap was drawn incorporating the defect.    The area thus outlined was incised deep to adipose tissue with a #15 scalpel blade.  The skin margins were undermined to an appropriate distance in all directions utilizing iris scissors. Anesthesia Type: 1% lidocaine with epinephrine Repair Type: Intermediate W Plasty Text: The lesion was extirpated to the level of the fat with a #15 scalpel blade.  Given the location of the defect, shape of the defect and the proximity to free margins a W-plasty was deemed most appropriate for repair.  Using a sterile surgical marker, the appropriate transposition arms of the W-plasty were drawn incorporating the defect and placing the expected incisions within the relaxed skin tension lines where possible.    The area thus outlined was incised deep to adipose tissue with a #15 scalpel blade.  The skin margins were undermined to an appropriate distance in all directions utilizing iris scissors.  The opposing transposition arms were then transposed into place in opposite direction and anchored with interrupted buried subcutaneous sutures. Bilobed Transposition Flap Text: The defect edges were debeveled with a #15 scalpel blade.  Given the location of the defect and the proximity to free margins a bilobed transposition flap was deemed most appropriate.  Using a sterile surgical marker, an appropriate bilobe flap drawn around the defect.    The area thus outlined was incised deep to adipose tissue with a #15 scalpel blade.  The skin margins were undermined to an appropriate distance in all directions utilizing iris scissors. Excision Method: Elliptical O-L Flap Text: The defect edges were debeveled with a #15 scalpel blade.  Given the location of the defect, shape of the defect and the proximity to free margins an O-L flap was deemed most appropriate.  Using a sterile surgical marker, an appropriate advancement flap was drawn incorporating the defect and placing the expected incisions within the relaxed skin tension lines where possible.    The area thus outlined was incised deep to adipose tissue with a #15 scalpel blade.  The skin margins were undermined to an appropriate distance in all directions utilizing iris scissors. Epidermal Closure Graft Donor Site (Optional): simple interrupted Rotation Flap Text: The defect edges were debeveled with a #15 scalpel blade.  Given the location of the defect, shape of the defect and the proximity to free margins a rotation flap was deemed most appropriate.  Using a sterile surgical marker, an appropriate rotation flap was drawn incorporating the defect and placing the expected incisions within the relaxed skin tension lines where possible.    The area thus outlined was incised deep to adipose tissue with a #15 scalpel blade.  The skin margins were undermined to an appropriate distance in all directions utilizing iris scissors. Purse String (Intermediate) Text: Given the location of the defect and the characteristics of the surrounding skin a purse string intermediate closure was deemed most appropriate.  Undermining was performed circumfirentially around the surgical defect.  A purse string suture was then placed and tightened. Bilobed Flap Text: The defect edges were debeveled with a #15 scalpel blade.  Given the location of the defect and the proximity to free margins a bilobe flap was deemed most appropriate.  Using a sterile surgical marker, an appropriate bilobe flap drawn around the defect.    The area thus outlined was incised deep to adipose tissue with a #15 scalpel blade.  The skin margins were undermined to an appropriate distance in all directions utilizing iris scissors. Transposition Flap Text: The defect edges were debeveled with a #15 scalpel blade.  Given the location of the defect and the proximity to free margins a transposition flap was deemed most appropriate.  Using a sterile surgical marker, an appropriate transposition flap was drawn incorporating the defect.    The area thus outlined was incised deep to adipose tissue with a #15 scalpel blade.  The skin margins were undermined to an appropriate distance in all directions utilizing iris scissors. Complex Repair And Epidermal Autograft Text: The defect edges were debeveled with a #15 scalpel blade.  The primary defect was closed partially with a complex linear closure.  Given the location of the defect, shape of the defect and the proximity to free margins an epidermal autograft was deemed most appropriate to repair the remaining defect.  The graft was trimmed to fit the size of the remaining defect.  The graft was then placed in the primary defect, oriented appropriately, and sutured into place. Split-Thickness Skin Graft Text: The defect edges were debeveled with a #15 scalpel blade.  Given the location of the defect, shape of the defect and the proximity to free margins a split thickness skin graft was deemed most appropriate.  Using a sterile surgical marker, the primary defect shape was transferred to the donor site. The split thickness graft was then harvested.  The skin graft was then placed in the primary defect and oriented appropriately. O-Z Plasty Text: The defect edges were debeveled with a #15 scalpel blade.  Given the location of the defect, shape of the defect and the proximity to free margins an O-Z plasty (double transposition flap) was deemed most appropriate.  Using a sterile surgical marker, the appropriate transposition flaps were drawn incorporating the defect and placing the expected incisions within the relaxed skin tension lines where possible.    The area thus outlined was incised deep to adipose tissue with a #15 scalpel blade.  The skin margins were undermined to an appropriate distance in all directions utilizing iris scissors.  Hemostasis was achieved with electrocautery.  The flaps were then transposed into place, one clockwise and the other counterclockwise, and anchored with interrupted buried subcutaneous sutures. Paramedian Forehead Flap Text: A decision was made to reconstruct the defect utilizing an interpolation axial flap and a staged reconstruction.  A telfa template was made of the defect.  This telfa template was then used to outline the paramedian forehead pedicle flap.  The donor area for the pedicle flap was then injected with anesthesia.  The flap was excised through the skin and subcutaneous tissue down to the layer of the underlying musculature.  The pedicle flap was carefully excised within this deep plane to maintain its blood supply.  The edges of the donor site were undermined.   The donor site was closed in a primary fashion.  The pedicle was then rotated into position and sutured.  Once the tube was sutured into place, adequate blood supply was confirmed with blanching and refill.  The pedicle was then wrapped with xeroform gauze and dressed appropriately with a telfa and gauze bandage to ensure continued blood supply and protect the attached pedicle. Chonodrocutaneous Helical Advancement Flap Text: The defect edges were debeveled with a #15 scalpel blade.  Given the location of the defect and the proximity to free margins a chondrocutaneous helical advancement flap was deemed most appropriate.  Using a sterile surgical marker, the appropriate advancement flap was drawn incorporating the defect and placing the expected incisions within the relaxed skin tension lines where possible.    The area thus outlined was incised deep to adipose tissue with a #15 scalpel blade.  The skin margins were undermined to an appropriate distance in all directions utilizing iris scissors. Epidermal Sutures: 3-0 Ethilon Positioning (Leave Blank If You Do Not Want): The patient was placed in a comfortable position exposing the surgical site. Epidermal Autograft Text: The defect edges were debeveled with a #15 scalpel blade.  Given the location of the defect, shape of the defect and the proximity to free margins an epidermal autograft was deemed most appropriate.  Using a sterile surgical marker, the primary defect shape was transferred to the donor site. The epidermal graft was then harvested.  The skin graft was then placed in the primary defect and oriented appropriately. Information: Selecting Yes will display possible errors in your note based on the variables you have selected. This validation is only offered as a suggestion for you. PLEASE NOTE THAT THE VALIDATION TEXT WILL BE REMOVED WHEN YOU FINALIZE YOUR NOTE. IF YOU WANT TO FAX A PRELIMINARY NOTE YOU WILL NEED TO TOGGLE THIS TO 'NO' IF YOU DO NOT WANT IT IN YOUR FAXED NOTE. Double O-Z Flap Text: The defect edges were debeveled with a #15 scalpel blade.  Given the location of the defect, shape of the defect and the proximity to free margins a Double O-Z flap was deemed most appropriate.  Using a sterile surgical marker, an appropriate transposition flap was drawn incorporating the defect and placing the expected incisions within the relaxed skin tension lines where possible. The area thus outlined was incised deep to adipose tissue with a #15 scalpel blade.  The skin margins were undermined to an appropriate distance in all directions utilizing iris scissors. Excision Depth: adipose tissue Posterior Auricular Interpolation Flap Text: A decision was made to reconstruct the defect utilizing an interpolation axial flap and a staged reconstruction.  A telfa template was made of the defect.  This telfa template was then used to outline the posterior auricular interpolation flap.  The donor area for the pedicle flap was then injected with anesthesia.  The flap was excised through the skin and subcutaneous tissue down to the layer of the underlying musculature.  The pedicle flap was carefully excised within this deep plane to maintain its blood supply.  The edges of the donor site were undermined.   The donor site was closed in a primary fashion.  The pedicle was then rotated into position and sutured.  Once the tube was sutured into place, adequate blood supply was confirmed with blanching and refill.  The pedicle was then wrapped with xeroform gauze and dressed appropriately with a telfa and gauze bandage to ensure continued blood supply and protect the attached pedicle. Star Wedge Flap Text: The defect edges were debeveled with a #15 scalpel blade.  Given the location of the defect, shape of the defect and the proximity to free margins a star wedge flap was deemed most appropriate.  Using a sterile surgical marker, an appropriate rotation flap was drawn incorporating the defect and placing the expected incisions within the relaxed skin tension lines where possible. The area thus outlined was incised deep to adipose tissue with a #15 scalpel blade.  The skin margins were undermined to an appropriate distance in all directions utilizing iris scissors. Tissue Cultured Epidermal Autograft Text: The defect edges were debeveled with a #15 scalpel blade.  Given the location of the defect, shape of the defect and the proximity to free margins a tissue cultured epidermal autograft was deemed most appropriate.  The graft was then trimmed to fit the size of the defect.  The graft was then placed in the primary defect and oriented appropriately. Purse String (Simple) Text: Given the location of the defect and the characteristics of the surrounding skin a purse string simple closure was deemed most appropriate.  Undermining was performed circumferentially around the surgical defect.  A purse string suture was then placed and tightened. Bilateral Helical Rim Advancement Flap Text: The defect edges were debeveled with a #15 blade scalpel.  Given the location of the defect and the proximity to free margins (helical rim) a bilateral helical rim advancement flap was deemed most appropriate.  Using a sterile surgical marker, the appropriate advancement flaps were drawn incorporating the defect and placing the expected incisions between the helical rim and antihelix where possible.  The area thus outlined was incised through and through with a #15 scalpel blade.  With a skin hook and iris scissors, the flaps were gently and sharply undermined and freed up. Interpolation Flap Text: A decision was made to reconstruct the defect utilizing an interpolation axial flap and a staged reconstruction.  A telfa template was made of the defect.  This telfa template was then used to outline the interpolation flap.  The donor area for the pedicle flap was then injected with anesthesia.  The flap was excised through the skin and subcutaneous tissue down to the layer of the underlying musculature.  The interpolation flap was carefully excised within this deep plane to maintain its blood supply.  The edges of the donor site were undermined.   The donor site was closed in a primary fashion.  The pedicle was then rotated into position and sutured.  Once the tube was sutured into place, adequate blood supply was confirmed with blanching and refill.  The pedicle was then wrapped with xeroform gauze and dressed appropriately with a telfa and gauze bandage to ensure continued blood supply and protect the attached pedicle. Saucerization Excision Additional Text (Leave Blank If You Do Not Want): The margin was drawn around the clinically apparent lesion.  Incisions were then made along these lines, in a tangential fashion, to the appropriate tissue plane and the lesion was extirpated.

## 2021-07-05 NOTE — DISCHARGE NOTE PROVIDER - NSDCCAREPROVSEEN_GEN_ALL_CORE_FT
Brock Bowens, Edmund Bustamante, Tigre Cardoso, José Luis Restrepo, Chayito Huang, Abdulkadir CEE Brock Bowens, Edmund Bustamante, Tigre Cardoso, José Luis Huang, Abdulkadir CEE

## 2021-07-06 LAB
ALBUMIN SERPL ELPH-MCNC: 3.1 G/DL — LOW (ref 3.3–5)
ALP SERPL-CCNC: 92 U/L — SIGNIFICANT CHANGE UP (ref 40–120)
ALT FLD-CCNC: 239 U/L — HIGH (ref 4–41)
ANION GAP SERPL CALC-SCNC: 12 MMOL/L — SIGNIFICANT CHANGE UP (ref 7–14)
AST SERPL-CCNC: 157 U/L — HIGH (ref 4–40)
BILIRUB DIRECT SERPL-MCNC: <0.2 MG/DL — SIGNIFICANT CHANGE UP (ref 0–0.2)
BILIRUB INDIRECT FLD-MCNC: >0.4 MG/DL — SIGNIFICANT CHANGE UP (ref 0–1)
BILIRUB SERPL-MCNC: 0.6 MG/DL — SIGNIFICANT CHANGE UP (ref 0.2–1.2)
BUN SERPL-MCNC: 13 MG/DL — SIGNIFICANT CHANGE UP (ref 7–23)
CALCIUM SERPL-MCNC: 9.3 MG/DL — SIGNIFICANT CHANGE UP (ref 8.4–10.5)
CHLORIDE SERPL-SCNC: 104 MMOL/L — SIGNIFICANT CHANGE UP (ref 98–107)
CO2 SERPL-SCNC: 21 MMOL/L — LOW (ref 22–31)
CREAT SERPL-MCNC: 1.28 MG/DL — SIGNIFICANT CHANGE UP (ref 0.5–1.3)
GLUCOSE SERPL-MCNC: 88 MG/DL — SIGNIFICANT CHANGE UP (ref 70–99)
HCT VFR BLD CALC: 45.7 % — SIGNIFICANT CHANGE UP (ref 39–50)
HGB BLD-MCNC: 15 G/DL — SIGNIFICANT CHANGE UP (ref 13–17)
MAGNESIUM SERPL-MCNC: 2.4 MG/DL — SIGNIFICANT CHANGE UP (ref 1.6–2.6)
MCHC RBC-ENTMCNC: 25.6 PG — LOW (ref 27–34)
MCHC RBC-ENTMCNC: 32.8 GM/DL — SIGNIFICANT CHANGE UP (ref 32–36)
MCV RBC AUTO: 77.9 FL — LOW (ref 80–100)
NRBC # BLD: 0 /100 WBCS — SIGNIFICANT CHANGE UP
NRBC # FLD: 0 K/UL — SIGNIFICANT CHANGE UP
PHOSPHATE SERPL-MCNC: 3.8 MG/DL — SIGNIFICANT CHANGE UP (ref 2.5–4.5)
PLATELET # BLD AUTO: 181 K/UL — SIGNIFICANT CHANGE UP (ref 150–400)
POTASSIUM SERPL-MCNC: 4.4 MMOL/L — SIGNIFICANT CHANGE UP (ref 3.5–5.3)
POTASSIUM SERPL-SCNC: 4.4 MMOL/L — SIGNIFICANT CHANGE UP (ref 3.5–5.3)
PROT SERPL-MCNC: 6.9 G/DL — SIGNIFICANT CHANGE UP (ref 6–8.3)
RBC # BLD: 5.87 M/UL — HIGH (ref 4.2–5.8)
RBC # FLD: 15 % — HIGH (ref 10.3–14.5)
SODIUM SERPL-SCNC: 137 MMOL/L — SIGNIFICANT CHANGE UP (ref 135–145)
WBC # BLD: 6.51 K/UL — SIGNIFICANT CHANGE UP (ref 3.8–10.5)
WBC # FLD AUTO: 6.51 K/UL — SIGNIFICANT CHANGE UP (ref 3.8–10.5)

## 2021-07-06 PROCEDURE — 76705 ECHO EXAM OF ABDOMEN: CPT | Mod: 26

## 2021-07-06 PROCEDURE — 99232 SBSQ HOSP IP/OBS MODERATE 35: CPT

## 2021-07-06 RX ORDER — PIPERACILLIN AND TAZOBACTAM 4; .5 G/20ML; G/20ML
3.38 INJECTION, POWDER, LYOPHILIZED, FOR SOLUTION INTRAVENOUS EVERY 8 HOURS
Refills: 0 | Status: DISCONTINUED | OUTPATIENT
Start: 2021-07-06 | End: 2021-07-06

## 2021-07-06 RX ADMIN — PREGABALIN 1000 MICROGRAM(S): 225 CAPSULE ORAL at 12:32

## 2021-07-06 RX ADMIN — Medication 50 MILLIGRAM(S): at 12:31

## 2021-07-06 RX ADMIN — Medication 1 TABLET(S): at 17:18

## 2021-07-06 RX ADMIN — ENOXAPARIN SODIUM 40 MILLIGRAM(S): 100 INJECTION SUBCUTANEOUS at 12:32

## 2021-07-06 NOTE — PROGRESS NOTE ADULT - SUBJECTIVE AND OBJECTIVE BOX
CC: Patient is a 70y old  Male who presents with a chief complaint of Fever, abdominal pain (05 Jul 2021 18:31)    ID following for fever    Interval History/ROS: Patient feels well. No fever. On room air.     Rest of ROS negative.    Allergies  No Known Drug Allergies  shellfish (Unknown)    ANTIMICROBIALS:  piperacillin/tazobactam IVPB.. 3.375 every 8 hours    OTHER MEDS:  acetaminophen   Tablet .. 650 milliGRAM(s) Oral every 4 hours PRN  cyanocobalamin 1000 MICROGram(s) Oral daily  enoxaparin Injectable 40 milliGRAM(s) SubCutaneous daily  lisinopril 2.5 milliGRAM(s) Oral daily  morphine  - Injectable 2 milliGRAM(s) IV Push every 4 hours PRN  testosterone 1% Gel 50 milliGRAM(s) Topical daily    PE:    Vital Signs Last 24 Hrs  T(C): 36.8 (06 Jul 2021 05:32), Max: 36.9 (05 Jul 2021 21:56)  T(F): 98.3 (06 Jul 2021 05:32), Max: 98.5 (05 Jul 2021 21:56)  HR: 87 (06 Jul 2021 05:32) (74 - 87)  BP: 101/62 (06 Jul 2021 05:32) (101/62 - 110/78)  BP(mean): --  RR: 16 (06 Jul 2021 05:32) (16 - 17)  SpO2: 97% (06 Jul 2021 05:32) (95% - 99%)    Gen: AOx3, NAD  CV: S1+S2 normal, no murmurs  Resp: Decreased on left side  Abd: Soft, nontender, +BS  Ext: No LE edema, no wounds  : No Magallanes  IV/Skin: No thrombophlebitis  Neuro: no focal deficits    LABS:                          15.0   6.51  )-----------( 181      ( 06 Jul 2021 07:28 )             45.7       07-06    137  |  104  |  13  ----------------------------<  88  4.4   |  21<L>  |  1.28    Ca    9.3      06 Jul 2021 07:28  Phos  3.8     07-06  Mg     2.40     07-06    MICROBIOLOGY:  v  .Body Fluid Pleural Fluid  06-30-21   Testing in progress  --  --      .Body Fluid Pleural Fluid  06-30-21   No growth at 5 days  --    polymorphonuclear leukocytes seen  No organisms seen  by cytocentrifuge      .Urine Clean Catch (Midstream)  06-28-21   No growth  --  --      .Blood Blood-Peripheral  06-28-21   No Growth Final  --  --    RADIOLOGY:    < from: CT Angio Chest PE Protocol w/ IV Cont (07.01.21 @ 20:53) >  IMPRESSION:  No pulmonary embolism to the lobar arteries. Unable to evaluate more distal branches.    Decreased small left pleural effusion and improved compressive atelectasis.    < end of copied text >

## 2021-07-06 NOTE — CHART NOTE - NSCHARTNOTEFT_GEN_A_CORE
LFT's from 7/6/2021 discussed with medical attending:    Hepatic Function Panel (07.06.21 @ 07:34)    Indirect Reacting Bilirubin: >0.4 mg/dL    Protein Total, Serum: 6.9 g/dL    Albumin, Serum: 3.1 g/dL    Bilirubin Total, Serum: 0.6 mg/dL    Bilirubin Direct, Serum: <0.2 mg/dL    Alkaline Phosphatase, Serum: 92 U/L    Aspartate Aminotransferase (AST/SGOT): 157 U/L    Alanine Aminotransferase (ALT/SGPT): 239 U/L    Patient asymptomatic. Right upper quadrant ultrasound ordered per recommendations. Team to continue to monitor.

## 2021-07-06 NOTE — PROGRESS NOTE ADULT - ASSESSMENT
ECHO 7/2/21: min MR, grossly mod global lv sys dysfx   ECHO 2/7/20: endocardium not well visualzied, grossly nl LV sys fx      a/p   70 year old man with HTN, hairy cell leukemia (in remission) who presents with abdominal pain, nausea and fever to 102 of unclear etiology.     #Sepsis, LLL pneumonia, parapneumonic effusion  -s/p LEFT thoracentesis   -clinically improved  -abx per med/pulmonary fu noted-- -Cytology negative, f/u fluid cultures (no growth so far)  -A/C stopped, CTA no obv PE  -med, pulmo f/u    #LV Dysfunction on TTE  -LV dysfunction seen on TTE  -new per patient  -previous ischemic testing and echo approx 2-3 years ago reportedly normal   -pleuritic chest pain not concerning for angina   -dyspnea related to effusion  -no decomp HF  -etiology for cmp ? stress induced in setting of infection  -continue medical therapy for cmp for now - c/w lisinopril, eventually add  low dose toprol if sys bp remains stable   -will defer ischemic eval for now in setting of pna, parapneumonic effusion  -once recovered from  medical issues will need ischemic eval as an outpt     #LADI  -resolved     #HTN  -borderline low normal bp noted;  c/w  Lisinopril 2.5 mg daily    -will hold off adding BB for LV dysfx to prevent further hypotension     #Hairy cell leukemia  -hem/onc f/u    dvt ppx

## 2021-07-06 NOTE — PROGRESS NOTE ADULT - SUBJECTIVE AND OBJECTIVE BOX
Date of Service: 21 @ 14:07    Patient is a 70y old  Male who presents with a chief complaint of Fever, abdominal pain (2021 12:11)      Any change in ROS: Doing pretty good:  no SOB : on room air: moving around in room:  Says he feels normal     MEDICATIONS  (STANDING):  amoxicillin  875 milliGRAM(s)/clavulanate 1 Tablet(s) Oral two times a day  cyanocobalamin 1000 MICROGram(s) Oral daily  enoxaparin Injectable 40 milliGRAM(s) SubCutaneous daily  lisinopril 2.5 milliGRAM(s) Oral daily  testosterone 1% Gel 50 milliGRAM(s) Topical daily    MEDICATIONS  (PRN):  acetaminophen   Tablet .. 650 milliGRAM(s) Oral every 4 hours PRN Mild Pain (1 - 3)  morphine  - Injectable 2 milliGRAM(s) IV Push every 4 hours PRN Moderate Pain (4 - 6)    Vital Signs Last 24 Hrs  T(C): 36.6 (2021 12:27), Max: 36.9 (2021 21:56)  T(F): 97.8 (2021 12:27), Max: 98.5 (2021 21:56)  HR: 84 (2021 12:27) (74 - 87)  BP: 117/69 (2021 12:27) (101/62 - 117/69)  BP(mean): --  RR: 17 (2021 12:27) (16 - 17)  SpO2: 95% (2021 12:27) (95% - 98%)    I&O's Summary        Physical Exam:   GENERAL: NAD, well-groomed, well-developed  HEENT: NUNU/   Atraumatic, Normocephalic  ENMT: No tonsillar erythema, exudates, or enlargement; Moist mucous membranes, Good dentition, No lesions  NECK: Supple, No JVD, Normal thyroid  CHEST/LUNG:  air entry left base:   CVS: Regular rate and rhythm; No murmurs, rubs, or gallops  GI: : Soft, Nontender, Nondistended; Bowel sounds present  NERVOUS SYSTEM:  Alert & Oriented X3  EXTREMITIES: - edema  LYMPH: No lymphadenopathy noted  SKIN: No rashes or lesions  ENDOCRINOLOGY: No Thyromegaly  PSYCH: Appropriate    Labs:                              15.0   6.51  )-----------( 181      ( 2021 07:28 )             45.7                         14.6   5.85  )-----------( 153      ( 2021 06:42 )             45.0                         13.9   5.88  )-----------( 150      ( 2021 03:18 )             43.9                         14.2   6.22  )-----------( 138      ( 2021 07:14 )             44.0     07-06    137  |  104  |  13  ----------------------------<  88  4.4   |  21<L>  |  1.28      136  |  103  |  13  ----------------------------<  90  4.0   |  22  |  1.32<H>      134<L>  |  99  |  13  ----------------------------<  90  3.8   |  21<L>  |  1.37<H>  03    138  |  102  |  13  ----------------------------<  89  3.8   |  22  |  1.30    Ca    9.3      2021 07:28  Ca    9.1      2021 06:42  Phos  3.8     07-06  Phos  3.7     07-05  Mg     2.40     07-06  Mg     2.20     07-05    TPro  6.9  /  Alb  3.1<L>  /  TBili  0.6  /  DBili  <0.2  /  AST  157<H>  /  ALT  239<H>  /  AlkPhos  92  07-06    CAPILLARY BLOOD GLUCOSE         ( @ 18:40)    LIVER FUNCTIONS - ( 2021 07:34 )  Alb: 3.1 g/dL / Pro: 6.9 g/dL / ALK PHOS: 92 U/L / ALT: 239 U/L / AST: 157 U/L / GGT: x           PTT - ( 2021 06:42 )  PTT:33.5 sec    Fluid Source --  Albumin, Fluid--  Glucose, Fluid--  Protein total, Fluid--  Lacatate Dehydrogenase, Fluid--  pH, Fluid7.6  Cytopathology-Non Gyn Report--  Fluid Source PLEURAL  Albumin, Fluid2.2 g/dL  Glucose, Fluid77 mg/dL  Protein total, Fluid3.5 g/dL  Lacatate Dehydrogenase, [id number] U/L  pH, Fluid--  Cytopathology-Non Gyn Report--  Fluid Source --  Albumin, Fluid--  Glucose, Fluid--  Protein total, Fluid--  Lacatate Dehydrogenase, Fluid--  pH, Fluid--  Cytopathology-Non Gyn Report  ACCESSION No:  [id number]    ANNE GRISSOM                          1        Cytopathology Report            Specimen(s) Submitted  PLEURAL FLUID, LEFT      Clinical History  70 year old male  Left pleural effusion.      Gross Description  Received: 60 ml of pinkish fluid in CytoLyt  Prepared: 1 ThinPrep slide, 1 cell block , 1 smear      Final Diagnosis  PLEURAL FLUID, LEFT  NEGATIVE FOR MALIGNANT CELLS.    Cytology slides and cell block section shows many benign and  reactive mesothelial cells, histiocytes, abundant mixed  inflammation and fibrin  material.  Clinical and radiological correlation is essential.    Screened by: Craig MATHEWS(ASCP)  Verified by: Ayad Hand M.D.  (Electronic Signature)  Reported on: 21 14:03 EDT, [address] [place] [address]  Phone: [phone]   Fax: [phone]  Cytology technical processing performed at [address] [place] [address]  _________________________________________________________________        RECENT CULTURES:   @ 22:58 .Body Fluid Pleural Fluid                Testing in progress     @ 20:35 .Body Fluid Pleural Fluid       polymorphonuclear leukocytes seen  No organisms seen  by cytocentrifuge           No growth at 5 days    r< from: CT Angio Chest PE Protocol w/ IV Cont (21 @ 20:53) >  COMPARISON: CT chest 2021.    CONTRAST/COMPLICATIONS:  IV Contrast: Omnipaque 350  150 cc administered   50 cc discarded  Oral Contrast: NONE  Complications: None reported at time of study completion    PROCEDURE:  CT of the Chest was performed.  Sagittal and coronal reformats were performed.    FINDINGS:    LUNGS, PLEURA, and AIRWAYS: Patent central airways. Decreased small left pleural effusion and improved compressive atelectasis. Unchanged small nodule in the RUL, likely benign.  MEDIASTINUM AND MATTY: No lymphadenopathy.  VESSELS: Poor opacification of the pulmonary arteries limits evaluation. No pulmonary embolism to the lobar arteries. Limited evaluation of the subsegmental arteries. Aortic calcification.  HEART: Heart size is normal. No pericardial effusion. Coronary artery calcification.  CHEST WALL AND LOWER NECK: Within normal limits.  VISUALIZED UPPER ABDOMEN: Partially imaged splenomegaly.  BONES: Degenerative changes of the spine.    IMPRESSION:  No pulmonary embolism to the lobar arteries. Unable to evaluate more distal branches.    Decreased small left pleural effusion and improved compressive atelectasis.              ROSALINDA LAU MD; Resident Radiologist  This document has been electronically signed.  KENDY SWAIN M.D., ATTENDING RADIOGIST  This document has been electronically signed. 2021 11:15AM    < end of copied text >        RESPIRATORY CULTURES:          Studies  Chest X-RAY  CT SCAN Chest   Venous Dopplers: LE:   CT Abdomen  Others

## 2021-07-06 NOTE — PROGRESS NOTE ADULT - SUBJECTIVE AND OBJECTIVE BOX
Pt is feeling a lot better, no bleeding, cough, SOB, fevers or chills and ROS is otherwise unremarkable to unchanged.         Meds:  acetaminophen   Tablet .. 650 milliGRAM(s) Oral every 4 hours PRN  amoxicillin  875 milliGRAM(s)/clavulanate 1 Tablet(s) Oral two times a day  cyanocobalamin 1000 MICROGram(s) Oral daily  enoxaparin Injectable 40 milliGRAM(s) SubCutaneous daily  lisinopril 2.5 milliGRAM(s) Oral daily  morphine  - Injectable 2 milliGRAM(s) IV Push every 4 hours PRN  testosterone 1% Gel 50 milliGRAM(s) Topical daily      Vital Signs Last 24 Hrs  T(C): 36.6 (06 Jul 2021 12:27), Max: 36.9 (05 Jul 2021 21:56)  T(F): 97.8 (06 Jul 2021 12:27), Max: 98.5 (05 Jul 2021 21:56)  HR: 84 (06 Jul 2021 12:27) (74 - 87)  BP: 117/69 (06 Jul 2021 12:27) (101/62 - 117/69)  BP(mean): --  RR: 17 (06 Jul 2021 12:27) (16 - 17)  SpO2: 95% (06 Jul 2021 12:27) (95% - 97%)                          15.0   6.51  )-----------( 181      ( 06 Jul 2021 07:28 )             45.7       07-06    137  |  104  |  13  ----------------------------<  88  4.4   |  21<L>  |  1.28    Ca    9.3      06 Jul 2021 07:28  Phos  3.8     07-06  Mg     2.40     07-06    TPro  6.9  /  Alb  3.1<L>  /  TBili  0.6  /  DBili  <0.2  /  AST  157<H>  /  ALT  239<H>  /  AlkPhos  92  07-06              PTT - ( 05 Jul 2021 06:42 )  PTT:33.5 sec

## 2021-07-06 NOTE — PROGRESS NOTE ADULT - ASSESSMENT
70 y.o. M, Confucianist, w/ a hx of HTN, hairy cell leukemia (in remission) who presents with abdominal pain, nausea and fever to 102 of unclear etiology.

## 2021-07-06 NOTE — PROGRESS NOTE ADULT - ASSESSMENT
70 year old male with HTN, Hairy cell leukemia in remission about 5 years ago presenting with abd pain and fevers. Patient noticed not feeling well starting Friday, then went to an outdoor picnic bbq on Sat and when he returned that night started to develop abd pain, no diarrhea though. He then started to develop fevers, and did not improve so came to the hospital.     CT A/P with unchanged splenomegaly consistent with Waldenstrom macroglobulinemia.    Elevated LFTs, RUQ US normal.  CXR with left lung patchy opacity, CT chest with left pleural effusion s/p thoracentesis 6/30/21. Cultures so far negative. Cytology with no malignant cells.    Recommend:  #SIRS (fevers, leukocytosis)  -?Parapneumonic effusion  -Blood cxs negative  -Continue zosyn   -F/U thoracentesis cultures so far no growth  -Appreciate pulm following  -Monitor fever curve  -Leukocytosis resolved  -Transition to oral augmentin 875 mg PO BID for an additional 2 weeks.    #Hairy cell leukemia  -In remission    #Elevated LFTs  -continue to trend    Stephon Rubio MD  Pager [phone]  After 5pm/weekends call [phone]    Discussed plan with primary team.

## 2021-07-06 NOTE — PROGRESS NOTE ADULT - SUBJECTIVE AND OBJECTIVE BOX
Patient is a 70y old  Male who presents with a chief complaint of Fever, abdominal pain (06 Jul 2021 14:07)      DATE OF SERVICE: 07-06-21 @ 14:43    SUBJECTIVE / OVERNIGHT EVENTS: overnight events noted  "I feel good"    ROS:  Resp: No cough no sputum production  CVS: No chest pain no palpitations no orthopnea  GI: no N/V/D  : no dysuria, no hematuria  Neuro: no weakness no paresthesias  Heme: No petechiae no easy bruising  Msk: No joint pain no swelling  Skin: No rash no itching        MEDICATIONS  (STANDING):  amoxicillin  875 milliGRAM(s)/clavulanate 1 Tablet(s) Oral two times a day  cyanocobalamin 1000 MICROGram(s) Oral daily  enoxaparin Injectable 40 milliGRAM(s) SubCutaneous daily  lisinopril 2.5 milliGRAM(s) Oral daily  testosterone 1% Gel 50 milliGRAM(s) Topical daily    MEDICATIONS  (PRN):  acetaminophen   Tablet .. 650 milliGRAM(s) Oral every 4 hours PRN Mild Pain (1 - 3)  morphine  - Injectable 2 milliGRAM(s) IV Push every 4 hours PRN Moderate Pain (4 - 6)        CAPILLARY BLOOD GLUCOSE        I&O's Summary      Vital Signs Last 24 Hrs  T(C): 36.6 (06 Jul 2021 12:27), Max: 36.9 (05 Jul 2021 21:56)  T(F): 97.8 (06 Jul 2021 12:27), Max: 98.5 (05 Jul 2021 21:56)  HR: 84 (06 Jul 2021 12:27) (74 - 87)  BP: 117/69 (06 Jul 2021 12:27) (101/62 - 117/69)  BP(mean): --  RR: 17 (06 Jul 2021 12:27) (16 - 17)  SpO2: 95% (06 Jul 2021 12:27) (95% - 98%)    PHYSICAL EXAM:  NECK: Supple, No JVD  CHEST/LUNG: improved air entry left base  HEART: S1 S2; no murmurs   ABDOMEN: Soft, Nontender  EXTREMITIES:  no edema  NEUROLOGY: AO x 3 non-focal  SKIN: No rashes or lesions    LABS:                        15.0   6.51  )-----------( 181      ( 06 Jul 2021 07:28 )             45.7     07-06    137  |  104  |  13  ----------------------------<  88  4.4   |  21<L>  |  1.28    Ca    9.3      06 Jul 2021 07:28  Phos  3.8     07-06  Mg     2.40     07-06    TPro  6.9  /  Alb  3.1<L>  /  TBili  0.6  /  DBili  <0.2  /  AST  157<H>  /  ALT  239<H>  /  AlkPhos  92  07-06    PTT - ( 05 Jul 2021 06:42 )  PTT:33.5 sec            All consultant(s) notes reviewed and care discussed with other providers        Contact Number, Dr Huang [phone]

## 2021-07-06 NOTE — PROGRESS NOTE ADULT - ASSESSMENT
71 y/o M with PMH of HTN, hairy cell leukemia (in remission, last chemo 7 yrs ago per patient), Baptism. Presents with abdominal pain, nausea, and fever (tmax 102F at home), SOB, pleurtic CP x3-4 days. On triage to ED, febrile to 101.2 F, tachycardic 110-120s , O2 sats 96% on room air. Received CTZ, Azithromycin, Zosyn in ED. CT A/P w/o contrast negative for intra-abdominal pathology. Pulmonary called to consult for SOB. On exam, is visibly SOB with minimal exertion, O2 sats 92-93% on room air.     SOB w/ pleuritic chest pain  -On exam pt is visibly SOB at rest and with minimal exertion, O2 sats 92-93% on room air  -F/u CT chest non-contrast   -Do VQ scan and B/L LE duplex for r/o PE/DVT given hx of malignancy, fevers, and tachycardia  -CT A/P 6/28 read as small L pleural effusion w/ adjacent compressive LLL atelectasis - appears more sick clinically than what this shows (?infarct vs PNA) - f/u VQ scan and CT chest results  Fevers  -Tmax 101.2 F in ED with leukocytosis, afebrile now  -CT A/P notes no intraabdominal pathology  -RVP negative  -F/u CT chest  -On ABX   Abdominal pain  -CT A/P notes no intraabdominal pathology  -F/u abdominal US  Hairy cell leukemia  -in remission per patient, s/p tx with chemo (last 7 years ago)      6/30:  SOB w/ pleuritic chest pain: -F CT chest non-contrast -no official report yet?; but to me has small left sided effusion as well as pneumonia and lingular nodule/ opacity: await for official report: but arranged for tap on the left sided diagnostic as he is having lot of pain and is febrile:  VQ scan is intermediate probability: has perfusion defects: on empiric heparin: check dopplers and echo: stopped now in anticipation of tap on left side: will restart after tap   -CT A/P 6/28 read as small L pleural effusion w/ adjacent compressive LLL atelectasis - appears more sick clinically than what this shows (?infarct vs PNA) -as above  Fevers -Tmax 101.2 F in ED with leukocytosis, afebrile now -CT A/P notes no intraabdominal pathology: On ABX   Abdominal pain -CT A/P notes no intraabdominal pathology  Hairy cell leukemia  -in remission per patient, s/p tx with chemo (last 7 years ago)    dw aarin    7/1:    SOB w/ pleuritic chest pain: -s/p tap: complicated exudative parapneumonia effusion: no growth so far: he feels much better now awaiting cta : cont heparin till pe is ruled out on cta   Fevers resolved cont antibiotics id following  await for pleural fluid culture   Abdominal pain -CT A/P notes no intraabdominal pathology  Hairy cell leukemia  -in remission per patient, s/p tx with chemo (last 7 years ago)    dwpmd and np      7/2:    SOB w/ pleuritic chest pain: -s/p tap: complicated exudative parapneumonia effusion: no growth so far: he feels much better now cta done: can not fully exclude pe: await dopplers as well as echo to see the rigth side of the heart   Fevers resolved cont antibiotics id following  await for pleural fluid culture : [id number] is negative too    Abdominal pain -CT A/P notes no intraabdominal pathology  Hairy cell leukemia  -in remission per patient, s/p tx with chemo (last 7 years ago)    dwnp    7/3  SOB w/ pleuritic chest pain - resolving per patient  -CT 6/30 with increasing L pleural effusion  -s/p L Thoracentesis 6/30 with 400 ml output - complicated exudative parapneumonia  -Cytology negative, f/u fluid cultures (no growth so far)  -CTA chest 7/1 with improvement in L pleural effusion, cannot fully exlcude PE  -TTE noted  -F/u dopplers, will reevalaute need for heparin gtt after results  -Comfortable on room air, O2 sats 94%   Fevers   -resolved   -cont antibiotics per ID  -f/u pleural fluid culture   Abdominal pain - resolving   -CT A/P notes no intraabdominal pathology  Hairy cell leukemia  -in remission per patient, s/p tx with chemo (last 7 years ago)    7/4:    SOB w/ pleuritic chest pain - resolving per patient CT 6/30 with increasing L pleural effusion s/p L Thoracentesis 6/30 with 400 ml output - complicated exudative parapneumonia  -Cytology negative, f/u fluid cultures (no growth so far) ?: CTA chest 7/1 with improvement in L pleural effusion, cannot fully exlcude PE  dopplers,: NEGATIVE: CTA IS LIMITED BUT NO MAJOR PE: ECHO IS NOT VERY HELPFUL TO EVALUATE RT SIDE BUT HAS MOD LV DYSFUCNTION!? CARDS TO SEE-DC HEPARIN Comfortable on room air, O2 sats 94%   Fevers  :resolved  :cont antibiotics per ID  pleural fluid cultureS ARE NEAGTIVE SO FAR   Abdominal pain - RESOLVED: CT A/P notes no intraabdominal pathology  Hairy cell leukemia  -in remission per patient, s/p tx with chemo (last 7 years ago)  DW ACP    7/5:    SOB w/ pleuritic chest pain - resolving per patient CT 6/30 with increasing L pleural effusion s/p L Thoracentesis 6/30 with 400 ml output - complicated exudative parapneumonia: cta with no PE   -Cytology negative, f/u fluid cultures (no growth so far) ?: CTA chest 7/1 with improvement in L pleural effusion, cannot fully exclude PE- his vq scan with intermmediate probability:   dopplers,: NEGATIVE: CTA IS LIMITED BUT NO MAJOR PE: ECHO IS NOT VERY HELPFUL TO EVALUATE RT SIDE BUT HAS MOD LV DYSFUNCTION CARDS TO SEE-DC HEPARIN Comfortable on room air, O2 sats 94%   Fevers  :resolved  :cont antibiotics per ID  pleural fluid cultures ARE NEGATIVE SO FAR   Abdominal pain - RESOLVED: CT A/P notes no intraabdominal pathology  Hairy cell leukemia  -in remission per patient, s/p tx with chemo (last 7 years ago)  DW ACP: seems to be doing pretty good: check ambulatory sao2:     7/6:    SOB w/ pleuritic chest pain - resolving per patient CT 6/30 with increasing L pleural effusion s/p L Thoracentesis 6/30 with 400 ml output - complicated exudative parapneumonia: cta with no PE   -Cytology negative, f/u fluid cultures (no growth so far) ?: CTA chest 7/1 with improvement in L pleural effusion, cannot fully exclude PE- his vq scan with intermediate probability:   dopplers,: NEGATIVE: CTA IS LIMITED BUT NO MAJOR PE: ECHO IS NOT VERY HELPFUL TO EVALUATE RT SIDE BUT HAS MOD LV DYSFUNCTION CARDS TO SEE-DC HEPARIN Comfortable on room air, O2 sats 94% : cont medical therpay for lv dysfunction per cards  Fevers  :resolved  :cont antibiotics per ID  pleural fluid cultures ARE NEGATIVE SO FAR   Abdominal pain - RESOLVED: CT A/P notes no intraabdominal pathology  Hairy cell leukemia  -in remission per patient, s/p tx with chemo (last 7 years ago)  DW ACP: seems to be doing pretty good: check ambulatory sao2:     dc planning: on oral antibiotics

## 2021-07-06 NOTE — PROGRESS NOTE ADULT - ASSESSMENT
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

## 2021-07-06 NOTE — PROGRESS NOTE ADULT - SUBJECTIVE AND OBJECTIVE BOX
CARDIOLOGY FOLLOW UP - Dr. Bowens  DATE OF SERVICE: 7/6/21     CC no cp or sob       REVIEW OF SYSTEMS:  CONSTITUTIONAL: No fever, weight loss, or fatigue  RESPIRATORY: No cough, wheezing, chills or hemoptysis; No Shortness of Breath  CARDIOVASCULAR: No chest pain, palpitations, passing out, dizziness, or leg swelling  GASTROINTESTINAL: No abdominal or epigastric pain. No nausea, vomiting, or hematemesis; No diarrhea or constipation. No melena or hematochezia.  VASCULAR: No edema     PHYSICAL EXAM:  T(C): 36.8 (07-06-21 @ 05:32), Max: 36.9 (07-05-21 @ 21:56)  HR: 87 (07-06-21 @ 05:32) (74 - 87)  BP: 101/62 (07-06-21 @ 05:32) (101/62 - 110/78)  RR: 16 (07-06-21 @ 05:32) (16 - 17)  SpO2: 97% (07-06-21 @ 05:32) (95% - 99%)  Wt(kg): --  I&O's Summary      Appearance: Normal	  Cardiovascular: Normal S1 S2,RRR, No JVD, No murmurs  Respiratory: Lungs clear to auscultation	  Gastrointestinal:  Soft, Non-tender, + BS	  Extremities: Normal range of motion, No clubbing, cyanosis or edema      Home Medications:  AndroGel Pump 20.25 mg/actuation (1.62%) transdermal gel: 2 pump(s) transdermal once a day (in the morning) (28 Jun 2021 18:38)  lisinopril 2.5 mg oral tablet: 1 tab(s) orally once a day (28 Jun 2021 18:38)  Vitamin B-12 100 mcg oral tablet: 1 tab(s) orally once a day (28 Jun 2021 18:38)      MEDICATIONS  (STANDING):  amoxicillin  875 milliGRAM(s)/clavulanate 1 Tablet(s) Oral two times a day  cyanocobalamin 1000 MICROGram(s) Oral daily  enoxaparin Injectable 40 milliGRAM(s) SubCutaneous daily  lisinopril 2.5 milliGRAM(s) Oral daily  testosterone 1% Gel 50 milliGRAM(s) Topical daily      TELEMETRY: 	    ECG:  	  RADIOLOGY:   DIAGNOSTIC TESTING:  [ ] Echocardiogram:  [ ]  Catheterization:  [ ] Stress Test:    OTHER: 	    LABS:	 	                            15.0   6.51  )-----------( 181      ( 06 Jul 2021 07:28 )             45.7     07-06    137  |  104  |  13  ----------------------------<  88  4.4   |  21<L>  |  1.28    Ca    9.3      06 Jul 2021 07:28  Phos  3.8     07-06  Mg     2.40     07-06      PTT - ( 05 Jul 2021 06:42 )  PTT:33.5 sec

## 2021-07-07 ENCOUNTER — TRANSCRIPTION ENCOUNTER (OUTPATIENT)
Age: 71
End: 2021-07-07

## 2021-07-07 VITALS
HEART RATE: 88 BPM | RESPIRATION RATE: 17 BRPM | TEMPERATURE: 98 F | OXYGEN SATURATION: 98 % | SYSTOLIC BLOOD PRESSURE: 120 MMHG | DIASTOLIC BLOOD PRESSURE: 70 MMHG

## 2021-07-07 LAB
ALBUMIN SERPL ELPH-MCNC: 3 G/DL — LOW (ref 3.3–5)
ALP SERPL-CCNC: 88 U/L — SIGNIFICANT CHANGE UP (ref 40–120)
ALT FLD-CCNC: 227 U/L — HIGH (ref 4–41)
ANION GAP SERPL CALC-SCNC: 13 MMOL/L — SIGNIFICANT CHANGE UP (ref 7–14)
AST SERPL-CCNC: 130 U/L — HIGH (ref 4–40)
BILIRUB SERPL-MCNC: 0.5 MG/DL — SIGNIFICANT CHANGE UP (ref 0.2–1.2)
BUN SERPL-MCNC: 16 MG/DL — SIGNIFICANT CHANGE UP (ref 7–23)
CALCIUM SERPL-MCNC: 8.6 MG/DL — SIGNIFICANT CHANGE UP (ref 8.4–10.5)
CHLORIDE SERPL-SCNC: 102 MMOL/L — SIGNIFICANT CHANGE UP (ref 98–107)
CO2 SERPL-SCNC: 21 MMOL/L — LOW (ref 22–31)
CREAT SERPL-MCNC: 1.34 MG/DL — HIGH (ref 0.5–1.3)
GLUCOSE SERPL-MCNC: 88 MG/DL — SIGNIFICANT CHANGE UP (ref 70–99)
HAV IGM SER-ACNC: SIGNIFICANT CHANGE UP
HBV CORE IGM SER-ACNC: SIGNIFICANT CHANGE UP
HBV SURFACE AG SER-ACNC: SIGNIFICANT CHANGE UP
HCT VFR BLD CALC: 45.5 % — SIGNIFICANT CHANGE UP (ref 39–50)
HCV AB S/CO SERPL IA: 0.06 S/CO — SIGNIFICANT CHANGE UP (ref 0–0.99)
HCV AB SERPL-IMP: SIGNIFICANT CHANGE UP
HGB BLD-MCNC: 14.7 G/DL — SIGNIFICANT CHANGE UP (ref 13–17)
MCHC RBC-ENTMCNC: 25.3 PG — LOW (ref 27–34)
MCHC RBC-ENTMCNC: 32.3 GM/DL — SIGNIFICANT CHANGE UP (ref 32–36)
MCV RBC AUTO: 78.4 FL — LOW (ref 80–100)
NRBC # BLD: 0 /100 WBCS — SIGNIFICANT CHANGE UP
NRBC # FLD: 0 K/UL — SIGNIFICANT CHANGE UP
PLATELET # BLD AUTO: 187 K/UL — SIGNIFICANT CHANGE UP (ref 150–400)
POTASSIUM SERPL-MCNC: 4.1 MMOL/L — SIGNIFICANT CHANGE UP (ref 3.5–5.3)
POTASSIUM SERPL-SCNC: 4.1 MMOL/L — SIGNIFICANT CHANGE UP (ref 3.5–5.3)
PROT SERPL-MCNC: 6.8 G/DL — SIGNIFICANT CHANGE UP (ref 6–8.3)
RBC # BLD: 5.8 M/UL — SIGNIFICANT CHANGE UP (ref 4.2–5.8)
RBC # FLD: 14.9 % — HIGH (ref 10.3–14.5)
SODIUM SERPL-SCNC: 136 MMOL/L — SIGNIFICANT CHANGE UP (ref 135–145)
WBC # BLD: 5.98 K/UL — SIGNIFICANT CHANGE UP (ref 3.8–10.5)
WBC # FLD AUTO: 5.98 K/UL — SIGNIFICANT CHANGE UP (ref 3.8–10.5)

## 2021-07-07 PROCEDURE — 99232 SBSQ HOSP IP/OBS MODERATE 35: CPT

## 2021-07-07 RX ORDER — PREGABALIN 225 MG/1
1 CAPSULE ORAL
Qty: 0 | Refills: 0 | DISCHARGE

## 2021-07-07 RX ORDER — PREGABALIN 225 MG/1
1 CAPSULE ORAL
Qty: 30 | Refills: 0
Start: 2021-07-07 | End: 2021-08-05

## 2021-07-07 RX ORDER — ACETAMINOPHEN 500 MG
2 TABLET ORAL
Qty: 0 | Refills: 0 | DISCHARGE
Start: 2021-07-07

## 2021-07-07 RX ADMIN — LISINOPRIL 2.5 MILLIGRAM(S): 2.5 TABLET ORAL at 11:29

## 2021-07-07 RX ADMIN — Medication 50 MILLIGRAM(S): at 12:57

## 2021-07-07 RX ADMIN — ENOXAPARIN SODIUM 40 MILLIGRAM(S): 100 INJECTION SUBCUTANEOUS at 11:29

## 2021-07-07 RX ADMIN — PREGABALIN 1000 MICROGRAM(S): 225 CAPSULE ORAL at 11:30

## 2021-07-07 RX ADMIN — Medication 1 TABLET(S): at 05:46

## 2021-07-07 NOTE — DISCHARGE NOTE NURSING/CASE MANAGEMENT/SOCIAL WORK - NSDCFUADDAPPT_GEN_ALL_CORE_FT
Please follow up with your primary care provider within 1 week of discharge from the hospital. If you do not have a primary care provider please follow up with the [facility], call [phone]

## 2021-07-07 NOTE — PROGRESS NOTE ADULT - ASSESSMENT
70 year old male with HTN, Hairy cell leukemia in remission about 5 years ago presenting with abd pain and fevers. Patient noticed not feeling well starting Friday, then went to an outdoor picnic bbq on Sat and when he returned that night started to develop abd pain, no diarrhea though. He then started to develop fevers, and did not improve so came to the hospital.     CT A/P with unchanged splenomegaly consistent with Waldenstrom macroglobulinemia.    Elevated LFTs, RUQ US normal.  CXR with left lung patchy opacity, CT chest with left pleural effusion s/p thoracentesis 6/30/21. Cultures so far negative. Cytology with no malignant cells.    Recommend:  #SIRS (fevers, leukocytosis)  -?Parapneumonic effusion  -Blood cxs negative  -Thoracentesis cultures so far no growth  -Appreciate pulm following  -Monitor fever curve  -Leukocytosis resolved  -Transition to oral augmentin 875 mg PO BID for an additional 2 weeks.    #Hairy cell leukemia  -In remission    #Elevated LFTs  -continue to trend  -starting to improve  -Repeat RUQ US unremarkable    #LADI  -Continue to renally adjust abx  -Monitor CrCl while on abx.    Stephon Rubio MD  Pager [phone]  After 5pm/weekends call [phone]

## 2021-07-07 NOTE — PROGRESS NOTE ADULT - SUBJECTIVE AND OBJECTIVE BOX
CC: Patient is a 70y old  Male who presents with a chief complaint of Fever, abdominal pain (07 Jul 2021 10:38)    ID following for parapneumonic effusion    Interval History/ROS: Patient sitting in a chair, feels well. Today with LADI. LFTs slightly improved. RUQ US unremarkable.    Rest of ROS negative.    Allergies  No Known Drug Allergies  shellfish (Unknown)    ANTIMICROBIALS:  amoxicillin  875 milliGRAM(s)/clavulanate 1 two times a day    OTHER MEDS:  acetaminophen   Tablet .. 650 milliGRAM(s) Oral every 4 hours PRN  cyanocobalamin 1000 MICROGram(s) Oral daily  enoxaparin Injectable 40 milliGRAM(s) SubCutaneous daily  lisinopril 2.5 milliGRAM(s) Oral daily  morphine  - Injectable 2 milliGRAM(s) IV Push every 4 hours PRN  testosterone 1% Gel 50 milliGRAM(s) Topical daily    PE:    Vital Signs Last 24 Hrs  T(C): 36.7 (07 Jul 2021 09:53), Max: 36.7 (07 Jul 2021 06:13)  T(F): 98 (07 Jul 2021 09:53), Max: 98 (07 Jul 2021 06:13)  HR: 78 (07 Jul 2021 09:53) (78 - 89)  BP: 118/71 (07 Jul 2021 09:53) (99/67 - 133/91)  BP(mean): --  RR: 16 (07 Jul 2021 09:53) (16 - 18)  SpO2: 98% (07 Jul 2021 09:53) (95% - 98%)    Gen: AOx3, NAD  CV: S1+S2 normal, no murmurs  Resp: Decreased on left side  Abd: Soft, nontender, +BS  Ext: No LE edema, no wounds  : No Magallanes  IV/Skin: No thrombophlebitis  Neuro: no focal deficits    LABS:                          14.7   5.98  )-----------( 187      ( 07 Jul 2021 07:29 )             45.5       07-07    136  |  102  |  16  ----------------------------<  88  4.1   |  21<L>  |  1.34<H>    Ca    8.6      07 Jul 2021 07:29  Phos  3.8     07-06  Mg     2.40     07-06    TPro  6.8  /  Alb  3.0<L>  /  TBili  0.5  /  DBili  x   /  AST  130<H>  /  ALT  227<H>  /  AlkPhos  88  07-07          MICROBIOLOGY:  v  .Body Fluid Pleural Fluid  06-30-21   Testing in progress  --  --      .Body Fluid Pleural Fluid  06-30-21   No growth at 5 days  --    polymorphonuclear leukocytes seen  No organisms seen  by cytocentrifuge      .Urine Clean Catch (Midstream)  06-28-21   No growth  --  --      .Blood Blood-Peripheral  06-28-21   No Growth Final  --  --    RADIOLOGY:    < from: US Abdomen Upper Quadrant Right (07.06.21 @ 14:49) >  IMPRESSION:    Normal right upper quadrant abdominal ultrasound.    < end of copied text >

## 2021-07-07 NOTE — PROGRESS NOTE ADULT - PROBLEM SELECTOR PLAN 1
clinically improved  now on po Augmentin  unclear etiology of elevated LFTs  ? antibiotics  acute hepatitis panel  repeat US RUQ
clinically improved  now on po Augmentin
likely secondary to LLL pneumonia with associated pleural effusion   s/p pleural tap  fluid consistent with exudative effusion likely parapneumonic  no evidence empyema  continue to monitor closely  clinically improving   continue antibiotics
likely secondary to LLL pneumonia with associated pleural effusion   CT positive for above  discussed with pulmonary   may need tap to r/o empyema  patient is certainly toxic appearing
unclear etiology  ID help appreciated  CT chest ordered  pulmonary help requested  unclear etiology of generalized diffuse pain   ESR CRP in AM  minimally elevated LFTs US RUQ pending
likely secondary to LLL pneumonia with associated pleural effusion   continue to monitor closely  clinically improving   continue antibiotics  CTA negative PE (prelim)  await final read

## 2021-07-07 NOTE — PROGRESS NOTE ADULT - ASSESSMENT
ECHO 7/2/21: min MR, grossly mod global lv sys dysfx   ECHO 2/7/20: endocardium not well visualzied, grossly nl LV sys fx      a/p   70 year old man with HTN, hairy cell leukemia (in remission) who presents with abdominal pain, nausea and fever to 102 of unclear etiology.     #Sepsis, LLL pneumonia, parapneumonic effusion  -s/p LEFT thoracentesis   -clinically improved  -abx per med/pulmonary fu noted-- -Cytology negative, f/u fluid cultures (no growth so far)  -A/C stopped, CTA no obv PE  -med, pulmo f/u    #LV Dysfunction on TTE  -LV dysfunction seen on TTE  -new per patient  -previous ischemic testing and echo approx 2-3 years ago reportedly normal   -pleuritic chest pain not concerning for angina   -dyspnea related to effusion- resolved   -no decomp HF  -etiology for cmp ? stress induced in setting of infection  -continue medical therapy for cmp for now - c/w lisinopril, eventually add  low dose toprol as outpt  -will defer ischemic eval for now in setting of pna, parapneumonic effusion  -once recovered from  medical issues will need ischemic eval as an outpt     #LADI  -resolved     #HTN  -borderline low normal bp noted;  asymptomatic;  c/w  Lisinopril 2.5 mg daily    -will hold off adding BB for LV dysfx to prevent further hypotension     #Hairy cell leukemia  -hem/onc f/u    dvt ppx- if dcp- he will f/u with dr Elaine as outpt (cards)

## 2021-07-07 NOTE — PROGRESS NOTE ADULT - NEGATIVE CARDIOVASCULAR SYMPTOMS
[facility]  Brief Operative Note    SUMMARY     Surgery Date: 7/21/2020     Surgeon(s) and Role:     * SEMAJ Márquez III, MD - Primary     * Rogelio Gillette MD - Resident - Assisting    Pre-op Diagnosis:  Tense R femoral hematoma; morbid obesity    Post-op Diagnosis:  SFA branch laceration; morbid obesity    PROCEDURES:    1. Urgent Direct repair, L SFA branch laceration    CODING NOTE: -22 Modifier appropriate given the increased complexity and length of this case due to morbid obesity    Anesthesia: General    Description of Procedure: 4-5 units of blood evacuated from L Thigh    Description of the findings of the procedure: Medial SFA branch overlying femoral vein fond to be lacerated    Estimated Blood Loss:  100cc new blood loss;    Specimens:   Specimen (12h ago, onward)    None        
no chest pain/no palpitations

## 2021-07-07 NOTE — PROGRESS NOTE ADULT - PROVIDER SPECIALTY LIST ADULT
Infectious Disease
Internal Medicine
Pulmonology
Cardiology
Infectious Disease
Internal Medicine
Pulmonology
Cardiology
Internal Medicine
Pulmonology
Pulmonology
Cardiology
Internal Medicine
Pulmonology
Pulmonology
Heme/Onc
Heme/Onc
Internal Medicine
Heme/Onc

## 2021-07-07 NOTE — PROGRESS NOTE ADULT - PROBLEM SELECTOR PROBLEM 3
LADI (acute kidney injury)
LADI (acute kidney injury)
Chest tightness
LADI (acute kidney injury)
Chest tightness
LADI (acute kidney injury)

## 2021-07-07 NOTE — PROGRESS NOTE ADULT - SUBJECTIVE AND OBJECTIVE BOX
CARDIOLOGY FOLLOW UP - Dr. Bowens  DATE OF SERVICE: 7/7/21     CC no cp or sob         REVIEW OF SYSTEMS:  CONSTITUTIONAL: No fever, weight loss, or fatigue  RESPIRATORY: No cough, wheezing, chills or hemoptysis; No Shortness of Breath  CARDIOVASCULAR: No chest pain, palpitations, passing out, dizziness, or leg swelling  GASTROINTESTINAL: No abdominal or epigastric pain. No nausea, vomiting, or hematemesis; No diarrhea or constipation. No melena or hematochezia.  VASCULAR: No edema     PHYSICAL EXAM:  T(C): 36.7 (07-07-21 @ 09:53), Max: 36.7 (07-07-21 @ 06:13)  HR: 78 (07-07-21 @ 09:53) (78 - 89)  BP: 118/71 (07-07-21 @ 09:53) (99/67 - 133/91)  RR: 16 (07-07-21 @ 09:53) (16 - 18)  SpO2: 98% (07-07-21 @ 09:53) (95% - 98%)  Wt(kg): --  I&O's Summary      Appearance: Normal	  Cardiovascular: Normal S1 S2,RRR, No JVD, No murmurs  Respiratory: Lungs clear to auscultation	  Gastrointestinal:  Soft, Non-tender, + BS	  Extremities: Normal range of motion, No clubbing, cyanosis or edema      Home Medications:  AndroGel Pump 20.25 mg/actuation (1.62%) transdermal gel: 2 pump(s) transdermal once a day (in the morning) (28 Jun 2021 18:38)  lisinopril 2.5 mg oral tablet: 1 tab(s) orally once a day (28 Jun 2021 18:38)  Vitamin B-12 100 mcg oral tablet: 1 tab(s) orally once a day (28 Jun 2021 18:38)      MEDICATIONS  (STANDING):  amoxicillin  875 milliGRAM(s)/clavulanate 1 Tablet(s) Oral two times a day  cyanocobalamin 1000 MICROGram(s) Oral daily  enoxaparin Injectable 40 milliGRAM(s) SubCutaneous daily  lisinopril 2.5 milliGRAM(s) Oral daily  testosterone 1% Gel 50 milliGRAM(s) Topical daily      TELEMETRY: 	    ECG:  	  RADIOLOGY:   DIAGNOSTIC TESTING:  [ ] Echocardiogram:  [ ]  Catheterization:  [ ] Stress Test:    OTHER: 	    LABS:	 	                            14.7   5.98  )-----------( 187      ( 07 Jul 2021 07:29 )             45.5     07-07    136  |  102  |  16  ----------------------------<  88  4.1   |  21<L>  |  1.34<H>    Ca    8.6      07 Jul 2021 07:29  Phos  3.8     07-06  Mg     2.40     07-06    TPro  6.8  /  Alb  3.0<L>  /  TBili  0.5  /  DBili  x   /  AST  130<H>  /  ALT  227<H>  /  AlkPhos  88  07-07

## 2021-07-07 NOTE — PROGRESS NOTE ADULT - REASON FOR ADMISSION
Fever, abdominal pain

## 2021-07-07 NOTE — DISCHARGE NOTE NURSING/CASE MANAGEMENT/SOCIAL WORK - PATIENT PORTAL LINK FT
You can access the FollowMyHealth Patient Portal offered by [facility] by registering at the following website: [email or web address]. By joining [employer]’s FollowMyHealth portal, you will also be able to view your health information using other applications (apps) compatible with our system.

## 2021-07-07 NOTE — PROGRESS NOTE ADULT - PROBLEM SELECTOR PLAN 5
resume Lisinopril LADI fully resolved 2.5 po qd with hold parameters
continue Lisinopril
resume Lisinopril when LADI fully resolved
Hold Lisinopril for LADI
continue Lisinopril
at baseline  will continue to monitor   no intervention required at this time

## 2021-07-07 NOTE — PROGRESS NOTE ADULT - SUBJECTIVE AND OBJECTIVE BOX
Pt has been doing OK, has renal issues, had renal sono yesterday. Denies any fevers, chills, pain, bleeding, rash and a detailed ROS unremarkable.       Meds:  acetaminophen   Tablet .. 650 milliGRAM(s) Oral every 4 hours PRN  amoxicillin  875 milliGRAM(s)/clavulanate 1 Tablet(s) Oral two times a day  cyanocobalamin 1000 MICROGram(s) Oral daily  enoxaparin Injectable 40 milliGRAM(s) SubCutaneous daily  lisinopril 2.5 milliGRAM(s) Oral daily  morphine  - Injectable 2 milliGRAM(s) IV Push every 4 hours PRN  testosterone 1% Gel 50 milliGRAM(s) Topical daily      Vital Signs Last 24 Hrs  T(C): 36.7 (07 Jul 2021 06:13), Max: 36.7 (07 Jul 2021 06:13)  T(F): 98 (07 Jul 2021 06:13), Max: 98 (07 Jul 2021 06:13)  HR: 85 (07 Jul 2021 06:13) (84 - 89)  BP: 99/67 (07 Jul 2021 06:13) (99/67 - 133/91)  BP(mean): --  RR: 18 (07 Jul 2021 06:13) (17 - 18)  SpO2: 98% (07 Jul 2021 06:13) (95% - 98%)                          15.0   6.51  )-----------( 181      ( 06 Jul 2021 07:28 )             45.7       07-06    137  |  104  |  13  ----------------------------<  88  4.4   |  21<L>  |  1.28    Ca    9.3      06 Jul 2021 07:28  Phos  3.8     07-06  Mg     2.40     07-06    TPro  6.9  /  Alb  3.1<L>  /  TBili  0.6  /  DBili  <0.2  /  AST  157<H>  /  ALT  239<H>  /  AlkPhos  92  07-06        abdominal sono: FINDINGS:    Liver: Within normal limits. Normal in size and echogenicity without focal lesions.  Bile ducts: Normal caliber. Common bile duct measures 3 mm..  Gallbladder: Within normal limits.  Pancreas: Visualized portions are within normal limits.  Right kidney: 10.8 cm. No hydronephrosis.  Ascites: None.  IVC: Visualized portions are within normal limits.    IMPRESSION:    Normal right upper quadrant abdominal ultrasound.        --- End of Report ---              LUCÍA HAWKINS MD; Attending Radiologist  This document has been electronically signed. Jul 6 2021  3:17PM

## 2021-07-07 NOTE — PROGRESS NOTE ADULT - PROBLEM SELECTOR PLAN 6
outpatient follow up with oncology  no intervention required at this time
resume Lisinopril LADI fully resolved 2.5 po qd with hold parameters
outpatient follow up with oncology  no intervention required at this time

## 2021-07-07 NOTE — PROGRESS NOTE ADULT - ASSESSMENT
70 y.o. M, [place], w/ a hx of HTN, hairy cell leukemia (in remission) who presents with abdominal pain, nausea and fever to 102 of unclear etiology.

## 2021-07-07 NOTE — PROGRESS NOTE ADULT - SUBJECTIVE AND OBJECTIVE BOX
Patient is a 70y old  Male who presents with a chief complaint of Fever, abdominal pain (07 Jul 2021 12:06)      DATE OF SERVICE: 07-07-21 @ 12:36    SUBJECTIVE / OVERNIGHT EVENTS: overnight events noted    ROS:  Resp: No cough no sputum production  CVS: No chest pain no palpitations no orthopnea  GI: no N/V/D  : no dysuria, no hematuria  Neuro: no weakness no paresthesias          MEDICATIONS  (STANDING):  amoxicillin  875 milliGRAM(s)/clavulanate 1 Tablet(s) Oral two times a day  cyanocobalamin 1000 MICROGram(s) Oral daily  enoxaparin Injectable 40 milliGRAM(s) SubCutaneous daily  lisinopril 2.5 milliGRAM(s) Oral daily  testosterone 1% Gel 50 milliGRAM(s) Topical daily    MEDICATIONS  (PRN):  acetaminophen   Tablet .. 650 milliGRAM(s) Oral every 4 hours PRN Mild Pain (1 - 3)  morphine  - Injectable 2 milliGRAM(s) IV Push every 4 hours PRN Moderate Pain (4 - 6)        CAPILLARY BLOOD GLUCOSE        I&O's Summary      Vital Signs Last 24 Hrs  T(C): 36.7 (07 Jul 2021 09:53), Max: 36.7 (07 Jul 2021 06:13)  T(F): 98 (07 Jul 2021 09:53), Max: 98 (07 Jul 2021 06:13)  HR: 78 (07 Jul 2021 09:53) (78 - 89)  BP: 118/71 (07 Jul 2021 09:53) (99/67 - 133/91)  BP(mean): --  RR: 16 (07 Jul 2021 09:53) (16 - 18)  SpO2: 98% (07 Jul 2021 09:53) (98% - 98%)    PHYSICAL EXAM:  NECK: Supple, No JVD  CHEST/LUNG: improved air entry left base  HEART: S1 S2; no murmurs   ABDOMEN: Soft, Nontender  EXTREMITIES:  no edema  NEUROLOGY: AO x 3 non-focal  SKIN: No rashes or lesions    LABS:                        14.7   5.98  )-----------( 187      ( 07 Jul 2021 07:29 )             45.5     07-07    136  |  102  |  16  ----------------------------<  88  4.1   |  21<L>  |  1.34<H>    Ca    8.6      07 Jul 2021 07:29  Phos  3.8     07-06  Mg     2.40     07-06    TPro  6.8  /  Alb  3.0<L>  /  TBili  0.5  /  DBili  x   /  AST  130<H>  /  ALT  227<H>  /  AlkPhos  88  07-07                All consultant(s) notes reviewed and care discussed with other providers        Contact Number, Dr Huang [phone]

## 2021-07-07 NOTE — PROGRESS NOTE ADULT - PROBLEM SELECTOR PLAN 2
resolving   will continue to monitor   likely ATN from sepsis
resolving   will continue to monitor   likely ATN from sepsis
off anticoagulation  CTA neg
off anticoagulation  CTA neg
suspected secondary to abnormal VQ scan  continue IV heparin  d/w pulmonary   CTA prelim negative PE  defer recommendations to  pulmonary
suspected secondary to abnormal VQ scan  continue IV heparin  d/w pulmonary   order CTA r/o PE (d/w pulmonary) now that creatinine is normal   bilateral venous duplex to r/o DVT pending

## 2021-07-07 NOTE — PROGRESS NOTE ADULT - NEGATIVE GASTROINTESTINAL SYMPTOMS
no nausea/no vomiting/no diarrhea/no constipation/no abdominal pain

## 2021-07-07 NOTE — PROGRESS NOTE ADULT - ASSESSMENT
69 y/o M with PMH of HTN, hairy cell leukemia (in remission, last chemo 7 yrs ago per patient), [employer]. Presents with abdominal pain, nausea, and fever (tmax 102F at home), SOB, pleurtic CP x3-4 days. On triage to ED, febrile to 101.2 F, tachycardic 110-120s , O2 sats 96% on room air. Received CTZ, Azithromycin, Zosyn in ED. CT A/P w/o contrast negative for intra-abdominal pathology. Pulmonary called to consult for SOB. On exam, is visibly SOB with minimal exertion, O2 sats 92-93% on room air.     SOB w/ pleuritic chest pain  -On exam pt is visibly SOB at rest and with minimal exertion, O2 sats 92-93% on room air  -F/u CT chest non-contrast   -Do VQ scan and B/L LE duplex for r/o PE/DVT given hx of malignancy, fevers, and tachycardia  -CT A/P 6/28 read as small L pleural effusion w/ adjacent compressive LLL atelectasis - appears more sick clinically than what this shows (?infarct vs PNA) - f/u VQ scan and CT chest results  Fevers  -Tmax 101.2 F in ED with leukocytosis, afebrile now  -CT A/P notes no intraabdominal pathology  -RVP negative  -F/u CT chest  -On ABX   Abdominal pain  -CT A/P notes no intraabdominal pathology  -F/u abdominal US  Hairy cell leukemia  -in remission per patient, s/p tx with chemo (last 7 years ago)      6/30:  SOB w/ pleuritic chest pain: -F CT chest non-contrast -no official report yet?; but to me has small left sided effusion as well as pneumonia and lingular nodule/ opacity: await for official report: but arranged for tap on the left sided diagnostic as he is having lot of pain and is febrile:  VQ scan is intermediate probability: has perfusion defects: on empiric heparin: check dopplers and echo: stopped now in anticipation of tap on left side: will restart after tap   -CT A/P 6/28 read as small L pleural effusion w/ adjacent compressive LLL atelectasis - appears more sick clinically than what this shows (?infarct vs PNA) -as above  Fevers -Tmax 101.2 F in ED with leukocytosis, afebrile now -CT A/P notes no intraabdominal pathology: On ABX   Abdominal pain -CT A/P notes no intraabdominal pathology  Hairy cell leukemia  -in remission per patient, s/p tx with chemo (last 7 years ago)    dw aarin    7/1:    SOB w/ pleuritic chest pain: -s/p tap: complicated exudative parapneumonia effusion: no growth so far: he feels much better now awaiting cta : cont heparin till pe is ruled out on cta   Fevers resolved cont antibiotics id following  await for pleural fluid culture   Abdominal pain -CT A/P notes no intraabdominal pathology  Hairy cell leukemia  -in remission per patient, s/p tx with chemo (last 7 years ago)    dwpmd and np      7/2:    SOB w/ pleuritic chest pain: -s/p tap: complicated exudative parapneumonia effusion: no growth so far: he feels much better now cta done: can not fully exclude pe: await dopplers as well as echo to see the rigth side of the heart   Fevers resolved cont antibiotics id following  await for pleural fluid culture : [id number] is negative too    Abdominal pain -CT A/P notes no intraabdominal pathology  Hairy cell leukemia  -in remission per patient, s/p tx with chemo (last 7 years ago)    dwnp    7/3  SOB w/ pleuritic chest pain - resolving per patient  -CT 6/30 with increasing L pleural effusion  -s/p L Thoracentesis 6/30 with 400 ml output - complicated exudative parapneumonia  -Cytology negative, f/u fluid cultures (no growth so far)  -CTA chest 7/1 with improvement in L pleural effusion, cannot fully exlcude PE  -TTE noted  -F/u dopplers, will reevalaute need for heparin gtt after results  -Comfortable on room air, O2 sats 94%   Fevers   -resolved   -cont antibiotics per ID  -f/u pleural fluid culture   Abdominal pain - resolving   -CT A/P notes no intraabdominal pathology  Hairy cell leukemia  -in remission per patient, s/p tx with chemo (last 7 years ago)    7/4:    SOB w/ pleuritic chest pain - resolving per patient CT 6/30 with increasing L pleural effusion s/p L Thoracentesis 6/30 with 400 ml output - complicated exudative parapneumonia  -Cytology negative, f/u fluid cultures (no growth so far) ?: CTA chest 7/1 with improvement in L pleural effusion, cannot fully exlcude PE  dopplers,: NEGATIVE: CTA IS LIMITED BUT NO MAJOR PE: ECHO IS NOT VERY HELPFUL TO EVALUATE RT SIDE BUT HAS MOD LV DYSFUCNTION!? CARDS TO SEE-DC HEPARIN Comfortable on room air, O2 sats 94%   Fevers  :resolved  :cont antibiotics per ID  pleural fluid cultureS ARE NEAGTIVE SO FAR   Abdominal pain - RESOLVED: CT A/P notes no intraabdominal pathology  Hairy cell leukemia  -in remission per patient, s/p tx with chemo (last 7 years ago)  DW ACP    7/5:    SOB w/ pleuritic chest pain - resolving per patient CT 6/30 with increasing L pleural effusion s/p L Thoracentesis 6/30 with 400 ml output - complicated exudative parapneumonia: cta with no PE   -Cytology negative, f/u fluid cultures (no growth so far) ?: CTA chest 7/1 with improvement in L pleural effusion, cannot fully exclude PE- his vq scan with intermmediate probability:   dopplers,: NEGATIVE: CTA IS LIMITED BUT NO MAJOR PE: ECHO IS NOT VERY HELPFUL TO EVALUATE RT SIDE BUT HAS MOD LV DYSFUNCTION CARDS TO SEE-DC HEPARIN Comfortable on room air, O2 sats 94%   Fevers  :resolved  :cont antibiotics per ID  pleural fluid cultures ARE NEGATIVE SO FAR   Abdominal pain - RESOLVED: CT A/P notes no intraabdominal pathology  Hairy cell leukemia  -in remission per patient, s/p tx with chemo (last 7 years ago)  DW ACP: seems to be doing pretty good: check ambulatory sao2:     7/6:    SOB w/ pleuritic chest pain - resolving per patient CT 6/30 with increasing L pleural effusion s/p L Thoracentesis 6/30 with 400 ml output - complicated exudative parapneumonia: cta with no PE   -Cytology negative, f/u fluid cultures (no growth so far) ?: CTA chest 7/1 with improvement in L pleural effusion, cannot fully exclude PE- his vq scan with intermediate probability:   dopplers,: NEGATIVE: CTA IS LIMITED BUT NO MAJOR PE: ECHO IS NOT VERY HELPFUL TO EVALUATE RT SIDE BUT HAS MOD LV DYSFUNCTION CARDS TO SEE-DC HEPARIN Comfortable on room air, O2 sats 94% : cont medical therpay for lv dysfunction per cards  Fevers  :resolved  :cont antibiotics per ID  pleural fluid cultures ARE NEGATIVE SO FAR   Abdominal pain - RESOLVED: CT A/P notes no intraabdominal pathology  Hairy cell leukemia  -in remission per patient, s/p tx with chemo (last 7 years ago)  DW ACP: seems to be doing pretty good: check ambulatory sao2:     dc planning: on oral antibiotics    7/7:      SOB w/ pleuritic chest pain - resolving per patient CT 6/30 with increasing L pleural effusion s/p L Thoracentesis 6/30 with 400 ml output - complicated exudative parapneumonia: cta with no PE   -Cytology negative, f/u fluid cultures (no growth so far) ?: CTA chest 7/1 with improvement in L pleural effusion, cannot fully exclude PE- his vq scan with intermediate probability:   dopplers,: NEGATIVE: CTA IS LIMITED BUT NO MAJOR PE: ECHO IS NOT VERY HELPFUL TO EVALUATE RT SIDE BUT HAS MOD LV DYSFUNCTION CARDS TO SEE-DCed HEPARIN Comfortable on room air, O2 sats 94% : cont medical therpay for lv dysfunction per cards  Fevers  :resolved  :cont antibiotics per ID  pleural fluid cultures ARE NEGATIVE SO FAR   Abdominal pain - RESOLVED: CT A/P notes no intraabdominal pathology  Hairy cell leukemia in remission per patient, s/p tx with chemo (last 7 years ago)  dc planning: on oral antibiotics  dw pt

## 2021-07-07 NOTE — PROGRESS NOTE ADULT - PROBLEM SELECTOR PROBLEM 2
LADI (acute kidney injury)
Pulmonary embolism
LADI (acute kidney injury)

## 2021-07-07 NOTE — PROGRESS NOTE ADULT - PROBLEM SELECTOR PROBLEM 6
Hairy cell leukemia
Benign essential HTN
Hairy cell leukemia

## 2021-07-07 NOTE — PROGRESS NOTE ADULT - NSPROGADDITIONALINFOA_GEN_ALL_CORE
abnormal LFTs likely secondary to resolving sepsis  they are slowly improving  repeat US no pathology    stable for discharge with outpatient follow up LFTs with PCP and oncology     discussed with patient in detail, expresses understanding of treatment plans.  discussed with covering ACP
discussed with patient in detail, expresses understanding of treatment plans.
discussed with patient in detail, expresses understanding of treatment plans.   discussed with pulmonary attending
discussed with patient in detail, expresses understanding of treatment plans.  discussed with covering ACP
discussed with patient in detail, expresses understanding of treatment plans.  discussed with pulmonary
discussed with patient in detail, expresses understanding of treatment plans.  discussed with covering ACP

## 2021-07-07 NOTE — PROGRESS NOTE ADULT - NEGATIVE GENERAL SYMPTOMS
no fever/no chills/no anorexia/no fatigue
no fever/no chills/no anorexia/no weight loss/no fatigue
no fever/no chills/no malaise/no fatigue

## 2021-07-07 NOTE — PROGRESS NOTE ADULT - PROBLEM SELECTOR PROBLEM 4
Thrombocytopenia
Thrombocytopenia
Chest tightness
Thrombocytopenia

## 2021-07-07 NOTE — PROGRESS NOTE ADULT - PROBLEM SELECTOR PROBLEM 5
Benign essential HTN
Thrombocytopenia
Benign essential HTN

## 2021-07-07 NOTE — PROGRESS NOTE ADULT - PROBLEM SELECTOR PLAN 7
discussed with patient   remains FULL CODE
outpatient follow up with oncology  no intervention required at this time

## 2021-07-07 NOTE — PROGRESS NOTE ADULT - PROBLEM SELECTOR PLAN 4
at baseline  will continue to monitor   no intervention required at this time
musculoskeletal  no intervention required at this time

## 2021-07-07 NOTE — PROGRESS NOTE ADULT - PROBLEM SELECTOR PLAN 3
now resolved   likely ATN from sepsis on admission
likely all musculoskeletal  no intervention required at this time
resolved   will continue to monitor   likely ATN from sepsis on admission
resolved   will continue to monitor   likely ATN from sepsis on admission
appears musculoskeletal
now resolved   likely ATN from sepsis on admission

## 2021-07-07 NOTE — PROGRESS NOTE ADULT - ASSESSMENT
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

## 2021-07-07 NOTE — PROGRESS NOTE ADULT - TIME BILLING
Agree with above NP note.  70 year old man with HTN, hairy cell leukemia (in remission) who presents with abdominal pain, nausea and fever to 102 of unclear etiology.   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 stress induced in setting of infection  -continue medical therapy for cmp for now   -c/w lisinopril, eventually add low dose toprol if sys bp remains stable   -will defer ischemic eval for now in setting of pna, parapneumonic effusion  -once recovered from medical issues will need ischemic eval as an outpt   dvt ppx
Agree with above NP note.  CV stable  Continue medical therapy for cmp for now   Will defer ischemic eval for now in setting of pna, parapneumonic effusion  Once recovered from medical issues will need ischemic eval as an outpt   dvt ppx
Agree with above NP note.  CV stable  clinically improbed  Continue medical therapy for cmp for now   Will defer ischemic eval for now in setting of pna, parapneumonic effusion  Once recovered from medical issues will need ischemic eval as an outpt w Dr Elaine  dvt ppx
risks and benefits of the procedure, alternative procedures as well as further management plan. Complex patient requiring services. Services provided were separate in additional from general pulmonary services due to critical nature of patient and interventions requires. Time spent separate from time spent doing any procedures.

## 2021-07-07 NOTE — PROGRESS NOTE ADULT - PROBLEM SELECTOR PROBLEM 7
Goals of care, counseling/discussion
Hairy cell leukemia

## 2021-07-07 NOTE — PROGRESS NOTE ADULT - NSICDXPILOT_GEN_ALL_CORE
[place]
[employer]

## 2021-07-07 NOTE — PROGRESS NOTE ADULT - RS GEN PE MLT RESP DETAILS PC
breath sounds equal/no rales/no rhonchi

## 2021-07-07 NOTE — PROGRESS NOTE ADULT - SUBJECTIVE AND OBJECTIVE BOX
Date of Service: 07-07-21 @ 15:04    Patient is a 70y old  Male who presents with a chief complaint of Fever, abdominal pain (07 Jul 2021 12:36)      Any change in ROS: he is doing pretty good: He is not SOB ; on room air: no chest pain :       MEDICATIONS  (STANDING):  amoxicillin  875 milliGRAM(s)/clavulanate 1 Tablet(s) Oral two times a day  cyanocobalamin 1000 MICROGram(s) Oral daily  enoxaparin Injectable 40 milliGRAM(s) SubCutaneous daily  lisinopril 2.5 milliGRAM(s) Oral daily  testosterone 1% Gel 50 milliGRAM(s) Topical daily    MEDICATIONS  (PRN):  acetaminophen   Tablet .. 650 milliGRAM(s) Oral every 4 hours PRN Mild Pain (1 - 3)  morphine  - Injectable 2 milliGRAM(s) IV Push every 4 hours PRN Moderate Pain (4 - 6)    Vital Signs Last 24 Hrs  T(C): 36.8 (07 Jul 2021 13:34), Max: 36.8 (07 Jul 2021 13:34)  T(F): 98.3 (07 Jul 2021 13:34), Max: 98.3 (07 Jul 2021 13:34)  HR: 88 (07 Jul 2021 13:34) (78 - 89)  BP: 120/70 (07 Jul 2021 13:34) (99/67 - 133/91)  BP(mean): --  RR: 17 (07 Jul 2021 13:34) (16 - 18)  SpO2: 98% (07 Jul 2021 13:34) (98% - 98%)    I&O's Summary        Physical Exam:   GENERAL:Obese+  HEENT: NUNU/   Atraumatic, Normocephalic  ENMT: No tonsillar erythema, exudates, or enlargement; Moist mucous membranes, Good dentition, No lesions  NECK: Supple, No JVD, Normal thyroid  CHEST/LUNG: -  CVS: Regular rate and rhythm; No murmurs, rubs, or gallops  GI: : Soft, Nontender, Nondistended; Bowel sounds present  NERVOUS SYSTEM:  Alert & Oriented X3  EXTREMITIES:  2+ Peripheral Pulses, No clubbing, cyanosis, or edema  LYMPH: No lymphadenopathy noted  SKIN: No rashes or lesions  ENDOCRINOLOGY: No Thyromegaly  PSYCH: Appropriate    Labs:                              14.7   5.98  )-----------( 187      ( 07 Jul 2021 07:29 )             45.5                         15.0   6.51  )-----------( 181      ( 06 Jul 2021 07:28 )             45.7                         14.6   5.85  )-----------( 153      ( 05 Jul 2021 06:42 )             45.0                         13.9   5.88  )-----------( 150      ( 04 Jul 2021 03:18 )             43.9     07-07    136  |  102  |  16  ----------------------------<  88  4.1   |  21<L>  |  1.34<H>  07-06    137  |  104  |  13  ----------------------------<  88  4.4   |  21<L>  |  1.28  07-05    136  |  103  |  13  ----------------------------<  90  4.0   |  22  |  1.32<H>  07-04    134<L>  |  99  |  13  ----------------------------<  90  3.8   |  21<L>  |  1.37<H>    Ca    8.6      07 Jul 2021 07:29  Ca    9.3      06 Jul 2021 07:28  Phos  3.8     07-06  Mg     2.40     07-06    TPro  6.8  /  Alb  3.0<L>  /  TBili  0.5  /  DBili  x   /  AST  130<H>  /  ALT  227<H>  /  AlkPhos  88  07-07  TPro  6.9  /  Alb  3.1<L>  /  TBili  0.6  /  DBili  <0.2  /  AST  157<H>  /  ALT  239<H>  /  AlkPhos  92  07-06    CAPILLARY BLOOD GLUCOSE         (06-30 @ 18:40)    LIVER FUNCTIONS - ( 07 Jul 2021 07:29 )  Alb: 3.0 g/dL / Pro: 6.8 g/dL / ALK PHOS: 88 U/L / ALT: 227 U/L / AST: 130 U/L / GGT: x               Fluid Source --  Albumin, Fluid--  Glucose, Fluid--  Protein total, Fluid--  Lacatate Dehydrogenase, Fluid--  pH, Fluid7.6  Cytopathology-Non Gyn Report--  Fluid Source PLEURAL  Albumin, Fluid2.2 g/dL  Glucose, Fluid77 mg/dL  Protein total, Fluid3.5 g/dL  Lacatate Dehydrogenase, [id number] U/L  pH, Fluid--  Cytopathology-Non Gyn Report--  Fluid Source --  Albumin, Fluid--  Glucose, Fluid--  Protein total, Fluid--  Lacatate Dehydrogenase, Fluid--  pH, Fluid--  Cytopathology-Non Gyn Report  ACCESSION No:  [id number]    ANNE GRISSOM                          1        Cytopathology Report            Specimen(s) Submitted  PLEURAL FLUID, LEFT      Clinical History  70 year old male  Left pleural effusion.      Gross Description  Received: 60 ml of pinkish fluid in CytoLyt  Prepared: 1 ThinPrep slide, 1 cell block , 1 smear      Final Diagnosis  PLEURAL FLUID, LEFT  NEGATIVE FOR MALIGNANT CELLS.    Cytology slides and cell block section shows many benign and  reactive mesothelial cells, histiocytes, abundant mixed  inflammation and fibrin  material.  Clinical and radiological correlation is essential.    Screened by: Craig Mims SCT(ASCP)  Verified by: Ayad Hand M.D.  (Electronic Signature)  Reported on: 07/02/21 14:03 EDT, [address] [place] [address]  Phone: [phone]   Fax: [phone]  Cytology technical processing performed at [address] [place] [address]  _________________________________________________________________        RECENT CULTURES:  06-30 @ 22:58 .Body Fluid Pleural Fluid                Testing in progress    06-30 @ 20:35 .Body Fluid Pleural Fluid       polymorphonuclear leukocytes seen  No organisms seen  by cytocentrifuge    r< from: US Abdomen Upper Quadrant Right (07.06.21 @ 14:49) >    EXAM:  US ABDOMEN RT UPR QUADRANT        PROCEDURE DATE:  Jul 6 2021         INTERPRETATION:  CLINICAL INFORMATION: Abnormal liver function tests.    COMPARISON: Abdominal ultrasound dated 06/29/2021, chest CT dated 07/01/2021. Abdominal CT dated 06/20/2021.    TECHNIQUE: Sonography of the right upper quadrant.    FINDINGS:    Liver: Within normal limits. Normal in size and echogenicity without focal lesions.  Bile ducts: Normal caliber. Common bile duct measures 3 mm..  Gallbladder: Within normal limits.  Pancreas: Visualized portions are within normal limits.  Right kidney: 10.8 cm. No hydronephrosis.  Ascites: None.  IVC: Visualized portions are within normal limits.    IMPRESSION:    Normal right upper quadrant abdominal ultrasound.        --- End of Report ---              LUCÍA HAWKINS MD; Attending Radiologist  This document has been electronically signed. Jul 6 2021  3:17PM    < end of copied text >         No growth at 5 days          RESPIRATORY CULTURES:          Studies  Chest X-RAY  CT SCAN Chest   Venous Dopplers: LE:   CT Abdomen  Others

## 2021-07-08 PROBLEM — Z86.2 PERSONAL HISTORY OF DISEASES OF THE BLOOD AND BLOOD-FORMING ORGANS AND CERTAIN DISORDERS INVOLVING THE IMMUNE MECHANISM: Chronic | Status: ACTIVE | Noted: 2021-06-28

## 2021-07-08 NOTE — DISCHARGE NOTE NURSING/CASE MANAGEMENT/SOCIAL WORK - NSCORESITESY/N_GEN_A_CORE_RD
Hpi Title: Evaluation of a Skin Lesion No How Severe Are Your Spot(S)?: mild Have Your Spot(S) Been Treated In The Past?: has not been treated

## 2021-07-13 ENCOUNTER — APPOINTMENT (OUTPATIENT)
Dept: CARE COORDINATION | Facility: HOME HEALTH | Age: 71
End: 2021-07-13
Payer: MEDICARE

## 2021-07-13 ENCOUNTER — NON-APPOINTMENT (OUTPATIENT)
Age: 71
End: 2021-07-13

## 2021-07-13 DIAGNOSIS — N17.9 ACUTE KIDNEY FAILURE, UNSPECIFIED: ICD-10-CM

## 2021-07-13 DIAGNOSIS — I10 ESSENTIAL (PRIMARY) HYPERTENSION: ICD-10-CM

## 2021-07-13 PROCEDURE — 99348 HOME/RES VST EST LOW MDM 30: CPT | Mod: 95

## 2021-07-13 NOTE — HISTORY OF PRESENT ILLNESS
[Home] : at home, [unfilled] , at the time of the visit. [Other Location: e.g. Home (Enter Location, City,State)___] : at [unfilled] [Verbal consent obtained from patient] : the patient, [unfilled] [FreeTextEntry1] : f/u hospitalization for PNA [de-identified] : 69 y/o M with PMH of HTN, hairy cell leukemia (in remission, last chemo 7 yrs ago per patient), [employer]. Presents with abdominal pain, nausea, and fever (tmax 102F at home), SOB, pleurtic CP x3-4 days. Patient admitted for PNA s/p L Thoracentesis 6/30 with 400 ml output - complicated exudative parapneumonia. cta with no PE, dopplers negative. \par VQ scan w/mismatch, placed on heparin drip empirically pending workup. CTA No PE to the lobar arteries. Unable to evaluate more distal branches, dopplers negative for DVT. Heparin drip discontinued. Cardiology consulted for LV dysfunction- likely 2/2 infection to f/u outpatient. Patient with LADI-resolved, which is likely ATN from sepsis on admission.  Thromboyctopenia-at baseline. D/c home with [facility] initially, however patient refused services.\par PNA: denies any worsening s/s, compliant with medications, ambulates as leanne, pulm appt TBD\par HTN: denies any worsening s/s. compliant with regimen/meds, seen pcp yesterday-ekg and bw completed, cardio appt tbd\par hx of hairy cell leukemia/thrombocytopenia- stable, patient to see heme tomorrow for follow up\par LADI: stable, patient to make appt with nephrology\par

## 2021-07-13 NOTE — REVIEW OF SYSTEMS
[Negative] : Constitutional [FreeTextEntry5] : see hpi [FreeTextEntry6] : see hpi [de-identified] : see hpi

## 2021-07-20 ENCOUNTER — TRANSCRIPTION ENCOUNTER (OUTPATIENT)
Age: 71
End: 2021-07-20

## 2021-07-20 ENCOUNTER — APPOINTMENT (OUTPATIENT)
Dept: PULMONOLOGY | Facility: CLINIC | Age: 71
End: 2021-07-20
Payer: MEDICARE

## 2021-07-20 VITALS
SYSTOLIC BLOOD PRESSURE: 131 MMHG | HEART RATE: 70 BPM | OXYGEN SATURATION: 95 % | DIASTOLIC BLOOD PRESSURE: 84 MMHG | TEMPERATURE: 98 F

## 2021-07-20 PROCEDURE — 99205 OFFICE O/P NEW HI 60 MIN: CPT | Mod: 25

## 2021-07-20 PROCEDURE — 71046 X-RAY EXAM CHEST 2 VIEWS: CPT

## 2021-07-21 ENCOUNTER — APPOINTMENT (OUTPATIENT)
Dept: PULMONOLOGY | Facility: CLINIC | Age: 71
End: 2021-07-21

## 2021-07-21 NOTE — DISCUSSION/SUMMARY
[FreeTextEntry1] : Status post hospitalization for possible pneumonitis.\par Cannot exclude empyema.  Pleural fluid cytologically negative.  Do not see cultures.\par Likely immunocompromise.\par Recurrent pneumonia.

## 2021-07-21 NOTE — PHYSICAL EXAM
[No Acute Distress] : no acute distress [Supple] : supple [No JVD] : no jvd [Normal S1, S2] : normal s1, s2 [No Murmurs] : no murmurs [Normal to Percussion] : normal to percussion [No Abnormalities] : no abnormalities [Benign] : benign [Not Tender] : not tender [No HSM] : no hsm [No Clubbing] : no clubbing [No Cyanosis] : no cyanosis [No Edema] : no edema [TextBox_68] : Mild decreased breath sounds at the left base.  Rare crackle.  No active wheezing.

## 2021-07-21 NOTE — HISTORY OF PRESENT ILLNESS
[Former] : former [TextBox_4] : ANNE GRISSOM is a 70 year old  M referred for pulmonary evaluation for recurrent pneumonia\par \par Admitted 6/28/21-7/7/21 for pneumonia and fluid in lungs. Had fever to 102 and SOB.\par Some abdominal pain as well.\par Discharged on Augmentin completed yesterday.\par \par Presently feeling much better.\par No cough at present. Denies significant SOB.\par Denies CP\par \par Past pulmonary history. Pneumonia x 3\par Occupational Exposure. N\par Family history of pulmonary disease.N\par Recent travel N\par Pets N\par \par Had pneumonia vaccines [TextBox_11] : 2 cigs [TextBox_13] : 5 [YearQuit] : 1973

## 2021-07-21 NOTE — PROCEDURE
[FreeTextEntry1] : Patient had thoracentesis in the hospital with negative cytology.\par \par Hospital record reviewed.  Fluid culture is negative.\par Pleural fluid exudative.\par  \par Report date: 7/2/2021 \par  \par  View Order\par \par \par (Report matches study selected on Patient History pane)\par \par \par  Recent WBC   5.6\par \par \par EXAM: CT ANGIO CHEST PULM ART WAWIC\par \par \par PROCEDURE DATE: Jul 1 2021\par \par \par \par INTERPRETATION: CLINICAL INFORMATION: Sepsis, fever, rule out pulmonary embolism, abnormal lung perfusion scan\par \par COMPARISON: CT chest 6/30/2021.\par \par CONTRAST/COMPLICATIONS:\par IV Contrast: Omnipaque 350 150 cc administered 50 cc discarded\par Oral Contrast: NONE\par Complications: None reported at time of study completion\par \par PROCEDURE:\par CT of the Chest was performed.\par Sagittal and coronal reformats were performed.\par \par FINDINGS:\par \par LUNGS, PLEURA, and AIRWAYS: Patent central airways. Decreased small left pleural effusion and improved compressive atelectasis. Unchanged small nodule in the RUL, likely benign.\par MEDIASTINUM AND MATTY: No lymphadenopathy.\par VESSELS: Poor opacification of the pulmonary arteries limits evaluation. No pulmonary embolism to the lobar arteries. Limited evaluation of the subsegmental arteries. Aortic calcification.\par HEART: Heart size is normal. No pericardial effusion. Coronary artery calcification.\par CHEST WALL AND LOWER NECK: Within normal limits.\par VISUALIZED UPPER ABDOMEN: Partially imaged splenomegaly.\par BONES: Degenerative changes of the spine.\par \par IMPRESSION:\par No pulmonary embolism to the lobar arteries. Unable to evaluate more distal branches.\par \par Decreased small left pleural effusion and improved compressive atelectasis.\par \par \par ROSALINDA LAU MD; Resident Radiologist\par This document has been electronically signed.\par KENDY SWAIN M.D., ATTENDING RADIOGIST\par This document has been electronically signed. Jul 2 2021 11:15AM

## 2021-07-21 NOTE — CONSULT LETTER
[Dear  ___] : Dear ~GABBY, [Consult Letter:] : I had the pleasure of evaluating your patient, [unfilled]. [Consult Closing:] : Thank you very much for allowing me to participate in the care of this patient.  If you have any questions, please do not hesitate to contact me. [Sincerely,] : Sincerely, [FreeTextEntry2] : Anurag Fragoso MD\par  [FreeTextEntry3] : Schuyler Fernandez MD FCCP\par

## 2021-07-21 NOTE — ASSESSMENT
[FreeTextEntry1] : Close observation.\par CT of the chest with high resolution images.\par We will attempt to follow-up on fluid cultures.\par Follow-up in few weeks or sooner on a as needed basis.

## 2021-07-29 ENCOUNTER — APPOINTMENT (OUTPATIENT)
Dept: OTOLARYNGOLOGY | Facility: CLINIC | Age: 71
End: 2021-07-29
Payer: MEDICARE

## 2021-07-29 VITALS
HEIGHT: 67 IN | SYSTOLIC BLOOD PRESSURE: 148 MMHG | HEART RATE: 65 BPM | WEIGHT: 235 LBS | BODY MASS INDEX: 36.88 KG/M2 | DIASTOLIC BLOOD PRESSURE: 85 MMHG

## 2021-07-29 DIAGNOSIS — D69.6 THROMBOCYTOPENIA, UNSPECIFIED: ICD-10-CM

## 2021-07-29 PROCEDURE — 99214 OFFICE O/P EST MOD 30 MIN: CPT | Mod: 25

## 2021-07-29 PROCEDURE — 31231 NASAL ENDOSCOPY DX: CPT

## 2021-07-29 NOTE — HISTORY OF PRESENT ILLNESS
[de-identified] : 70 year old male follow up for chronic pansinusitis, associated symptoms of nasal congestion. Patient finished course of antibiotics and has been using the nasal saline irrigation intermittent.  Was started on irrigations of gentamicin which seemed to help the situation.  However he then developed a pneumonia and had to be admitted to the hospital at that time was found to have fluid in his lungs and this was aspirated at that time while he was in the hospital.  He has a history of leukocytoclastic vasculitis and was also there was some concern in the hospital about the possible blood clot.  He was discharged from the hospital on July 10 and presented today for repeat evaluation to see how his sinuses look since he has not been here since April

## 2021-07-29 NOTE — PHYSICAL EXAM
[Nasal Endoscopy Performed] : nasal endoscopy was performed, see procedure section for findings [Normal] : mucosa is normal [de-identified] : Crusting more on right than left,  no significant pus seen but the lateral walls appear erythematous

## 2021-07-29 NOTE — REASON FOR VISIT
[Subsequent Evaluation] : a subsequent evaluation for [FreeTextEntry2] : Follow up for pansinusitis.

## 2021-07-29 NOTE — PROCEDURE
[FreeTextEntry6] : Nasal Endoscopy (71415)\par \par After informed verbal consent, nasal endoscopy was performed due to anterior rhinoscopy insufficient to account for symptoms. Flexible , scope # [] was used.  Topical vasoconstrictor and anesthetic was used.  ]. \par \par The nasal endoscope is passed via the right nasal cavity. The inferior, middle, and superior turbinates responded to vasoconstrictors. The inferior, middle and superior meati were examined. The patient partially resected turbinates with crusting in the nose no significant  purulence was seen.  . The choana is patent. \par \par The nasal endoscope is passed via the left nasal cavity. The inferior, middle, and superior turbinates responded to vasoconstrictors. The inferior, middle and superior meati were examined. The osteomeatal complex showed signs of previous surgery no purulence was noted for polyps. The sphenoethmoidal recess is clear with no polyposis or purulence. The choana is patent.  \par \par There is []% obstruction of the nasopharynx with adenoid tissue.\par \par

## 2021-07-31 LAB
CULTURE RESULTS: SIGNIFICANT CHANGE UP
SPECIMEN SOURCE: SIGNIFICANT CHANGE UP

## 2021-08-04 ENCOUNTER — APPOINTMENT (OUTPATIENT)
Dept: PULMONOLOGY | Facility: CLINIC | Age: 71
End: 2021-08-04
Payer: MEDICARE

## 2021-08-04 VITALS
DIASTOLIC BLOOD PRESSURE: 84 MMHG | HEART RATE: 74 BPM | OXYGEN SATURATION: 96 % | SYSTOLIC BLOOD PRESSURE: 136 MMHG | TEMPERATURE: 98 F

## 2021-08-04 PROCEDURE — 71046 X-RAY EXAM CHEST 2 VIEWS: CPT

## 2021-08-04 PROCEDURE — 99214 OFFICE O/P EST MOD 30 MIN: CPT | Mod: 25

## 2021-08-04 RX ORDER — AMOXICILLIN AND CLAVULANATE POTASSIUM 875; 125 MG/1; MG/1
875-125 TABLET, COATED ORAL
Qty: 20 | Refills: 0 | Status: DISCONTINUED | COMMUNITY
Start: 2021-07-13 | End: 2021-08-04

## 2021-08-04 NOTE — ASSESSMENT
[FreeTextEntry1] : Repeat CT Scan prior to f/u 2 months\par Follow-up sooner should there be any recurrence of respiratory symptoms.\par \par

## 2021-08-04 NOTE — HISTORY OF PRESENT ILLNESS
[Former] : former [TextBox_4] : Patient feeling significantly improved.  Feels respiratory status is essentially baseline.\par Denies significant cough wheezing chest congestion mucus production chest pain or shortness of breath. [TextBox_11] : 2 cigs [TextBox_13] : 5 [YearQuit] : 1973

## 2021-08-04 NOTE — DISCUSSION/SUMMARY
[FreeTextEntry1] : Status post hospitalization for possible pneumonitis.\par Cannot exclude empyema versus parapneumonic effusion.  Pleural fluid cytologically negative.  Do not see cultures.\par Likely immunocompromised\par Recurrent pneumonia.\par Clinically and radiographically improved.

## 2021-09-15 ENCOUNTER — APPOINTMENT (OUTPATIENT)
Dept: OTOLARYNGOLOGY | Facility: CLINIC | Age: 71
End: 2021-09-15
Payer: MEDICARE

## 2021-09-15 VITALS
BODY MASS INDEX: 36.88 KG/M2 | DIASTOLIC BLOOD PRESSURE: 90 MMHG | HEIGHT: 67 IN | WEIGHT: 235 LBS | HEART RATE: 69 BPM | SYSTOLIC BLOOD PRESSURE: 141 MMHG

## 2021-09-15 PROCEDURE — 99214 OFFICE O/P EST MOD 30 MIN: CPT | Mod: 25

## 2021-09-15 PROCEDURE — 31231 NASAL ENDOSCOPY DX: CPT

## 2021-09-15 NOTE — HISTORY OF PRESENT ILLNESS
[de-identified] : 71 year old male internal referral by Dr Rice  for chronic pansinusitis. \par Hx hairy cell leukemia s/p chemo treatment-- now in remission\par Developed leukocytoclastic vasculitis-- says he is looking for new rheumatologist\par Longstanding CRS symptoms\par Hx prior ESS approx 10yr ago in [place] with Dr. Interiano\par Reports mild intermittent nasal congestion, often with discolored discharge from nose\par Denies facial pain/pressure, post nasal drip \par Sense of smell is good \par Recently hospitalized in July at [facility] for PNA (10d)\par Preceding his PNA had a longstanding sinus infection that was resistant to multiple rounds on PO abx but then improved on IV in hospital\par Uses nasal saline\par \par PMH: HTN, hair cell leukemia, vasculitis, PNA

## 2021-09-15 NOTE — REASON FOR VISIT
[Initial Consultation] : an initial consultation for [FreeTextEntry2] : internal referral by Dr Rice for chronic pansinusitis

## 2021-09-16 ENCOUNTER — OUTPATIENT (OUTPATIENT)
Dept: OUTPATIENT SERVICES | Facility: HOSPITAL | Age: 71
LOS: 1 days | End: 2021-09-16
Payer: MEDICARE

## 2021-09-16 ENCOUNTER — APPOINTMENT (OUTPATIENT)
Dept: CT IMAGING | Facility: IMAGING CENTER | Age: 71
End: 2021-09-16
Payer: MEDICARE

## 2021-09-16 DIAGNOSIS — J18.9 PNEUMONIA, UNSPECIFIED ORGANISM: ICD-10-CM

## 2021-09-16 PROCEDURE — 71250 CT THORAX DX C-: CPT

## 2021-09-16 PROCEDURE — 71250 CT THORAX DX C-: CPT | Mod: 26,MH

## 2021-10-01 ENCOUNTER — APPOINTMENT (OUTPATIENT)
Dept: PULMONOLOGY | Facility: CLINIC | Age: 71
End: 2021-10-01

## 2021-10-01 DIAGNOSIS — Z01.812 ENCOUNTER FOR PREPROCEDURAL LABORATORY EXAMINATION: ICD-10-CM

## 2021-10-03 LAB — SARS-COV-2 N GENE NPH QL NAA+PROBE: NOT DETECTED

## 2021-10-06 ENCOUNTER — APPOINTMENT (OUTPATIENT)
Dept: PULMONOLOGY | Facility: CLINIC | Age: 71
End: 2021-10-06
Payer: MEDICARE

## 2021-10-06 VITALS
DIASTOLIC BLOOD PRESSURE: 96 MMHG | HEART RATE: 76 BPM | BODY MASS INDEX: 36.88 KG/M2 | WEIGHT: 235 LBS | SYSTOLIC BLOOD PRESSURE: 145 MMHG | TEMPERATURE: 97.9 F | HEIGHT: 67 IN | OXYGEN SATURATION: 94 %

## 2021-10-06 DIAGNOSIS — J90 PLEURAL EFFUSION, NOT ELSEWHERE CLASSIFIED: ICD-10-CM

## 2021-10-06 DIAGNOSIS — J18.9 PNEUMONIA, UNSPECIFIED ORGANISM: ICD-10-CM

## 2021-10-06 PROBLEM — I10 ESSENTIAL HYPERTENSION: Status: ACTIVE | Noted: 2021-07-13

## 2021-10-06 PROCEDURE — 94010 BREATHING CAPACITY TEST: CPT

## 2021-10-06 PROCEDURE — 99213 OFFICE O/P EST LOW 20 MIN: CPT | Mod: 25

## 2021-10-06 PROCEDURE — 94726 PLETHYSMOGRAPHY LUNG VOLUMES: CPT

## 2021-10-06 PROCEDURE — ZZZZZ: CPT

## 2021-10-06 PROCEDURE — 94729 DIFFUSING CAPACITY: CPT

## 2021-10-06 NOTE — PROCEDURE
[FreeTextEntry1] : \par Ct chest 9/16 2021 reviewed and discussed with pt\par \par 10/06/2021\par Pulmonary function testing\par FEV1, FVC, and FEV1/FVC are within normal limits. TLC and subdivisions are normal. RV/TLC ratio is normal. Resistance and specific conductance are normal. Single breath diffusion capacity is normal. \par

## 2021-10-06 NOTE — REASON FOR VISIT
[Follow-Up] : a follow-up visit [Abnormal CXR/ Chest CT] : an abnormal CXR/ chest CT [Pneumonia] : pneumonia

## 2021-10-06 NOTE — HISTORY OF PRESENT ILLNESS
[Former] : former [TextBox_4] : Patient feeling significantly improved.  Feels respiratory status is essentially baseline.\par Denies significant cough wheezing chest congestion mucus production chest pain or shortness of breath.\par Had ct chest\par Here for Pft\par \par has a infection inside his nose, seeing ent going for ct of sinus\par \par does not get flu shot , gets bad reaction\par \par Has had recc. pneumonia. Has been told is immunocompromised. \par Did see allergy immunology and seeing Heme/Onc.\par \par  [TextBox_11] : 2 cigs [TextBox_13] : 5 [YearQuit] : 1973

## 2021-10-06 NOTE — DISCUSSION/SUMMARY
[FreeTextEntry1] : Status post hospitalization for possible pneumonitis.\par Likely immunocompromised\par Recurrent pneumonia.  Recent pneumonia resolved.  Normal lung function.\par Clinically and radiographically improved.

## 2021-10-06 NOTE — PHYSICAL EXAM
[No Acute Distress] : no acute distress [Supple] : supple [No JVD] : no jvd [Normal S1, S2] : normal s1, s2 [No Murmurs] : no murmurs [Normal to Percussion] : normal to percussion [No Abnormalities] : no abnormalities [Not Tender] : not tender [Benign] : benign [No HSM] : no hsm [No Clubbing] : no clubbing [No Cyanosis] : no cyanosis [No Edema] : no edema [Clear to Auscultation Bilaterally] : clear to auscultation bilaterally

## 2021-10-26 ENCOUNTER — APPOINTMENT (OUTPATIENT)
Dept: CT IMAGING | Facility: IMAGING CENTER | Age: 71
End: 2021-10-26
Payer: MEDICARE

## 2021-10-26 ENCOUNTER — OUTPATIENT (OUTPATIENT)
Dept: OUTPATIENT SERVICES | Facility: HOSPITAL | Age: 71
LOS: 1 days | End: 2021-10-26
Payer: MEDICARE

## 2021-10-26 DIAGNOSIS — M31.0 HYPERSENSITIVITY ANGIITIS: ICD-10-CM

## 2021-10-26 PROCEDURE — 70486 CT MAXILLOFACIAL W/O DYE: CPT

## 2021-10-26 PROCEDURE — 70486 CT MAXILLOFACIAL W/O DYE: CPT | Mod: 26,MH

## 2021-11-04 ENCOUNTER — APPOINTMENT (OUTPATIENT)
Dept: OTOLARYNGOLOGY | Facility: CLINIC | Age: 71
End: 2021-11-04
Payer: MEDICARE

## 2021-11-04 VITALS
HEIGHT: 67 IN | DIASTOLIC BLOOD PRESSURE: 90 MMHG | SYSTOLIC BLOOD PRESSURE: 120 MMHG | WEIGHT: 235 LBS | BODY MASS INDEX: 36.88 KG/M2 | HEART RATE: 72 BPM

## 2021-11-04 PROCEDURE — 99214 OFFICE O/P EST MOD 30 MIN: CPT | Mod: 25

## 2021-11-04 PROCEDURE — 31237 NSL/SINS NDSC SURG BX POLYPC: CPT | Mod: 50

## 2021-11-04 RX ORDER — BUDESONIDE, MICRONIZED 100 %
POWDER (GRAM) MISCELLANEOUS
Qty: 120 | Refills: 3 | Status: DISCONTINUED | COMMUNITY
Start: 2021-09-15 | End: 2021-11-04

## 2021-11-04 NOTE — REASON FOR VISIT
[Subsequent Evaluation] : a subsequent evaluation for [FreeTextEntry2] : follow-up for chronic pansinusitis

## 2021-11-04 NOTE — HISTORY OF PRESENT ILLNESS
[de-identified] : 71 year male with hx of hairy cell carcinoma presents for follow-up for CRS\par LCV 09/15/2021 at which time was recommend for steroid/abx rinses but was unable to get due to cost\par Still with persistent congestion, discharge-- very mild improvement compared to prior\par Only rinsing every few days\par Lungs doing well-- recently saw Dr. Fernandez-- note reviewed\par Reports sense of smell varies day to day\par \par \par CT Sinuses No Cont 10/26/2021 IMPRESSION: Postsurgical changes as described above. Severe chronic inflammatory disease throughout the paranasal sinuses and their respective drainage pathways, most prominently involving the maxillary sinuses. Small foci of possible dehiscence of the right anterior lamina papyracea. No infiltration of the extraconal orbital fat.

## 2021-11-08 ENCOUNTER — NON-APPOINTMENT (OUTPATIENT)
Age: 71
End: 2021-11-08

## 2021-11-10 LAB — EAR NOSE AND THROAT CULTURE: ABNORMAL

## 2022-01-06 ENCOUNTER — APPOINTMENT (OUTPATIENT)
Dept: OTOLARYNGOLOGY | Facility: CLINIC | Age: 72
End: 2022-01-06
Payer: MEDICARE

## 2022-01-06 VITALS
HEART RATE: 72 BPM | WEIGHT: 239 LBS | HEIGHT: 67 IN | BODY MASS INDEX: 37.51 KG/M2 | DIASTOLIC BLOOD PRESSURE: 91 MMHG | SYSTOLIC BLOOD PRESSURE: 155 MMHG

## 2022-01-06 PROCEDURE — 99214 OFFICE O/P EST MOD 30 MIN: CPT | Mod: 25

## 2022-01-06 PROCEDURE — 31237 NSL/SINS NDSC SURG BX POLYPC: CPT | Mod: 50

## 2022-01-06 RX ORDER — SULFAMETHOXAZOLE AND TRIMETHOPRIM 800; 160 MG/1; MG/1
800-160 TABLET ORAL TWICE DAILY
Qty: 14 | Refills: 0 | Status: DISCONTINUED | COMMUNITY
Start: 2021-11-08 | End: 2022-01-06

## 2022-01-06 NOTE — HISTORY OF PRESENT ILLNESS
[de-identified] : 71 year old male w CRS s/p OSH ESS presents for follow-up\par LCV 11/04/21\par At that time sinonasal cx c/w staph-- started on bactrim\par Now on gent rinses\par Significant symptomatic relief\par Scheduled to see  Dr Aguayo 01/18/22\par Reports intermittent nasal congestion, light green anterior rhinorrhea but less frequent than prior\par Denies facial pain/pressure, PND, recent sinus infections \par Sense of smell is good\par Prescribed Mupirocin ointment by Dr Luan Catalan, allergist, but has not started yet

## 2022-01-18 ENCOUNTER — LABORATORY RESULT (OUTPATIENT)
Age: 72
End: 2022-01-18

## 2022-01-18 ENCOUNTER — APPOINTMENT (OUTPATIENT)
Dept: RHEUMATOLOGY | Facility: CLINIC | Age: 72
End: 2022-01-18
Payer: MEDICARE

## 2022-01-18 VITALS
DIASTOLIC BLOOD PRESSURE: 95 MMHG | HEIGHT: 67 IN | HEART RATE: 77 BPM | RESPIRATION RATE: 97 BRPM | TEMPERATURE: 97.3 F | BODY MASS INDEX: 37.51 KG/M2 | SYSTOLIC BLOOD PRESSURE: 158 MMHG | WEIGHT: 239 LBS

## 2022-01-18 DIAGNOSIS — D69.2 OTHER NONTHROMBOCYTOPENIC PURPURA: ICD-10-CM

## 2022-01-18 DIAGNOSIS — R25.2 CRAMP AND SPASM: ICD-10-CM

## 2022-01-18 PROCEDURE — 99205 OFFICE O/P NEW HI 60 MIN: CPT | Mod: GC

## 2022-01-31 PROBLEM — D69.2 PURPURA: Status: ACTIVE | Noted: 2020-03-12

## 2022-01-31 LAB
24R-OH-CALCIDIOL SERPL-MCNC: 66.1 PG/ML
ACE BLD-CCNC: 32 U/L
ALBUMIN SERPL ELPH-MCNC: 4.5 G/DL
ALP BLD-CCNC: 99 U/L
ALT SERPL-CCNC: 21 U/L
ANION GAP SERPL CALC-SCNC: 15 MMOL/L
APPEARANCE: CLEAR
AST SERPL-CCNC: 20 U/L
B2 GLYCOPROT1 IGA SERPL IA-ACNC: <5 SAU
B2 GLYCOPROT1 IGG SER-ACNC: <5 SGU
B2 GLYCOPROT1 IGM SER-ACNC: <5 SMU
BACTERIA: NEGATIVE
BASOPHILS # BLD AUTO: 0.03 K/UL
BASOPHILS NFR BLD AUTO: 0.4 %
BILIRUB SERPL-MCNC: 0.6 MG/DL
BILIRUBIN URINE: NEGATIVE
BLOOD URINE: NEGATIVE
BUN SERPL-MCNC: 14 MG/DL
CALCIUM SERPL-MCNC: 9.6 MG/DL
CARDIOLIPIN IGM SER-MCNC: 6.4 MPL
CARDIOLIPIN IGM SER-MCNC: <5 GPL
CHLORIDE SERPL-SCNC: 99 MMOL/L
CO2 SERPL-SCNC: 25 MMOL/L
COLOR: YELLOW
CONFIRM: 30 SEC
CREAT SERPL-MCNC: 1.42 MG/DL
CREAT SPEC-SCNC: 129 MG/DL
CREAT/PROT UR: 0.1 RATIO
CRP SERPL-MCNC: 6 MG/L
CRYOGLOB SERPL-MCNC: NEGATIVE
DEPRECATED CARDIOLIPIN IGA SER: <5 APL
DRVVT IMM 1:2 NP PPP: NORMAL
DRVVT SCREEN TO CONFIRM RATIO: 0.95 RATIO
EOSINOPHIL # BLD AUTO: 0.1 K/UL
EOSINOPHIL NFR BLD AUTO: 1.3 %
ERYTHROCYTE [SEDIMENTATION RATE] IN BLOOD BY WESTERGREN METHOD: 35 MM/HR
GLUCOSE QUALITATIVE U: NEGATIVE
GLUCOSE SERPL-MCNC: 85 MG/DL
HCT VFR BLD CALC: 53.6 %
HGB BLD-MCNC: 16.8 G/DL
HYALINE CASTS: 0 /LPF
IGG SUBSET TOTAL IGG: 124 MG/DL
IGG1 SER-MCNC: 37 MG/DL
IGG2 SER-MCNC: 43 MG/DL
IGG3 SER-MCNC: 1 MG/DL
IGG4 SER-MCNC: 35 MG/DL
IMM GRANULOCYTES NFR BLD AUTO: 0.8 %
KETONES URINE: NEGATIVE
LEUKOCYTE ESTERASE URINE: NEGATIVE
LYMPHOCYTES # BLD AUTO: 1.81 K/UL
LYMPHOCYTES NFR BLD AUTO: 23.1 %
MAGNESIUM SERPL-MCNC: 2.2 MG/DL
MAN DIFF?: NORMAL
MCHC RBC-ENTMCNC: 25.6 PG
MCHC RBC-ENTMCNC: 31.3 GM/DL
MCV RBC AUTO: 81.7 FL
MICROSCOPIC-UA: NORMAL
MONOCYTES # BLD AUTO: 0.41 K/UL
MONOCYTES NFR BLD AUTO: 5.2 %
MYELOPEROXIDASE AB SER QL IA: <5 UNITS
MYELOPEROXIDASE CELLS FLD QL: NEGATIVE
NEUTROPHILS # BLD AUTO: 5.44 K/UL
NEUTROPHILS NFR BLD AUTO: 69.2 %
NITRITE URINE: NEGATIVE
PH URINE: 6
PLATELET # BLD AUTO: 75 K/UL
POTASSIUM SERPL-SCNC: 4.4 MMOL/L
PROT SERPL-MCNC: 7.2 G/DL
PROT UR-MCNC: 15 MG/DL
PROTEIN URINE: NORMAL
PROTEINASE3 AB SER IA-ACNC: <5 UNITS
PROTEINASE3 AB SER-ACNC: NEGATIVE
RBC # BLD: 6.56 M/UL
RBC # FLD: 17.5 %
RED BLOOD CELLS URINE: 1 /HPF
RHEUMATOID FACT SER QL: 570 IU/ML
SCREEN DRVVT: 38.2 SEC
SILICA CLOTTING TIME INTERPRETATION: NORMAL
SILICA CLOTTING TIME S/C: 0.99 RATIO
SODIUM SERPL-SCNC: 139 MMOL/L
SPECIFIC GRAVITY URINE: 1.02
SQUAMOUS EPITHELIAL CELLS: 3 /HPF
UROBILINOGEN URINE: NORMAL
WBC # FLD AUTO: 7.85 K/UL
WHITE BLOOD CELLS URINE: 2 /HPF

## 2022-02-16 ENCOUNTER — APPOINTMENT (OUTPATIENT)
Dept: OTOLARYNGOLOGY | Facility: CLINIC | Age: 72
End: 2022-02-16
Payer: MEDICARE

## 2022-02-16 VITALS
WEIGHT: 240 LBS | SYSTOLIC BLOOD PRESSURE: 161 MMHG | BODY MASS INDEX: 37.67 KG/M2 | DIASTOLIC BLOOD PRESSURE: 95 MMHG | TEMPERATURE: 98 F | HEIGHT: 67 IN

## 2022-02-16 PROCEDURE — 99214 OFFICE O/P EST MOD 30 MIN: CPT | Mod: 25

## 2022-02-16 PROCEDURE — 31231 NASAL ENDOSCOPY DX: CPT

## 2022-02-16 NOTE — HISTORY OF PRESENT ILLNESS
[de-identified] : 71 year old male hx CRS s/p OSH ESS \par LCV 01/06/2022 \par Reports use of Gentamicin rinses, once every 1-2 days-- weaning slowly\par Reports mild nasal congestion, light green anterior rhinorrhea that is intermittent\par Denies post nasal drip, facial pain/pressure, epistaxis, recent sinus infections\par Reports sense of smell fluctuates but overall good\par FOllows with Dr. Aguayo of rheum-- she advised biopsy if feasible for further testing

## 2022-04-06 ENCOUNTER — APPOINTMENT (OUTPATIENT)
Dept: PULMONOLOGY | Facility: CLINIC | Age: 72
End: 2022-04-06
Payer: MEDICARE

## 2022-04-06 VITALS
TEMPERATURE: 97.3 F | HEART RATE: 74 BPM | OXYGEN SATURATION: 95 % | DIASTOLIC BLOOD PRESSURE: 94 MMHG | SYSTOLIC BLOOD PRESSURE: 149 MMHG

## 2022-04-06 DIAGNOSIS — J18.9 PNEUMONIA, UNSPECIFIED ORGANISM: ICD-10-CM

## 2022-04-06 PROCEDURE — 99214 OFFICE O/P EST MOD 30 MIN: CPT

## 2022-04-06 NOTE — CONSULT LETTER
[Dear  ___] : Dear ~GABBY, [Consult Letter:] : I had the pleasure of evaluating your patient, [unfilled]. [Sincerely,] : Sincerely, [Consult Closing:] : Thank you very much for allowing me to participate in the care of this patient.  If you have any questions, please do not hesitate to contact me. [FreeTextEntry2] : Anurag Fragoso MD\par  [FreeTextEntry3] : Schuyler Fernandez MD FCCP\par

## 2022-04-06 NOTE — ASSESSMENT
[FreeTextEntry1] : \par Observation\par To F/U rheum and ENT.\par F/U  PRN resp. infection. \par \par 35 minutes spent evaluation management and review of studies.

## 2022-04-06 NOTE — HISTORY OF PRESENT ILLNESS
[Former] : former [TextBox_4] : Saw rheumatology.\par Seeing ENT\par \par Feels respiratory status is essentially baseline.\par Denies significant cough wheezing chest congestion mucus production chest pain or shortness of breath.\par has a infection inside his nose, seeing ent going for ct of sinus\par \par does not get flu shot , gets bad reaction\par \par Has had recc. pneumonia. Has been told is immunocompromised. \par Did see allergy immunology and seeing Heme/Onc.\par Seeing vascular dx. mild insufficiency. \par \par Had Moderna vaccine x 2. Due for Booster going next week. \par  [TextBox_11] : 2 cigs [TextBox_13] : 5 [YearQuit] : 1973

## 2022-04-06 NOTE — DISCUSSION/SUMMARY
[FreeTextEntry1] : Status post hospitalization for possible pneumonitis.\par Likely immunocompromised\par Recurrent pneumonia.  Recent pneumonia resolved.  Normal lung function.\par Clinically stable.

## 2022-04-06 NOTE — PROCEDURE
[FreeTextEntry1] :  Rheum and VIOLETA notes reviewed. \par \par Ct chest 9/16 2021 \par \par 10/06/2021\par Pulmonary function testing\par FEV1, FVC, and FEV1/FVC are within normal limits. TLC and subdivisions are normal. RV/TLC ratio is normal. Resistance and specific conductance are normal. Single breath diffusion capacity is normal. \par

## 2022-04-20 ENCOUNTER — NON-APPOINTMENT (OUTPATIENT)
Age: 72
End: 2022-04-20

## 2022-04-20 ENCOUNTER — APPOINTMENT (OUTPATIENT)
Dept: OTOLARYNGOLOGY | Facility: CLINIC | Age: 72
End: 2022-04-20

## 2022-04-21 ENCOUNTER — APPOINTMENT (OUTPATIENT)
Dept: OTOLARYNGOLOGY | Facility: CLINIC | Age: 72
End: 2022-04-21
Payer: MEDICARE

## 2022-04-21 VITALS
DIASTOLIC BLOOD PRESSURE: 95 MMHG | HEART RATE: 68 BPM | HEIGHT: 67 IN | WEIGHT: 240 LBS | SYSTOLIC BLOOD PRESSURE: 143 MMHG | BODY MASS INDEX: 37.67 KG/M2

## 2022-04-21 PROCEDURE — 31231 NASAL ENDOSCOPY DX: CPT

## 2022-04-21 PROCEDURE — 99213 OFFICE O/P EST LOW 20 MIN: CPT | Mod: 25

## 2022-04-21 NOTE — HISTORY OF PRESENT ILLNESS
[de-identified] : 71 year old male hx CRS s/p OSH ESS presents for follow up \par LCV 02/16/22 \par Reports use of Gentamicin rinses every 2-3 days\par Reports breathing has improved\par Occasional mild clear anterior rhinorrhea\par Denies nasal congestion, PND, facial pain/pressure, epistaxis, recent fevers or sinus infections\par Sense of smell is good\par

## 2022-05-20 ENCOUNTER — APPOINTMENT (OUTPATIENT)
Dept: DERMATOLOGY | Facility: CLINIC | Age: 72
End: 2022-05-20
Payer: MEDICARE

## 2022-05-20 ENCOUNTER — NON-APPOINTMENT (OUTPATIENT)
Age: 72
End: 2022-05-20

## 2022-05-20 VITALS — HEIGHT: 67 IN | BODY MASS INDEX: 37.67 KG/M2 | WEIGHT: 240 LBS

## 2022-05-20 DIAGNOSIS — Z00.00 ENCOUNTER FOR GENERAL ADULT MEDICAL EXAMINATION W/OUT ABNORMAL FINDINGS: ICD-10-CM

## 2022-05-20 PROCEDURE — 99214 OFFICE O/P EST MOD 30 MIN: CPT

## 2022-05-24 ENCOUNTER — NON-APPOINTMENT (OUTPATIENT)
Age: 72
End: 2022-05-24

## 2022-05-26 LAB
HCV AB SER QL: NONREACTIVE
HCV S/CO RATIO: 0.04 S/CO

## 2022-06-23 ENCOUNTER — APPOINTMENT (OUTPATIENT)
Dept: DERMATOLOGY | Facility: CLINIC | Age: 72
End: 2022-06-23

## 2022-06-23 DIAGNOSIS — Z79.899 OTHER LONG TERM (CURRENT) DRUG THERAPY: ICD-10-CM

## 2022-06-23 PROCEDURE — 99214 OFFICE O/P EST MOD 30 MIN: CPT

## 2022-08-16 ENCOUNTER — APPOINTMENT (OUTPATIENT)
Dept: OTOLARYNGOLOGY | Facility: CLINIC | Age: 72
End: 2022-08-16

## 2022-08-16 VITALS — HEART RATE: 75 BPM | DIASTOLIC BLOOD PRESSURE: 85 MMHG | SYSTOLIC BLOOD PRESSURE: 143 MMHG

## 2022-08-16 DIAGNOSIS — J01.90 ACUTE SINUSITIS, UNSPECIFIED: ICD-10-CM

## 2022-08-16 PROCEDURE — 31231 NASAL ENDOSCOPY DX: CPT

## 2022-08-16 PROCEDURE — 99214 OFFICE O/P EST MOD 30 MIN: CPT | Mod: 25

## 2022-08-16 RX ORDER — CEPHALEXIN 250 MG/1
250 CAPSULE ORAL
Qty: 40 | Refills: 0 | Status: COMPLETED | COMMUNITY
Start: 2022-05-26

## 2022-08-16 RX ORDER — DICLOFENAC SODIUM 100 MG/1
100 TABLET, FILM COATED, EXTENDED RELEASE ORAL
Qty: 30 | Refills: 0 | Status: COMPLETED | COMMUNITY
Start: 2022-06-23

## 2022-08-16 RX ORDER — COVID-19 MOLECULAR TEST ASSAY
KIT MISCELLANEOUS
Qty: 8 | Refills: 0 | Status: COMPLETED | COMMUNITY
Start: 2022-06-28

## 2022-08-16 RX ORDER — MUPIROCIN 20 MG/G
2 OINTMENT TOPICAL
Qty: 22 | Refills: 0 | Status: COMPLETED | COMMUNITY
Start: 2022-05-04

## 2022-08-16 NOTE — HISTORY OF PRESENT ILLNESS
[de-identified] : 72 year old male hx CRS s/p OSH ESS presents for follow up \par LCV 04/21/22 at which time to abx rinses \par States cleared up when initially started medicated rinses.\par Today with sinus pain/ pressure, nasal congestion and blood tinged mucus.\par Reports symptoms began to worsen in June / July.\par Now - using steroid/ abx rinses about every 4 days.\par Denies post nasal drip and recent use of PO abx.

## 2022-08-23 LAB — EAR NOSE AND THROAT CULTURE: ABNORMAL

## 2022-08-24 ENCOUNTER — NON-APPOINTMENT (OUTPATIENT)
Age: 72
End: 2022-08-24

## 2022-09-28 ENCOUNTER — APPOINTMENT (OUTPATIENT)
Dept: OTOLARYNGOLOGY | Facility: CLINIC | Age: 72
End: 2022-09-28

## 2022-09-28 ENCOUNTER — NON-APPOINTMENT (OUTPATIENT)
Age: 72
End: 2022-09-28

## 2022-09-28 VITALS
OXYGEN SATURATION: 97 % | HEIGHT: 67 IN | SYSTOLIC BLOOD PRESSURE: 140 MMHG | HEART RATE: 77 BPM | DIASTOLIC BLOOD PRESSURE: 84 MMHG | WEIGHT: 240 LBS | BODY MASS INDEX: 37.67 KG/M2

## 2022-09-28 PROCEDURE — 99213 OFFICE O/P EST LOW 20 MIN: CPT | Mod: 25

## 2022-09-28 PROCEDURE — 31231 NASAL ENDOSCOPY DX: CPT

## 2022-09-28 RX ORDER — AMOXICILLIN AND CLAVULANATE POTASSIUM 875; 125 MG/1; MG/1
875-125 TABLET, COATED ORAL
Qty: 20 | Refills: 0 | Status: DISCONTINUED | COMMUNITY
Start: 2022-08-16 | End: 2022-09-28

## 2022-09-28 RX ORDER — GENTAMICIN SULFATE 100 MG/50ML
INJECTION, SOLUTION INTRAVENOUS
Refills: 0 | Status: DISCONTINUED | COMMUNITY
End: 2022-09-28

## 2022-09-28 RX ORDER — SULFAMETHOXAZOLE AND TRIMETHOPRIM 800; 160 MG/1; MG/1
800-160 TABLET ORAL TWICE DAILY
Qty: 14 | Refills: 0 | Status: DISCONTINUED | COMMUNITY
Start: 2022-08-23 | End: 2022-09-28

## 2022-09-28 NOTE — HISTORY OF PRESENT ILLNESS
[de-identified] : 72 year old male hx CRS s/p OSH ESS presents for follow up \par LCV 08/16/22 at which time sinus culture grew Staph Aureus\par Completed PO bactrim with some improvement \par Prescribed Gentamicin/steroid rinses which helped somewhat but now what\par Intermittent sinus pain/pressure, and nasal congestion \par Denies PND, reduced sense of smell, epistaxis \par Using plain saline rinse every 1-2 days

## 2022-10-20 ENCOUNTER — APPOINTMENT (OUTPATIENT)
Dept: DERMATOLOGY | Facility: CLINIC | Age: 72
End: 2022-10-20

## 2022-10-20 PROCEDURE — 99214 OFFICE O/P EST MOD 30 MIN: CPT

## 2023-01-24 ENCOUNTER — APPOINTMENT (OUTPATIENT)
Dept: OTOLARYNGOLOGY | Facility: CLINIC | Age: 73
End: 2023-01-24
Payer: MEDICARE

## 2023-01-24 VITALS — BODY MASS INDEX: 33.74 KG/M2 | WEIGHT: 215 LBS | HEIGHT: 67 IN

## 2023-01-24 PROCEDURE — 99213 OFFICE O/P EST LOW 20 MIN: CPT | Mod: 25

## 2023-01-24 PROCEDURE — 31231 NASAL ENDOSCOPY DX: CPT

## 2023-01-24 NOTE — HISTORY OF PRESENT ILLNESS
[de-identified] : 72 year old male hx CRS s/p OSH ESS presents for follow up \par LCV 09/28/22\par Using Gentamicin and steroid rinses every other day with temporary relief\par Continues to have frequent congestion, and crusting build up\par Sense of smell fluctuates-overall okay\par Denies anterior rhinorrhea, PND,  epistaxis

## 2023-04-20 ENCOUNTER — APPOINTMENT (OUTPATIENT)
Dept: DERMATOLOGY | Facility: CLINIC | Age: 73
End: 2023-04-20

## 2023-05-30 ENCOUNTER — APPOINTMENT (OUTPATIENT)
Dept: OTOLARYNGOLOGY | Facility: CLINIC | Age: 73
End: 2023-05-30
Payer: MEDICARE

## 2023-06-30 ENCOUNTER — LABORATORY RESULT (OUTPATIENT)
Age: 73
End: 2023-06-30

## 2023-06-30 ENCOUNTER — APPOINTMENT (OUTPATIENT)
Dept: DERMATOLOGY | Facility: CLINIC | Age: 73
End: 2023-06-30
Payer: MEDICARE

## 2023-06-30 VITALS — HEIGHT: 67 IN | BODY MASS INDEX: 34.06 KG/M2 | WEIGHT: 217 LBS

## 2023-06-30 PROCEDURE — 99214 OFFICE O/P EST MOD 30 MIN: CPT

## 2023-06-30 NOTE — ASSESSMENT
[FreeTextEntry1] :  Leukocytoclastic vasculitis, chronic and recurrent\par Last seen in 10/2022\par Hx - biopsy in 2020 showing LCV, unlikely IgA vasculitis given DIF\par At this time favor may be cancer related in setting of Hairy Cell Leukemia, as recurrent LCV can happen in cancer patients even when in remission \par Pt has had infrequent flares without evidence of systemic involvement\par \par Recent CBC with thrombocytopenia (?ITP) - followed by heme Dr. Armstrong.\par We will check CMP, UA \par \par Labs reviewed- Cr 1.3, Hgb 15.7, Plts 72, AST/ALT WNL, UA with negative protein March 2022 per patient record review, Cryoglobulins neg 1/2022, Hep C neg 5/20/22\par \par Start pred 60 mg daily x 5 days, 40 mg daily x 5 days, 20 mg daily x 5 days. \par Add Calcium-vitamin D\par No PPI - reviewed reflux s/s to call us. avoid NSAIDs\par reviewed short term side effects \par \par Reassess in 2-3 weeks -- discussed if infrequent flares, no systemic involvement, may just observe off any tx

## 2023-06-30 NOTE — HISTORY OF PRESENT ILLNESS
[FreeTextEntry1] : vasculitis  [de-identified] : Mr. ANNE GRISSOM is a 71 year old M hx of hairy cell leukemia (in remission) presenting for followup\par \par 1) Leukocytoclastic vasculitis, chronic and recurrent\par Last seen in 10/2022\par Hx - biopsy in 2020 showing LCV, unlikely IgA vasculitis given DIF\par At this time favor may be cancer related in setting of Hairy Cell Leukemia, as recurrent LCV can happen in cancer patients even when in remission \par This current flares seems to be on way out, no need for intervention\par Had extensive discussion regarding use of MTX with heme/onc Dr. Armstrong, who is OK with trial of MTX. pt w/ hx of thrombocytopenia (last plt 75 1/2022), oncologist feels is immune related in nature (?ITP)\par At this time there is no objective evidence of systemic involvement (see labs below), and given pt would like to hold off on systemic therapy, can continue to monitor as such. \par \par Labs reviewed- Cr 1.3, Hgb 15.7, Plts 72, AST/ALT WNL, UA with negative protein March 2022 per patient record review, Cryoglobulins neg 1/2022, Hep C neg 5/20/22\par \par He has been doing well but developed rash ~ 3 days ago, same rash. itching and feels some swelling/pain in joints\par first episode in a year - no new triggers (no medications, preceding illnesses)\par

## 2023-07-09 LAB
ALBUMIN SERPL ELPH-MCNC: 3.5 G/DL
ALP BLD-CCNC: 79 U/L
ALT SERPL-CCNC: 17 U/L
ANION GAP SERPL CALC-SCNC: 12 MMOL/L
APPEARANCE: CLEAR
AST SERPL-CCNC: 26 U/L
BILIRUB SERPL-MCNC: 0.6 MG/DL
BILIRUBIN URINE: ABNORMAL
BLOOD URINE: ABNORMAL
BUN SERPL-MCNC: 13 MG/DL
CALCIUM SERPL-MCNC: 8.8 MG/DL
CHLORIDE SERPL-SCNC: 107 MMOL/L
CO2 SERPL-SCNC: 23 MMOL/L
COLOR: NORMAL
CREAT SERPL-MCNC: 1.28 MG/DL
EGFR: 59 ML/MIN/1.73M2
GLUCOSE QUALITATIVE U: NEGATIVE MG/DL
GLUCOSE SERPL-MCNC: 93 MG/DL
KETONES URINE: ABNORMAL MG/DL
LEUKOCYTE ESTERASE URINE: ABNORMAL
NITRITE URINE: NEGATIVE
PH URINE: 6
POTASSIUM SERPL-SCNC: 4.5 MMOL/L
PROT SERPL-MCNC: 5.3 G/DL
PROTEIN URINE: 100 MG/DL
SODIUM SERPL-SCNC: 142 MMOL/L
SPECIFIC GRAVITY URINE: 1.02
UROBILINOGEN URINE: 1 MG/DL

## 2023-07-20 ENCOUNTER — APPOINTMENT (OUTPATIENT)
Dept: DERMATOLOGY | Facility: CLINIC | Age: 73
End: 2023-07-20
Payer: MEDICARE

## 2023-07-20 VITALS — BODY MASS INDEX: 34.06 KG/M2 | WEIGHT: 217 LBS | HEIGHT: 67 IN

## 2023-07-20 PROCEDURE — 99214 OFFICE O/P EST MOD 30 MIN: CPT

## 2023-08-31 ENCOUNTER — APPOINTMENT (OUTPATIENT)
Dept: DERMATOLOGY | Facility: CLINIC | Age: 73
End: 2023-08-31

## 2023-09-12 ENCOUNTER — APPOINTMENT (OUTPATIENT)
Dept: OTOLARYNGOLOGY | Facility: CLINIC | Age: 73
End: 2023-09-12
Payer: MEDICARE

## 2023-09-12 VITALS
WEIGHT: 211 LBS | BODY MASS INDEX: 33.12 KG/M2 | OXYGEN SATURATION: 96 % | SYSTOLIC BLOOD PRESSURE: 158 MMHG | HEIGHT: 67 IN | RESPIRATION RATE: 17 BRPM | HEART RATE: 77 BPM | TEMPERATURE: 97.8 F | DIASTOLIC BLOOD PRESSURE: 99 MMHG

## 2023-09-12 PROCEDURE — 99214 OFFICE O/P EST MOD 30 MIN: CPT | Mod: 25

## 2023-09-12 PROCEDURE — 31231 NASAL ENDOSCOPY DX: CPT

## 2023-09-12 RX ORDER — CHROMIUM 200 MCG
TABLET ORAL
Refills: 0 | Status: COMPLETED | COMMUNITY
End: 2023-09-12

## 2023-09-12 RX ORDER — TESTOSTERONE 10 MG/G
25 MG/2.5GM GEL TRANSDERMAL DAILY
Refills: 0 | Status: COMPLETED | COMMUNITY
Start: 2021-07-13 | End: 2023-09-12

## 2023-09-12 RX ORDER — PREDNISONE 20 MG/1
20 TABLET ORAL
Qty: 30 | Refills: 0 | Status: COMPLETED | COMMUNITY
Start: 2023-06-30 | End: 2023-09-12

## 2023-09-12 RX ORDER — TESTOSTERONE 20.25 MG/1.25G
GEL, METERED TRANSDERMAL
Refills: 0 | Status: COMPLETED | COMMUNITY
End: 2023-09-12

## 2023-09-14 ENCOUNTER — APPOINTMENT (OUTPATIENT)
Dept: RHEUMATOLOGY | Facility: CLINIC | Age: 73
End: 2023-09-14
Payer: MEDICARE

## 2023-09-14 ENCOUNTER — TRANSCRIPTION ENCOUNTER (OUTPATIENT)
Age: 73
End: 2023-09-14

## 2023-09-14 ENCOUNTER — LABORATORY RESULT (OUTPATIENT)
Age: 73
End: 2023-09-14

## 2023-09-14 VITALS
OXYGEN SATURATION: 96 % | WEIGHT: 211 LBS | BODY MASS INDEX: 33.12 KG/M2 | HEIGHT: 67 IN | DIASTOLIC BLOOD PRESSURE: 118 MMHG | HEART RATE: 81 BPM | SYSTOLIC BLOOD PRESSURE: 171 MMHG

## 2023-09-14 PROCEDURE — 99214 OFFICE O/P EST MOD 30 MIN: CPT

## 2023-09-14 PROCEDURE — 99204 OFFICE O/P NEW MOD 45 MIN: CPT

## 2023-09-18 LAB — EAR NOSE AND THROAT CULTURE: ABNORMAL

## 2023-09-26 LAB
ACE BLD-CCNC: 105 U/L
ALBUMIN MFR SERPL ELPH: 53.7 %
ALBUMIN SERPL ELPH-MCNC: 4.2 G/DL
ALBUMIN SERPL-MCNC: 3.8 G/DL
ALBUMIN/GLOB SERPL: 1.2 RATIO
ALBUPE: 18.9 %
ALP BLD-CCNC: 88 U/L
ALPHA1 GLOB MFR SERPL ELPH: 6.2 %
ALPHA1 GLOB SERPL ELPH-MCNC: 0.4 G/DL
ALPHA1UPE: 36.6 %
ALPHA2 GLOB MFR SERPL ELPH: 14.6 %
ALPHA2 GLOB SERPL ELPH-MCNC: 1 G/DL
ALPHA2UPE: 18.7 %
ALT SERPL-CCNC: 15 U/L
ANA SER IF-ACNC: NEGATIVE
ANION GAP SERPL CALC-SCNC: 17 MMOL/L
APPEARANCE: CLEAR
AST SERPL-CCNC: 20 U/L
B-GLOBULIN MFR SERPL ELPH: 10 %
B-GLOBULIN SERPL ELPH-MCNC: 0.7 G/DL
BACTERIA: NEGATIVE /HPF
BASOPHILS # BLD AUTO: 0.02 K/UL
BASOPHILS NFR BLD AUTO: 0.3 %
BETAUPE: 14.9 %
BILIRUB SERPL-MCNC: 0.4 MG/DL
BILIRUBIN URINE: NEGATIVE
BLOOD URINE: ABNORMAL
BUN SERPL-MCNC: 19 MG/DL
C3 SERPL-MCNC: 135 MG/DL
C4 SERPL-MCNC: <1 MG/DL
CALCIUM SERPL-MCNC: 9.4 MG/DL
CAST: 0 /LPF
CHLORIDE SERPL-SCNC: 101 MMOL/L
CK SERPL-CCNC: 90 U/L
CO2 SERPL-SCNC: 22 MMOL/L
COLOR: YELLOW
CREAT SERPL-MCNC: 1.15 MG/DL
CREAT SPEC-SCNC: 82 MG/DL
CREAT/PROT UR: 0.2 RATIO
CRYOGLOB SERPL-MCNC: POSITIVE
DEPRECATED KAPPA LC FREE/LAMBDA SER: 6.74 RATIO
DSDNA AB SER-ACNC: <12 IU/ML
EGFR: 67 ML/MIN/1.73M2
ENA RNP AB SER IA-ACNC: <0.2 AL
ENA SM AB SER IA-ACNC: <0.2 AL
ENA SS-A AB SER IA-ACNC: <0.2 AL
ENA SS-B AB SER IA-ACNC: <0.2 AL
EOSINOPHIL # BLD AUTO: 0.08 K/UL
EOSINOPHIL NFR BLD AUTO: 1.4 %
EPITHELIAL CELLS: 0 /HPF
GAMMA GLOB FLD ELPH-MCNC: 1.1 G/DL
GAMMA GLOB MFR SERPL ELPH: 15.5 %
GAMMAUPE: 10.9 %
GLUCOSE QUALITATIVE U: NEGATIVE MG/DL
GLUCOSE SERPL-MCNC: 90 MG/DL
HBV CORE IGG+IGM SER QL: NONREACTIVE
HBV SURFACE AG SER QL: NONREACTIVE
HCT VFR BLD CALC: 47.5 %
HCV AB SER QL: NONREACTIVE
HCV S/CO RATIO: 0.28 S/CO
HGB BLD-MCNC: 14.7 G/DL
IGA 24H UR QL IFE: NORMAL
IGA SER QL IEP: 114 MG/DL
IGG SER QL IEP: 125 MG/DL
IGM SER QL IEP: 1762 MG/DL
IMM GRANULOCYTES NFR BLD AUTO: 0.3 %
INTERPRETATION SERPL IEP-IMP: NORMAL
KAPPA LC 24H UR QL: PRESENT
KAPPA LC CSF-MCNC: 1.49 MG/DL
KAPPA LC SERPL-MCNC: 10.05 MG/DL
KETONES URINE: NEGATIVE MG/DL
LEUKOCYTE ESTERASE URINE: NEGATIVE
LYMPHOCYTES # BLD AUTO: 1.49 K/UL
LYMPHOCYTES NFR BLD AUTO: 25.3 %
M PROTEIN MFR SERPL ELPH: 13.1 %
M PROTEIN SPEC IFE-MCNC: NORMAL
M SPIKE RU: 3.8 %
M TB IFN-G BLD-IMP: NEGATIVE
MAN DIFF?: NORMAL
MCHC RBC-ENTMCNC: 24.8 PG
MCHC RBC-ENTMCNC: 30.9 GM/DL
MCV RBC AUTO: 80.1 FL
MICROSCOPIC-UA: NORMAL
MONOCLON BAND OBS SERPL: 0.9 G/DL
MONOCYTES # BLD AUTO: 0.35 K/UL
MONOCYTES NFR BLD AUTO: 5.9 %
MYELOPEROXIDASE AB SER QL IA: <5 UNITS
MYELOPEROXIDASE CELLS FLD QL: NEGATIVE
NEUTROPHILS # BLD AUTO: 3.94 K/UL
NEUTROPHILS NFR BLD AUTO: 66.8 %
NITRITE URINE: NEGATIVE
PH URINE: 6
PLATELET # BLD AUTO: 103 K/UL
POTASSIUM SERPL-SCNC: 4.6 MMOL/L
PROT PATTERN 24H UR ELPH-IMP: NORMAL
PROT SERPL-MCNC: 6.9 G/DL
PROT SERPL-MCNC: 7 G/DL
PROT SERPL-MCNC: 7 G/DL
PROT UR-MCNC: 13 MG/DL
PROT UR-MCNC: 14 MG/DL
PROT UR-MCNC: 14 MG/DL
PROTEIN URINE: NORMAL MG/DL
PROTEINASE3 AB SER IA-ACNC: 5.2 UNITS
PROTEINASE3 AB SER-ACNC: NEGATIVE
QUANTIFERON TB PLUS MITOGEN MINUS NIL: 4.52 IU/ML
QUANTIFERON TB PLUS NIL: 0.02 IU/ML
QUANTIFERON TB PLUS TB1 MINUS NIL: 0.01 IU/ML
QUANTIFERON TB PLUS TB2 MINUS NIL: 0 IU/ML
RBC # BLD: 5.93 M/UL
RBC # FLD: 16.3 %
RED BLOOD CELLS URINE: 3 /HPF
RHEUMATOID FACT SER QL: >650 IU/ML
SODIUM SERPL-SCNC: 141 MMOL/L
SPECIFIC GRAVITY URINE: 1.02
UROBILINOGEN URINE: 0.2 MG/DL
WBC # FLD AUTO: 5.9 K/UL
WHITE BLOOD CELLS URINE: 0 /HPF

## 2023-10-02 ENCOUNTER — NON-APPOINTMENT (OUTPATIENT)
Age: 73
End: 2023-10-02

## 2023-10-03 ENCOUNTER — APPOINTMENT (OUTPATIENT)
Dept: OTOLARYNGOLOGY | Facility: CLINIC | Age: 73
End: 2023-10-03
Payer: MEDICARE

## 2023-10-03 VITALS
BODY MASS INDEX: 33.12 KG/M2 | WEIGHT: 211 LBS | HEIGHT: 67 IN | SYSTOLIC BLOOD PRESSURE: 155 MMHG | OXYGEN SATURATION: 99 % | DIASTOLIC BLOOD PRESSURE: 90 MMHG | HEART RATE: 63 BPM

## 2023-10-03 DIAGNOSIS — Z82.49 FAMILY HISTORY OF ISCHEMIC HEART DISEASE AND OTHER DISEASES OF THE CIRCULATORY SYSTEM: ICD-10-CM

## 2023-10-03 DIAGNOSIS — Z83.42 FAMILY HISTORY OF FAMILIAL HYPERCHOLESTEROLEMIA: ICD-10-CM

## 2023-10-03 DIAGNOSIS — Z83.3 FAMILY HISTORY OF DIABETES MELLITUS: ICD-10-CM

## 2023-10-03 PROCEDURE — 31231 NASAL ENDOSCOPY DX: CPT

## 2023-10-03 PROCEDURE — 99213 OFFICE O/P EST LOW 20 MIN: CPT | Mod: 25

## 2023-10-03 RX ORDER — AMOXICILLIN AND CLAVULANATE POTASSIUM 875; 125 MG/1; MG/1
875-125 TABLET, COATED ORAL
Qty: 20 | Refills: 0 | Status: COMPLETED | COMMUNITY
Start: 2023-09-12 | End: 2023-10-03

## 2023-10-03 RX ORDER — GUAIFENESIN 600 MG/1
TABLET, EXTENDED RELEASE ORAL
Refills: 0 | Status: COMPLETED | COMMUNITY
End: 2023-10-03

## 2023-10-03 RX ORDER — EPINEPHRINE 0.3 MG/.3ML
INJECTION INTRAMUSCULAR
Refills: 0 | Status: ACTIVE | COMMUNITY

## 2023-11-27 ENCOUNTER — EMERGENCY (EMERGENCY)
Facility: HOSPITAL | Age: 73
LOS: 1 days | Discharge: HOME | End: 2023-11-27
Attending: EMERGENCY MEDICINE | Admitting: EMERGENCY MEDICINE
Payer: MEDICARE

## 2023-11-27 VITALS
TEMPERATURE: 98 F | DIASTOLIC BLOOD PRESSURE: 96 MMHG | OXYGEN SATURATION: 98 % | SYSTOLIC BLOOD PRESSURE: 137 MMHG | RESPIRATION RATE: 20 BRPM | HEART RATE: 92 BPM

## 2023-11-27 LAB
B PERT DNA SPEC QL NAA+PROBE: SIGNIFICANT CHANGE UP
B PERT DNA SPEC QL NAA+PROBE: SIGNIFICANT CHANGE UP
B PERT+PARAPERT DNA PNL SPEC NAA+PROBE: SIGNIFICANT CHANGE UP
B PERT+PARAPERT DNA PNL SPEC NAA+PROBE: SIGNIFICANT CHANGE UP
BORDETELLA PARAPERTUSSIS (RAPRVP): SIGNIFICANT CHANGE UP
BORDETELLA PARAPERTUSSIS (RAPRVP): SIGNIFICANT CHANGE UP
C PNEUM DNA SPEC QL NAA+PROBE: SIGNIFICANT CHANGE UP
C PNEUM DNA SPEC QL NAA+PROBE: SIGNIFICANT CHANGE UP
FLUAV SUBTYP SPEC NAA+PROBE: SIGNIFICANT CHANGE UP
FLUAV SUBTYP SPEC NAA+PROBE: SIGNIFICANT CHANGE UP
FLUBV RNA SPEC QL NAA+PROBE: SIGNIFICANT CHANGE UP
FLUBV RNA SPEC QL NAA+PROBE: SIGNIFICANT CHANGE UP
HADV DNA SPEC QL NAA+PROBE: SIGNIFICANT CHANGE UP
HADV DNA SPEC QL NAA+PROBE: SIGNIFICANT CHANGE UP
HCOV 229E RNA SPEC QL NAA+PROBE: SIGNIFICANT CHANGE UP
HCOV 229E RNA SPEC QL NAA+PROBE: SIGNIFICANT CHANGE UP
HCOV HKU1 RNA SPEC QL NAA+PROBE: SIGNIFICANT CHANGE UP
HCOV HKU1 RNA SPEC QL NAA+PROBE: SIGNIFICANT CHANGE UP
HCOV NL63 RNA SPEC QL NAA+PROBE: SIGNIFICANT CHANGE UP
HCOV NL63 RNA SPEC QL NAA+PROBE: SIGNIFICANT CHANGE UP
HCOV OC43 RNA SPEC QL NAA+PROBE: SIGNIFICANT CHANGE UP
HCOV OC43 RNA SPEC QL NAA+PROBE: SIGNIFICANT CHANGE UP
HMPV RNA SPEC QL NAA+PROBE: SIGNIFICANT CHANGE UP
HMPV RNA SPEC QL NAA+PROBE: SIGNIFICANT CHANGE UP
HPIV1 RNA SPEC QL NAA+PROBE: SIGNIFICANT CHANGE UP
HPIV1 RNA SPEC QL NAA+PROBE: SIGNIFICANT CHANGE UP
HPIV2 RNA SPEC QL NAA+PROBE: SIGNIFICANT CHANGE UP
HPIV2 RNA SPEC QL NAA+PROBE: SIGNIFICANT CHANGE UP
HPIV3 RNA SPEC QL NAA+PROBE: SIGNIFICANT CHANGE UP
HPIV3 RNA SPEC QL NAA+PROBE: SIGNIFICANT CHANGE UP
HPIV4 RNA SPEC QL NAA+PROBE: SIGNIFICANT CHANGE UP
HPIV4 RNA SPEC QL NAA+PROBE: SIGNIFICANT CHANGE UP
M PNEUMO DNA SPEC QL NAA+PROBE: SIGNIFICANT CHANGE UP
M PNEUMO DNA SPEC QL NAA+PROBE: SIGNIFICANT CHANGE UP
RAPID RVP RESULT: SIGNIFICANT CHANGE UP
RAPID RVP RESULT: SIGNIFICANT CHANGE UP
RSV RNA SPEC QL NAA+PROBE: SIGNIFICANT CHANGE UP
RSV RNA SPEC QL NAA+PROBE: SIGNIFICANT CHANGE UP
RV+EV RNA SPEC QL NAA+PROBE: SIGNIFICANT CHANGE UP
RV+EV RNA SPEC QL NAA+PROBE: SIGNIFICANT CHANGE UP
SARS-COV-2 RNA SPEC QL NAA+PROBE: SIGNIFICANT CHANGE UP
SARS-COV-2 RNA SPEC QL NAA+PROBE: SIGNIFICANT CHANGE UP

## 2023-11-27 PROCEDURE — 99284 EMERGENCY DEPT VISIT MOD MDM: CPT

## 2023-11-27 RX ORDER — IBUPROFEN 200 MG
600 TABLET ORAL ONCE
Refills: 0 | Status: COMPLETED | OUTPATIENT
Start: 2023-11-27 | End: 2023-11-27

## 2023-11-27 RX ADMIN — Medication 600 MILLIGRAM(S): at 13:54

## 2023-11-27 NOTE — ED PROVIDER NOTE - PATIENT PORTAL LINK FT
You can access the FollowMyHealth Patient Portal offered by [facility] by registering at the following website: [email or web address]. By joining [email or web address]’s FollowMyHealth portal, you will also be able to view your health information using other applications (apps) compatible with our system.

## 2023-11-27 NOTE — ED PROVIDER NOTE - PHYSICAL EXAMINATION
GEN - NAD; well appearing; A+O x3   HEAD - NC/AT   EYES- PERRL, EOMI  ENT: Airway patent, mmm, no TTP over frontal or maxillary sinuses b/l, OP clear w/o erythema, tonsillitis, uvulitis, exudates, TMs clear   NECK: Neck supple  PULMONARY - CTA b/l, symmetric breath sounds.   CARDIAC -s1s2, RRR, no M,G,R  EXTREMITIES - FROM  NEUROLOGIC - alert, speech clear  PSYCH -nl mood/affect, nl insight.

## 2023-11-27 NOTE — ED PROVIDER NOTE - NSFOLLOWUPINSTRUCTIONS_ED_ALL_ED_FT
Please do not use the Afrin nasal spray more than 2 times a day and for more than 3 days.  Recommend following up with your ENT doctor for further evaluation of your nasal congestion.  If you develop fever, worsening facial pain please return to the emergency department for reevaluation.  The results of your viral panel swab will be emailed to you later today or tomorrow morning.

## 2023-11-27 NOTE — ED ADULT NURSE NOTE - OBJECTIVE STATEMENT
Pt complaining of congestion for the past week. Pt currently not complaining of shortness of breath. Airway is patent, respirations are even and unlabored. Pt medicated per MAR. Labs sent per provider orders.

## 2023-11-27 NOTE — ED ADULT TRIAGE NOTE - CHIEF COMPLAINT QUOTE
pt coming with 1 wk of nasal congestion, SOB, fabby. ear pain, frontal HA pt stated his flu shot 1 wk ago.

## 2023-11-27 NOTE — ED PROVIDER NOTE - CLINICAL SUMMARY MEDICAL DECISION MAKING FREE TEXT BOX
of issues patient with history of chronic sinusitis for which he follows with ENT presents the emergency department with 1 week of nasal congestion, frontal head pressure, bilateral ear fullness, sore throat, cough.  Patient is afebrile here, has not taken any medications in about 2 days, had previously been taking Tylenol, ibuprofen, Mucinex without much relief.  His cough is somewhat dry, mucus coming out when he blows his nose is darkish brown mixed with yellow mucus, no evidence of otitis media on otoscopic exam, no TTP over facial sinuses, no e/o tonsilitis. Suspect viral syndrome, patient does not meet criteria for clinical get a diagnosis of bacterial sinusitis at this time.  Plan for RVP swab, patient given Afrin nose spray with strict instructions on how to use it and for how long to use, advised patient that he should follow-up with his ENT doctor for further evaluation.

## 2023-11-27 NOTE — ED ADULT NURSE NOTE - NSICDXPASTMEDICALHX_GEN_ALL_CORE_FT
PAST MEDICAL HISTORY:  Benign essential HTN     H/O splenomegaly     Hairy cell leukemia     History of ITP     Hx of thrombocytopenia refused blood products transfusion- [employer]

## 2023-11-27 NOTE — ED PROVIDER NOTE - NSICDXPASTMEDICALHX_GEN_ALL_CORE_FT
PAST MEDICAL HISTORY:  Benign essential HTN     H/O splenomegaly     Hairy cell leukemia     History of ITP     Hx of thrombocytopenia refused blood products transfusion- [employer]

## 2023-12-01 ENCOUNTER — APPOINTMENT (OUTPATIENT)
Dept: OTOLARYNGOLOGY | Facility: CLINIC | Age: 73
End: 2023-12-01
Payer: MEDICARE

## 2023-12-01 VITALS
HEART RATE: 80 BPM | BODY MASS INDEX: 33.12 KG/M2 | HEIGHT: 67 IN | WEIGHT: 211 LBS | DIASTOLIC BLOOD PRESSURE: 84 MMHG | RESPIRATION RATE: 17 BRPM | SYSTOLIC BLOOD PRESSURE: 122 MMHG | OXYGEN SATURATION: 95 %

## 2023-12-01 PROCEDURE — 99214 OFFICE O/P EST MOD 30 MIN: CPT | Mod: 25

## 2023-12-01 PROCEDURE — 31237 NSL/SINS NDSC SURG BX POLYPC: CPT | Mod: 50

## 2023-12-14 ENCOUNTER — APPOINTMENT (OUTPATIENT)
Dept: OTOLARYNGOLOGY | Facility: CLINIC | Age: 73
End: 2023-12-14
Payer: MEDICARE

## 2023-12-14 VITALS
SYSTOLIC BLOOD PRESSURE: 133 MMHG | HEART RATE: 84 BPM | DIASTOLIC BLOOD PRESSURE: 87 MMHG | OXYGEN SATURATION: 97 % | BODY MASS INDEX: 33.27 KG/M2 | HEIGHT: 67 IN | WEIGHT: 212 LBS

## 2023-12-14 PROCEDURE — 31231 NASAL ENDOSCOPY DX: CPT

## 2023-12-14 PROCEDURE — 99214 OFFICE O/P EST MOD 30 MIN: CPT | Mod: 25

## 2023-12-14 RX ORDER — MOMETASONE FUROATE 100 %
POWDER (GRAM) MISCELLANEOUS
Qty: 1 | Refills: 3 | Status: ACTIVE | COMMUNITY
Start: 2023-12-14 | End: 1900-01-01

## 2023-12-14 NOTE — HISTORY OF PRESENT ILLNESS
[de-identified] : 73 year old male with hx of CRS presents for follow-up LCV12/01/2023--at which time was prescribed Bactrim(completed) and continue rinses(was doing rinses 3x daily but it was hurting his nose so he can only tolerate 2x daily). Reports a burning a sensation at times when he breathes through his nose that is very uncomfortable.  Reports mild bilateral nasal congestion, forehead, eyes and nostril pressure, occasional bloody tinged anterior rhinorrhea ONLY when rinsing, post nasal drip Denies, epistaxis or recent fevers. No vision changes. Sense of smell has not been good.

## 2023-12-14 NOTE — PHYSICAL EXAM
[de-identified] : R cerumen impaction [Nasal Endoscopy Performed] : nasal endoscopy was performed, see procedure section for findings [Midline] : trachea located in midline position [Normal] : no rashes

## 2024-01-15 ENCOUNTER — NON-APPOINTMENT (OUTPATIENT)
Age: 74
End: 2024-01-15

## 2024-01-16 ENCOUNTER — APPOINTMENT (OUTPATIENT)
Dept: DERMATOLOGY | Facility: CLINIC | Age: 74
End: 2024-01-16
Payer: MEDICARE

## 2024-01-16 DIAGNOSIS — D89.1 CRYOGLOBULINEMIA: ICD-10-CM

## 2024-01-16 DIAGNOSIS — C91.40 HAIRY CELL LEUKEMIA NOT HAVING ACHIEVED REMISSION: ICD-10-CM

## 2024-01-16 PROCEDURE — 99214 OFFICE O/P EST MOD 30 MIN: CPT

## 2024-01-16 NOTE — ASSESSMENT
[Review of test: [ enter lab tests ] :____] : I reviewed the following test results: [unfilled] [FreeTextEntry1] : # Leukocytoclastic vasculitis, recurrent i/s/o Hairy Cell Leukemia vs. cryoglobulinemia, infection  Hx - biopsy in 2020 showing LCV, unlikely IgA vasculitis given DIF Labs reviewed- 9/2023: + RF + cryglobulins, undetectable C4 - consistent with Type II cryoglobulinemia Improved with prednisone in the past  Most frequent flare December 2023-February 2024 i/s/o URI  Pt has had infrequent flares without evidence of systemic involvement - sent 1 week course of pred 30mg qAM as needed for flares  - advised to call for clinical advice w/ next flare, as may be difficult to get an expedited appointment   RTC PRN flares, no systemic treatment indicated at this time.

## 2024-01-16 NOTE — HISTORY OF PRESENT ILLNESS
[FreeTextEntry1] : RPV vasculitis  [de-identified] : Mr. ANNE GRISSOM is a 73 year old M hx of hairy cell leukemia (in remission) presenting for followup  1) Leukocytoclastic vasculitis, chronic and recurrent Hx - biopsy in 2020 showing LCV, unlikely IgA vasculitis given DIF His LCV may be from Type II cryoglobulinemia (RF + cryglobulins, undetectable C4, + IgM kappa band, + M spike, + bence zuluaga protein urine). May also be cancer related in setting of Hairy Cell Leukemia, as recurrent LCV can happen in cancer patients even when in remission  This current flares seems to be on way out, triggered by URI, no need for intervention.  In the past had extensive discussion regarding use of MTX with heme/onc Dr. Armstrong, who is OK with trial of MTX. 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

## 2024-02-29 ENCOUNTER — APPOINTMENT (OUTPATIENT)
Dept: OTOLARYNGOLOGY | Facility: CLINIC | Age: 74
End: 2024-02-29

## 2024-03-21 ENCOUNTER — INPATIENT (INPATIENT)
Facility: HOSPITAL | Age: 74
LOS: 2 days | Discharge: HOME | End: 2024-03-24
Attending: INTERNAL MEDICINE | Admitting: INTERNAL MEDICINE
Payer: MEDICARE

## 2024-03-21 VITALS
SYSTOLIC BLOOD PRESSURE: 174 MMHG | TEMPERATURE: 98 F | DIASTOLIC BLOOD PRESSURE: 118 MMHG | RESPIRATION RATE: 22 BRPM | OXYGEN SATURATION: 97 % | HEART RATE: 100 BPM

## 2024-03-21 DIAGNOSIS — I50.21 ACUTE SYSTOLIC (CONGESTIVE) HEART FAILURE: ICD-10-CM

## 2024-03-21 DIAGNOSIS — D69.6 THROMBOCYTOPENIA, UNSPECIFIED: ICD-10-CM

## 2024-03-21 DIAGNOSIS — Z29.9 ENCOUNTER FOR PROPHYLACTIC MEASURES, UNSPECIFIED: ICD-10-CM

## 2024-03-21 DIAGNOSIS — D64.9 ANEMIA, UNSPECIFIED: ICD-10-CM

## 2024-03-21 DIAGNOSIS — C91.40 HAIRY CELL LEUKEMIA NOT HAVING ACHIEVED REMISSION: ICD-10-CM

## 2024-03-21 DIAGNOSIS — R94.39 ABNORMAL RESULT OF OTHER CARDIOVASCULAR FUNCTION STUDY: ICD-10-CM

## 2024-03-21 LAB
ALBUMIN SERPL ELPH-MCNC: 3.4 G/DL — SIGNIFICANT CHANGE UP (ref 3.3–5)
ALP SERPL-CCNC: 78 U/L — SIGNIFICANT CHANGE UP (ref 40–120)
ALT FLD-CCNC: 17 U/L — SIGNIFICANT CHANGE UP (ref 4–41)
ANION GAP SERPL CALC-SCNC: 14 MMOL/L — SIGNIFICANT CHANGE UP (ref 7–14)
APPEARANCE UR: CLEAR — SIGNIFICANT CHANGE UP
AST SERPL-CCNC: 29 U/L — SIGNIFICANT CHANGE UP (ref 4–40)
BACTERIA # UR AUTO: NEGATIVE /HPF — SIGNIFICANT CHANGE UP
BASE EXCESS BLDV CALC-SCNC: -1.8 MMOL/L — SIGNIFICANT CHANGE UP (ref -2–3)
BASOPHILS # BLD AUTO: 0.01 K/UL — SIGNIFICANT CHANGE UP (ref 0–0.2)
BASOPHILS NFR BLD AUTO: 0.2 % — SIGNIFICANT CHANGE UP (ref 0–2)
BILIRUB SERPL-MCNC: 0.5 MG/DL — SIGNIFICANT CHANGE UP (ref 0.2–1.2)
BILIRUB UR-MCNC: NEGATIVE — SIGNIFICANT CHANGE UP
BLOOD GAS VENOUS COMPREHENSIVE RESULT: SIGNIFICANT CHANGE UP
BUN SERPL-MCNC: 20 MG/DL — SIGNIFICANT CHANGE UP (ref 7–23)
CALCIUM SERPL-MCNC: 8.7 MG/DL — SIGNIFICANT CHANGE UP (ref 8.4–10.5)
CAST: 1 /LPF — SIGNIFICANT CHANGE UP (ref 0–4)
CHLORIDE BLDV-SCNC: 108 MMOL/L — SIGNIFICANT CHANGE UP (ref 96–108)
CHLORIDE SERPL-SCNC: 104 MMOL/L — SIGNIFICANT CHANGE UP (ref 98–107)
CO2 BLDV-SCNC: 24.3 MMOL/L — SIGNIFICANT CHANGE UP (ref 22–26)
CO2 SERPL-SCNC: 19 MMOL/L — LOW (ref 22–31)
COLOR SPEC: YELLOW — SIGNIFICANT CHANGE UP
CREAT SERPL-MCNC: 1.27 MG/DL — SIGNIFICANT CHANGE UP (ref 0.5–1.3)
DIFF PNL FLD: ABNORMAL
EGFR: 60 ML/MIN/1.73M2 — SIGNIFICANT CHANGE UP
EOSINOPHIL # BLD AUTO: 0.06 K/UL — SIGNIFICANT CHANGE UP (ref 0–0.5)
EOSINOPHIL NFR BLD AUTO: 1.2 % — SIGNIFICANT CHANGE UP (ref 0–6)
FLUAV AG NPH QL: SIGNIFICANT CHANGE UP
FLUBV AG NPH QL: SIGNIFICANT CHANGE UP
GAS PNL BLDV: 134 MMOL/L — LOW (ref 136–145)
GLUCOSE BLDV-MCNC: 93 MG/DL — SIGNIFICANT CHANGE UP (ref 70–99)
GLUCOSE SERPL-MCNC: 99 MG/DL — SIGNIFICANT CHANGE UP (ref 70–99)
GLUCOSE UR QL: NEGATIVE MG/DL — SIGNIFICANT CHANGE UP
HCO3 BLDV-SCNC: 23 MMOL/L — SIGNIFICANT CHANGE UP (ref 22–29)
HCT VFR BLD CALC: 36 % — LOW (ref 39–50)
HCT VFR BLDA CALC: 35 % — LOW (ref 39–51)
HGB BLD CALC-MCNC: 11.7 G/DL — LOW (ref 12.6–17.4)
HGB BLD-MCNC: 11.4 G/DL — LOW (ref 13–17)
IANC: 3.67 K/UL — SIGNIFICANT CHANGE UP (ref 1.8–7.4)
IMM GRANULOCYTES NFR BLD AUTO: 1 % — HIGH (ref 0–0.9)
KETONES UR-MCNC: NEGATIVE MG/DL — SIGNIFICANT CHANGE UP
LACTATE BLDV-MCNC: 1 MMOL/L — SIGNIFICANT CHANGE UP (ref 0.5–2)
LEUKOCYTE ESTERASE UR-ACNC: NEGATIVE — SIGNIFICANT CHANGE UP
LYMPHOCYTES # BLD AUTO: 1.12 K/UL — SIGNIFICANT CHANGE UP (ref 1–3.3)
LYMPHOCYTES # BLD AUTO: 21.6 % — SIGNIFICANT CHANGE UP (ref 13–44)
MCHC RBC-ENTMCNC: 23.7 PG — LOW (ref 27–34)
MCHC RBC-ENTMCNC: 31.7 GM/DL — LOW (ref 32–36)
MCV RBC AUTO: 74.8 FL — LOW (ref 80–100)
MONOCYTES # BLD AUTO: 0.28 K/UL — SIGNIFICANT CHANGE UP (ref 0–0.9)
MONOCYTES NFR BLD AUTO: 5.4 % — SIGNIFICANT CHANGE UP (ref 2–14)
NEUTROPHILS # BLD AUTO: 3.67 K/UL — SIGNIFICANT CHANGE UP (ref 1.8–7.4)
NEUTROPHILS NFR BLD AUTO: 70.6 % — SIGNIFICANT CHANGE UP (ref 43–77)
NITRITE UR-MCNC: NEGATIVE — SIGNIFICANT CHANGE UP
NRBC # BLD: 0 /100 WBCS — SIGNIFICANT CHANGE UP (ref 0–0)
NRBC # FLD: 0 K/UL — SIGNIFICANT CHANGE UP (ref 0–0)
NT-PROBNP SERPL-SCNC: HIGH PG/ML
PCO2 BLDV: 39 MMHG — LOW (ref 42–55)
PH BLDV: 7.38 — SIGNIFICANT CHANGE UP (ref 7.32–7.43)
PH UR: 6 — SIGNIFICANT CHANGE UP (ref 5–8)
PLATELET # BLD AUTO: 103 K/UL — LOW (ref 150–400)
PO2 BLDV: 44 MMHG — SIGNIFICANT CHANGE UP (ref 25–45)
POTASSIUM BLDV-SCNC: 3.7 MMOL/L — SIGNIFICANT CHANGE UP (ref 3.5–5.1)
POTASSIUM SERPL-MCNC: 4.3 MMOL/L — SIGNIFICANT CHANGE UP (ref 3.5–5.3)
POTASSIUM SERPL-SCNC: 4.3 MMOL/L — SIGNIFICANT CHANGE UP (ref 3.5–5.3)
PROT SERPL-MCNC: 6.4 G/DL — SIGNIFICANT CHANGE UP (ref 6–8.3)
PROT UR-MCNC: 30 MG/DL
RBC # BLD: 4.81 M/UL — SIGNIFICANT CHANGE UP (ref 4.2–5.8)
RBC # FLD: 15.9 % — HIGH (ref 10.3–14.5)
RBC CASTS # UR COMP ASSIST: 2 /HPF — SIGNIFICANT CHANGE UP (ref 0–4)
RSV RNA NPH QL NAA+NON-PROBE: SIGNIFICANT CHANGE UP
SAO2 % BLDV: 71.6 % — SIGNIFICANT CHANGE UP (ref 67–88)
SARS-COV-2 RNA SPEC QL NAA+PROBE: SIGNIFICANT CHANGE UP
SODIUM SERPL-SCNC: 137 MMOL/L — SIGNIFICANT CHANGE UP (ref 135–145)
SP GR SPEC: 1.01 — SIGNIFICANT CHANGE UP (ref 1–1.03)
SQUAMOUS # UR AUTO: 0 /HPF — SIGNIFICANT CHANGE UP (ref 0–5)
TROPONIN T, HIGH SENSITIVITY RESULT: 14 NG/L — SIGNIFICANT CHANGE UP
TROPONIN T, HIGH SENSITIVITY RESULT: 38 NG/L — SIGNIFICANT CHANGE UP
UROBILINOGEN FLD QL: 0.2 MG/DL — SIGNIFICANT CHANGE UP (ref 0.2–1)
WBC # BLD: 5.19 K/UL — SIGNIFICANT CHANGE UP (ref 3.8–10.5)
WBC # FLD AUTO: 5.19 K/UL — SIGNIFICANT CHANGE UP (ref 3.8–10.5)
WBC UR QL: 0 /HPF — SIGNIFICANT CHANGE UP (ref 0–5)

## 2024-03-21 PROCEDURE — 71046 X-RAY EXAM CHEST 2 VIEWS: CPT | Mod: 26

## 2024-03-21 PROCEDURE — 99223 1ST HOSP IP/OBS HIGH 75: CPT

## 2024-03-21 PROCEDURE — 99285 EMERGENCY DEPT VISIT HI MDM: CPT

## 2024-03-21 RX ORDER — FUROSEMIDE 40 MG
40 TABLET ORAL EVERY 12 HOURS
Refills: 0 | Status: COMPLETED | OUTPATIENT
Start: 2024-03-21 | End: 2024-03-23

## 2024-03-21 RX ORDER — LISINOPRIL 2.5 MG/1
2.5 TABLET ORAL DAILY
Refills: 0 | Status: DISCONTINUED | OUTPATIENT
Start: 2024-03-21 | End: 2024-03-22

## 2024-03-21 RX ORDER — ASPIRIN/CALCIUM CARB/MAGNESIUM 324 MG
162 TABLET ORAL ONCE
Refills: 0 | Status: COMPLETED | OUTPATIENT
Start: 2024-03-21 | End: 2024-03-21

## 2024-03-21 RX ORDER — LANOLIN ALCOHOL/MO/W.PET/CERES
3 CREAM (GRAM) TOPICAL AT BEDTIME
Refills: 0 | Status: DISCONTINUED | OUTPATIENT
Start: 2024-03-21 | End: 2024-03-24

## 2024-03-21 RX ORDER — ACETAMINOPHEN 500 MG
650 TABLET ORAL EVERY 6 HOURS
Refills: 0 | Status: DISCONTINUED | OUTPATIENT
Start: 2024-03-21 | End: 2024-03-24

## 2024-03-21 RX ORDER — ONDANSETRON 8 MG/1
4 TABLET, FILM COATED ORAL EVERY 8 HOURS
Refills: 0 | Status: DISCONTINUED | OUTPATIENT
Start: 2024-03-21 | End: 2024-03-24

## 2024-03-21 RX ORDER — FUROSEMIDE 40 MG
20 TABLET ORAL DAILY
Refills: 0 | Status: DISCONTINUED | OUTPATIENT
Start: 2024-03-21 | End: 2024-03-21

## 2024-03-21 RX ORDER — HEPARIN SODIUM 5000 [USP'U]/ML
5000 INJECTION INTRAVENOUS; SUBCUTANEOUS EVERY 12 HOURS
Refills: 0 | Status: DISCONTINUED | OUTPATIENT
Start: 2024-03-21 | End: 2024-03-24

## 2024-03-21 RX ORDER — FUROSEMIDE 40 MG
20 TABLET ORAL ONCE
Refills: 0 | Status: COMPLETED | OUTPATIENT
Start: 2024-03-21 | End: 2024-03-21

## 2024-03-21 RX ADMIN — HEPARIN SODIUM 5000 UNIT(S): 5000 INJECTION INTRAVENOUS; SUBCUTANEOUS at 18:26

## 2024-03-21 RX ADMIN — Medication 40 MILLIGRAM(S): at 18:26

## 2024-03-21 RX ADMIN — Medication 20 MILLIGRAM(S): at 13:35

## 2024-03-21 RX ADMIN — Medication 162 MILLIGRAM(S): at 10:46

## 2024-03-21 RX ADMIN — Medication 20 MILLIGRAM(S): at 10:44

## 2024-03-21 NOTE — H&P ADULT - ASSESSMENT
72 yo M with PMhx of Hairy cell Leukemia (in remission), chronic sinusitis, and new HF presents to the ED with worsening SOB, due to acute heart failure.

## 2024-03-21 NOTE — ED PROVIDER NOTE - PROGRESS NOTE DETAILS
YOLANDA Renner: Case discussed with Dr. Sal Panchal, who admits for PMD Dr. Fragoso. He accepts the pt for admission to telemetry.

## 2024-03-21 NOTE — H&P ADULT - NSHPREVIEWOFSYSTEMS_GEN_ALL_CORE
REVIEW OF SYSTEMS:    CONSTITUTIONAL: No weakness, fevers or chills  EYES/ENT: No visual changes;  No vertigo or throat pain   NECK: No pain or stiffness  RESPIRATORY: No cough, wheezing, hemoptysis; + shortness of breath  CARDIOVASCULAR: +chest pain with exertion.   GASTROINTESTINAL: No abdominal or epigastric pain. No nausea, vomiting, or hematemesis; No diarrhea or constipation. No melena or hematochezia.  GENITOURINARY: No dysuria, frequency or hematuria  NEUROLOGICAL: No numbness or weakness  MUSCULOSKELETAL: No joint pain, no muscle ache   SKIN: No itching, burning, rashes, or lesions   All other review of systems is negative unless indicated above.

## 2024-03-21 NOTE — ED PROVIDER NOTE - CLINICAL SUMMARY MEDICAL DECISION MAKING FREE TEXT BOX
72 y/o M c/o chest tightness, SOB, HAZEL, orthopnea, LE edema x 2 months.  Stress test performed yesterday reportedly demonstrated new hypokinesis and decreased EF.  Referred to ED for further assessment and management of likely heart failure.  Consider ischemic etiology.  ASA, lasix.  Card monitor.  Check labs including BNP, troponin.  CXR.  ECG.  Will require admission.  Possibly card cath.

## 2024-03-21 NOTE — H&P ADULT - NSHPPHYSICALEXAM_GEN_ALL_CORE
Vital Signs Last 24 Hrs  T(C): 36.6 (21 Mar 2024 14:37), Max: 36.6 (21 Mar 2024 14:37)  T(F): 97.9 (21 Mar 2024 14:37), Max: 97.9 (21 Mar 2024 14:37)  HR: 93 (21 Mar 2024 14:37) (93 - 100)  BP: 161/99 (21 Mar 2024 14:37) (161/99 - 174/118)  BP(mean): --  RR: 19 (21 Mar 2024 14:37) (19 - 22)  SpO2: 100% (21 Mar 2024 14:37) (97% - 100%)    Parameters below as of 21 Mar 2024 14:37  Patient On (Oxygen Delivery Method): room air    GENERAL: NAD, well-developed  HEENT:  Atraumatic, Normocephalic, EOMI, PERRLA, conjunctiva and sclera clear, oral mucosa moist, clear w/o any exudate   NECK: Supple, No JVD  CHEST/LUNG: Clear to auscultation bilaterally; No wheeze  HEART: Regular rate and rhythm; No murmurs, rubs, or gallops  ABDOMEN: Soft, Nontender, Nondistended; Bowel sounds present  EXTREMITIES:  2+ Peripheral Pulses, 2+ pitting edema in b/l LE  PSYCH: AAOx3  NEUROLOGY: non-focal, moving all extremities   SKIN: No rashes or lesions

## 2024-03-21 NOTE — H&P ADULT - PROBLEM SELECTOR PLAN 1
Patient with worsening SOB, orthopnea, b/l LE edema and chest pain with exertion   -Outpatient Stress test showed decreased EF of 28%   -Hx and findings are consistent with acute HF   -EKG showed no acute changes, Trops are stable, ACS is ruled out   -Cardiology consulted, recs appreciated   -F/u with TTE   -C/w IV lasix 40 mg BID   -C/w telemonitoring   -Daily weight   -strict ins and outs

## 2024-03-21 NOTE — ED PROVIDER NOTE - PHYSICAL EXAMINATION
ATTENDING PHYSICAL EXAM  GEN - tachypneic with RR 20-22, speaking full sentences, A+O x3.  O2 100%RA.  HEAD - NC/AT; EYES/NOSE - PERRL, EOMI, mucous membranes moist, no discharge; THROAT: Oral cavity and pharynx normal. No inflammation, swelling, exudate, or lesions  NECK: Neck supple, +JVD  PULMONARY - trace bibasilar rales  CARDIAC -s1s2, RRR, no M,R,G  ABDOMEN - +NABS, ND, NT, soft, no guarding, no rebound, no masses   BACK - no CVA tenderness, No vertebral or paravertebral tenderness  EXTREMITIES - symmetric pulses, 2+ dp, capillary refill < 2 seconds, no clubbing, no cyanosis, 2+ pitting edema b/l.  SKIN - streaky red rash b/l lower legs (from vasculitis per patient)  NEUROLOGIC - alert, CN 2-12 intact, no focal deficits

## 2024-03-21 NOTE — H&P ADULT - TIME BILLING
Comprehensive hx taking, examination, review of charts, labs, imaging, and medications, counseling of patient, and discussion with interdisciplinary teams.

## 2024-03-21 NOTE — H&P ADULT - NSICDXPASTMEDICALHX_GEN_ALL_CORE_FT
PAST MEDICAL HISTORY:  Benign essential HTN     H/O splenomegaly     Hairy cell leukemia     History of ITP     Hx of thrombocytopenia refused blood products transfusion- [employer]

## 2024-03-21 NOTE — CONSULT NOTE ADULT - SUBJECTIVE AND OBJECTIVE BOX
Cardiovascular Disease Initial Evaluation  Date of service: 03-21-24 @ 14:48    CHIEF COMPLAINT:   SOB    HISTORY OF PRESENT ILLNESS:  · Mr. Ling is a  72 y/o M with history of HTN, vasculitis, and hairy cell leukemia s/p chemo circa 10 years ago who presents chest tightness and shortness of breath x 2 months.  Reports constantly present even at rest.  Worse with exertion.  Denies chest 'pain', says tightness.  + 2-3 pillow orthopnea. Generalized fatigue.  Increasing b/l LE edema.  No syncope.  No fever.  + nonprod cough x 2 months.  PMD Dr. Tolbert.  Card Dr. Elaine - [phone].  Was prescribed lasix 20mg and lisinopril.  I personally spoke to Dr. Elaine's PA - patient had Lexiscan recently which showed new hypokinesis with EF 28%, normal perfusion with no report of ischemia.  Referred to hospital for admission and further evaluation for heart failure.  Patient with LE edema. He admits to having an URI in January and since then has been experiencing SOB.            Allergies    shellfish (Unknown)  No Known Drug Allergies    Intolerances    	    MEDICATIONS:  furosemide   Injectable 20 milliGRAM(s) IV Push daily                  PAST MEDICAL & SURGICAL HISTORY:  Hairy cell leukemia      H/O splenomegaly      Hx of thrombocytopenia  refused blood products transfusion- [employer]      Benign essential HTN      History of ITP      History of Rotator Cuff Surgery      Patellar fracture          FAMILY HISTORY:  FH: myocardial infarction        SOCIAL HISTORY:    The patient is a nonsmoker       REVIEW OF SYSTEMS:  See HPI, otherwise complete 14 point review of systems negative    [ x] All others negative	  [ ] Unable to obtain    PHYSICAL EXAM:  T(C): 36.6 (03-21-24 @ 14:37), Max: 36.6 (03-21-24 @ 14:37)  HR: 93 (03-21-24 @ 14:37) (93 - 100)  BP: 161/99 (03-21-24 @ 14:37) (161/99 - 174/118)  RR: 19 (03-21-24 @ 14:37) (19 - 22)  SpO2: 100% (03-21-24 @ 14:37) (97% - 100%)  Wt(kg): --  I&O's Summary      Appearance: No Acute Distress; resting comfortably  HEENT:  Normal oral mucosa, PERRL, EOMI	  Cardiovascular: Normal S1 S2, No JVD, No murmurs/rubs/gallops  Respiratory: Normal respiratory effort; decreased air entry bilaterally.   Gastrointestinal:  Soft, Non-tender, + BS	  Skin: No rashes, No ecchymoses, No cyanosis	  Neurologic: Non-focal; no weakness  Extremities: 2+ pitting edema bilaterally. with erythema.   Vascular: Peripheral pulses palpable 2+ bilaterally  Psychiatry: A & O x 3, Mood & affect appropriate    Laboratory Data:	 	    CBC Full  -  ( 21 Mar 2024 10:30 )  WBC Count : 5.19 K/uL  Hemoglobin : 11.4 g/dL  Hematocrit : 36.0 %  Platelet Count - Automated : 103 K/uL  Mean Cell Volume : 74.8 fL  Mean Cell Hemoglobin : 23.7 pg  Mean Cell Hemoglobin Concentration : 31.7 gm/dL  Auto Neutrophil # : 3.67 K/uL  Auto Lymphocyte # : 1.12 K/uL  Auto Monocyte # : 0.28 K/uL  Auto Eosinophil # : 0.06 K/uL  Auto Basophil # : 0.01 K/uL  Auto Neutrophil % : 70.6 %  Auto Lymphocyte % : 21.6 %  Auto Monocyte % : 5.4 %  Auto Eosinophil % : 1.2 %  Auto Basophil % : 0.2 %    03-21    137  |  104  |  20  ----------------------------<  99  4.3   |  19<L>  |  1.27    Ca    8.7      21 Mar 2024 10:30    TPro  6.4  /  Alb  3.4  /  TBili  0.5  /  DBili  x   /  AST  29  /  ALT  17  /  AlkPhos  78  03-21      proBNP: >11K  Lipid Profile:   HgA1c:   TSH:       CARDIAC MARKERS: Trop T 38-14            Interpretation of Telemetry: N/a	    ECG: Sinus rhythm with TWI in the lateral leads.   RADIOLOGY:  OTHER: 	    PREVIOUS DIAGNOSTIC TESTING:    [ ] Echocardiogram:  [ ] Catheterization:  [ ] Stress Test:  	    Assessment: 72 y/o M with history of HTN, vasculitis, and hairy cell leukemia s/p chemo circa 10 years ago who presents chest tightness and shortness of breath x 2 months    Plan of Care:    #Acute systolic heart failure  - Patient presents with LE edema and SOB  - EKG shows sinus rhythm with non specific T wave changes.   - CXR shows pleural effusion  - Recent stress test shows reduced LVEF of 28% with no evidence of ischemia. Report to be faxed by Dr. Elaine's PA  - Etiology of newly diagnosed CHF is likely viral given recent URI  - Recommend IV diuresis with Lasix 40mg BID  - I and O's, daily weights, and replenish lytes as needed  - Will start GDMT for HFrEF as tolerated  - Please obtain Echo  - No evidence of ACS  - Chest pain is likely 2/2 congestion    #HTN  - Continue ACE    #Hairy cell leukemia  - S/p chemo          77 minutes spent on total encounter; more than 50% of the visit was spent counseling and/or coordinating care by the attending physician.   	  Ruddy Anton, DO [facility]  Cardiovascular Diseases  [phone]

## 2024-03-21 NOTE — H&P ADULT - NSHPLABSRESULTS_GEN_ALL_CORE
11.4   5.19  )-----------( 103      ( 21 Mar 2024 10:30 )             36.0     Hgb Trend: 11.4<--  03    137  |  104  |  20  ----------------------------<  99  4.3   |  19<L>  |  1.27    Ca    8.7      21 Mar 2024 10:30    TPro  6.4  /  Alb  3.4  /  TBili  0.5  /  DBili  x   /  AST  29  /  ALT  17  /  AlkPhos  78      Creatinine Trend: 1.27<--        Urinalysis Basic - ( 21 Mar 2024 13:00 )    Color: Yellow / Appearance: Clear / S.008 / pH: x  Gluc: x / Ketone: Negative mg/dL  / Bili: Negative / Urobili: 0.2 mg/dL   Blood: x / Protein: 30 mg/dL / Nitrite: Negative   Leuk Esterase: Negative / RBC: 2 /HPF / WBC 0 /HPF   Sq Epi: x / Non Sq Epi: 0 /HPF / Bacteria: Negative /HPF        EKG is reviewed by me: MATTHEW       TTE is ordered

## 2024-03-21 NOTE — ED ADULT NURSE NOTE - OBJECTIVE STATEMENT
Pt c/o chest pressure and SOB on and off for 2 x months - hx copd, pt presents in no acute distress edema noted to BL ext - pending md atul hussein rn

## 2024-03-21 NOTE — ED ADULT NURSE NOTE - NSFALLUNIVINTERV_ED_ALL_ED
Bed/Stretcher in lowest position, wheels locked, appropriate side rails in place/Call bell, personal items and telephone in reach/Instruct patient to call for assistance before getting out of bed/chair/stretcher/Non-slip footwear applied when patient is off stretcher/[place] to call system/Physically safe environment - no spills, clutter or unnecessary equipment/Purposeful proactive rounding/Room/bathroom lighting operational, light cord in reach

## 2024-03-21 NOTE — ED PROVIDER NOTE - OBJECTIVE STATEMENT
Te - 72 y/o M with wife at bedside.  Has been having worsening chest tightness and shortness of breath x 2 months.  Reports constantly present even at rest.  Worse with exertion.  Denies chest 'pain', says tightness.  + 2-3 pillow orthopnea. Generalized fatigue.  Increasing b/l LE edema.  No syncope.  No fever.  + nonprod cough x 2 months.  PMD Dr. Tolbert.  Card Dr. Elaine - [phone].  To both doctors on Tuesday, prescribed lasix 20mg and lisinopril.  I spoke to Dr. Elaine's PA - patient had Lexiscan yesterday - showed new hypokinesis with EF 28%, no report of ischemia.  Referred to hospital for admission and further evaluation for heart failure.  They will discuss with Interventionalist Dr. Asya Diaz.   Noted hx Hairy Cell Leukemia in remission x 10 years.  Also hx vasculitis - b/l lower legs with rash.

## 2024-03-21 NOTE — ED ADULT NURSE NOTE - NSICDXPASTMEDICALHX_GEN_ALL_CORE_FT
PAST MEDICAL HISTORY:  Benign essential HTN     H/O splenomegaly     Hairy cell leukemia     History of ITP     Hx of thrombocytopenia refused blood products transfusion- [employer]

## 2024-03-21 NOTE — ED PROVIDER NOTE - NSICDXPASTMEDICALHX_GEN_ALL_CORE_FT
PAST MEDICAL HISTORY:  Benign essential HTN     H/O splenomegaly     Hairy cell leukemia     History of ITP     Hx of thrombocytopenia refused blood products transfusion- [employer]

## 2024-03-21 NOTE — H&P ADULT - HISTORY OF PRESENT ILLNESS
72 yo M with PMhx of Hairy cell Leukemia (in remission), chronic sinusitis, and new HF presents to the ED with worsening SOB. Patient reports that he has been feeling SOB for the last 2 months. It got progressively worse. He can only walk 1 flight of stairs before getting SOB and fatigued. He also reports to have chest pain with exertion, worsening b/l LE, and orthopnea. He went to see his PCP who referred him to Cardiologist Dr. Rodriguez. Yesterday pt underwent stress test which showed reduced LVEF of 28% with no evidence of ischemia. As a result, he was advised to come to the ED.   In the ED, his vitals were notable for HTN and tachycardia. Labs were notable for chronic anemia, chronic thrombocytopenia, low bicarb, and elevated proBNP. EKG showed NSR. Cxr showed pleural effusion.

## 2024-03-22 ENCOUNTER — RESULT REVIEW (OUTPATIENT)
Age: 74
End: 2024-03-22

## 2024-03-22 LAB
A1C WITH ESTIMATED AVERAGE GLUCOSE RESULT: 4.5 % — SIGNIFICANT CHANGE UP (ref 4–5.6)
ALBUMIN SERPL ELPH-MCNC: 3.3 G/DL — SIGNIFICANT CHANGE UP (ref 3.3–5)
ALP SERPL-CCNC: 70 U/L — SIGNIFICANT CHANGE UP (ref 40–120)
ALT FLD-CCNC: 13 U/L — SIGNIFICANT CHANGE UP (ref 4–41)
ANION GAP SERPL CALC-SCNC: 13 MMOL/L — SIGNIFICANT CHANGE UP (ref 7–14)
AST SERPL-CCNC: 21 U/L — SIGNIFICANT CHANGE UP (ref 4–40)
BASOPHILS # BLD AUTO: 0.01 K/UL — SIGNIFICANT CHANGE UP (ref 0–0.2)
BASOPHILS NFR BLD AUTO: 0.2 % — SIGNIFICANT CHANGE UP (ref 0–2)
BILIRUB SERPL-MCNC: 0.7 MG/DL — SIGNIFICANT CHANGE UP (ref 0.2–1.2)
BUN SERPL-MCNC: 22 MG/DL — SIGNIFICANT CHANGE UP (ref 7–23)
CALCIUM SERPL-MCNC: 8.8 MG/DL — SIGNIFICANT CHANGE UP (ref 8.4–10.5)
CHLORIDE SERPL-SCNC: 103 MMOL/L — SIGNIFICANT CHANGE UP (ref 98–107)
CHOLEST SERPL-MCNC: 148 MG/DL — SIGNIFICANT CHANGE UP
CO2 SERPL-SCNC: 23 MMOL/L — SIGNIFICANT CHANGE UP (ref 22–31)
CREAT SERPL-MCNC: 1.48 MG/DL — HIGH (ref 0.5–1.3)
EGFR: 50 ML/MIN/1.73M2 — LOW
EOSINOPHIL # BLD AUTO: 0.09 K/UL — SIGNIFICANT CHANGE UP (ref 0–0.5)
EOSINOPHIL NFR BLD AUTO: 1.8 % — SIGNIFICANT CHANGE UP (ref 0–6)
ESTIMATED AVERAGE GLUCOSE: 82 — SIGNIFICANT CHANGE UP
FERRITIN SERPL-MCNC: 198 NG/ML — SIGNIFICANT CHANGE UP (ref 30–400)
FOLATE SERPL-MCNC: 5.7 NG/ML — SIGNIFICANT CHANGE UP (ref 3.1–17.5)
GLUCOSE SERPL-MCNC: 91 MG/DL — SIGNIFICANT CHANGE UP (ref 70–99)
HCT VFR BLD CALC: 35 % — LOW (ref 39–50)
HDLC SERPL-MCNC: 33 MG/DL — LOW
HGB BLD-MCNC: 11.2 G/DL — LOW (ref 13–17)
IANC: 3.3 K/UL — SIGNIFICANT CHANGE UP (ref 1.8–7.4)
IMM GRANULOCYTES NFR BLD AUTO: 0.6 % — SIGNIFICANT CHANGE UP (ref 0–0.9)
IRON SATN MFR SERPL: 12 % — LOW (ref 14–50)
IRON SATN MFR SERPL: 27 UG/DL — LOW (ref 45–165)
LIPID PNL WITH DIRECT LDL SERPL: 90 MG/DL — SIGNIFICANT CHANGE UP
LYMPHOCYTES # BLD AUTO: 1.21 K/UL — SIGNIFICANT CHANGE UP (ref 1–3.3)
LYMPHOCYTES # BLD AUTO: 24.5 % — SIGNIFICANT CHANGE UP (ref 13–44)
MCHC RBC-ENTMCNC: 23.5 PG — LOW (ref 27–34)
MCHC RBC-ENTMCNC: 32 GM/DL — SIGNIFICANT CHANGE UP (ref 32–36)
MCV RBC AUTO: 73.5 FL — LOW (ref 80–100)
MONOCYTES # BLD AUTO: 0.3 K/UL — SIGNIFICANT CHANGE UP (ref 0–0.9)
MONOCYTES NFR BLD AUTO: 6.1 % — SIGNIFICANT CHANGE UP (ref 2–14)
NEUTROPHILS # BLD AUTO: 3.3 K/UL — SIGNIFICANT CHANGE UP (ref 1.8–7.4)
NEUTROPHILS NFR BLD AUTO: 66.8 % — SIGNIFICANT CHANGE UP (ref 43–77)
NON HDL CHOLESTEROL: 115 MG/DL — SIGNIFICANT CHANGE UP
NRBC # BLD: 0 /100 WBCS — SIGNIFICANT CHANGE UP (ref 0–0)
NRBC # FLD: 0 K/UL — SIGNIFICANT CHANGE UP (ref 0–0)
PLATELET # BLD AUTO: 124 K/UL — LOW (ref 150–400)
POTASSIUM SERPL-MCNC: 3.7 MMOL/L — SIGNIFICANT CHANGE UP (ref 3.5–5.3)
POTASSIUM SERPL-SCNC: 3.7 MMOL/L — SIGNIFICANT CHANGE UP (ref 3.5–5.3)
PROT SERPL-MCNC: 6.3 G/DL — SIGNIFICANT CHANGE UP (ref 6–8.3)
RBC # BLD: 4.76 M/UL — SIGNIFICANT CHANGE UP (ref 4.2–5.8)
RBC # FLD: 15.7 % — HIGH (ref 10.3–14.5)
SODIUM SERPL-SCNC: 139 MMOL/L — SIGNIFICANT CHANGE UP (ref 135–145)
TIBC SERPL-MCNC: 229 UG/DL — SIGNIFICANT CHANGE UP (ref 220–430)
TRIGL SERPL-MCNC: 127 MG/DL — SIGNIFICANT CHANGE UP
TSH SERPL-MCNC: 3.06 UIU/ML — SIGNIFICANT CHANGE UP (ref 0.27–4.2)
UIBC SERPL-MCNC: 202 UG/DL — SIGNIFICANT CHANGE UP (ref 110–370)
VIT B12 SERPL-MCNC: 290 PG/ML — SIGNIFICANT CHANGE UP (ref 200–900)
WBC # BLD: 4.94 K/UL — SIGNIFICANT CHANGE UP (ref 3.8–10.5)
WBC # FLD AUTO: 4.94 K/UL — SIGNIFICANT CHANGE UP (ref 3.8–10.5)

## 2024-03-22 PROCEDURE — 93306 TTE W/DOPPLER COMPLETE: CPT | Mod: 26

## 2024-03-22 RX ORDER — METOPROLOL TARTRATE 50 MG
25 TABLET ORAL DAILY
Refills: 0 | Status: DISCONTINUED | OUTPATIENT
Start: 2024-03-22 | End: 2024-03-24

## 2024-03-22 RX ORDER — PREGABALIN 225 MG/1
1000 CAPSULE ORAL DAILY
Refills: 0 | Status: DISCONTINUED | OUTPATIENT
Start: 2024-03-23 | End: 2024-03-24

## 2024-03-22 RX ORDER — PREGABALIN 225 MG/1
1000 CAPSULE ORAL ONCE
Refills: 0 | Status: COMPLETED | OUTPATIENT
Start: 2024-03-22 | End: 2024-03-22

## 2024-03-22 RX ORDER — LISINOPRIL 2.5 MG/1
10 TABLET ORAL DAILY
Refills: 0 | Status: DISCONTINUED | OUTPATIENT
Start: 2024-03-22 | End: 2024-03-24

## 2024-03-22 RX ADMIN — Medication 25 MILLIGRAM(S): at 17:15

## 2024-03-22 RX ADMIN — Medication 40 MILLIGRAM(S): at 05:39

## 2024-03-22 RX ADMIN — LISINOPRIL 2.5 MILLIGRAM(S): 2.5 TABLET ORAL at 05:39

## 2024-03-22 RX ADMIN — HEPARIN SODIUM 5000 UNIT(S): 5000 INJECTION INTRAVENOUS; SUBCUTANEOUS at 17:15

## 2024-03-22 RX ADMIN — PREGABALIN 1000 MICROGRAM(S): 225 CAPSULE ORAL at 17:15

## 2024-03-22 RX ADMIN — Medication 40 MILLIGRAM(S): at 17:14

## 2024-03-22 RX ADMIN — HEPARIN SODIUM 5000 UNIT(S): 5000 INJECTION INTRAVENOUS; SUBCUTANEOUS at 05:39

## 2024-03-22 NOTE — PATIENT PROFILE ADULT - FALL HARM RISK - UNIVERSAL INTERVENTIONS
Bed in lowest position, wheels locked, appropriate side rails in place/Call bell, personal items and telephone in reach/Instruct patient to call for assistance before getting out of bed or chair/Non-slip footwear when patient is out of bed/Emigrant to call system/Physically safe environment - no spills, clutter or unnecessary equipment/Purposeful Proactive Rounding/Room/bathroom lighting operational, light cord in reach

## 2024-03-23 ENCOUNTER — TRANSCRIPTION ENCOUNTER (OUTPATIENT)
Age: 74
End: 2024-03-23

## 2024-03-23 LAB
ANION GAP SERPL CALC-SCNC: 14 MMOL/L — SIGNIFICANT CHANGE UP (ref 7–14)
BUN SERPL-MCNC: 26 MG/DL — HIGH (ref 7–23)
CALCIUM SERPL-MCNC: 8.9 MG/DL — SIGNIFICANT CHANGE UP (ref 8.4–10.5)
CHLORIDE SERPL-SCNC: 102 MMOL/L — SIGNIFICANT CHANGE UP (ref 98–107)
CO2 SERPL-SCNC: 24 MMOL/L — SIGNIFICANT CHANGE UP (ref 22–31)
CREAT SERPL-MCNC: 1.53 MG/DL — HIGH (ref 0.5–1.3)
EGFR: 48 ML/MIN/1.73M2 — LOW
GLUCOSE SERPL-MCNC: 89 MG/DL — SIGNIFICANT CHANGE UP (ref 70–99)
HCT VFR BLD CALC: 37.7 % — LOW (ref 39–50)
HGB BLD-MCNC: 12 G/DL — LOW (ref 13–17)
MAGNESIUM SERPL-MCNC: 2.1 MG/DL — SIGNIFICANT CHANGE UP (ref 1.6–2.6)
MCHC RBC-ENTMCNC: 23.2 PG — LOW (ref 27–34)
MCHC RBC-ENTMCNC: 31.8 GM/DL — LOW (ref 32–36)
MCV RBC AUTO: 72.8 FL — LOW (ref 80–100)
NRBC # BLD: 0 /100 WBCS — SIGNIFICANT CHANGE UP (ref 0–0)
NRBC # FLD: 0 K/UL — SIGNIFICANT CHANGE UP (ref 0–0)
PHOSPHATE SERPL-MCNC: 4.6 MG/DL — HIGH (ref 2.5–4.5)
PLATELET # BLD AUTO: 103 K/UL — LOW (ref 150–400)
POTASSIUM SERPL-MCNC: 3.7 MMOL/L — SIGNIFICANT CHANGE UP (ref 3.5–5.3)
POTASSIUM SERPL-SCNC: 3.7 MMOL/L — SIGNIFICANT CHANGE UP (ref 3.5–5.3)
RBC # BLD: 5.18 M/UL — SIGNIFICANT CHANGE UP (ref 4.2–5.8)
RBC # FLD: 15.9 % — HIGH (ref 10.3–14.5)
SODIUM SERPL-SCNC: 140 MMOL/L — SIGNIFICANT CHANGE UP (ref 135–145)
WBC # BLD: 5.24 K/UL — SIGNIFICANT CHANGE UP (ref 3.8–10.5)
WBC # FLD AUTO: 5.24 K/UL — SIGNIFICANT CHANGE UP (ref 3.8–10.5)

## 2024-03-23 RX ORDER — FUROSEMIDE 40 MG
40 TABLET ORAL
Refills: 0 | Status: DISCONTINUED | OUTPATIENT
Start: 2024-03-24 | End: 2024-03-24

## 2024-03-23 RX ORDER — SODIUM CHLORIDE 0.65 %
1 AEROSOL, SPRAY (ML) NASAL EVERY 4 HOURS
Refills: 0 | Status: DISCONTINUED | OUTPATIENT
Start: 2024-03-23 | End: 2024-03-24

## 2024-03-23 RX ADMIN — Medication 40 MILLIGRAM(S): at 05:57

## 2024-03-23 RX ADMIN — Medication 40 MILLIGRAM(S): at 16:30

## 2024-03-23 RX ADMIN — LISINOPRIL 10 MILLIGRAM(S): 2.5 TABLET ORAL at 05:57

## 2024-03-23 RX ADMIN — Medication 25 MILLIGRAM(S): at 05:56

## 2024-03-23 RX ADMIN — HEPARIN SODIUM 5000 UNIT(S): 5000 INJECTION INTRAVENOUS; SUBCUTANEOUS at 05:56

## 2024-03-23 RX ADMIN — PREGABALIN 1000 MICROGRAM(S): 225 CAPSULE ORAL at 16:30

## 2024-03-23 RX ADMIN — HEPARIN SODIUM 5000 UNIT(S): 5000 INJECTION INTRAVENOUS; SUBCUTANEOUS at 16:29

## 2024-03-23 NOTE — PHYSICAL THERAPY INITIAL EVALUATION ADULT - ADDITIONAL COMMENTS
Patient lives in a private home with his wife, reports 8 steps to enter. One flight up to his bedroom. Patient ambulatory without any device, denies any falls.     Patient left sitting in chair at bedside, NAD, all lines and tubes intact, HR 95 following gait training, RN aware of PT evaluation

## 2024-03-23 NOTE — DISCHARGE NOTE PROVIDER - NSDCCPCAREPLAN_GEN_ALL_CORE_FT
PRINCIPAL DISCHARGE DIAGNOSIS  Diagnosis: Acute systolic congestive heart failure  Assessment and Plan of Treatment: You were seen by cardiology and treated with IV Lasix. You will be going home on oral lasix. Please follow up outpatient with Dr. Elaine (cardiologist) upon discharge for further management.      SECONDARY DISCHARGE DIAGNOSES  Diagnosis: Anemia  Assessment and Plan of Treatment: Follow-up with your outpatient provider for further monitoring of your blood counts.    Diagnosis: Hairy cell leukemia  Assessment and Plan of Treatment: Follow-up with your outpatient provider for further monitoring.     PRINCIPAL DISCHARGE DIAGNOSIS  Diagnosis: Acute systolic congestive heart failure  Assessment and Plan of Treatment: You were seen by cardiology and treated with IV Lasix. You will be going home on oral lasix but at an increased dose. You were started on Metoprolol and your lisinopril was increased. Please follow up outpatient with Dr. Elaine (cardiologist) upon discharge for further management.      SECONDARY DISCHARGE DIAGNOSES  Diagnosis: Anemia  Assessment and Plan of Treatment: Continue oral B12. Follow-up with your outpatient provider for further monitoring of your blood counts.    Diagnosis: Hairy cell leukemia  Assessment and Plan of Treatment: Follow-up with your outpatient provider for further monitoring.    Diagnosis: LADI (acute kidney injury)  Assessment and Plan of Treatment: Your creatinine got elevated while you were on IV Lasix, continue oral Lasix on discharge and follow up with your PCP to rpt BMP within a week.

## 2024-03-23 NOTE — PHYSICAL THERAPY INITIAL EVALUATION ADULT - GENERAL OBSERVATIONS, REHAB EVAL
Patient found sitting in chair NAD, A&Ox4, +tele monitor, HR 87 at rest, OK or PT per RN Willy, patient agreeable to participate in PT evaluation

## 2024-03-23 NOTE — DISCHARGE NOTE PROVIDER - NSDCFUSCHEDAPPT_GEN_ALL_CORE_FT
Alek Jimenez  [facility]  [facility] [address] [facility]  Scheduled Appointment: 05/21/2024

## 2024-03-23 NOTE — DISCHARGE NOTE PROVIDER - HOSPITAL COURSE
72 yo M with PMhx of Hairy cell Leukemia (in remission), chronic sinusitis, and new HF presents to the ED with worsening SOB, due to acute heart failure.     Acute systolic congestive heart failure.   -Patient with worsening SOB, orthopnea, b/l LE edema and chest pain with exertion   -Outpatient Stress test showed decreased EF of 28%   -Hx and findings are consistent with acute HF   -EKG showed no acute changes, Trops are stable, ACS is ruled out   -Cardiology consulted, recs appreciated   -Echo shows severe global LV dysfunction, EF 20-25%  -s/p IV lasix 40 mg BID - transitioned to PO 3/24  -Outpatient follow up with Dr. Elaine (cardiologist) upon discharged for further management.     Hairy cell leukemia.   -Stable, in remission   -F/u as outpatient.    Thrombocytopenia.   -Chronic, acquired after chemotherapy   -Stable, at baseline   -trend CBC daily.    Anemia.   -Microcytic anemia   -Also reports to have B12 deficiency   -low normal B12  -replete IM x 1 then continue PO    PT ______    On _________ , discussed with Dr. Huang, patient is medically cleared and optimized for discharge today to _____________ . All medications were reviewed with attending, and any new/required prescriptions were sent to mutually agreed upon pharmacy.       72 yo M with PMhx of Hairy cell Leukemia (in remission), chronic sinusitis, and new HF presents to the ED with worsening SOB, due to acute heart failure.     Acute systolic congestive heart failure.   -Patient with worsening SOB, orthopnea, b/l LE edema and chest pain with exertion   -Outpatient Stress test showed decreased EF of 28%   -Hx and findings are consistent with acute HF   -EKG showed no acute changes, Trops are stable, ACS is ruled out   -Cardiology consulted, recs appreciated   -Echo shows severe global LV dysfunction, EF 20-25%  -s/p IV lasix 40 mg BID - transitioned to PO 3/24  -Outpatient follow up with Dr. Elaine (cardiologist) upon discharged for further management.     LADI  - refrain from initiating further GDMT for CHF in the inpatient setting per Cards  - d/w attending pamela Babb to discharge and instructed pt to rpt BMP outpt in 1 week with PCP    Hairy cell leukemia.   -Stable, in remission   -F/u as outpatient.    Thrombocytopenia.   -Chronic, acquired after chemotherapy   -Stable, at baseline   -trend CBC daily.    Anemia.   -Microcytic anemia   -Also reports to have B12 deficiency   -low normal B12  -replete IM x 1 then continue PO    PT: no PT needs    On 3/24/2024, discussed with Dr. Huang, patient is medically cleared and optimized for discharge today to home. All medications were reviewed with attending, and any new/required prescriptions were sent to mutually agreed upon pharmacy.

## 2024-03-23 NOTE — PHYSICAL THERAPY INITIAL EVALUATION ADULT - MANUAL MUSCLE TESTING RESULTS, REHAB EVAL
PAtients bilateral upper and lower extremity strength grossly 5/5 upon MMT and functional assessment

## 2024-03-23 NOTE — DISCHARGE NOTE PROVIDER - CARE PROVIDER_API CALL
Hubert Elaine [facility]  Cardiovascular Disease  [address] [place] [address], [address]  [place] [address]  Phone: [phone]  Fax: [phone]  Follow Up Time: 1 week

## 2024-03-23 NOTE — PHYSICAL THERAPY INITIAL EVALUATION ADULT - PERTINENT HX OF CURRENT PROBLEM, REHAB EVAL
Patient is a 73 year old male with PMHx of Hairy cell Leukemia (in remission), chronic sinusitis, and new heart failure presents to the ED with worsening shortness of breath, due to acute heart failure

## 2024-03-23 NOTE — DISCHARGE NOTE PROVIDER - NSDCMRMEDTOKEN_GEN_ALL_CORE_FT
amLODIPine 5 mg oral tablet: 1 tab(s) orally once a day  Lasix 20 mg oral tablet: 1 tab(s) orally once a day  lisinopril 2.5 mg oral tablet: 1 tab(s) orally once a day   Mustarde Flap Text: The defect edges were debeveled with a #15 scalpel blade.  Given the size, depth and location of the defect and the proximity to free margins a Mustarde flap was deemed most appropriate. Using a sterile surgical marker, an appropriate flap was drawn incorporating the defect. The area thus outlined was incised with a #15 scalpel blade. The skin margins were undermined to an appropriate distance in all directions utilizing iris scissors. Following this, the designed flap was carried into the primary defect and sutured into place.

## 2024-03-24 ENCOUNTER — TRANSCRIPTION ENCOUNTER (OUTPATIENT)
Age: 74
End: 2024-03-24

## 2024-03-24 VITALS
TEMPERATURE: 97 F | RESPIRATION RATE: 17 BRPM | HEART RATE: 82 BPM | OXYGEN SATURATION: 98 % | DIASTOLIC BLOOD PRESSURE: 90 MMHG | SYSTOLIC BLOOD PRESSURE: 136 MMHG

## 2024-03-24 LAB
ANION GAP SERPL CALC-SCNC: 11 MMOL/L — SIGNIFICANT CHANGE UP (ref 7–14)
BUN SERPL-MCNC: 27 MG/DL — HIGH (ref 7–23)
CALCIUM SERPL-MCNC: 8.9 MG/DL — SIGNIFICANT CHANGE UP (ref 8.4–10.5)
CHLORIDE SERPL-SCNC: 101 MMOL/L — SIGNIFICANT CHANGE UP (ref 98–107)
CO2 SERPL-SCNC: 26 MMOL/L — SIGNIFICANT CHANGE UP (ref 22–31)
CREAT SERPL-MCNC: 1.54 MG/DL — HIGH (ref 0.5–1.3)
EGFR: 47 ML/MIN/1.73M2 — LOW
GLUCOSE SERPL-MCNC: 93 MG/DL — SIGNIFICANT CHANGE UP (ref 70–99)
MAGNESIUM SERPL-MCNC: 2.1 MG/DL — SIGNIFICANT CHANGE UP (ref 1.6–2.6)
PHOSPHATE SERPL-MCNC: 4.2 MG/DL — SIGNIFICANT CHANGE UP (ref 2.5–4.5)
POTASSIUM SERPL-MCNC: 3.8 MMOL/L — SIGNIFICANT CHANGE UP (ref 3.5–5.3)
POTASSIUM SERPL-SCNC: 3.8 MMOL/L — SIGNIFICANT CHANGE UP (ref 3.5–5.3)
SODIUM SERPL-SCNC: 138 MMOL/L — SIGNIFICANT CHANGE UP (ref 135–145)

## 2024-03-24 RX ORDER — LISINOPRIL 2.5 MG/1
1 TABLET ORAL
Qty: 30 | Refills: 0
Start: 2024-03-24 | End: 2024-04-22

## 2024-03-24 RX ORDER — FUROSEMIDE 40 MG
1 TABLET ORAL
Refills: 0 | DISCHARGE

## 2024-03-24 RX ORDER — AMLODIPINE BESYLATE 2.5 MG/1
1 TABLET ORAL
Refills: 0 | DISCHARGE

## 2024-03-24 RX ORDER — PREGABALIN 225 MG/1
1 CAPSULE ORAL
Qty: 30 | Refills: 0
Start: 2024-03-24 | End: 2024-04-22

## 2024-03-24 RX ORDER — METOPROLOL TARTRATE 50 MG
1 TABLET ORAL
Qty: 30 | Refills: 0
Start: 2024-03-24 | End: 2024-04-22

## 2024-03-24 RX ORDER — LISINOPRIL 2.5 MG/1
1 TABLET ORAL
Qty: 0 | Refills: 0 | DISCHARGE

## 2024-03-24 RX ORDER — FUROSEMIDE 40 MG
1 TABLET ORAL
Qty: 60 | Refills: 0
Start: 2024-03-24 | End: 2024-04-22

## 2024-03-24 RX ADMIN — Medication 40 MILLIGRAM(S): at 05:01

## 2024-03-24 RX ADMIN — Medication 25 MILLIGRAM(S): at 05:01

## 2024-03-24 RX ADMIN — HEPARIN SODIUM 5000 UNIT(S): 5000 INJECTION INTRAVENOUS; SUBCUTANEOUS at 05:02

## 2024-03-24 RX ADMIN — LISINOPRIL 10 MILLIGRAM(S): 2.5 TABLET ORAL at 05:01

## 2024-03-24 NOTE — PROGRESS NOTE ADULT - SUBJECTIVE AND OBJECTIVE BOX
Cardiovascular Disease Progress Note  Date of service: 03-22-24 @ 12:30    Overnight events: No acute events overnight. Patient denies chest pain or SOB  Otherwise review of systems negative    Objective Findings:  T(C): 36.8 (03-22-24 @ 08:30), Max: 36.8 (03-21-24 @ 18:40)  HR: 95 (03-22-24 @ 08:30) (90 - 95)  BP: 148/76 (03-22-24 @ 08:30) (144/94 - 162/96)  RR: 18 (03-22-24 @ 08:30) (17 - 19)  SpO2: 99% (03-22-24 @ 08:30) (97% - 100%)  Wt(kg): --  Daily Height in cm: 175.26 (22 Mar 2024 08:30)    Daily       Physical Exam:  Gen: NAD; Patient resting comfortably  HEENT: EOMI, Normocephalic/ atraumatic  CV: RRR, normal S1 + S2, no m/r/g  Lungs:  Normal respiratory effort; clear to auscultation bilaterally  Abd: soft, non-tender; bowel sounds present  Ext: 2+ LE  edema; warm and well perfused    Telemetry: Sinus     Laboratory Data:                        11.2   4.94  )-----------( 124      ( 22 Mar 2024 06:27 )             35.0     03-22    139  |  103  |  22  ----------------------------<  91  3.7   |  23  |  1.48<H>    Ca    8.8      22 Mar 2024 06:27    TPro  6.3  /  Alb  3.3  /  TBili  0.7  /  DBili  x   /  AST  21  /  ALT  13  /  AlkPhos  70  03-22              Inpatient Medications:  MEDICATIONS  (STANDING):  furosemide   Injectable 40 milliGRAM(s) IV Push every 12 hours  heparin   Injectable 5000 Unit(s) SubCutaneous every 12 hours  lisinopril 2.5 milliGRAM(s) Oral daily      Assessment:  74 y/o M with history of HTN, vasculitis, and hairy cell leukemia s/p chemo circa 10 years ago who presents chest tightness and shortness of breath x 2 months    Plan of Care:    #Acute systolic heart failure  - Patient presents with LE edema and SOB  - EKG shows sinus rhythm with non specific T wave changes.   - CXR shows pleural effusion  - Recent stress test shows reduced LVEF of 28% with no evidence of infarction or  ischemia. Report reviewed.   - Continue IV diuresis with Lasix 40mg BID  - I and O's, daily weights, and replenish lytes as needed  - Will start GDMT for HFrEF as tolerated  - Recommend Metoprolol Succinate 25mg daily, Please increase Lisinopril to 10 mg daily, will titrate accordingly.   - Please obtain Echo  - No evidence of ACS  - Chest pain now resolved.     #HTN  - Continue ACE  - Refer to plan above.     #Hairy cell leukemia  - S/p chemo    #ACP (advance care planning)-  Advanced care planning was discussed with the patient.  Risks, benefits and alternatives of medical treatment and procedures were discussed in detail and all questions were answered. 30 additional minutes spent addressing advance care plans.          Over 55 minutes spent on total encounter; more than 50% of the visit was spent counseling and/or coordinating care by the attending physician.      Ruddy Anton DO [facility]  Cardiovascular Disease  [phone]
Cardiovascular Disease Progress Note  Date of service: 03-23-24 @ 09:14    Overnight events: No acute events overnight.  Patient denies chest pain or SOB.   Otherwise review of systems negative    Objective Findings:  T(C): 36.2 (03-23-24 @ 05:55), Max: 36.7 (03-22-24 @ 19:58)  HR: 88 (03-23-24 @ 05:55) (87 - 89)  BP: 137/87 (03-23-24 @ 05:55) (111/73 - 137/87)  RR: 18 (03-23-24 @ 05:55) (18 - 18)  SpO2: 98% (03-23-24 @ 05:55) (98% - 99%)  Wt(kg): --  Daily     Daily       Physical Exam:  Gen: NAD; Patient resting comfortably  HEENT: EOMI, Normocephalic/ atraumatic  CV: RRR, normal S1 + S2, no m/r/g  Lungs:  Normal respiratory effort; clear to auscultation bilaterally  Abd: soft, non-tender; bowel sounds present  Ext: 1+ LE  edema; warm and well perfused    Telemetry: Sinus     Laboratory Data:                        12.0   5.24  )-----------( x        ( 23 Mar 2024 07:10 )             37.7     03-23    140  |  102  |  26<H>  ----------------------------<  89  3.7   |  24  |  1.53<H>    Ca    8.9      23 Mar 2024 07:10  Phos  4.6     03-23  Mg     2.10     03-23    TPro  6.3  /  Alb  3.3  /  TBili  0.7  /  DBili  x   /  AST  21  /  ALT  13  /  AlkPhos  70  03-22              Inpatient Medications:  MEDICATIONS  (STANDING):  cyanocobalamin 1000 MICROGram(s) Oral daily  furosemide   Injectable 40 milliGRAM(s) IV Push every 12 hours  heparin   Injectable 5000 Unit(s) SubCutaneous every 12 hours  lisinopril 10 milliGRAM(s) Oral daily  metoprolol succinate ER 25 milliGRAM(s) Oral daily      Assessment:  72 y/o M with history of HTN, vasculitis, and hairy cell leukemia s/p chemo circa 10 years ago who presents chest tightness and shortness of breath x 2 months    Plan of Care:    #Acute systolic heart failure  - Improving  - EKG shows sinus rhythm with non specific T wave changes.   - Recent stress test shows reduced LVEF of 28% with no evidence of infarction or  ischemia. Report reviewed.   - Continue IV diuresis with Lasix 40mg BID  - Recommend transition to PO Lasix 40mg BID 3/24/2024  - I and O's, daily weights, and replenish lytes as needed  - Continue Metoprolol Succinate 25mg daily, Please increase Lisinopril to 10 mg daily   - Echo shows severe global LV dysfunction.   - No evidence of ACS  - Outpatient follow up with Dr. Elaine (cardiologist) upon discharged for further management.     #HTN  - Continue ACE  - Refer to plan above.     #Hairy cell leukemia  - S/p chemo          Over 55 minutes spent on total encounter; more than 50% of the visit was spent counseling and/or coordinating care by the attending physician.      Ruddy Anton DO [facility]  Cardiovascular Disease  [phone]
Cardiovascular Disease Progress Note  Date of service: 03-24-24 @ 09:34    Overnight events: No acute events overnight.  Patient is in no distress.   Otherwise review of systems negative    Objective Findings:  T(C): 36.7 (03-24-24 @ 05:00), Max: 36.7 (03-24-24 @ 05:00)  HR: 79 (03-24-24 @ 05:00) (79 - 84)  BP: 135/75 (03-24-24 @ 05:00) (103/77 - 140/82)  RR: 18 (03-24-24 @ 05:00) (18 - 18)  SpO2: 100% (03-24-24 @ 05:00) (100% - 100%)  Wt(kg): --  Daily     Daily       Physical Exam:  Gen: NAD; Patient resting comfortably  HEENT: EOMI, Normocephalic/ atraumatic  CV: RRR, normal S1 + S2, no m/r/g  Lungs:  Normal respiratory effort; clear to auscultation bilaterally  Abd: soft, non-tender; bowel sounds present  Ext: Trace LE edema; warm and well perfused    Telemetry: Sinus     Laboratory Data:                        12.0   5.24  )-----------( 103      ( 23 Mar 2024 07:10 )             37.7     03-24    138  |  101  |  27<H>  ----------------------------<  93  3.8   |  26  |  1.54<H>    Ca    8.9      24 Mar 2024 07:25  Phos  4.2     03-24  Mg     2.10     03-24                Inpatient Medications:  MEDICATIONS  (STANDING):  cyanocobalamin 1000 MICROGram(s) Oral daily  furosemide    Tablet 40 milliGRAM(s) Oral two times a day  heparin   Injectable 5000 Unit(s) SubCutaneous every 12 hours  lisinopril 10 milliGRAM(s) Oral daily  metoprolol succinate ER 25 milliGRAM(s) Oral daily      Assessment:  72 y/o M with history of HTN, vasculitis, and hairy cell leukemia s/p chemo circa 10 years ago who presents chest tightness and shortness of breath x 2 months    Plan of Care:    #Acute systolic heart failure  - Improving  - EKG shows sinus rhythm with non specific T wave changes.   - Recent stress test shows reduced LVEF of 28% with no evidence of infarction or  ischemia. Report reviewed.   - Transition to  PO Lasix 40mg BID 3/24/2024, Patient to be discharged on this dose.   - Continue Metoprolol Succinate 25mg daily,  Lisinopril to 10 mg daily   - Patient has developed an LADI, would refrain from initiating further GDMT for CHF in the inpatient setting.   - Echo shows severe global LV dysfunction.   - No evidence of ACS  - Outpatient follow up with Dr. Elaine (cardiologist) upon discharged for further management.     #HTN  - Continue ACE  - Refer to plan above.       #ACP (advance care planning)-  Advanced care planning was discussed with the patient.  Risks, benefits and alternatives of medical treatment and procedures were discussed in detail and all questions were answered. 30 additional minutes spent addressing advance care plans.          Over 55 minutes spent on total encounter; more than 50% of the visit was spent counseling and/or coordinating care by the attending physician.      Ruddy Anton DO [facility]  Cardiovascular Disease  [phone]
Patient is a 73y old  Male who presents with a chief complaint of SOB (23 Mar 2024 10:16)      DATE OF SERVICE: 03-23-24 @ 15:50    SUBJECTIVE / OVERNIGHT EVENTS: overnight events noted    ROS:  Resp: No cough no sputum production  CVS: No chest pain no palpitations no orthopnea  GI: no N/V/D  "I feel much better"       MEDICATIONS  (STANDING):  cyanocobalamin 1000 MICROGram(s) Oral daily  furosemide   Injectable 40 milliGRAM(s) IV Push every 12 hours  heparin   Injectable 5000 Unit(s) SubCutaneous every 12 hours  lisinopril 10 milliGRAM(s) Oral daily  metoprolol succinate ER 25 milliGRAM(s) Oral daily    MEDICATIONS  (PRN):  acetaminophen     Tablet .. 650 milliGRAM(s) Oral every 6 hours PRN Temp greater or equal to 38C (100.4F), Mild Pain (1 - 3)  aluminum hydroxide/magnesium hydroxide/simethicone Suspension 30 milliLiter(s) Oral every 4 hours PRN Dyspepsia  melatonin 3 milliGRAM(s) Oral at bedtime PRN Insomnia  ondansetron Injectable 4 milliGRAM(s) IV Push every 8 hours PRN Nausea and/or Vomiting  sodium chloride 0.65% Nasal 1 Spray(s) Both Nostrils every 4 hours PRN Nasal Congestion        CAPILLARY BLOOD GLUCOSE        I&O's Summary      Vital Signs Last 24 Hrs  T(C): 36.2 (23 Mar 2024 05:55), Max: 36.7 (22 Mar 2024 19:58)  T(F): 97.1 (23 Mar 2024 05:55), Max: 98 (22 Mar 2024 19:58)  HR: 81 (23 Mar 2024 14:22) (81 - 89)  BP: 103/77 (23 Mar 2024 14:22) (103/77 - 137/87)  BP(mean): --  RR: 18 (23 Mar 2024 14:22) (18 - 18)  SpO2: 100% (23 Mar 2024 14:22) (98% - 100%)    PHYSICAL EXAM:  CHEST/LUNG: clear   HEART: S1 S2; systolic murmur +   ABDOMEN: Soft, Nontender  EXTREMITIES: improved edema  NEUROLOGY: AO x 3 non-focal  SKIN: No rashes or lesions    LABS:                        12.0   5.24  )-----------( 103      ( 23 Mar 2024 07:10 )             37.7     03-23    140  |  102  |  26<H>  ----------------------------<  89  3.7   |  24  |  1.53<H>    Ca    8.9      23 Mar 2024 07:10  Phos  4.6     03-23  Mg     2.10     03-23    TPro  6.3  /  Alb  3.3  /  TBili  0.7  /  DBili  x   /  AST  21  /  ALT  13  /  AlkPhos  70  03-22          Urinalysis Basic - ( 23 Mar 2024 07:10 )    Color: x / Appearance: x / SG: x / pH: x  Gluc: 89 mg/dL / Ketone: x  / Bili: x / Urobili: x   Blood: x / Protein: x / Nitrite: x   Leuk Esterase: x / RBC: x / WBC x   Sq Epi: x / Non Sq Epi: x / Bacteria: x          All consultant(s) notes reviewed and care discussed with other providers        Contact Number, Dr Huang [phone]
Patient is a 73y old  Male who presents with a chief complaint of SOB (24 Mar 2024 09:34)      DATE OF SERVICE: 03-24-24 @ 14:22    SUBJECTIVE / OVERNIGHT EVENTS: overnight events noted    ROS:  Resp: No cough no sputum production  CVS: No chest pain no palpitations no orthopnea  GI: no N/V/D  'I feel fine'       MEDICATIONS  (STANDING):  cyanocobalamin 1000 MICROGram(s) Oral daily  furosemide    Tablet 40 milliGRAM(s) Oral two times a day  heparin   Injectable 5000 Unit(s) SubCutaneous every 12 hours  lisinopril 10 milliGRAM(s) Oral daily  metoprolol succinate ER 25 milliGRAM(s) Oral daily    MEDICATIONS  (PRN):  acetaminophen     Tablet .. 650 milliGRAM(s) Oral every 6 hours PRN Temp greater or equal to 38C (100.4F), Mild Pain (1 - 3)  aluminum hydroxide/magnesium hydroxide/simethicone Suspension 30 milliLiter(s) Oral every 4 hours PRN Dyspepsia  melatonin 3 milliGRAM(s) Oral at bedtime PRN Insomnia  ondansetron Injectable 4 milliGRAM(s) IV Push every 8 hours PRN Nausea and/or Vomiting  sodium chloride 0.65% Nasal 1 Spray(s) Both Nostrils every 4 hours PRN Nasal Congestion        CAPILLARY BLOOD GLUCOSE        I&O's Summary    23 Mar 2024 07:01  -  24 Mar 2024 07:00  --------------------------------------------------------  IN: 0 mL / OUT: 1600 mL / NET: -1600 mL    24 Mar 2024 07:01  -  24 Mar 2024 14:22  --------------------------------------------------------  IN: 0 mL / OUT: 550 mL / NET: -550 mL        Vital Signs Last 24 Hrs  T(C): 36.3 (24 Mar 2024 12:31), Max: 36.7 (24 Mar 2024 05:00)  T(F): 97.4 (24 Mar 2024 12:31), Max: 98 (24 Mar 2024 05:00)  HR: 82 (24 Mar 2024 12:31) (79 - 84)  BP: 136/90 (24 Mar 2024 12:31) (135/75 - 140/82)  BP(mean): --  RR: 17 (24 Mar 2024 12:31) (17 - 18)  SpO2: 98% (24 Mar 2024 12:31) (98% - 100%)    PHYSICAL EXAM:  CHEST/LUNG: clear   HEART: S1 S2; systolic murmur +   ABDOMEN: Soft, Nontender  EXTREMITIES: trace residual edema  NEUROLOGY: AO x 3 non-focal  SKIN: No rashes or lesions    LABS:                        12.0   5.24  )-----------( 103      ( 23 Mar 2024 07:10 )             37.7     03-24    138  |  101  |  27<H>  ----------------------------<  93  3.8   |  26  |  1.54<H>    Ca    8.9      24 Mar 2024 07:25  Phos  4.2     03-24  Mg     2.10     03-24            Urinalysis Basic - ( 24 Mar 2024 07:25 )    Color: x / Appearance: x / SG: x / pH: x  Gluc: 93 mg/dL / Ketone: x  / Bili: x / Urobili: x   Blood: x / Protein: x / Nitrite: x   Leuk Esterase: x / RBC: x / WBC x   Sq Epi: x / Non Sq Epi: x / Bacteria: x          All consultant(s) notes reviewed and care discussed with other providers        Contact Number, Dr Huang [phone]
Patient is a 73y old  Male who presents with a chief complaint of SOB (22 Mar 2024 12:30)  patient not known to me, assigned this AM to assume care  chart reviewed and events thus far noted  admitted overnight by hospitalist colleague     DATE OF SERVICE: 03-22-24 @ 14:50    SUBJECTIVE / OVERNIGHT EVENTS: overnight events noted    ROS:  Resp: No cough no sputum production  CVS: No chest pain no palpitations no orthopnea  GI: no N/V/D  : no dysuria, no hematuria  "I feel much better"       MEDICATIONS  (STANDING):  furosemide   Injectable 40 milliGRAM(s) IV Push every 12 hours  heparin   Injectable 5000 Unit(s) SubCutaneous every 12 hours  lisinopril 10 milliGRAM(s) Oral daily    MEDICATIONS  (PRN):  acetaminophen     Tablet .. 650 milliGRAM(s) Oral every 6 hours PRN Temp greater or equal to 38C (100.4F), Mild Pain (1 - 3)  aluminum hydroxide/magnesium hydroxide/simethicone Suspension 30 milliLiter(s) Oral every 4 hours PRN Dyspepsia  melatonin 3 milliGRAM(s) Oral at bedtime PRN Insomnia  ondansetron Injectable 4 milliGRAM(s) IV Push every 8 hours PRN Nausea and/or Vomiting        CAPILLARY BLOOD GLUCOSE        I&O's Summary    21 Mar 2024 07:01  -  22 Mar 2024 07:00  --------------------------------------------------------  IN: 100 mL / OUT: 450 mL / NET: -350 mL        Vital Signs Last 24 Hrs  T(C): 36.8 (22 Mar 2024 08:30), Max: 36.8 (21 Mar 2024 18:40)  T(F): 98.2 (22 Mar 2024 08:30), Max: 98.2 (21 Mar 2024 18:40)  HR: 95 (22 Mar 2024 08:30) (90 - 95)  BP: 148/76 (22 Mar 2024 08:30) (144/94 - 162/96)  BP(mean): --  RR: 18 (22 Mar 2024 08:30) (17 - 18)  SpO2: 99% (22 Mar 2024 08:30) (97% - 100%)    PHYSICAL EXAM:  EYES: EOMI, PERRLA,  NECK: Supple, No JVD  CHEST/LUNG: clear b  HEART: S1 S2; systolic murmur +   ABDOMEN: Soft, Nontender  EXTREMITIES: 2 + pitting edema  NEUROLOGY: AO x 3 non-focal  SKIN: No rashes or lesions    LABS:                        11.2   4.94  )-----------( 124      ( 22 Mar 2024 06:27 )             35.0     03-22    139  |  103  |  22  ----------------------------<  91  3.7   |  23  |  1.48<H>    Ca    8.8      22 Mar 2024 06:27    TPro  6.3  /  Alb  3.3  /  TBili  0.7  /  DBili  x   /  AST  21  /  ALT  13  /  AlkPhos  70  03-22          Urinalysis Basic - ( 22 Mar 2024 06:27 )    Color: x / Appearance: x / SG: x / pH: x  Gluc: 91 mg/dL / Ketone: x  / Bili: x / Urobili: x   Blood: x / Protein: x / Nitrite: x   Leuk Esterase: x / RBC: x / WBC x   Sq Epi: x / Non Sq Epi: x / Bacteria: x          All consultant(s) notes reviewed and care discussed with other providers        Contact Number, Dr Huang [phone]

## 2024-03-24 NOTE — PROGRESS NOTE ADULT - NSPROGADDITIONALINFOA_GEN_ALL_CORE
discussed with patient in detail, expresses understanding of treatment plans.  discussed with daughter over the phone  discussed with covering ACP   discussed with cardiology
discussed with patient in detail, expresses understanding of treatment plans.
discussed with patient in detail, expresses understanding of treatment plans.

## 2024-03-24 NOTE — PROGRESS NOTE ADULT - PROBLEM SELECTOR PLAN 3
Chronic, acquired after chemotherapy   -Stable, at baseline   -trend CBC daily
Chronic, acquired after chemotherapy   -Stable, at baseline   outpatient follow up
Chronic, acquired after chemotherapy   -Stable, at baseline   -trend CBC daily

## 2024-03-24 NOTE — DISCHARGE NOTE NURSING/CASE MANAGEMENT/SOCIAL WORK - PATIENT PORTAL LINK FT
You can access the FollowMyHealth Patient Portal offered by [facility] by registering at the following website: [email or web address]. By joining [employer]’s FollowMyHealth portal, you will also be able to view your health information using other applications (apps) compatible with our system.

## 2024-03-24 NOTE — PROGRESS NOTE ADULT - PROBLEM SELECTOR PLAN 2
Stable, in remission   -F/u as outpatient
Stable, in remission   -F/u as outpatient
Stable, in remission   follow up as outpatient with oncology

## 2024-03-24 NOTE — PROGRESS NOTE ADULT - ASSESSMENT
74 yo M with PMH of Hairy cell Leukemia (in remission), chronic sinusitis, and new HF presents to the ED with worsening SOB, due to acute heart failure. 
74 yo M with PMH of Hairy cell Leukemia (in remission), chronic sinusitis, and new HF presents to the ED with worsening SOB, due to acute heart failure. 
72 yo M with PMH of Hairy cell Leukemia (in remission), chronic sinusitis, and new HF presents to the ED with worsening SOB, due to acute heart failure.

## 2024-03-24 NOTE — PROGRESS NOTE ADULT - PROBLEM SELECTOR PLAN 1
now on  furosemide 40 BID  cleared for discharge per cardiology on current med regimen  edema much improved  salt and water restriction reinforced at length   daughter was on the phone  creatinine 1.5 consistent with mild prerenal state  no evidence LADI  should improve with change to po furosemide  outpatient follow up
new onset   continue IV lasix 40 mg BID today  change to po 40 BID tomorrow   telemonitoring   Daily weight   strict ins and outs
new onset   continue IV lasix 40 mg BID   telemonitoring   Daily weight   strict ins and outs

## 2024-03-24 NOTE — PROGRESS NOTE ADULT - PROBLEM SELECTOR PLAN 4
Microcytic anemia   -Also reports to have B12 deficiency   -F/u with iron studies, B12, and folate  low normal B12  replete IM x 1 then continue Po
Microcytic anemia   -Also reports to have B12 deficiency   -F/u with iron studies, B12, and folate  low normal B12  replete IM x 1 then continue Po
continue B12 po on discharge

## 2024-04-23 NOTE — DISCHARGE NOTE NURSING/CASE MANAGEMENT/SOCIAL WORK - CAREGIVER RELATION TO PATIENT
Called Pt to reviewed Liver Shrinking Diet and address questions.   She was unable to take call at this time due to being in a meeting.    We discussed a plan for a phone call between 11am and 12pm tomorrow 4/24/24  
Adequate: hears normal conversation without difficulty
spouse

## 2024-05-21 ENCOUNTER — APPOINTMENT (OUTPATIENT)
Dept: OTOLARYNGOLOGY | Facility: CLINIC | Age: 74
End: 2024-05-21
Payer: MEDICARE

## 2024-05-21 VITALS — WEIGHT: 202 LBS | HEIGHT: 67 IN | BODY MASS INDEX: 31.71 KG/M2

## 2024-05-21 DIAGNOSIS — M31.0 HYPERSENSITIVITY ANGIITIS: ICD-10-CM

## 2024-05-21 DIAGNOSIS — J32.4 CHRONIC PANSINUSITIS: ICD-10-CM

## 2024-05-21 PROCEDURE — 99214 OFFICE O/P EST MOD 30 MIN: CPT | Mod: 25

## 2024-05-21 PROCEDURE — 31231 NASAL ENDOSCOPY DX: CPT

## 2024-05-21 RX ORDER — SULFAMETHOXAZOLE AND TRIMETHOPRIM 800; 160 MG/1; MG/1
800-160 TABLET ORAL TWICE DAILY
Qty: 14 | Refills: 0 | Status: DISCONTINUED | COMMUNITY
Start: 2023-09-18 | End: 2024-05-21

## 2024-05-21 RX ORDER — SULFAMETHOXAZOLE AND TRIMETHOPRIM 800; 160 MG/1; MG/1
800-160 TABLET ORAL DAILY
Qty: 20 | Refills: 0 | Status: DISCONTINUED | COMMUNITY
Start: 2023-12-01 | End: 2024-05-21

## 2024-05-21 RX ORDER — LISINOPRIL 10 MG/1
10 TABLET ORAL
Refills: 0 | Status: ACTIVE | COMMUNITY

## 2024-05-21 RX ORDER — CYANOCOBALAMIN (VITAMIN B-12) 1000MCG/ML
VIAL (ML) INJECTION
Refills: 0 | Status: ACTIVE | COMMUNITY

## 2024-05-21 RX ORDER — METOPROLOL SUCCINATE 25 MG/1
25 TABLET, EXTENDED RELEASE ORAL
Refills: 0 | Status: ACTIVE | COMMUNITY

## 2024-05-21 RX ORDER — FUROSEMIDE 40 MG/1
40 TABLET ORAL
Refills: 0 | Status: ACTIVE | COMMUNITY

## 2024-05-21 RX ORDER — LISINOPRIL 2.5 MG/1
2.5 TABLET ORAL DAILY
Refills: 0 | Status: DISCONTINUED | COMMUNITY
End: 2024-05-21

## 2024-05-21 RX ORDER — PREDNISONE 10 MG/1
10 TABLET ORAL
Qty: 21 | Refills: 0 | Status: DISCONTINUED | COMMUNITY
Start: 2024-01-16 | End: 2024-05-21

## 2024-05-21 NOTE — HISTORY OF PRESENT ILLNESS
[de-identified] : 73 year old male hx CRS presents for follow up LCV 12/14/23 States symptoms have been stable- no improvement  Continues steroid/Tobramycin rinses once every 3 days  Intermittent nasal congestion Frequent nasal dryness and crusting- occasional discomfort if crusting is sufficient  Intermittent Mild blood tinged nasal discharge after blowing his nose too hard- none recently  Denies recent anterior rhinorrhea, PND, facial pain/pressure. No recent fevers Sense of smell fluctuates Admitted to [facility] in March for acute heart failure and acute kidney infection

## 2024-06-04 ENCOUNTER — APPOINTMENT (OUTPATIENT)
Dept: CT IMAGING | Facility: IMAGING CENTER | Age: 74
End: 2024-06-04
Payer: MEDICARE

## 2024-06-04 ENCOUNTER — OUTPATIENT (OUTPATIENT)
Dept: OUTPATIENT SERVICES | Facility: HOSPITAL | Age: 74
LOS: 1 days | End: 2024-06-04
Payer: MEDICARE

## 2024-06-04 DIAGNOSIS — J32.4 CHRONIC PANSINUSITIS: ICD-10-CM

## 2024-06-04 PROCEDURE — 70486 CT MAXILLOFACIAL W/O DYE: CPT

## 2024-06-04 PROCEDURE — 70486 CT MAXILLOFACIAL W/O DYE: CPT | Mod: 26

## 2024-06-16 ENCOUNTER — NON-APPOINTMENT (OUTPATIENT)
Age: 74
End: 2024-06-16

## 2024-06-18 ENCOUNTER — NON-APPOINTMENT (OUTPATIENT)
Age: 74
End: 2024-06-18

## 2024-09-17 ENCOUNTER — APPOINTMENT (OUTPATIENT)
Dept: DERMATOLOGY | Facility: CLINIC | Age: 74
End: 2024-09-17
Payer: MEDICARE

## 2024-09-17 DIAGNOSIS — Z79.899 OTHER LONG TERM (CURRENT) DRUG THERAPY: ICD-10-CM

## 2024-09-17 PROCEDURE — G2211 COMPLEX E/M VISIT ADD ON: CPT

## 2024-09-17 PROCEDURE — 99214 OFFICE O/P EST MOD 30 MIN: CPT

## 2024-09-19 LAB
ALBUMIN SERPL ELPH-MCNC: 3.4 G/DL
ALP BLD-CCNC: 85 U/L
ALT SERPL-CCNC: 9 U/L
ANION GAP SERPL CALC-SCNC: 10 MMOL/L
AST SERPL-CCNC: 21 U/L
BASOPHILS # BLD AUTO: 0.04 K/UL
BASOPHILS NFR BLD AUTO: 0.6 %
BILIRUB SERPL-MCNC: 0.3 MG/DL
BUN SERPL-MCNC: 17 MG/DL
CALCIUM SERPL-MCNC: 9 MG/DL
CHLORIDE SERPL-SCNC: 104 MMOL/L
CO2 SERPL-SCNC: 27 MMOL/L
CREAT SERPL-MCNC: 1.43 MG/DL
EGFR: 51 ML/MIN/1.73M2
EOSINOPHIL # BLD AUTO: 0.16 K/UL
EOSINOPHIL NFR BLD AUTO: 2.4 %
GLUCOSE SERPL-MCNC: 90 MG/DL
HBV CORE IGG+IGM SER QL: NONREACTIVE
HBV CORE IGM SER QL: NONREACTIVE
HBV SURFACE AB SER QL: NONREACTIVE
HBV SURFACE AG SER QL: NONREACTIVE
HCT VFR BLD CALC: 38.1 %
HCV AB SER QL: NONREACTIVE
HCV S/CO RATIO: 0.06 S/CO
HGB BLD-MCNC: 11.7 G/DL
IMM GRANULOCYTES NFR BLD AUTO: 1.2 %
LYMPHOCYTES # BLD AUTO: 1.87 K/UL
LYMPHOCYTES NFR BLD AUTO: 28.2 %
MAN DIFF?: NORMAL
MCHC RBC-ENTMCNC: 24.8 PG
MCHC RBC-ENTMCNC: 30.7 GM/DL
MCV RBC AUTO: 80.7 FL
MONOCYTES # BLD AUTO: 0.51 K/UL
MONOCYTES NFR BLD AUTO: 7.7 %
NEUTROPHILS # BLD AUTO: 3.96 K/UL
NEUTROPHILS NFR BLD AUTO: 59.9 %
PLATELET # BLD AUTO: 101 K/UL
POTASSIUM SERPL-SCNC: 5 MMOL/L
PROT SERPL-MCNC: 5.8 G/DL
RBC # BLD: 4.72 M/UL
RBC # FLD: 15.4 %
SODIUM SERPL-SCNC: 141 MMOL/L
WBC # FLD AUTO: 6.62 K/UL

## 2024-09-19 NOTE — ASSESSMENT
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 Now with possible systemic symptoms.  Last flare was in December 2023-February 2024 i/s/o URI   START Methotrexate 15 mg once weekly with Folic acid 1mg daily.  High Risk Medication Use, MTX  - Ordered CBC, CMP, hepatitis panel, and quant tb this visit  -  Repeat CBC/CMP in 2 weeks (~Oct 1)  RTC 6 weeks after starting MTX

## 2024-09-19 NOTE — PHYSICAL EXAM
[FreeTextEntry3] : violaceous papules on the lower abdomen and back, thighs, and lower legs  pitting edema and overlying bronze patches on the lower legs bilaterally

## 2024-09-19 NOTE — HISTORY OF PRESENT ILLNESS
[FreeTextEntry1] : RPV vasculitis  [de-identified] : Mr. ANNE GRISSOM is a 74 year old M hx of hairy cell leukemia (in remission) presenting for followup  1) Leukocytoclastic vasculitis, chronic and recurrent Hx - biopsy in 2020 showing LCV, unlikely IgA vasculitis given DIF His LCV may be from Type II cryoglobulinemia (RF + cryglobulins, undetectable C4, + IgM kappa band, + M spike, + bence zuluaga protein urine). May also be cancer related in setting of Hairy Cell Leukemia, as recurrent LCV can happen in cancer patients even when in remission  He has been flaring twice weekly, with each flare lasting about 3-4 days for the past 2 months. Also has lower leg swelling. The vasculitis is itchy and painful.  In the past had extensive discussion regarding use of MTX with heme/onc Dr. Armstrong, who is OK with trial of MTX. 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

## 2024-09-19 NOTE — HISTORY OF PRESENT ILLNESS
[FreeTextEntry1] : RPV vasculitis  [de-identified] : Mr. ANNE GRISSOM is a 74 year old M hx of hairy cell leukemia (in remission) presenting for followup  1) Leukocytoclastic vasculitis, chronic and recurrent Hx - biopsy in 2020 showing LCV, unlikely IgA vasculitis given DIF His LCV may be from Type II cryoglobulinemia (RF + cryglobulins, undetectable C4, + IgM kappa band, + M spike, + bence zuluaga protein urine). May also be cancer related in setting of Hairy Cell Leukemia, as recurrent LCV can happen in cancer patients even when in remission  He has been flaring twice weekly, with each flare lasting about 3-4 days for the past 2 months. Also has lower leg swelling. The vasculitis is itchy and painful.  In the past had extensive discussion regarding use of MTX with heme/onc Dr. Armstrong, who is OK with trial of MTX. 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

## 2024-09-20 LAB
M TB IFN-G BLD-IMP: NEGATIVE
QUANTIFERON TB PLUS MITOGEN MINUS NIL: 1.4 IU/ML
QUANTIFERON TB PLUS NIL: 0.05 IU/ML
QUANTIFERON TB PLUS TB1 MINUS NIL: 0.01 IU/ML
QUANTIFERON TB PLUS TB2 MINUS NIL: 0 IU/ML

## 2024-09-20 RX ORDER — FOLIC ACID 1 MG/1
1 TABLET ORAL DAILY
Qty: 1 | Refills: 3 | Status: ACTIVE | COMMUNITY
Start: 2024-09-20 | End: 1900-01-01

## 2024-09-20 RX ORDER — METHOTREXATE 2.5 MG/1
2.5 TABLET ORAL
Qty: 24 | Refills: 3 | Status: ACTIVE | COMMUNITY
Start: 2024-09-20 | End: 1900-01-01

## 2024-09-25 ENCOUNTER — LABORATORY RESULT (OUTPATIENT)
Age: 74
End: 2024-09-25

## 2024-09-25 ENCOUNTER — APPOINTMENT (OUTPATIENT)
Dept: OTOLARYNGOLOGY | Facility: CLINIC | Age: 74
End: 2024-09-25
Payer: MEDICARE

## 2024-09-25 VITALS — WEIGHT: 212 LBS | BODY MASS INDEX: 33.27 KG/M2 | HEIGHT: 67 IN

## 2024-09-25 DIAGNOSIS — J32.4 CHRONIC PANSINUSITIS: ICD-10-CM

## 2024-09-25 DIAGNOSIS — M31.0 HYPERSENSITIVITY ANGIITIS: ICD-10-CM

## 2024-09-25 PROCEDURE — 99214 OFFICE O/P EST MOD 30 MIN: CPT | Mod: 25

## 2024-09-25 PROCEDURE — 31231 NASAL ENDOSCOPY DX: CPT

## 2024-09-25 NOTE — HISTORY OF PRESENT ILLNESS
[de-identified] : 74 year old male hx CRS presents for follow up LCV 5/21/24  CT Sinus 6/4/24 Impression IMPRESSION: 1.  Extensive postsurgical changes with nonobstructive sinus disease. States during July/August had severe congestion, sinus headaches, PND, green nasal crusting- used tylenol and abx/steroid rinses- denies oral abx  Symptoms started to slightly improve in September Current symptoms: mild intermittent nasal congestion, blood tinged thick hard crusting  Mild headaches- not as severe  Intermittent nasal soreness  Completed abx/steroid rinses -Now using plain saline rinses as needed Pt was scheduled for revision surgery but all codes denied by insurance, peer to peer was denied

## 2024-10-03 RX ORDER — SULFAMETHOXAZOLE AND TRIMETHOPRIM 800; 160 MG/1; MG/1
800-160 TABLET ORAL TWICE DAILY
Qty: 20 | Refills: 0 | Status: ACTIVE | COMMUNITY
Start: 2024-10-03 | End: 1900-01-01

## 2024-10-04 ENCOUNTER — APPOINTMENT (OUTPATIENT)
Dept: OTOLARYNGOLOGY | Facility: HOSPITAL | Age: 74
End: 2024-10-04

## 2024-10-10 ENCOUNTER — APPOINTMENT (OUTPATIENT)
Dept: OTOLARYNGOLOGY | Facility: CLINIC | Age: 74
End: 2024-10-10

## 2024-10-29 ENCOUNTER — APPOINTMENT (OUTPATIENT)
Dept: DERMATOLOGY | Facility: CLINIC | Age: 74
End: 2024-10-29
Payer: MEDICARE

## 2024-10-29 DIAGNOSIS — Z79.899 OTHER LONG TERM (CURRENT) DRUG THERAPY: ICD-10-CM

## 2024-10-29 DIAGNOSIS — M31.0 HYPERSENSITIVITY ANGIITIS: ICD-10-CM

## 2024-10-29 PROCEDURE — 99214 OFFICE O/P EST MOD 30 MIN: CPT

## 2024-10-31 ENCOUNTER — RX RENEWAL (OUTPATIENT)
Age: 74
End: 2024-10-31

## 2024-10-31 ENCOUNTER — NON-APPOINTMENT (OUTPATIENT)
Age: 74
End: 2024-10-31

## 2024-11-01 ENCOUNTER — RX RENEWAL (OUTPATIENT)
Age: 74
End: 2024-11-01

## 2024-11-07 ENCOUNTER — APPOINTMENT (OUTPATIENT)
Dept: OTOLARYNGOLOGY | Facility: CLINIC | Age: 74
End: 2024-11-07

## 2024-12-10 LAB
ALBUMIN SERPL ELPH-MCNC: 3.7 G/DL
ALP BLD-CCNC: 69 U/L
ALT SERPL-CCNC: 9 U/L
ANION GAP SERPL CALC-SCNC: 11 MMOL/L
AST SERPL-CCNC: 14 U/L
BASOPHILS # BLD AUTO: 0.02 K/UL
BASOPHILS NFR BLD AUTO: 0.3 %
BILIRUB SERPL-MCNC: 0.5 MG/DL
BUN SERPL-MCNC: 27 MG/DL
CALCIUM SERPL-MCNC: 8.9 MG/DL
CHLORIDE SERPL-SCNC: 106 MMOL/L
CO2 SERPL-SCNC: 24 MMOL/L
CREAT SERPL-MCNC: 1.62 MG/DL
EGFR: 44 ML/MIN/1.73M2
EOSINOPHIL # BLD AUTO: 0.15 K/UL
EOSINOPHIL NFR BLD AUTO: 2.6 %
GLUCOSE SERPL-MCNC: 93 MG/DL
HCT VFR BLD CALC: 39.4 %
HGB BLD-MCNC: 12.4 G/DL
IMM GRANULOCYTES NFR BLD AUTO: 1.2 %
LYMPHOCYTES # BLD AUTO: 1.4 K/UL
LYMPHOCYTES NFR BLD AUTO: 24.3 %
MAN DIFF?: NORMAL
MCHC RBC-ENTMCNC: 25.8 PG
MCHC RBC-ENTMCNC: 31.5 G/DL
MCV RBC AUTO: 82.1 FL
MONOCYTES # BLD AUTO: 0.42 K/UL
MONOCYTES NFR BLD AUTO: 7.3 %
NEUTROPHILS # BLD AUTO: 3.7 K/UL
NEUTROPHILS NFR BLD AUTO: 64.3 %
PLATELET # BLD AUTO: 158 K/UL
POTASSIUM SERPL-SCNC: 4.7 MMOL/L
PROT SERPL-MCNC: 5.2 G/DL
RBC # BLD: 4.8 M/UL
RBC # FLD: 16.9 %
SODIUM SERPL-SCNC: 142 MMOL/L
WBC # FLD AUTO: 5.76 K/UL

## 2024-12-12 ENCOUNTER — APPOINTMENT (OUTPATIENT)
Dept: DERMATOLOGY | Facility: CLINIC | Age: 74
End: 2024-12-12
Payer: MEDICARE

## 2024-12-12 DIAGNOSIS — Z79.899 OTHER LONG TERM (CURRENT) DRUG THERAPY: ICD-10-CM

## 2024-12-12 DIAGNOSIS — M31.0 HYPERSENSITIVITY ANGIITIS: ICD-10-CM

## 2024-12-12 PROCEDURE — 99214 OFFICE O/P EST MOD 30 MIN: CPT

## 2024-12-19 ENCOUNTER — APPOINTMENT (OUTPATIENT)
Dept: OTOLARYNGOLOGY | Facility: CLINIC | Age: 74
End: 2024-12-19

## 2025-01-22 ENCOUNTER — INPATIENT (INPATIENT)
Facility: HOSPITAL | Age: 75
LOS: 5 days | Discharge: HOME HEALTH | End: 2025-01-28
Attending: INTERNAL MEDICINE | Admitting: INTERNAL MEDICINE
Payer: MEDICARE

## 2025-01-22 VITALS
SYSTOLIC BLOOD PRESSURE: 164 MMHG | DIASTOLIC BLOOD PRESSURE: 84 MMHG | WEIGHT: 212.08 LBS | OXYGEN SATURATION: 94 % | HEART RATE: 137 BPM | TEMPERATURE: 100 F | RESPIRATION RATE: 26 BRPM

## 2025-01-22 LAB
ADD ON TEST-SPECIMEN IN LAB: SIGNIFICANT CHANGE UP
ALBUMIN SERPL ELPH-MCNC: 3.8 G/DL — SIGNIFICANT CHANGE UP (ref 3.3–5)
ALP SERPL-CCNC: 88 U/L — SIGNIFICANT CHANGE UP (ref 40–120)
ALT FLD-CCNC: 14 U/L — SIGNIFICANT CHANGE UP (ref 4–41)
ANION GAP SERPL CALC-SCNC: 16 MMOL/L — HIGH (ref 7–14)
APPEARANCE UR: CLEAR — SIGNIFICANT CHANGE UP
APTT BLD: 25.8 SEC — SIGNIFICANT CHANGE UP (ref 24.5–35.6)
AST SERPL-CCNC: 24 U/L — SIGNIFICANT CHANGE UP (ref 4–40)
BASOPHILS # BLD AUTO: 0.03 K/UL — SIGNIFICANT CHANGE UP (ref 0–0.2)
BASOPHILS NFR BLD AUTO: 0.2 % — SIGNIFICANT CHANGE UP (ref 0–2)
BILIRUB SERPL-MCNC: 0.5 MG/DL — SIGNIFICANT CHANGE UP (ref 0.2–1.2)
BILIRUB UR-MCNC: NEGATIVE — SIGNIFICANT CHANGE UP
BUN SERPL-MCNC: 31 MG/DL — HIGH (ref 7–23)
CALCIUM SERPL-MCNC: 9 MG/DL — SIGNIFICANT CHANGE UP (ref 8.4–10.5)
CHLORIDE SERPL-SCNC: 105 MMOL/L — SIGNIFICANT CHANGE UP (ref 98–107)
CO2 SERPL-SCNC: 21 MMOL/L — LOW (ref 22–31)
COLOR SPEC: YELLOW — SIGNIFICANT CHANGE UP
CREAT SERPL-MCNC: 1.89 MG/DL — HIGH (ref 0.5–1.3)
DIFF PNL FLD: ABNORMAL
EGFR: 37 ML/MIN/1.73M2 — LOW
EOSINOPHIL # BLD AUTO: 0.03 K/UL — SIGNIFICANT CHANGE UP (ref 0–0.5)
EOSINOPHIL NFR BLD AUTO: 0.2 % — SIGNIFICANT CHANGE UP (ref 0–6)
FLUAV AG NPH QL: SIGNIFICANT CHANGE UP
FLUBV AG NPH QL: SIGNIFICANT CHANGE UP
GAS PNL BLDV: SIGNIFICANT CHANGE UP
GAS PNL BLDV: SIGNIFICANT CHANGE UP
GLUCOSE SERPL-MCNC: 153 MG/DL — HIGH (ref 70–99)
GLUCOSE UR QL: NEGATIVE MG/DL — SIGNIFICANT CHANGE UP
HCT VFR BLD CALC: 40.3 % — SIGNIFICANT CHANGE UP (ref 39–50)
HGB BLD-MCNC: 12 G/DL — LOW (ref 13–17)
IANC: 14.38 K/UL — HIGH (ref 1.8–7.4)
IMM GRANULOCYTES NFR BLD AUTO: 0.9 % — SIGNIFICANT CHANGE UP (ref 0–0.9)
INR BLD: 1.03 RATIO — SIGNIFICANT CHANGE UP (ref 0.85–1.16)
KETONES UR-MCNC: ABNORMAL MG/DL
LACTATE SERPL-SCNC: 1.9 MMOL/L — SIGNIFICANT CHANGE UP (ref 0.5–2)
LEUKOCYTE ESTERASE UR-ACNC: ABNORMAL
LYMPHOCYTES # BLD AUTO: 18.6 % — SIGNIFICANT CHANGE UP (ref 13–44)
LYMPHOCYTES # BLD AUTO: 3.58 K/UL — HIGH (ref 1–3.3)
MCHC RBC-ENTMCNC: 24.8 PG — LOW (ref 27–34)
MCHC RBC-ENTMCNC: 29.8 G/DL — LOW (ref 32–36)
MCV RBC AUTO: 83.4 FL — SIGNIFICANT CHANGE UP (ref 80–100)
MONOCYTES # BLD AUTO: 1.03 K/UL — HIGH (ref 0–0.9)
MONOCYTES NFR BLD AUTO: 5.4 % — SIGNIFICANT CHANGE UP (ref 2–14)
NEUTROPHILS # BLD AUTO: 14.38 K/UL — HIGH (ref 1.8–7.4)
NEUTROPHILS NFR BLD AUTO: 74.7 % — SIGNIFICANT CHANGE UP (ref 43–77)
NITRITE UR-MCNC: NEGATIVE — SIGNIFICANT CHANGE UP
NRBC # BLD AUTO: 0 K/UL — SIGNIFICANT CHANGE UP (ref 0–0)
NRBC # BLD: 0 /100 WBCS — SIGNIFICANT CHANGE UP (ref 0–0)
NRBC # FLD: 0 K/UL — SIGNIFICANT CHANGE UP (ref 0–0)
NRBC BLD-RTO: 0 /100 WBCS — SIGNIFICANT CHANGE UP (ref 0–0)
PH UR: 5.5 — SIGNIFICANT CHANGE UP (ref 5–8)
PLATELET # BLD AUTO: 262 K/UL — SIGNIFICANT CHANGE UP (ref 150–400)
POTASSIUM SERPL-MCNC: 5.2 MMOL/L — SIGNIFICANT CHANGE UP (ref 3.5–5.3)
POTASSIUM SERPL-SCNC: 5.2 MMOL/L — SIGNIFICANT CHANGE UP (ref 3.5–5.3)
PROT SERPL-MCNC: 6.1 G/DL — SIGNIFICANT CHANGE UP (ref 6–8.3)
PROT UR-MCNC: >=1000 MG/DL
PROTHROM AB SERPL-ACNC: 12.2 SEC — SIGNIFICANT CHANGE UP (ref 9.9–13.4)
RBC # BLD: 4.83 M/UL — SIGNIFICANT CHANGE UP (ref 4.2–5.8)
RBC # FLD: 16.5 % — HIGH (ref 10.3–14.5)
RSV RNA NPH QL NAA+NON-PROBE: SIGNIFICANT CHANGE UP
SARS-COV-2 RNA SPEC QL NAA+PROBE: SIGNIFICANT CHANGE UP
SODIUM SERPL-SCNC: 142 MMOL/L — SIGNIFICANT CHANGE UP (ref 135–145)
SP GR SPEC: 1.02 — SIGNIFICANT CHANGE UP (ref 1–1.03)
TROPONIN T, HIGH SENSITIVITY RESULT: 335 NG/L — CRITICAL HIGH
TROPONIN T, HIGH SENSITIVITY RESULT: 86 NG/L — CRITICAL HIGH
UROBILINOGEN FLD QL: 0.2 MG/DL — SIGNIFICANT CHANGE UP (ref 0.2–1)
WBC # BLD: 19.23 K/UL — HIGH (ref 3.8–10.5)
WBC # FLD AUTO: 19.23 K/UL — HIGH (ref 3.8–10.5)

## 2025-01-22 PROCEDURE — 71045 X-RAY EXAM CHEST 1 VIEW: CPT | Mod: 26

## 2025-01-22 PROCEDURE — 99285 EMERGENCY DEPT VISIT HI MDM: CPT

## 2025-01-22 RX ORDER — VANCOMYCIN HYDROCHLORIDE 50 MG/ML
1000 KIT ORAL ONCE
Refills: 0 | Status: COMPLETED | OUTPATIENT
Start: 2025-01-22 | End: 2025-01-22

## 2025-01-22 RX ORDER — PIPERACILLIN SODIUM AND TAZOBACTAM SODIUM 2; 250 G/50ML; MG/50ML
3.38 INJECTION, POWDER, FOR SOLUTION INTRAVENOUS ONCE
Refills: 0 | Status: COMPLETED | OUTPATIENT
Start: 2025-01-22 | End: 2025-01-22

## 2025-01-22 RX ORDER — ACETAMINOPHEN 160 MG/5ML
1000 SUSPENSION ORAL ONCE
Refills: 0 | Status: COMPLETED | OUTPATIENT
Start: 2025-01-22 | End: 2025-01-23

## 2025-01-22 RX ADMIN — Medication 40 MILLIGRAM(S): at 22:44

## 2025-01-22 RX ADMIN — VANCOMYCIN HYDROCHLORIDE 250 MILLIGRAM(S): KIT at 21:30

## 2025-01-22 RX ADMIN — PIPERACILLIN SODIUM AND TAZOBACTAM SODIUM 200 GRAM(S): 2; 250 INJECTION, POWDER, FOR SOLUTION INTRAVENOUS at 20:54

## 2025-01-22 NOTE — ED ADULT TRIAGE NOTE - CHIEF COMPLAINT QUOTE
Pt presents for shortness of breath, cough progressing over the past several days, pt is hypoxic, audible crackles, tachypneic in triage, placed on non-rebreather, pt also endorses chest pain, pt is tachycardic and borderline febrile. Charge RN called and made aware.

## 2025-01-23 DIAGNOSIS — A41.9 SEPSIS, UNSPECIFIED ORGANISM: ICD-10-CM

## 2025-01-23 DIAGNOSIS — J18.9 PNEUMONIA, UNSPECIFIED ORGANISM: ICD-10-CM

## 2025-01-23 DIAGNOSIS — C88.00 WALDENSTROM MACROGLOBULINEMIA NOT HAVING ACHIEVED REMISSION: ICD-10-CM

## 2025-01-23 DIAGNOSIS — N17.9 ACUTE KIDNEY FAILURE, UNSPECIFIED: ICD-10-CM

## 2025-01-23 DIAGNOSIS — C91.40 HAIRY CELL LEUKEMIA NOT HAVING ACHIEVED REMISSION: ICD-10-CM

## 2025-01-23 DIAGNOSIS — I50.9 HEART FAILURE, UNSPECIFIED: ICD-10-CM

## 2025-01-23 DIAGNOSIS — I10 ESSENTIAL (PRIMARY) HYPERTENSION: ICD-10-CM

## 2025-01-23 DIAGNOSIS — R79.89 OTHER SPECIFIED ABNORMAL FINDINGS OF BLOOD CHEMISTRY: ICD-10-CM

## 2025-01-23 DIAGNOSIS — R06.02 SHORTNESS OF BREATH: ICD-10-CM

## 2025-01-23 DIAGNOSIS — J96.01 ACUTE RESPIRATORY FAILURE WITH HYPOXIA: ICD-10-CM

## 2025-01-23 DIAGNOSIS — Z29.9 ENCOUNTER FOR PROPHYLACTIC MEASURES, UNSPECIFIED: ICD-10-CM

## 2025-01-23 DIAGNOSIS — E78.5 HYPERLIPIDEMIA, UNSPECIFIED: ICD-10-CM

## 2025-01-23 LAB
ADD ON TEST-SPECIMEN IN LAB: SIGNIFICANT CHANGE UP
ANION GAP SERPL CALC-SCNC: 13 MMOL/L — SIGNIFICANT CHANGE UP (ref 7–14)
B PERT DNA SPEC QL NAA+PROBE: SIGNIFICANT CHANGE UP
B PERT+PARAPERT DNA PNL SPEC NAA+PROBE: SIGNIFICANT CHANGE UP
BLD GP AB SCN SERPL QL: NEGATIVE — SIGNIFICANT CHANGE UP
BUN SERPL-MCNC: 34 MG/DL — HIGH (ref 7–23)
C PNEUM DNA SPEC QL NAA+PROBE: SIGNIFICANT CHANGE UP
CALCIUM SERPL-MCNC: 8.2 MG/DL — LOW (ref 8.4–10.5)
CHLORIDE SERPL-SCNC: 104 MMOL/L — SIGNIFICANT CHANGE UP (ref 98–107)
CK MB BLD-MCNC: 3 % — HIGH (ref 0–2.5)
CK MB CFR SERPL CALC: 4.4 NG/ML — SIGNIFICANT CHANGE UP
CK SERPL-CCNC: 149 U/L — SIGNIFICANT CHANGE UP (ref 30–200)
CO2 SERPL-SCNC: 22 MMOL/L — SIGNIFICANT CHANGE UP (ref 22–31)
CREAT ?TM UR-MCNC: 234 MG/DL — SIGNIFICANT CHANGE UP
CREAT SERPL-MCNC: 2.08 MG/DL — HIGH (ref 0.5–1.3)
EGFR: 33 ML/MIN/1.73M2 — LOW
FLUAV SUBTYP SPEC NAA+PROBE: SIGNIFICANT CHANGE UP
FLUBV RNA SPEC QL NAA+PROBE: SIGNIFICANT CHANGE UP
GLUCOSE SERPL-MCNC: 90 MG/DL — SIGNIFICANT CHANGE UP (ref 70–99)
HADV DNA SPEC QL NAA+PROBE: SIGNIFICANT CHANGE UP
HCOV 229E RNA SPEC QL NAA+PROBE: SIGNIFICANT CHANGE UP
HCOV HKU1 RNA SPEC QL NAA+PROBE: SIGNIFICANT CHANGE UP
HCOV NL63 RNA SPEC QL NAA+PROBE: SIGNIFICANT CHANGE UP
HCOV OC43 RNA SPEC QL NAA+PROBE: SIGNIFICANT CHANGE UP
HCT VFR BLD CALC: 31.7 % — LOW (ref 39–50)
HCT VFR BLD CALC: 34.7 % — LOW (ref 39–50)
HGB BLD-MCNC: 11 G/DL — LOW (ref 13–17)
HGB BLD-MCNC: 9.9 G/DL — LOW (ref 13–17)
HMPV RNA SPEC QL NAA+PROBE: SIGNIFICANT CHANGE UP
HPIV1 RNA SPEC QL NAA+PROBE: SIGNIFICANT CHANGE UP
HPIV2 RNA SPEC QL NAA+PROBE: SIGNIFICANT CHANGE UP
HPIV3 RNA SPEC QL NAA+PROBE: SIGNIFICANT CHANGE UP
HPIV4 RNA SPEC QL NAA+PROBE: SIGNIFICANT CHANGE UP
M PNEUMO DNA SPEC QL NAA+PROBE: SIGNIFICANT CHANGE UP
MAGNESIUM SERPL-MCNC: 2 MG/DL — SIGNIFICANT CHANGE UP (ref 1.6–2.6)
MCHC RBC-ENTMCNC: 25.2 PG — LOW (ref 27–34)
MCHC RBC-ENTMCNC: 25.4 PG — LOW (ref 27–34)
MCHC RBC-ENTMCNC: 31.2 G/DL — LOW (ref 32–36)
MCHC RBC-ENTMCNC: 31.7 G/DL — LOW (ref 32–36)
MCV RBC AUTO: 80.1 FL — SIGNIFICANT CHANGE UP (ref 80–100)
MCV RBC AUTO: 80.7 FL — SIGNIFICANT CHANGE UP (ref 80–100)
MRSA PCR RESULT.: SIGNIFICANT CHANGE UP
NRBC # BLD AUTO: 0 K/UL — SIGNIFICANT CHANGE UP (ref 0–0)
NRBC # BLD AUTO: 0 K/UL — SIGNIFICANT CHANGE UP (ref 0–0)
NRBC # BLD: 0 /100 WBCS — SIGNIFICANT CHANGE UP (ref 0–0)
NRBC # BLD: 0 /100 WBCS — SIGNIFICANT CHANGE UP (ref 0–0)
NRBC # FLD: 0 K/UL — SIGNIFICANT CHANGE UP (ref 0–0)
NRBC # FLD: 0 K/UL — SIGNIFICANT CHANGE UP (ref 0–0)
NRBC BLD-RTO: 0 /100 WBCS — SIGNIFICANT CHANGE UP (ref 0–0)
NRBC BLD-RTO: 0 /100 WBCS — SIGNIFICANT CHANGE UP (ref 0–0)
PHOSPHATE SERPL-MCNC: 4.3 MG/DL — SIGNIFICANT CHANGE UP (ref 2.5–4.5)
PLATELET # BLD AUTO: 147 K/UL — LOW (ref 150–400)
PLATELET # BLD AUTO: SIGNIFICANT CHANGE UP K/UL (ref 150–400)
POTASSIUM SERPL-MCNC: 4.4 MMOL/L — SIGNIFICANT CHANGE UP (ref 3.5–5.3)
POTASSIUM SERPL-SCNC: 4.4 MMOL/L — SIGNIFICANT CHANGE UP (ref 3.5–5.3)
PROT ?TM UR-MCNC: 417 MG/DL — SIGNIFICANT CHANGE UP
PROT/CREAT UR-RTO: 1.8 RATIO — HIGH (ref 0–0.2)
RAPID RVP RESULT: SIGNIFICANT CHANGE UP
RBC # BLD: 3.93 M/UL — LOW (ref 4.2–5.8)
RBC # BLD: 4.33 M/UL — SIGNIFICANT CHANGE UP (ref 4.2–5.8)
RBC # FLD: 15.9 % — HIGH (ref 10.3–14.5)
RBC # FLD: 16.2 % — HIGH (ref 10.3–14.5)
RH IG SCN BLD-IMP: POSITIVE — SIGNIFICANT CHANGE UP
RSV RNA SPEC QL NAA+PROBE: SIGNIFICANT CHANGE UP
RV+EV RNA SPEC QL NAA+PROBE: SIGNIFICANT CHANGE UP
S AUREUS DNA NOSE QL NAA+PROBE: DETECTED
SARS-COV-2 RNA SPEC QL NAA+PROBE: SIGNIFICANT CHANGE UP
SODIUM SERPL-SCNC: 139 MMOL/L — SIGNIFICANT CHANGE UP (ref 135–145)
SODIUM UR-SCNC: 80 MMOL/L — SIGNIFICANT CHANGE UP
TROPONIN T, HIGH SENSITIVITY RESULT: 171 NG/L — CRITICAL HIGH
VANCOMYCIN TROUGH SERPL-MCNC: 6.7 UG/ML — LOW (ref 10–20)
WBC # BLD: 7.59 K/UL — SIGNIFICANT CHANGE UP (ref 3.8–10.5)
WBC # BLD: 7.72 K/UL — SIGNIFICANT CHANGE UP (ref 3.8–10.5)
WBC # FLD AUTO: 7.59 K/UL — SIGNIFICANT CHANGE UP (ref 3.8–10.5)
WBC # FLD AUTO: 7.72 K/UL — SIGNIFICANT CHANGE UP (ref 3.8–10.5)

## 2025-01-23 PROCEDURE — 99223 1ST HOSP IP/OBS HIGH 75: CPT | Mod: GC

## 2025-01-23 PROCEDURE — 99223 1ST HOSP IP/OBS HIGH 75: CPT

## 2025-01-23 PROCEDURE — 76770 US EXAM ABDO BACK WALL COMP: CPT | Mod: 26

## 2025-01-23 PROCEDURE — 71250 CT THORAX DX C-: CPT | Mod: 26

## 2025-01-23 RX ORDER — FOLIC ACID 1 MG
1 TABLET ORAL
Refills: 0 | DISCHARGE

## 2025-01-23 RX ORDER — MUPIROCIN 2 G/100G
1 CREAM TOPICAL
Refills: 0 | Status: COMPLETED | OUTPATIENT
Start: 2025-01-23 | End: 2025-01-28

## 2025-01-23 RX ORDER — ASPIRIN 81 MG/1
324 TABLET, COATED ORAL ONCE
Refills: 0 | Status: COMPLETED | OUTPATIENT
Start: 2025-01-23 | End: 2025-01-23

## 2025-01-23 RX ORDER — ATORVASTATIN CALCIUM 80 MG/1
1 TABLET, FILM COATED ORAL
Refills: 0 | DISCHARGE

## 2025-01-23 RX ORDER — METHOTREXATE 25 MG/ML
6 INJECTION INTRA-ARTERIAL; INTRAMUSCULAR; INTRATHECAL; INTRAVENOUS
Refills: 0 | DISCHARGE

## 2025-01-23 RX ORDER — ATORVASTATIN CALCIUM 80 MG/1
10 TABLET, FILM COATED ORAL AT BEDTIME
Refills: 0 | Status: DISCONTINUED | OUTPATIENT
Start: 2025-01-23 | End: 2025-01-28

## 2025-01-23 RX ORDER — CEFEPIME HCL 1 G
2000 IV SOLUTION, PIGGYBACK, BOTTLE (EA) INTRAVENOUS ONCE
Refills: 0 | Status: COMPLETED | OUTPATIENT
Start: 2025-01-23 | End: 2025-01-23

## 2025-01-23 RX ORDER — CEFEPIME HCL 1 G
2000 IV SOLUTION, PIGGYBACK, BOTTLE (EA) INTRAVENOUS EVERY 12 HOURS
Refills: 0 | Status: DISCONTINUED | OUTPATIENT
Start: 2025-01-24 | End: 2025-01-28

## 2025-01-23 RX ORDER — AZITHROMYCIN DIHYDRATE 500 MG/1
500 TABLET, FILM COATED ORAL EVERY 24 HOURS
Refills: 0 | Status: DISCONTINUED | OUTPATIENT
Start: 2025-01-23 | End: 2025-01-26

## 2025-01-23 RX ORDER — VANCOMYCIN HYDROCHLORIDE 50 MG/ML
1500 KIT ORAL EVERY 24 HOURS
Refills: 0 | Status: COMPLETED | OUTPATIENT
Start: 2025-01-23 | End: 2025-01-23

## 2025-01-23 RX ORDER — CEFEPIME HCL 1 G
IV SOLUTION, PIGGYBACK, BOTTLE (EA) INTRAVENOUS
Refills: 0 | Status: DISCONTINUED | OUTPATIENT
Start: 2025-01-23 | End: 2025-01-28

## 2025-01-23 RX ORDER — FOLIC ACID 1 MG
1 TABLET ORAL DAILY
Refills: 0 | Status: DISCONTINUED | OUTPATIENT
Start: 2025-01-23 | End: 2025-01-28

## 2025-01-23 RX ORDER — HEPARIN SODIUM,PORCINE 10000/ML
5000 VIAL (ML) INJECTION EVERY 12 HOURS
Refills: 0 | Status: DISCONTINUED | OUTPATIENT
Start: 2025-01-23 | End: 2025-01-25

## 2025-01-23 RX ORDER — PIPERACILLIN SODIUM AND TAZOBACTAM SODIUM 2; 250 G/50ML; MG/50ML
3.38 INJECTION, POWDER, FOR SOLUTION INTRAVENOUS EVERY 8 HOURS
Refills: 0 | Status: DISCONTINUED | OUTPATIENT
Start: 2025-01-23 | End: 2025-01-23

## 2025-01-23 RX ADMIN — ACETAMINOPHEN 400 MILLIGRAM(S): 160 SUSPENSION ORAL at 01:05

## 2025-01-23 RX ADMIN — Medication 100 MILLIGRAM(S): at 14:13

## 2025-01-23 RX ADMIN — Medication 5000 UNIT(S): at 18:29

## 2025-01-23 RX ADMIN — ASPIRIN 324 MILLIGRAM(S): 81 TABLET, COATED ORAL at 01:07

## 2025-01-23 RX ADMIN — AZITHROMYCIN DIHYDRATE 255 MILLIGRAM(S): 500 TABLET, FILM COATED ORAL at 12:36

## 2025-01-23 RX ADMIN — Medication 40 MILLIGRAM(S): at 14:14

## 2025-01-23 RX ADMIN — VANCOMYCIN HYDROCHLORIDE 300 MILLIGRAM(S): KIT at 22:41

## 2025-01-23 RX ADMIN — ATORVASTATIN CALCIUM 10 MILLIGRAM(S): 80 TABLET, FILM COATED ORAL at 21:10

## 2025-01-23 NOTE — H&P ADULT - PROBLEM SELECTOR PLAN 5
Baseline creatine 1.2-1.3   Creatinine: 1.89 -->2.08   likely 2/2 sepsis vs HF exacerbation   -f/u urine studies   -f/u Renal US Trop 335-->171  s/p aspirin 324  nicolas 2/2 sepsis and CHF  -cards recs appreciated   -ctm

## 2025-01-23 NOTE — H&P ADULT - PROBLEM SELECTOR PLAN 4
TTE 3/2024:  Left ventricular systolic function is severely decreased with an ejection fraction visually estimated at 20 to 25%, global left ventricular hypokinesis. There is mild (grade 1) left ventricular diastolic dysfunction.  overloaded on exam  Home medication: furosemide 20mg daily   s/p IV lasix IV 40   -cards recs appreciated TTE 3/2024:  Left ventricular systolic function is severely decreased with an ejection fraction visually estimated at 20 to 25%, global left ventricular hypokinesis. There is mild (grade 1) left ventricular diastolic dysfunction.  overloaded on exam   Trop 335-->171  Home medication: furosemide 20mg daily   s/p IV lasix IV 40   -cards recs appreciated  -start Lasix IV 40 BID  -TTE TTE 3/2024:  Left ventricular systolic function is severely decreased with an ejection fraction visually estimated at 20 to 25%, global left ventricular hypokinesis. There is mild (grade 1) left ventricular diastolic dysfunction.  overloaded on exam  Home medication: furosemide 20mg daily   s/p IV lasix IV 40   -cards recs appreciated  -start Lasix IV 40 BID  -TTE

## 2025-01-23 NOTE — ED PROVIDER NOTE - HOW PATIENT ADDRESSED, PROFILE
Patient Education        Vertigo: Exercises  Your Care Instructions  Here are some examples of typical rehabilitation exercises for your condition. Start each exercise slowly. Ease off the exercise if you start to have pain. Your doctor or physical therapist will tell you when you can start these exercises and which ones will work best for you. How to do the exercises  Exercise 1    1. Stand with a chair in front of you and a wall behind you. If you begin to fall, you may use them for support. 2. Stand with your feet together and your arms at your sides. 3. Move your head up and down 10 times. Exercise 2    1. Move your head side to side 10 times. Exercise 3    1. Move your head diagonally up and down 10 times. Exercise 4    1. Move your head diagonally up and down 10 times on the other side. Follow-up care is a key part of your treatment and safety. Be sure to make and go to all appointments, and call your doctor if you are having problems. It's also a good idea to know your test results and keep a list of the medicines you take. Where can you learn more? Go to [email or web address] and sign in to your [employer] account. Enter F349 in the Chute box to learn more about \"Vertigo: Exercises. \"     If you do not have an account, please click on the \"Sign Up Now\" link. Current as of: October 21, 2018  Content Version: 12.0  © 4060-7087 [employer]wise, Incorporated. Care instructions adapted under license by [facility] ([facility]). If you have questions about a medical condition or this instruction, always ask your healthcare professional. [address] any warranty or liability for your use of this information.
Stephen

## 2025-01-23 NOTE — ED PROVIDER NOTE - PROGRESS NOTE DETAILS
Sandi PGY3: Labs remarkable for WBC 19.23, troponin 86/335 (2–hours apart), creatinine 1.89 (baseline 1.2–1.5), BNP 31170.  CXR is remarkable for patchy left perihilar opacity concerning for pulmonary vascular congestion versus infectious etiology.  Was already empirically treated with vancomycin/Zosyn.  Patient was also diuresed with furosemide 40 mg IV push.  Patient's cardiologist (Dr. Anton) was consulted for uptrending troponins with no ischemic changes on repeat EKG.  Patient was weaned off of BiPAP onto 2 L nasal cannula for comfort with normal work of breathing.  Admitted to Dr. Garcia on continuous pulse ox. Sandi PGY3: Patient did not receive fluids as they have previous echo showing EF 20-30% (3/2024). However, patient did receive fluids through antibiotics and other medications administered through ED course.

## 2025-01-23 NOTE — PHARMACOTHERAPY INTERVENTION NOTE - COMMENTS
Medication history is complete. Medication list updated in Outpatient Medication Record (OMR). Verified with outpatient pharmacy and reviewed with patient at bedside.     Home Medications:  atorvastatin 10 mg oral tablet: 1 tab(s) orally once a day (at bedtime)   folic acid 1 mg oral tablet: 1 tab(s) orally once a day  furosemide 20 mg oral tablet: 1 tab(s) orally once a day   methotrexate 2.5 mg oral tablet: 6 tab(s) orally once a week (on Mondays  lisinopril 10 mg oral tablet: 1 tab(s) orally once a day   metoprolol succinate 25 mg oral tablet, extended release: 1 tab(s) orally once a day

## 2025-01-23 NOTE — H&P ADULT - PROBLEM SELECTOR PLAN 1
patient presented with SOB, chest pain and cough   Meets SIRS criteria with T: 102.9, Tachycardia, WBC: 18K and tachypnea  RVP negative  Chest x-ray: left lower lobe pneumonia  presented with hypoxia and hypercarbia  currently on BIPAP   U/A with few bacteria and trace leukocyte esterase, patient denies dysuria   most likely 2/2 pneumonia  s/p Vanc and Zosyn   -c/w Zosyn   -c/w Vanc by level given LADI   -f/u MRSA  -f/u sputum culture  -pulm recs. appreciated  -F/u urine legionella and strep  -f/u BCx   -f/u CT chest patient presented with SOB, chest pain and cough   Meets SIRS criteria with T: 102.9, Tachycardia, WBC: 18K and tachypnea  RVP negative  Chest x-ray: left lower lobe pneumonia  presented with hypoxia and hypercarbia  currently on BIPAP   U/A with few bacteria and trace leukocyte esterase, patient denies dysuria   most likely 2/2 pneumonia  s/p Vanc and Zosyn   -c/w Zosyn   -Start azithromycin   -c/w Vanc by level given LADI   -f/u MRSA  -f/u sputum culture  -pulm recs. appreciated  -F/u urine legionella and strep  -f/u BCx   -f/u CT chest patient presented with SOB, chest pain and cough   Meets SIRS criteria with T: 102.9, Tachycardia, WBC: 18K and tachypnea  RVP negative  Chest x-ray: left lower lobe pneumonia  presented with hypoxia and hypercarbia  currently on BIPAP   U/A with few bacteria and trace leukocyte esterase, patient denies dysuria   most likely 2/2 pneumonia  s/p Vanc and Zosyn   -start cefepime and azithromycin   -c/w Vanc by level given LADI   -f/u MRSA  -f/u sputum culture  -pulm recs. appreciated  -F/u urine legionella and strep  -f/u BCx   -f/u CT chest

## 2025-01-23 NOTE — CONSULT NOTE ADULT - NS ATTEND AMEND GEN_ALL_CORE FT
Agree with above assessment and plan.   74 year old male with h/o HCL, WM on Rituxan x1, planned for weekly x4 with sob and chest pain. Found to have nodular opacities on abx. No plan for inpatient treatment of his WM. Send iron, b12, folate studies for anemia. Jehovas witness. Follow up at [facility] with Dr Anne after discharge

## 2025-01-23 NOTE — ED PROVIDER NOTE - NSICDXPASTMEDICALHX_GEN_ALL_CORE_FT
PAST MEDICAL HISTORY:  Benign essential HTN     H/O splenomegaly     Hairy cell leukemia     History of ITP     Hx of thrombocytopenia refused blood products transfusion- Bahai

## 2025-01-23 NOTE — H&P ADULT - PROBLEM SELECTOR PLAN 2
presented with hypoxia and hypercarbia  likely 2/2 pneumonia vs Rituxan side effect   On BIPAP with improvement  -pulm recs appreciated  -wean to nasal cannula   -may consider steroids depending course  -rest of care as stated above

## 2025-01-23 NOTE — ED ADULT NURSE REASSESSMENT NOTE - NS ED NURSE REASSESS COMMENT FT1
Patient is in transport upstairs, no complaints at this time. On mobile oxygen. pending labels in the chart as patient had dumped his urine prior to sample being collected. medicated as per MD order. endorsed to HARRISON Stallworth. Plan of care ongoing.

## 2025-01-23 NOTE — H&P ADULT - NSHPPHYSICALEXAM_GEN_ALL_CORE
Vital Signs Last 24 Hrs  T(C): 36.7 (23 Jan 2025 09:21), Max: 39.4 (22 Jan 2025 20:54)  T(F): 98.1 (23 Jan 2025 09:21), Max: 102.9 (22 Jan 2025 20:54)  HR: 85 (23 Jan 2025 11:18) (78 - 137)  BP: 145/108 (23 Jan 2025 09:21) (121/86 - 165/105)  BP(mean): 107 (23 Jan 2025 07:35) (95 - 118)  RR: 22 (23 Jan 2025 09:21) (16 - 26)  SpO2: 96% (23 Jan 2025 11:18) (94% - 100%)    Parameters below as of 23 Jan 2025 09:21  Patient On (Oxygen Delivery Method): BiPAP/CPAP        CONSTITUTIONAL: Older gentleman in NAD, sitting up in a stretcher on BIPAP  EYES: PERRLA; conjunctiva and sclera clear  ENMT: Moist oral mucosa  NECK: Supple, no palpable masses  RESPIRATORY: Normal respiratory effort; lungs are clear to auscultation bilaterally, + crackles  CARDIOVASCULAR: Regular rate and rhythm, normal S1 and S2, no murmur/rub/gallop   ABDOMEN: Nontender to palpation, normoactive bowel sounds, no rebound/guarding  MUSCULOSKELETAL: no clubbing or cyanosis of digits; no joint swelling or tenderness to palpation, 2+ B/l lower extremity edema   PSYCH: A+O to person, place, and time; affect appropriate  NEUROLOGY: CN 2-12 are intact and symmetric; no gross sensory deficits   SKIN: No rashes; no palpable lesions Vital Signs Last 24 Hrs  T(C): 36.7 (23 Jan 2025 09:21), Max: 39.4 (22 Jan 2025 20:54)  T(F): 98.1 (23 Jan 2025 09:21), Max: 102.9 (22 Jan 2025 20:54)  HR: 85 (23 Jan 2025 11:18) (78 - 137)  BP: 145/108 (23 Jan 2025 09:21) (121/86 - 165/105)  BP(mean): 107 (23 Jan 2025 07:35) (95 - 118)  RR: 22 (23 Jan 2025 09:21) (16 - 26)  SpO2: 96% (23 Jan 2025 11:18) (94% - 100%)    Parameters below as of 23 Jan 2025 09:21  Patient On (Oxygen Delivery Method): BiPAP/CPAP        CONSTITUTIONAL: Older gentleman in NAD, sitting up in a stretcher on BIPAP  EYES: PERRLA; conjunctiva and sclera clear  ENMT: Moist oral mucosa  NECK: Supple, no palpable masses  RESPIRATORY: Normal respiratory effort; lungs are clear to auscultation bilaterally, + crackles  CARDIOVASCULAR: Regular rate and rhythm, normal S1 and S2, no murmur/rub/gallop   ABDOMEN: Nontender to palpation, normoactive bowel sounds, no rebound/guarding  MUSCULOSKELETAL: no clubbing or cyanosis of digits; no joint swelling or tenderness to palpation, 1+ pitting B/l lower extremity edema   PSYCH: A+O to person, place, and time; affect appropriate  NEUROLOGY: CN 2-12 are intact and symmetric; no gross sensory deficits   SKIN: No rashes; no palpable lesions

## 2025-01-23 NOTE — PROGRESS NOTE ADULT - SUBJECTIVE AND OBJECTIVE BOX
NEPHROLOGY - NSN    Patient seen and examined.    HPI:  74 yr old M with a hx of Dustin Cell leukemia (in remission), chronic sinusitis, vasculitis, and a recent diagnosis Waldenström's macroglobulinemia on Rituxan (last infusion on 1/21) presents for SOB chest pain and cough. Patient states that overall he was in his usual state of health after he received his rituximab infusion. After his infusion he reports 3 episodes of diarrhea. He does report some shortness of breath over the past week. However yesterday his SOB became significantly worst and he also developed severe chest tightness and cough. He spoke to his oncologist who told him that SOB and pneumonia are possible side effects of rituximab and he came to the hospital. Of note he does report that he has noticed worsened leg swelling over the past week. He denies fevers, chills, nausea, vomiting or dysuria.     In the ED Tmax: 102.9  HR:  BP:164/84 RR: 26 O2 sat: 94% on NRB. Patient was transitioned to BIPAP. Labs significant with WBC:19K, Trop 335-->171. Lactate 4.2 -->1.6. Chest x-ray showed left lower lobe pneumonia. He recived aspirin 324, vancomycin, Zosyn, Lasix 40mg IV x1. (23 Jan 2025 11:19)      PAST MEDICAL & SURGICAL HISTORY:  Hairy cell leukemia      H/O splenomegaly      Hx of thrombocytopenia  refused blood products transfusion- [facility]      Benign essential HTN      History of ITP      Waldenstrom macroglobulinemia      History of Rotator Cuff Surgery      Patellar fracture          MEDICATIONS  (STANDING):  atorvastatin 10 milliGRAM(s) Oral at bedtime  azithromycin  IVPB 500 milliGRAM(s) IV Intermittent every 24 hours  cefepime   IVPB      folic acid 1 milliGRAM(s) Oral daily  furosemide   Injectable 40 milliGRAM(s) IV Push two times a day  heparin   Injectable 5000 Unit(s) SubCutaneous every 12 hours      Allergies    No Known Drug Allergies  shellfish (Unknown)    Intolerances        SOCIAL HISTORY:  Denies alcohol abuse, drug abuse or tobacco usage.     FAMILY HISTORY:  FH: myocardial infarction    FH: HTN (hypertension) (Mother)    FH: diabetes mellitus (Mother)        VITALS:  T(C): 36.8 (01-23-25 @ 14:38), Max: 39.4 (01-22-25 @ 20:54)  HR: 101 (01-23-25 @ 14:38) (78 - 137)  BP: 150/86 (01-23-25 @ 14:38) (121/86 - 165/105)  RR: 28 (01-23-25 @ 14:38) (16 - 28)  SpO2: 100% (01-23-25 @ 14:38) (94% - 100%)    REVIEW OF SYSTEMS:  Denies any nausea, vomiting, diarrhea, fever or chills. Denies chest pain, SOB, focal weakness, hematuria or dysuria. Good oral intake and denies fatigue or weakness. All other pertinent systems are reviewed and are negative.    PHYSICAL EXAM:  Constitutional: NAD  HEENT: EOMI  Neck:  No JVD, supple   Respiratory: CTA B/L  Cardiovascular: S1 and S2, RRR  Gastrointestinal: + BS, soft, NT, ND  Extremities: No peripheral edema, + peripheral pulses  Neurological: A/O x 3, CN2-12 intact  Psychiatric: Normal mood, normal affect  : No Magallanes  Skin: No rashes, C/D/I  Access: Not applicable    I and O's:      Weight (kg): 96.2 (01-22 @ 19:50)    LABS:                        9.9    7.59  )-----------( Clumped    ( 23 Jan 2025 06:28 )             31.7     01-23    139  |  104  |  34[H]  ----------------------------<  90  4.4   |  22  |  2.08[H]    Ca    8.2[L]      23 Jan 2025 06:28  Phos  4.3     01-23  Mg     2.00     01-23    TPro  6.1  /  Alb  3.8  /  TBili  0.5  /  DBili  x   /  AST  24  /  ALT  14  /  AlkPhos  88  01-22      URINE:  Urinalysis Basic - ( 23 Jan 2025 06:28 )    Color: x / Appearance: x / SG: x / pH: x  Gluc: 90 mg/dL / Ketone: x  / Bili: x / Urobili: x   Blood: x / Protein: x / Nitrite: x   Leuk Esterase: x / RBC: x / WBC x   Sq Epi: x / Non Sq Epi: x / Bacteria: x      Creatinine, Random Urine: 234 mg/dL (01-23 @ 11:11)  Protein/Creatinine Ratio Calculation: 1.8 Ratio (01-23 @ 11:11)  Sodium, Random Urine: 80 mmol/L (01-23 @ 11:11)    RADIOLOGY & ADDITIONAL STUDIES:    < from: US Kidney and Bladder (01.23.25 @ 13:06) >    ACC: [id number] EXAM:  US KIDNEYS AND BLADDER   ORDERED BY: JOANA HARE     PROCEDURE DATE:  01/23/2025          INTERPRETATION:  CLINICAL INFORMATION: Acute kidney injury.    COMPARISON: Renal Doppler 7/6/2021. Renal ultrasound 2/5/2020.    TECHNIQUE: Sonography of the kidneys and bladder.    FINDINGS:  Right kidney: 10.0 cm. No hydronephrosis.    Left kidney: 11.0 cm. No hydronephrosis.    Urinary bladder: Within normal limits.    IMPRESSION:  No hydronephrosis.    --- End of Report ---      < from: TTE W or WO Ultrasound Enhancing Agent (03.22.24 @ 15:23) >    TRANSTHORACIC ECHOCARDIOGRAM REPORT  ________________________________________________________________________________                                      _______       Pt. Name:       ANNE GRISSOM Study Date:    3/22/2024  MRN:            [id number]  YOB: 1950  Accession #:    [id number]   Age:           73 years  Account#:       [id number]    Gender:        M  Heart Rate:                 Height:        67.00 in (170.18 cm)  Rhythm:                     Weight:        221.00 lb (100.25 kg)  Blood Pressure: 148/76 mmHg BSA/BMI:       2.11 m² / 34.61 kg/m²  ________________________________________________________________________________________  Referring Physician:    [id number] Abdulkadir Huang  Interpreting Physician: Edmund Collazo M.D.  Primary Sonographer:    Rosendo SANTIAGO    CPT:               ECHO TTE WO CON COMP W [facility] - 23234.m  Indication(s):     Chest pain, unspecified - R07.9  Procedure:         Transthoracic echocardiogram with 2-D, M-mode and complete          spectral and color flow Doppler.  Ordering Location: [facility]  Admission Status:  Inpatient  Study Information: Image quality for this study is good.    _______________________________________________________________________________________     CONCLUSIONS:      1. The left ventricular cavity is normal in size. Left ventricular wall thickness is normal. Left ventricular systolic function is severely decreased with an ejection fraction visually estimated at 20 to 25%. There is global left ventricular hypokinesis. There is mild (grade 1) left ventricular diastolic dysfunction.   2. Normal right ventricular cavity size and normal systolic function.   3. Structurally normal mitral valve with normal leaflet excursion. There is calcification ofthe mitral valve annulus. There is mild mitral regurgitation.   4. No prior echocardiogram is available for comparison.    ________________________________________________________________________________________  FINDINGS:     Left Ventricle:  The left ventricular cavity is normal in size. Left ventricular wall thickness is normal. Left ventricular systolic function is severely decreased with an ejection fraction visually estimated at 20 to 25%. There is global left ventricular hypokinesis. Thereis mild (grade 1) left ventricular diastolic dysfunction.     Right Ventricle:  The right ventricular cavity is normal in size and normal systolic function. Tricuspid annular plane systolic excursion (TAPSE) is 1.8 cm (normal >=1.7 cm).     Left Atrium:  The left atrium is normal with an indexed volume of 29.72 ml/m².     Right Atrium:  The right atrium is normal in size with an indexed volume of 15.36 ml/m² and an indexed area of 6.40 cm²/m².     Aortic Valve:  The aortic valve appears trileaflet with normal systolic excursion. There is calcification of the aortic valve leaflets. There is trace aortic regurgitation.     Mitral Valve:  Structurally normal mitral valve with normal leaflet excursion. There is calcification of the mitral valve annulus. There is mild mitral regurgitation.     Tricuspid Valve:  Structurally normal tricuspid valve with normal leaflet excursion. There is trace tricuspid regurgitation.     Pulmonic Valve:  Structurally normal pulmonic valve with normal leaflet excursion. There is trace pulmonic regurgitation.     Aorta:  The aortic root at the sinuses of Valsalva is normal in size, measuring 3.10 cm (indexed 1.47 cm/m²). The ascending aorta diameter is normal in size, measuring 3.00 cm (indexed 1.42 cm/m²).    Pericardium:  There is a trace pericardial effusion.     Systemic Veins:  The inferior vena cava is normal in size measuring 1.99 cm in diameter, (normal <2.1cm) with abnormal inspiratory collapse (abnormal <50%) consistent with mildly elevated right atrial pressure (~8, range 5-10mmHg).  ____________________________________________________________________  QUANTITATIVE DATA:  Left Ventricle Measurements: (Indexed to BSA)     IVSd (2D):   0.9 cm  LVPWd (2D):  0.9 cm  LVIDd (2D):  5.4 cm  LVIDs (2D):  4.6 cm  LV Mass:     180 g  85.4 g/m²  Visualized LV EF%: 20 to 25%     MV E Vmax:    0.76 m/s  MV A Vmax:    0.99 m/s  MV E/A:       0.77  e' lateral:   5.44 cm/s  e' medial:    5.66 cm/s  E/e' lateral: 13.97  E/e' medial:  13.43  E/e' Average: 13.69  MV DT:        253 msec    Aorta Measurements: (Normal range) (Indexed to BSA)     Sinuses of Valsalva: 3.10 cm (3.1 - 3.7 cm)  Ao Asc prox:         3.00 cm       Left Atrium Measurements: (Indexed to BSA)  LA Diam 2D:        4.10 cm  LA Vol s, MOD A4C: 53.00 ml.  LA Vol s, MOD A2C: 70.60 ml.  LA Vol s, MOD BP:  62.70 ml  29.72 ml/m²    Right Ventricle Measurements: Right Atrial Measurements:     TAPSE:            1.8 cm      RA Vol:       32.40 ml  TV Alyson. S':       18.10 cm/s  RA Vol Index: 15.36 ml/m²  RV Base (RVID1):  2.7 cm  RV Mid (RVID2):   1.8 cm  RV Major (RVID3): 7.7 cm       LVOT / RVOT/ Qp/Qs Data: (Indexed to BSA)  LVOT Diameter: 2.10 cm  LVOT Vmax:     0.80 m/s  LVOT VTI:      13.70 cm  LVOT SV:       47.5 ml  22.49 ml/m²    Mitral Valve Measurements:     MV E Vmax: 0.8 m/s  MV A Vmax: 1.0 m/s  MV E/A:    0.8       Tricuspid Valve Measurements:     RA Pressure: 8 mmHg    ________________________________________________________________________________________  Electronically signed on 3/22/2024 at 5:34:56 PM by Edmund Collazo M.D.         *** Final ***    < end of copied text >      MERCEDES HARRIS MD; Resident Radiologist  This document has been electronically signed.  MARIA R WATTS MD; Attending Radiologist  This document has been electronically signed.    < end of copied text >   NEPHROLOGY - NSN    Patient seen and examined.    HPI:  74 yr old M with a hx of Dustin Cell leukemia (in remission), chronic sinusitis, vasculitis, and a recent diagnosis Waldenström's macroglobulinemia on Rituxan (last infusion on 1/21) presents for SOB chest pain and cough. Patient states that overall he was in his usual state of health after he received his rituximab infusion. After his infusion he reports 3 episodes of diarrhea. He does report some shortness of breath over the past week. However yesterday his SOB became significantly worst and he also developed severe chest tightness and cough. He spoke to his oncologist who told him that SOB and pneumonia are possible side effects of rituximab and he came to the hospital. Of note he does report that he has noticed worsened leg swelling over the past week. He denies fevers, chills, nausea, vomiting or dysuria.     In the ED Tmax: 102.9  HR:  BP:164/84 RR: 26 O2 sat: 94% on NRB. Patient was transitioned to BIPAP. Labs significant with WBC:19K, Trop 335-->171. Lactate 4.2 -->1.6. Chest x-ray showed left lower lobe pneumonia. He recived aspirin 324, vancomycin, Zosyn, Lasix 40mg IV x1. (23 Jan 2025 11:19)    Pt at present feels better     PAST MEDICAL & SURGICAL HISTORY:  Hairy cell leukemia      H/O splenomegaly      Hx of thrombocytopenia  refused blood products transfusion- [facility]      Benign essential HTN      History of ITP      Waldenstrom macroglobulinemia      History of Rotator Cuff Surgery      Patellar fracture          MEDICATIONS  (STANDING):  atorvastatin 10 milliGRAM(s) Oral at bedtime  azithromycin  IVPB 500 milliGRAM(s) IV Intermittent every 24 hours  cefepime   IVPB      folic acid 1 milliGRAM(s) Oral daily  furosemide   Injectable 40 milliGRAM(s) IV Push two times a day  heparin   Injectable 5000 Unit(s) SubCutaneous every 12 hours      Allergies    No Known Drug Allergies  shellfish (Unknown)    Intolerances        SOCIAL HISTORY:  Denies alcohol abuse, drug abuse or tobacco usage.     FAMILY HISTORY:  FH: myocardial infarction    FH: HTN (hypertension) (Mother)    FH: diabetes mellitus (Mother)        VITALS:  T(C): 36.8 (01-23-25 @ 14:38), Max: 39.4 (01-22-25 @ 20:54)  HR: 101 (01-23-25 @ 14:38) (78 - 137)  BP: 150/86 (01-23-25 @ 14:38) (121/86 - 165/105)  RR: 28 (01-23-25 @ 14:38) (16 - 28)  SpO2: 100% (01-23-25 @ 14:38) (94% - 100%)    REVIEW OF SYSTEMS:  +  SOB, focal weakness, hematuria or dysuria. + fatigue or weakness. All other pertinent systems are reviewed and are negative.    PHYSICAL EXAM:  Constitutional: NAD  HEENT: EOMI  Neck:  No JVD, supple   Respiratory: Coarse   Cardiovascular: S1 and S2, RRR  Gastrointestinal: + BS, soft, NT, ND  Extremities: No peripheral edema, + peripheral pulses  Neurological: A/O x 3, CN2-12 intact  Psychiatric: Normal mood, normal affect  : No Magallanes  Skin: No rashes, C/D/I  Access: Not applicable    I and O's:      Weight (kg): 96.2 (01-22 @ 19:50)    LABS:                        9.9    7.59  )-----------( Clumped    ( 23 Jan 2025 06:28 )             31.7     01-23    139  |  104  |  34[H]  ----------------------------<  90  4.4   |  22  |  2.08[H]    Ca    8.2[L]      23 Jan 2025 06:28  Phos  4.3     01-23  Mg     2.00     01-23    TPro  6.1  /  Alb  3.8  /  TBili  0.5  /  DBili  x   /  AST  24  /  ALT  14  /  AlkPhos  88  01-22      URINE:  Urinalysis Basic - ( 23 Jan 2025 06:28 )    Color: x / Appearance: x / SG: x / pH: x  Gluc: 90 mg/dL / Ketone: x  / Bili: x / Urobili: x   Blood: x / Protein: x / Nitrite: x   Leuk Esterase: x / RBC: x / WBC x   Sq Epi: x / Non Sq Epi: x / Bacteria: x      Creatinine, Random Urine: 234 mg/dL (01-23 @ 11:11)  Protein/Creatinine Ratio Calculation: 1.8 Ratio (01-23 @ 11:11)  Sodium, Random Urine: 80 mmol/L (01-23 @ 11:11)    RADIOLOGY & ADDITIONAL STUDIES:    < from: US Kidney and Bladder (01.23.25 @ 13:06) >    ACC: [id number] EXAM:  US KIDNEYS AND BLADDER   ORDERED BY: JOANA HARE     PROCEDURE DATE:  01/23/2025          INTERPRETATION:  CLINICAL INFORMATION: Acute kidney injury.    COMPARISON: Renal Doppler 7/6/2021. Renal ultrasound 2/5/2020.    TECHNIQUE: Sonography of the kidneys and bladder.    FINDINGS:  Right kidney: 10.0 cm. No hydronephrosis.    Left kidney: 11.0 cm. No hydronephrosis.    Urinary bladder: Within normal limits.    IMPRESSION:  No hydronephrosis.    --- End of Report ---      < from: TTE W or WO Ultrasound Enhancing Agent (03.22.24 @ 15:23) >    TRANSTHORACIC ECHOCARDIOGRAM REPORT  ________________________________________________________________________________                                      _______       Pt. Name:       ANNE GRISSOM Study Date:    3/22/2024  MRN:            [id number]  YOB: 1950  Accession #:    [id number]   Age:           73 years  Account#:       [id number]    Gender:        M  Heart Rate:                 Height:        67.00 in (170.18 cm)  Rhythm:                     Weight:        221.00 lb (100.25 kg)  Blood Pressure: 148/76 mmHg BSA/BMI:       2.11 m² / 34.61 kg/m²  ________________________________________________________________________________________  Referring Physician:    [id number] Abdulkadir Huang  Interpreting Physician: Edmund Collazo M.D.  Primary Sonographer:    Rosendo SANTIAGO    CPT:               ECHO TTE WO CON COMP W [facility] - 36568.m  Indication(s):     Chest pain, unspecified - R07.9  Procedure:         Transthoracic echocardiogram with 2-D, M-mode and complete          spectral and color flow Doppler.  Ordering Location: [facility]  Admission Status:  Inpatient  Study Information: Image quality for this study is good.    _______________________________________________________________________________________     CONCLUSIONS:      1. The left ventricular cavity is normal in size. Left ventricular wall thickness is normal. Left ventricular systolic function is severely decreased with an ejection fraction visually estimated at 20 to 25%. There is global left ventricular hypokinesis. There is mild (grade 1) left ventricular diastolic dysfunction.   2. Normal right ventricular cavity size and normal systolic function.   3. Structurally normal mitral valve with normal leaflet excursion. There is calcification ofthe mitral valve annulus. There is mild mitral regurgitation.   4. No prior echocardiogram is available for comparison.    ________________________________________________________________________________________  FINDINGS:     Left Ventricle:  The left ventricular cavity is normal in size. Left ventricular wall thickness is normal. Left ventricular systolic function is severely decreased with an ejection fraction visually estimated at 20 to 25%. There is global left ventricular hypokinesis. Thereis mild (grade 1) left ventricular diastolic dysfunction.     Right Ventricle:  The right ventricular cavity is normal in size and normal systolic function. Tricuspid annular plane systolic excursion (TAPSE) is 1.8 cm (normal >=1.7 cm).     Left Atrium:  The left atrium is normal with an indexed volume of 29.72 ml/m².     Right Atrium:  The right atrium is normal in size with an indexed volume of 15.36 ml/m² and an indexed area of 6.40 cm²/m².     Aortic Valve:  The aortic valve appears trileaflet with normal systolic excursion. There is calcification of the aortic valve leaflets. There is trace aortic regurgitation.     Mitral Valve:  Structurally normal mitral valve with normal leaflet excursion. There is calcification of the mitral valve annulus. There is mild mitral regurgitation.     Tricuspid Valve:  Structurally normal tricuspid valve with normal leaflet excursion. There is trace tricuspid regurgitation.     Pulmonic Valve:  Structurally normal pulmonic valve with normal leaflet excursion. There is trace pulmonic regurgitation.     Aorta:  The aortic root at the sinuses of Valsalva is normal in size, measuring 3.10 cm (indexed 1.47 cm/m²). The ascending aorta diameter is normal in size, measuring 3.00 cm (indexed 1.42 cm/m²).    Pericardium:  There is a trace pericardial effusion.     Systemic Veins:  The inferior vena cava is normal in size measuring 1.99 cm in diameter, (normal <2.1cm) with abnormal inspiratory collapse (abnormal <50%) consistent with mildly elevated right atrial pressure (~8, range 5-10mmHg).  ____________________________________________________________________  QUANTITATIVE DATA:  Left Ventricle Measurements: (Indexed to BSA)     IVSd (2D):   0.9 cm  LVPWd (2D):  0.9 cm  LVIDd (2D):  5.4 cm  LVIDs (2D):  4.6 cm  LV Mass:     180 g  85.4 g/m²  Visualized LV EF%: 20 to 25%     MV E Vmax:    0.76 m/s  MV A Vmax:    0.99 m/s  MV E/A:       0.77  e' lateral:   5.44 cm/s  e' medial:    5.66 cm/s  E/e' lateral: 13.97  E/e' medial:  13.43  E/e' Average: 13.69  MV DT:        253 msec    Aorta Measurements: (Normal range) (Indexed to BSA)     Sinuses of Valsalva: 3.10 cm (3.1 - 3.7 cm)  Ao Asc prox:         3.00 cm       Left Atrium Measurements: (Indexed to BSA)  LA Diam 2D:        4.10 cm  LA Vol s, MOD A4C: 53.00 ml.  LA Vol s, MOD A2C: 70.60 ml.  LA Vol s, MOD BP:  62.70 ml  29.72 ml/m²    Right Ventricle Measurements: Right Atrial Measurements:     TAPSE:            1.8 cm      RA Vol:       32.40 ml  TV Alyson. S':       18.10 cm/s  RA Vol Index: 15.36 ml/m²  RV Base (RVID1):  2.7 cm  RV Mid (RVID2):   1.8 cm  RV Major (RVID3): 7.7 cm       LVOT / RVOT/ Qp/Qs Data: (Indexed to BSA)  LVOT Diameter: 2.10 cm  LVOT Vmax:     0.80 m/s  LVOT VTI:      13.70 cm  LVOT SV:       47.5 ml  22.49 ml/m²    Mitral Valve Measurements:     MV E Vmax: 0.8 m/s  MV A Vmax: 1.0 m/s  MV E/A:    0.8       Tricuspid Valve Measurements:     RA Pressure: 8 mmHg    ________________________________________________________________________________________  Electronically signed on 3/22/2024 at 5:34:56 PM by Edmund Collazo M.D.         *** Final ***    < end of copied text >      MERCEDES HARRIS MD; Resident Radiologist  This document has been electronically signed.  MARIA R WATTS MD; Attending Radiologist  This document has been electronically signed.    < end of copied text >

## 2025-01-23 NOTE — H&P ADULT - PROBLEM SELECTOR PLAN 3
Chest x-ray: left lower lobe pneumonia  -pulm recs appreciated  -f/u CT Chest   -management as stated above

## 2025-01-23 NOTE — CONSULT NOTE ADULT - ASSESSMENT
75 yo M w/ PMHx of chronic sinusitis, vasculitis, history of leukemia in remission, new diagnosis of Waldenström's macroglobulinemia on Rituxan (versed infusion yesterday), HTN, and HLD presents for SOB/cough/chest tightness since today afternoon.  Patient also endorses 3 episodes of diarrhea over the past day.  Patient also been attempting nasal rinsing/2–3 PM, but became more persistently short of breath.  He states that he has been feeling slight shortness of breath for the past week.  He was told by his oncologist (Dr. Anne) SOB and pneumonia are effects of the new infusion (rituximab).  He denies any fevers/sore throat, abdominal pain, nausea/vomiting, fall/trauma.  On chart review, patient is on rituximab for which there are multiple side effects including infusion reaction (15%), influenza (15%), headaches, bronchitis, chills/fevers, sinus infection, skin rash, throat discomfort, abdominal discomfort, anemia, blood clots, tingling/burning sensation of skin, coughs, pneumonia, back pain, and high blood pressure/heart rate.  On medication review, patient is taking metoprolol, furosemide, lisinopril, atorvastatin, and folic acid.  Vital signs remarkable for BPs 160s/80s, , T99.8, and RR 26.  He was briefly started on 8 L nasal cannula with improvement in oxygen saturation.  Physical exam is remarkable for diffuse crackles in all lung fields and 1+ pitting edema bilateral lower extremities.  Concern for respiratory distress secondary to URI versus CHF versus rituximab reaction.  Concern for flash pulm edema as patient's blood pressures is not very elevated.  Plan for BiPAP, empiric treatment with antibiotics (vancomycin/cefepime), sepsis order set, and support care.  he is alert and awake at this time and seems to be doing OK:  he is alert and awke and responding to questions well : he says he never h ad any underlying lung issues:   former smoker:  but quit in 1973     Hypoxioc resp failure  ?hx of vasculitis:   Waldenstrom's  macroglobulinemia on rituxan   HTN  HLD    Hypoxic/ Hypercarbic  resp failure  -he presented with hypoxia and hypercarbic acidosis  -he was placed on bipap and was started on antibiotics' as he is febrile  -his cxr:  showed: IMPRESSION: Left lower lobe pneumonia. : need ct chest  :   -rituxan toxicity needs to be kept in mind , ct scan chest will  clarify more:   -do pancultures  , RVP I S NEGATIVE:  -Consider ID consult as he is immunocompromised: Cont antibiotics  - he m ay need steroids too :, depending how he progresses on antibiotics and furterh clinical course:   -can try to give him a break froim bipap as he is fully alert and awake    ?hx of vasculitis / Waldenstrom's  macroglobulinemia on rituxan   -he is on rituxan : would  get onc to see   HTN  blood pressure is OK  HLD  -defe to primary team      alli acp

## 2025-01-23 NOTE — PROGRESS NOTE ADULT - ASSESSMENT
74 yr old M with a hx of Dustin Cell leukemia (in remission), chronic sinusitis, vasculitis, and a recent diagnosis Waldenström's macroglobulinemia on Rituxan (last infusion on 1/21) presents for SOB chest pain and cough   74 yr old M with a hx of Dustin Cell leukemia (in remission), chronic sinusitis, vasculitis, and a recent diagnosis Waldenström's macroglobulinemia on Rituxan (last infusion on 1/21) presents for SOB chest pain and cough  Is this CHF vs pna or a combination of the 2.  Rutuxan is usually given with steroids and that can lead to salt and water retention   LADI-Is this hemodynamic at present     1 Renal - Agree with IV lasix bid and outs > ins;  Renal sono noted  Check UA and assess proteinuria   2 [facility]-Check echo and place on tele  3 Onc-Eval noted   4 ID- IV abx     Sayed [facility]   [phone]

## 2025-01-23 NOTE — CONSULT NOTE ADULT - ASSESSMENT
74 year old male Latter-day (refuses all blood products) with history of Dustin Cell leukemia (in remission), chronic sinusitis, HFrEF (TTE 3/2024 EF 20-25%), vasculitis, and a recent diagnosis of Waldenström's macroglobulinemia (follows Dr. Anne of [facility], received Rituxan last infusion on 1/21) presents with 1-2 duration of SOB and cough. Cough is non-productive and SOB worse with exertion, improved with rest. Patient reports having history of CHF exacerbations in the past and reports this feeling similar. Patient is a Latter-day and refuses blood products including in the event of life threatening bleed. Reports legs being more swollen than normal.     RCU consulted given bilevel use and presenting with acute hypoxic respiratory failure. Patient noted to have rectal temp 102.9, WBC 19, Cr 2.08 (baseline 3/2024 ~1.2 - 1.5), BNP 29k, vbg initially 7.16 pco2 64 po2 22 (repeat vbg 7.34, pco2 43, po2 32 s/p bilevel). Patient reports receiving first dose of rituxan this past tuesday which would unusual to have pulmonary involvement this early with first dose). Suspect possible bacterial infection (febrile with WBC) as well as heart failure exacerbation given patient fluid overloaded on exam with elevated BNP and creatinine and now with significant clinical improvement with antibiotics and diuresing. In the room, patient weaned to room air and maintained o2 sat 97%+ and speaking in full sentences and reports feeling significantly better today compared to yesterday.    #Acute hypoxic respiratory failure - resolved  -weaned to room air from 3L NC, goal o2 >92%  -continue antibiotics and diuresis  -cardiology, pulm, heme/onc, and nephro recs appreciated  -patient does not require RCU level of care at this time    Recommendations are not final pending attending attestation. 74 year old male Muslim (refuses all blood products) with history of Dustin Cell leukemia (in remission), chronic sinusitis, HFrEF (TTE 3/2024 EF 20-25%), vasculitis, and a recent diagnosis of Waldenström's macroglobulinemia (follows Dr. Anne of [facility], received Rituxan last infusion on 1/21) presents with 1-2 duration of SOB and cough. Cough is non-productive and SOB worse with exertion, improved with rest. Patient reports having history of CHF exacerbations in the past and reports this feeling similar. Patient is a Muslim and refuses blood products including in the event of life threatening bleed. Reports legs being more swollen than normal.     RCU consulted given bilevel use and presenting with acute hypoxic respiratory failure. Patient noted to have rectal temp 102.9, WBC 19, Cr 2.08 (baseline 3/2024 ~1.2 - 1.5), BNP 29k, vbg initially 7.16 pco2 64 po2 22 (repeat vbg 7.34, pco2 43, po2 32 s/p bilevel). Patient reports receiving first dose of rituxan this past tuesday which would unusual to have pulmonary involvement this early with first dose). CT chest with interlobular septal thickening, bilateral ground glass opacities and nodular opacities and small bilateral pleural effusion. Suspect possible bacterial infection (febrile with WBC) as well as heart failure exacerbation given patient fluid overloaded on exam with elevated BNP and creatinine and now with significant clinical improvement with antibiotics and diuresing. In the room, patient weaned to room air and maintained o2 sat 97%+ and speaking in full sentences and reports feeling significantly better today compared to yesterday.    #Acute hypoxic respiratory failure - resolved  -weaned to room air from 3L NC, goal o2 >92%  -continue antibiotics and diuresis  -cardiology, pulm, heme/onc, and nephro recs appreciated  -OOBTC as tolerate  -patient does not require RCU level of care at this time    Recommendations are not final pending attending attestation. 74 year old male Jew (refuses all blood products) with history of Dustin Cell leukemia (in remission), chronic sinusitis, HFrEF (TTE 3/2024 EF 20-25%), vasculitis, and a recent diagnosis of Waldenström's macroglobulinemia (follows Dr. Anne of [facility], received Rituxan last infusion on 1/21) presents with 1-2 duration of SOB and cough. Cough is non-productive and SOB worse with exertion, improved with rest. Patient reports having history of CHF exacerbations in the past and reports this feeling similar. Patient is a Jew and refuses blood products including in the event of life threatening bleed. Reports legs being more swollen than normal.     RCU consulted given bilevel use and presenting with acute hypoxic respiratory failure. Patient noted to have rectal temp 102.9, WBC 19, Cr 2.08 (baseline 3/2024 ~1.2 - 1.5), BNP 29k, vbg initially 7.16 pco2 64 po2 22 (repeat vbg 7.34, pco2 43, po2 32 s/p bilevel). Patient reports receiving first dose of rituxan this past tuesday which would unusual to have pulmonary involvement this early with first dose). CT chest with interlobular septal thickening, bilateral ground glass opacities and nodular opacities and small bilateral pleural effusion. Suspect possible bacterial infection (febrile with WBC) as well as heart failure exacerbation given patient fluid overloaded on exam with elevated BNP and creatinine and now with significant clinical improvement with antibiotics and diuresing. In the room, patient weaned to room air and maintained o2 sat 97%+ and speaking in full sentences and reports feeling significantly better today compared to yesterday.    #Acute hypoxic respiratory failure - resolved  -weaned to room air from 3L NC, goal o2 >92%  -continue antibiotics and diuresis  -cardiology, pulm, heme/onc, and nephro recs appreciated  -OOBTC as tolerate  -patient does not require RCU level of care at this time      Recommendations are not final pending attending attestation.

## 2025-01-23 NOTE — CONSULT NOTE ADULT - SUBJECTIVE AND OBJECTIVE BOX
Cardiovascular Disease Initial Evaluation  DATE OF SERVICE: 01-23-25 @ 08:58    CHIEF COMPLAINT: Chest pain    HISTORY OF PRESENT ILLNESS:    This is a 73 year old man with nonischemic cardiomyopathy, HTN, and leukemia who presented to [facility] on 1/22/2025 with chest pain and SOB.  On presentation to the ED, the patient was noted to be in respiratory distress and placed on BiPaP.  Troponins cesario and cardiology was consulted.  Currently the patient says the pain is better.      Allergie  No Known Drug Allergies  shellfish (Unknown)      	    MEDICATIONS:    Reviewed    PAST MEDICAL & SURGICAL HISTORY:  Hairy cell leukemia      H/O splenomegaly      Hx of thrombocytopenia  refused blood products transfusion- Jewish      Benign essential HTN      History of ITP      History of Rotator Cuff Surgery      Patellar fracture          FAMILY HISTORY:  FH: myocardial infarction        SOCIAL HISTORY:    The patient is a nonsmoker       REVIEW OF SYSTEMS:  See HPI, otherwise complete 14 point review of systems negative      PHYSICAL EXAM:  T(C): 36.8 (01-23-25 @ 07:35), Max: 39.4 (01-22-25 @ 20:54)  HR: 87 (01-23-25 @ 07:35) (78 - 137)  BP: 131/94 (01-23-25 @ 07:35) (121/86 - 165/105)  RR: 17 (01-23-25 @ 07:35) (16 - 26)  SpO2: 100% (01-23-25 @ 07:35) (94% - 100%)  Wt(kg): --  I&O's Summary      Appearance: No Acute Distress; resting comfortably  HEENT:  Normal oral mucosa, PERRL, EOMI	  Cardiovascular: Normal S1 S2, + JVD, No murmurs/rubs/gallops  Respiratory: Normal respiratory effort; Crackles to auscultation bilaterally  Gastrointestinal:  Soft, Non-tender, + BS	  Skin: No rashes, No ecchymoses, No cyanosis	  Neurologic: Non-focal; no weakness  Extremities: No clubbing, 2+ edema  Vascular: Peripheral pulses palpable 2+ bilaterally  Psychiatry: A & O x 3, Mood & affect appropriate    Laboratory Data:	 	    CBC Full  -  ( 23 Jan 2025 06:28 )  WBC Count : x  Hemoglobin : 9.9 g/dL  Hematocrit : 31.7 %  Platelet Count - Automated : x  Mean Cell Volume : 80.7 fL  Mean Cell Hemoglobin : 25.2 pg  Mean Cell Hemoglobin Concentration : 31.2 g/dL  Auto Neutrophil # : x  Auto Lymphocyte # : x  Auto Monocyte # : x  Auto Eosinophil # : x  Auto Basophil # : x  Auto Neutrophil % : x  Auto Lymphocyte % : x  Auto Monocyte % : x  Auto Eosinophil % : x  Auto Basophil % : x    01-23    139  |  104  |  34[H]  ----------------------------<  90  4.4   |  22  |  2.08[H]  01-22    142  |  105  |  31[H]  ----------------------------<  153[H]  5.2   |  21[L]  |  1.89[H]    Ca    8.2[L]      23 Jan 2025 06:28  Ca    9.0      22 Jan 2025 20:15  Phos  4.3     01-23  Mg     2.00     01-23    TPro  6.1  /  Alb  3.8  /  TBili  0.5  /  DBili  x   /  AST  24  /  ALT  14  /  AlkPhos  88  01-22      Interpretation of Telemetry: Sinus	    ECG:  	Sinus; nonspecific ST changes      Assessment: 73 year old man with nonischemic cardiomyopathy, HTN, and leukemia presents with NSTEMI, acute systolic CHF and hypoxic respiratory failure.     Plan of Care:    #Acute systolic CHF-  Due to nonischemic cardiomyopathy as per NST done by outpatient cardiologist, Dr. Elaine.   The patient is fluid overloaded on exam.   Start Lasix 40 mg IV BID despite LADI.  Wean off BiPaP.  Hold BB and ACEi for now.     #NSTEMI-  Due to acute CHF and hypoxia.  Concern for concomitant sepsis.  Plan for medical management at this time.     Care discussed at length with Mr. Ling in the ED.       56 minutes spent on total encounter; more than 50% of the visit was spent counseling and/or coordinating care by the attending physician.   	  Jim Anton MD [facility]  Cardiovascular Diseases  [phone]    
  01-23-25 @ 11:06    Patient is a 74y old  Male who presents with a chief complaint of     HPI:    75 yo M w/ PMHx of chronic sinusitis, vasculitis, history of leukemia in remission, new diagnosis of Waldenström's macroglobulinemia on Rituxan (versed infusion yesterday), HTN, and HLD presents for SOB/cough/chest tightness since today afternoon.  Patient also endorses 3 episodes of diarrhea over the past day.  Patient also been attempting nasal rinsing/2–3 PM, but became more persistently short of breath.  He states that he has been feeling slight shortness of breath for the past week.  He was told by his oncologist (Dr. Anne) SOB and pneumonia are effects of the new infusion (rituximab).  He denies any fevers/sore throat, abdominal pain, nausea/vomiting, fall/trauma.  On chart review, patient is on rituximab for which there are multiple side effects including infusion reaction (15%), influenza (15%), headaches, bronchitis, chills/fevers, sinus infection, skin rash, throat discomfort, abdominal discomfort, anemia, blood clots, tingling/burning sensation of skin, coughs, pneumonia, back pain, and high blood pressure/heart rate.  On medication review, patient is taking metoprolol, furosemide, lisinopril, atorvastatin, and folic acid.  Vital signs remarkable for BPs 160s/80s, , T99.8, and RR 26.  He was briefly started on 8 L nasal cannula with improvement in oxygen saturation.  Physical exam is remarkable for diffuse crackles in all lung fields and 1+ pitting edema bilateral lower extremities.  Concern for respiratory distress secondary to URI versus CHF versus rituximab reaction.  Concern for flash pulm edema as patient's blood pressures is not very elevated.  Plan for BiPAP, empiric treatment with antibiotics (vancomycin/cefepime), sepsis order set, and support care.  he is alert and awake at this time and seems to be doing OK:  he is alert and awke and responding to questions well : he says he never h ad any underlying lung issues:   former smoker:  but quit in 1973       ?FOLLOWING PRESENT  [x ] Hx of PE/DVT, [x ] Hx COPD, x[ ] Hx of Asthma, y[ ] Hx of Hospitalization, [x ]  Hx of BiPAP/CPAP use, [x ] Hx of MAYA    Allergies    No Known Drug Allergies  shellfish (Unknown)    Intolerances        PAST MEDICAL & SURGICAL HISTORY:  Hairy cell leukemia      H/O splenomegaly      Hx of thrombocytopenia  refused blood products transfusion- Congregational      Benign essential HTN      History of ITP      History of Rotator Cuff Surgery      Patellar fracture          FAMILY HISTORY:  FH: myocardial infarction        Social History: [ former  : quit in 1973  ] TOBACCO                  [  x] ETOH                                 [ x ] IVDA/DRUGS    REVIEW OF SYSTEMS      General:	x    Skin/Breast:x  	  Ophthalmologic:x  	  ENMT:	x    Respiratory and Thorax:  sob,  sinus issues  	  Cardiovascular:	x    Gastrointestinal:	x    Genitourinary:	x    Musculoskeletal:	x    Neurological:	x    Psychiatric:	x    Hematology/Lymphatics:	x    Endocrine:	x    Allergic/Immunologic:	x    MEDICATIONS  (STANDING):  furosemide   Injectable 40 milliGRAM(s) IV Push two times a day    MEDICATIONS  (PRN):       Vital Signs Last 24 Hrs  T(C): 36.7 (23 Jan 2025 09:21), Max: 39.4 (22 Jan 2025 20:54)  T(F): 98.1 (23 Jan 2025 09:21), Max: 102.9 (22 Jan 2025 20:54)  HR: 78 (23 Jan 2025 09:40) (78 - 137)  BP: 145/108 (23 Jan 2025 09:21) (121/86 - 165/105)  BP(mean): 107 (23 Jan 2025 07:35) (95 - 118)  RR: 22 (23 Jan 2025 09:21) (16 - 26)  SpO2: 99% (23 Jan 2025 09:40) (94% - 100%)    Parameters below as of 23 Jan 2025 09:21  Patient On (Oxygen Delivery Method): BiPAP/CPAP    Orthostatic VS          I&O's Summary      Physical Exam:   GENERAL: NAD, well-groomed, well-developed  HEENT: NUNU/   Atraumatic, Normocephalic  ENMT: No tonsillar erythema, exudates, or enlargement; Moist mucous membranes, Good dentition, No lesions  NECK: Supple, No JVD, Normal thyroid  CHEST/LUNG: scattered cracekls:  no wheezing  CVS: Regular rate and rhythm; No murmurs, rubs, or gallops  GI: : Soft, Nontender, Nondistended; Bowel sounds present  NERVOUS SYSTEM:  Alert & Oriented X3  EXTREMITIES: -edema  LYMPH: No lymphadenopathy noted  SKIN: No rashes or lesions  ENDOCRINOLOGY: No Thyromegaly  PSYCH: Appropriate    Labs:  -2.6<43<4>>32<<7.345>>-2.6<<3><<4><<5<<329>>, -6.8<64<4>>22<<7.165>>-6.8<<3><<4><<5<<229>>SARS-CoV-2: NotDetec (22 Jan 2025 20:15)    CARDIAC MARKERS ( 23 Jan 2025 06:28 )  x     / x     / x     / x     / 4.4 ng/mL                            9.9    7.59  )-----------( Clumped    ( 23 Jan 2025 06:28 )             31.7                         12.0   19.23 )-----------( 262      ( 22 Jan 2025 20:15 )             40.3     01-23    139  |  104  |  34[H]  ----------------------------<  90  4.4   |  22  |  2.08[H]  01-22    142  |  105  |  31[H]  ----------------------------<  153[H]  5.2   |  21[L]  |  1.89[H]    Ca    8.2[L]      23 Jan 2025 06:28  Ca    9.0      22 Jan 2025 20:15  Phos  4.3     01-23  Mg     2.00     01-23    TPro  6.1  /  Alb  3.8  /  TBili  0.5  /  DBili  x   /  AST  24  /  ALT  14  /  AlkPhos  88  01-22    CAPILLARY BLOOD GLUCOSE        LIVER FUNCTIONS - ( 22 Jan 2025 20:15 )  Alb: 3.8 g/dL / Pro: 6.1 g/dL / ALK PHOS: 88 U/L / ALT: 14 U/L / AST: 24 U/L / GGT: x           PT/INR - ( 22 Jan 2025 20:15 )   PT: 12.2 sec;   INR: 1.03 ratio         PTT - ( 22 Jan 2025 20:15 )  PTT:25.8 sec  Urinalysis Basic - ( 23 Jan 2025 06:28 )    Color: x / Appearance: x / SG: x / pH: x  Gluc: 90 mg/dL / Ketone: x  / Bili: x / Urobili: x   Blood: x / Protein: x / Nitrite: x   Leuk Esterase: x / RBC: x / WBC x   Sq Epi: x / Non Sq Epi: x / Bacteria: x      Urinalysis with Rflx Culture (collected 22 Jan 2025 22:25)      D DImerLactate, Blood: 1.9 mmol/L (01-22 @ 21:39)        Studies  Chest X-RAY  CT SCAN Chest   CT Abdomen  Venous Dopplers: LE:   Others  rad< from: Xray Chest 1 View-PORTABLE IMMEDIATE (01.22.25 @ 20:53) >    INTERPRETATION:  EXAMINATION: XR CHEST IMMEDIATE    CLINICAL INDICATION: Sepsis    TECHNIQUE: Single frontal, portable view of thechest was obtained.    COMPARISON: Chest x-ray 3/21/2024    FINDINGS:    The heart size is normal in size.  Left lower lobe opacity with small effusion likely infectious in nature   although asymmetric edema is not excluded.  Left upper lobe and right lung are clear.  There is no pneumothorax.  No acute bony abnormality.    IMPRESSION: Left lower lobe pneumonia.      --- End of Report ---          NUHA LINK DO; Resident Radiologist  This document has been electronically signed.  ALLEY CELESTE MD; Attending Radiologist  This document has been electronically signed. Jan 23 2025  9:26AM    < end of copied text >  < from: CT Sinuses No Cont (06.04.24 @ 14:25) >  IMPRESSION:  1.  Extensive postsurgical changes with nonobstructive sinus disease.    --- End of Report ---      < end of copied text >    < from: TTE W or WO Ultrasound Enhancing Agent (03.22.24 @ 15:23) >      1. The left ventricular cavity is normal in size. Left ventricular wall thickness is normal. Left ventricular systolic function is severely decreased with an ejection fraction visually estimated at 20 to 25%. There is global left ventricular hypokinesis. There is mild (grade 1) left ventricular diastolic dysfunction.   2. Normal right ventricular cavity size and normal systolic function.   3. Structurally normal mitral valve with normal leaflet excursion. There is calcification ofthe mitral valve annulus. There is mild mitral regurgitation.   4. No prior echocardiogram is available for comparison.    < end of copied text >                
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 Anne of [facility], received Rituxan last infusion on 1/21) presents with 1-2 duration of SOB and cough. Cough is non-productive and SOB worse with exertion, improved with rest. Patient reports having history of CHF exacerbations in the past and reports this feeling similar. Patient is a Scientology and refuses blood products including in the event of life threatening bleed. Reports legs being more swollen than normal. Patient without recent travel and denies sick contact. Denies fever, chills, chest pain, chest tightness, abd pain.    RCU consulted given bipap use and presenting with acute hypoxic respiratory failure.    PAST MEDICAL & SURGICAL HISTORY:  Hairy cell leukemia      H/O splenomegaly      Hx of thrombocytopenia  refused blood products transfusion- Scientology      Benign essential HTN      History of ITP      Waldenstrom macroglobulinemia      History of Rotator Cuff Surgery      Patellar fracture          FAMILY HISTORY:  FH: myocardial infarction    FH: HTN (hypertension) (Mother)    FH: diabetes mellitus (Mother)        SOCIAL HISTORY:  Smoking: [ ] Never Smoked [ ] Former Smoker (__ packs x ___ years) [ ] Current Smoker  (__ packs x ___ years)  Substance Use: [ ] Never Used [ ] Used ____  EtOH Use:  Marital Status: [ ] Single [ ]  [ ]  [ ]   Sexual History:   Occupation:  Recent Travel:  Country of Birth:  Advance Directives:    Allergies    No Known Drug Allergies  shellfish (Unknown)    Intolerances        HOME MEDICATIONS:  Home Medications:  atorvastatin 10 mg oral tablet: 1 tab(s) orally once a day (at bedtime) (23 Jan 2025 10:13)  folic acid 1 mg oral tablet: 1 tab(s) orally once a day (23 Jan 2025 10:12)  furosemide 20 mg oral tablet: 1 tab(s) orally once a day (23 Jan 2025 10:12)  methotrexate 2.5 mg oral tablet: 6 tab(s) orally once a week (on Mondays) (23 Jan 2025 10:12)      REVIEW OF SYSTEMS:  All systems negative except as documented above.    OBJECTIVE:  ICU Vital Signs Last 24 Hrs  T(C): 36.8 (23 Jan 2025 14:38), Max: 39.4 (22 Jan 2025 20:54)  T(F): 98.2 (23 Jan 2025 14:38), Max: 102.9 (22 Jan 2025 20:54)  HR: 101 (23 Jan 2025 14:38) (78 - 137)  BP: 150/86 (23 Jan 2025 14:38) (121/86 - 165/105)  BP(mean): 107 (23 Jan 2025 07:35) (95 - 118)  ABP: --  ABP(mean): --  RR: 28 (23 Jan 2025 14:38) (16 - 28)  SpO2: 100% (23 Jan 2025 14:38) (94% - 100%)    O2 Parameters below as of 23 Jan 2025 14:38  Patient On (Oxygen Delivery Method): nasal cannula  O2 Flow (L/min): 4            CAPILLARY BLOOD GLUCOSE          PHYSICAL EXAM:  General: NAD  HEENT: EOMI, sclera anicteric  Neck: supple  Cardiovascular: RR  Respiratory: CTAB, no wheezes, crackles, or rhonci, breathing comfortably on nasal canula  Abdomen: soft  Extremities: warm and well perfused, bilateral LE pitting edema  Skin: no rashes  Neurological: AOx3, no focal deficits    HOSPITAL MEDICATIONS:  Standing Meds:  atorvastatin 10 milliGRAM(s) Oral at bedtime  azithromycin  IVPB 500 milliGRAM(s) IV Intermittent every 24 hours  cefepime   IVPB      folic acid 1 milliGRAM(s) Oral daily  furosemide   Injectable 40 milliGRAM(s) IV Push two times a day  heparin   Injectable 5000 Unit(s) SubCutaneous every 12 hours      PRN Meds:      LABS:                        11.0   7.72  )-----------( 147      ( 23 Jan 2025 16:50 )             34.7     Hgb Trend: 11.0<--, 9.9<--, 12.0<--  01-23    139  |  104  |  34[H]  ----------------------------<  90  4.4   |  22  |  2.08[H]    Ca    8.2[L]      23 Jan 2025 06:28  Phos  4.3     01-23  Mg     2.00     01-23    TPro  6.1  /  Alb  3.8  /  TBili  0.5  /  DBili  x   /  AST  24  /  ALT  14  /  AlkPhos  88  01-22    Creatinine Trend: 2.08<--, 1.89<--  PT/INR - ( 22 Jan 2025 20:15 )   PT: 12.2 sec;   INR: 1.03 ratio         PTT - ( 22 Jan 2025 20:15 )  PTT:25.8 sec  Urinalysis Basic - ( 23 Jan 2025 06:28 )    Color: x / Appearance: x / SG: x / pH: x  Gluc: 90 mg/dL / Ketone: x  / Bili: x / Urobili: x   Blood: x / Protein: x / Nitrite: x   Leuk Esterase: x / RBC: x / WBC x   Sq Epi: x / Non Sq Epi: x / Bacteria: x        Venous Blood Gas:  01-22 @ 22:56  7.34/43/32/23/54.6  VBG Lactate: 1.6  Venous Blood Gas:  01-22 @ 20:15  7.16/64/22/23/28.0  VBG Lactate: 4.2      MICROBIOLOGY:     Urinalysis with Rflx Culture (collected 22 Jan 2025 22:25)        RADIOLOGY:  [x] Reviewed and interpreted by me  
Reason for consult: Hairy cell leukemia    HPI:  74 yr old M with a hx of Hairy Cell leukemia (in remission), chronic sinusitis, vasculitis, and a recent diagnosis Waldenström's macroglobulinemia on Rituxan (last infusion on 1/21) presents for SOB chest pain and cough. Patient states that overall he was in his usual state of health after he received his rituximab infusion. After his infusion he reports 3 episodes of diarrhea. He does report some shortness of breath over the past week. However yesterday his SOB became significantly worst and he also developed severe chest tightness and cough. He spoke to his oncologist who told him that SOB and pneumonia are possible side effects of rituximab and he came to the hospital. Of note he does report that he has noticed worsened leg swelling over the past week. He denies fevers, chills, nausea, vomiting or dysuria.     In the ED Tmax: 102.9  HR:  BP:164/84 RR: 26 O2 sat: 94% on NRB. Patient was transitioned to BIPAP. Labs significant with WBC:19K, Trop 335-->171. Lactate 4.2 -->1.6. Chest x-ray showed left lower lobe pneumonia. He recived aspirin 324, vancomycin, Zosyn, Lasix 40mg IV x1. (23 Jan 2025 11:19)    Oncology consultation completed for this  74-year-old male with a history of hairy cell leukemia, vasculitis on MTX, chronic thrombocytopenia, minimal marrow involvement by kappa-restricted CD5/OA28-edimebif B cells, and plasma cells (outside study, Apr.2024), IgMparaprotein, and hypochromic borderline microcytic anemia being treated with Rituximab and now IVIG for Hypogamma, presenting with sob and cough, concerning for PNA    PAST MEDICAL & SURGICAL HISTORY:  Hairy cell leukemia      H/O splenomegaly      Hx of thrombocytopenia  refused blood products transfusion- Rastafarian      Benign essential HTN      History of ITP      Waldenstrom macroglobulinemia      History of Rotator Cuff Surgery      Patellar fracture          FAMILY HISTORY:  FH: myocardial infarction    FH: HTN (hypertension) (Mother)    FH: diabetes mellitus (Mother)        Alochol: Denied  Smoking: Nonsmoker  Drug Use: Denied  Marital Status:         Allergies    No Known Drug Allergies  shellfish (Unknown)    Intolerances        MEDICATIONS  (STANDING):  atorvastatin 10 milliGRAM(s) Oral at bedtime  azithromycin  IVPB 500 milliGRAM(s) IV Intermittent every 24 hours  cefepime   IVPB      cefepime   IVPB 2000 milliGRAM(s) IV Intermittent once  folic acid 1 milliGRAM(s) Oral daily  furosemide   Injectable 40 milliGRAM(s) IV Push two times a day  heparin   Injectable 5000 Unit(s) SubCutaneous every 12 hours    MEDICATIONS  (PRN):      ROS  No fever, sweats, chills  No epistaxis, HA, sore throat  No CP, SOB, cough, sputum  No n/v/d, abd pain, melena, hematochezia  No edema  No rash  No anxiety  No back pain, joint pain  No bleeding, bruising  No dysuria, hematuria    T(C): 36.8 (01-23-25 @ 11:30), Max: 39.4 (01-22-25 @ 20:54)  HR: 91 (01-23-25 @ 11:30) (78 - 137)  BP: 156/99 (01-23-25 @ 11:30) (121/86 - 165/105)  RR: 20 (01-23-25 @ 11:30) (16 - 26)  SpO2: 98% (01-23-25 @ 11:30) (94% - 100%)  Wt(kg): --    PE  NAD  Awake, alert  Anicteric, MMM  RRR  CTAB  Abd soft, NT, ND  No c/c/e  No rash grossly                            9.9    7.59  )-----------( Clumped    ( 23 Jan 2025 06:28 )             31.7       01-23    139  |  104  |  34[H]  ----------------------------<  90  4.4   |  22  |  2.08[H]    Ca    8.2[L]      23 Jan 2025 06:28  Phos  4.3     01-23  Mg     2.00     01-23    TPro  6.1  /  Alb  3.8  /  TBili  0.5  /  DBili  x   /  AST  24  /  ALT  14  /  AlkPhos  88  01-22

## 2025-01-23 NOTE — H&P ADULT - PROBLEM SELECTOR PLAN 7
DVT: Subq Heparin   Diet: DASH    Dispo: pending clinical improvement home medication: metoprolol succinate 25 mg oral tablet, lisinopril 10 mg daily   -hold beta blocker and lisinopril   -if SBP>180 can give IV hydral Currently takes Rituximab (last infusion 1/21/2024  Follow with Dr. Troy Anne   -will reach out to heme/onc given concern of possible side effect of rituximab

## 2025-01-23 NOTE — H&P ADULT - PROBLEM SELECTOR PLAN 9
home medication: atorvastatin 10 mg daily  -c/w home medication home medication: methotrexate 2.5 mg oral tablet: 6 tab orally once a week (on Mondays), folic acid 1 mg daily

## 2025-01-23 NOTE — H&P ADULT - ASSESSMENT
74 yr old M with a hx of Dustin Cell leukemia (in remission), chronic sinusitis, vasculitis, and a recent diagnosis Waldenström's macroglobulinemia on Rituxan (last infusion on 1/21) presents for SOB chest pain and cough. Found to be septic with pneumonia

## 2025-01-23 NOTE — CONSULT NOTE ADULT - ASSESSMENT
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 and now IVIG for Hypogamma    Hairy cell Leukemia  -undergoing care with Dr Anne of [facility]  -S/P Rituximab 1/21  -no treatment while inpatient  [facility]        SOB  -sent to ED for evaluation of shortness of breath  -CT Chest with nodular opacities, RVP negative  -Antibiotics, pulm recs appreciated  -Consider ID consult      continue to follow    Sri Beltran NP  Hematology/Oncology  [facility]  [phone] (Office)  [phone] (Alt office)  Evenings and weekends please call MD on call or office        Anemia  -H&H 9.9/31.7  -check B12, Folate, Haptoglobin and retic  -Will consider epo if Hgb < 7.0 as pt is Jehovahs witness and refuses all blood products

## 2025-01-23 NOTE — ED PROVIDER NOTE - CLINICAL SUMMARY MEDICAL DECISION MAKING FREE TEXT BOX
75 yo M w/ PMHx of chronic sinusitis, vasculitis, history of leukemia in remission, new diagnosis of Waldenström's macroglobulinemia on Rituxan (versed infusion yesterday), HTN, and HLD presents for SOB/cough/chest tightness since today afternoon.  Patient also endorses 3 episodes of diarrhea over the past day.  Patient also been attempting nasal rinsing/2–3 PM, but became more persistently short of breath.  He states that he has been feeling slight shortness of breath for the past week.  He was told by his oncologist (Dr. Anne) SOB and pneumonia are effects of the new infusion (rituximab).  He denies any fevers/sore throat, abdominal pain, nausea/vomiting, fall/trauma.    On chart review, patient is on rituximab for which there are multiple side effects including infusion reaction (15%), influenza (15%), headaches, bronchitis, chills/fevers, sinus infection, skin rash, throat discomfort, abdominal discomfort, anemia, blood clots, tingling/burning sensation of skin, coughs, pneumonia, back pain, and high blood pressure/heart rate.  On medication review, patient is taking metoprolol, furosemide, lisinopril, atorvastatin, and folic acid.    Vital signs remarkable for BPs 160s/80s, , T99.8, and RR 26.  He was briefly started on 8 L nasal cannula with improvement in oxygen saturation.  Physical exam is remarkable for diffuse crackles in all lung fields and 1+ pitting edema bilateral lower extremities.  Concern for respiratory distress secondary to URI versus CHF versus rituximab reaction.  Concern for flash pulm edema as patient's blood pressures is not very elevated.  Plan for BiPAP, empiric treatment with antibiotics (vancomycin/cefepime), sepsis order set, and support care.

## 2025-01-23 NOTE — H&P ADULT - NSHPLABSRESULTS_GEN_ALL_CORE
LABS:                         9.9    7.59  )-----------( Clumped    ( 23 Jan 2025 06:28 )             31.7     01-23    139  |  104  |  34[H]  ----------------------------<  90  4.4   |  22  |  2.08[H]    Ca    8.2[L]      23 Jan 2025 06:28  Phos  4.3     01-23  Mg     2.00     01-23    TPro  6.1  /  Alb  3.8  /  TBili  0.5  /  DBili  x   /  AST  24  /  ALT  14  /  AlkPhos  88  01-22    PT/INR - ( 22 Jan 2025 20:15 )   PT: 12.2 sec;   INR: 1.03 ratio         PTT - ( 22 Jan 2025 20:15 )  PTT:25.8 sec  Urinalysis Basic - ( 23 Jan 2025 06:28 )    Color: x / Appearance: x / SG: x / pH: x  Gluc: 90 mg/dL / Ketone: x  / Bili: x / Urobili: x   Blood: x / Protein: x / Nitrite: x   Leuk Esterase: x / RBC: x / WBC x   Sq Epi: x / Non Sq Epi: x / Bacteria: x            RADIOLOGY, EKG & ADDITIONAL TESTS: Reviewed.     Portable Chest x-ray:   The heart size is normal in size.  Left lower lobe opacity with small effusion likely infectious in nature   although asymmetric edema is not excluded.  Left upper lobe and right lung are clear.  There is no pneumothorax.  No acute bony abnormality.    IMPRESSION: Left lower lobe pneumonia.

## 2025-01-23 NOTE — H&P ADULT - PROBLEM SELECTOR PLAN 8
home medication: methotrexate 2.5 mg oral tablet: 6 tab orally once a week (on Mondays), folic acid 1 mg daily home medication: metoprolol succinate 25 mg oral tablet, lisinopril 10 mg daily   -hold beta blocker and lisinopril   -if SBP>180 can give IV hydral

## 2025-01-23 NOTE — CONSULT NOTE ADULT - ATTENDING COMMENTS
Agree with plan as outlined above by fellow.     Pt is a 74M with MHx as above presenting to [facility] on 1/23/2025 with acute onset dyspnea and nonproductive cough requiring initiation of Bilevel NIV. RCU consulted for eligibility. Pt doing well and weaned to RA at this time. Does not require RCU level of care, please call or reconsult for any questions or change in clinical status.

## 2025-01-23 NOTE — ED ADULT NURSE REASSESSMENT NOTE - NS ED NURSE REASSESS COMMENT FT1
Patient is resting comfortably in bed on Nasal cannula. Given lasix and urinal provided at bedside. able to ambulate to the bathroom safely, breathing is even and unlabored. plan of care ongoing.

## 2025-01-23 NOTE — H&P ADULT - NSHPSOCIALHISTORY_GEN_ALL_CORE
Patient is a former smoker. Quit more than 50 years ago. Patient is retired and lives with family. Denies alcohol or recreational drug use.

## 2025-01-23 NOTE — H&P ADULT - NSICDXPASTMEDICALHX_GEN_ALL_CORE_FT
PAST MEDICAL HISTORY:  Benign essential HTN     H/O splenomegaly     Hairy cell leukemia     History of ITP     Hx of thrombocytopenia refused blood products transfusion- Christianity    Waldenstrom macroglobulinemia

## 2025-01-23 NOTE — ED ADULT NURSE REASSESSMENT NOTE - NS ED NURSE REASSESS COMMENT FT1
Patient is received from night shift nurse. A&O x 4 [place]ayan speaking, son at the bedside to translate. Patient reports that she has no pain, SOB, nausea, vomiting, or dizziness at this time. Had one episode of dark liquid stool in the bedside commode, denied any pain while passing. Abdomen is soft and nontender with no distension or discoloration. Patient appears comfortably in no acute distress at this time. requests note from MD as per why she had to miss her flight to [facility] scheduled for today. currently at CT. plan of care ongoing. Patient received from night shift nurse. A&O x 4 currently on bipap, resting comfortably in bed with no complaints at this time. as per night shift nurse he was scheduled to be taken off Bipap but requested the mask stay on because he feels more comfortable breathing with the mask on. Breathing is even and unlabored at this time, lung sounds are clear and patient denies any dizziness, nausea, SOB, pain, numbness or tingling at this time. Patient reports that he usually takes his BP medication at 10 AM. Plan of care ongoing.

## 2025-01-23 NOTE — PATIENT PROFILE ADULT - CAREGIVER RELATION TO PATIENT
Spouse Metronidazole Counseling:  I discussed with the patient the risks of metronidazole including but not limited to seizures, nausea/vomiting, a metallic taste in the mouth, nausea/vomiting and severe allergy.

## 2025-01-23 NOTE — PROGRESS NOTE ADULT - SUBJECTIVE AND OBJECTIVE BOX
seen and examined  patient not known to me, assigned this AM to assume care  chart reviewed and events thus far noted  admitted overnight by hospitalist colleague  plan per HnP  cardiology and pulmonary  help requested  discussed with patient, expresses understanding of treatment plans.  likely can be weaned off BiPAP  doing well with likely improved lung exam Patient is a 74y old  Male who presents with a chief complaint of sepsis (23 Jan 2025 13:56)  patient not known to me, assigned this AM to assume care  chart reviewed and events thus far noted  admitted overnight by hospitalist colleague     DATE OF SERVICE: 01-23-25 @ 14:39    SUBJECTIVE / OVERNIGHT EVENTS: overnight events noted    ROS:  Resp: No cough no sputum production  CVS: No chest pain no palpitations no orthopnea  GI: no N/V/D  BiPAP on        MEDICATIONS  (STANDING):  atorvastatin 10 milliGRAM(s) Oral at bedtime  azithromycin  IVPB 500 milliGRAM(s) IV Intermittent every 24 hours  cefepime   IVPB      folic acid 1 milliGRAM(s) Oral daily  furosemide   Injectable 40 milliGRAM(s) IV Push two times a day  heparin   Injectable 5000 Unit(s) SubCutaneous every 12 hours    MEDICATIONS  (PRN):        CAPILLARY BLOOD GLUCOSE        I&O's Summary      Vital Signs Last 24 Hrs  T(C): 36.8 (23 Jan 2025 11:30), Max: 39.4 (22 Jan 2025 20:54)  T(F): 98.3 (23 Jan 2025 11:30), Max: 102.9 (22 Jan 2025 20:54)  HR: 91 (23 Jan 2025 11:30) (78 - 137)  BP: 156/99 (23 Jan 2025 11:30) (121/86 - 165/105)  BP(mean): 107 (23 Jan 2025 07:35) (95 - 118)  RR: 20 (23 Jan 2025 11:30) (16 - 26)  SpO2: 98% (23 Jan 2025 11:30) (94% - 100%)    PHYSICAL EXAM:  EYES: EOMI, PERRLA, sclera clear  NECK: Supple, No JVD  CHEST/LUNG: scattered expiratory wheeze   HEART: S1 S2; no murmurs appreciated  ABDOMEN: Soft, Nontender, Bowel sounds present  EXTREMITIES:  No clubbing or cyanosis,  1 +  edema  NEUROLOGY: AO x 3 non-focal  SKIN: No rashes or lesions    LABS:                        9.9    7.59  )-----------( Clumped    ( 23 Jan 2025 06:28 )             31.7     01-23    139  |  104  |  34[H]  ----------------------------<  90  4.4   |  22  |  2.08[H]    Ca    8.2[L]      23 Jan 2025 06:28  Phos  4.3     01-23  Mg     2.00     01-23    TPro  6.1  /  Alb  3.8  /  TBili  0.5  /  DBili  x   /  AST  24  /  ALT  14  /  AlkPhos  88  01-22    PT/INR - ( 22 Jan 2025 20:15 )   PT: 12.2 sec;   INR: 1.03 ratio         PTT - ( 22 Jan 2025 20:15 )  PTT:25.8 sec  CARDIAC MARKERS ( 23 Jan 2025 06:28 )  x     / x     / x     / x     / 4.4 ng/mL      Urinalysis Basic - ( 23 Jan 2025 06:28 )    Color: x / Appearance: x / SG: x / pH: x  Gluc: 90 mg/dL / Ketone: x  / Bili: x / Urobili: x   Blood: x / Protein: x / Nitrite: x   Leuk Esterase: x / RBC: x / WBC x   Sq Epi: x / Non Sq Epi: x / Bacteria: x          All consultant(s) notes reviewed and care discussed with other providers        Contact Number, Dr Huang [phone]

## 2025-01-23 NOTE — H&P ADULT - PROBLEM SELECTOR PLAN 6
Currently takes Rituximab (last infusion 1/21/2024  Follow with Dr. Troy Anne   -will reach out to heme/onc given concern of possible side effect of rituximab Baseline creatine 1.2-1.3   Creatinine: 1.89 -->2.08   likely 2/2 sepsis vs HF exacerbation   -f/u urine studies   -f/u Renal US

## 2025-01-23 NOTE — ED PROVIDER NOTE - ATTENDING CONTRIBUTION TO CARE
Agree with resident note  74-year-old male with history of chronic sinusitis, vasculitis, history of leukemia in remission, Waldenström's macroglobulinemia on Rituxan, hypertension, hyperlipidemia presents with cough, progressive shortness of breath over the last 2 to 3 days.  Patient relates a history of remote pneumonia once or twice in the past.  States possibly due to infusion.  Physical exam  Patient presents in severe to respiratory distress, tachypneic, pulse ox in the 80s, 94 on nonrebreather  Febrile to 102.9  Crackles and rales heard throughout  S1-S2 no murmurs rubs or gallops  Abdomen soft nontender nondistended  Extremities no edema  Impression  Patient is febrile, dyspneic, crackles heard diffusely will start on broad-spectrum antibiotics, get sepsis labs, chest x-ray  Chest x-ray is read as possible pulmonary congestion  Patient has white count of 19.2  Troponin 80 6 repeat 335, no complaint of chest pain, EKG poor baseline but no signs of STEMI  Will consult cardiology, repeat EKG and third troponin  Will continue to monitor

## 2025-01-23 NOTE — ED ADULT NURSE REASSESSMENT NOTE - NS ED NURSE REASSESS COMMENT FT1
Patient has been removed from Bipap as per MD Harry order, Attending from admitting team came for consult and approved removal of the mask. Patient prefers NC now so that he can eat breakfast. Currently resting comfortably in bed, MRSA swab and urine specimen sent. MD Harry made aware of BP medication schedule and states she will be putting in orders for medication. Plan of care ongoing.

## 2025-01-23 NOTE — H&P ADULT - PROBLEM SELECTOR PLAN 10
DVT: Subq Heparin   Diet: DASH    Dispo: pending clinical improvement home medication: atorvastatin 10 mg daily  -c/w home medication

## 2025-01-23 NOTE — H&P ADULT - NSICDXFAMILYHX_GEN_ALL_CORE_FT
FAMILY HISTORY:  FH: myocardial infarction    Mother  Still living? Unknown  FH: diabetes mellitus, Age at diagnosis: Age Unknown  FH: HTN (hypertension), Age at diagnosis: Age Unknown

## 2025-01-23 NOTE — H&P ADULT - NSHPREVIEWOFSYSTEMS_GEN_ALL_CORE
REVIEW OF SYSTEMS:  CONSTITUTIONAL: +weakness, +chills, No fevers, sick contacts, or unintended weight loss  EYES: No visual changes or vertigo  ENT: No throat pain, rhinorrhea, or hearing loss   NECK: No pain or stiffness  RESPIRATORY: +cough, shortness of breath, No wheezing, hemoptysis  CARDIOVASCULAR: +chest pain   GASTROINTESTINAL: No abdominal or epigastric pain. No nausea, vomiting, or hematemesis; No diarrhea or constipation. No melena or hematochezia.  GENITOURINARY: No dysuria, frequency or hematuria  NEUROLOGICAL: No numbness or weakness  SKIN: No itching, rashes, or bruises  Psych: Good mood, no substance use

## 2025-01-23 NOTE — H&P ADULT - HISTORY OF PRESENT ILLNESS
74 yr old M with a hx of Dustin Cell leukemia (in remission), chronic sinusitis, vasculitis, and a recent diagnosis Waldenström's macroglobulinemia on Rituxan (last infusion on 1/21) presents for SOB chest pain and cough. Patient states that overall he was in his usual state of health after he received his rituximab infusion. After his infusion he reports 3 episodes of diarrhea. He does report some shortness of breath over the past week. However yesterday his SOB became significantly worst and he also developed severe chest tightness and cough. He spoke to his oncologist who told him that SOB and pneumonia are possible side effects of rituximab and he came to the hospital. Of note he does report that he has noticed worsened leg swelling over the past week. He denies fevers, chills, nausea, vomiting or dysuria.     In the ED Tmax: 102.9  HR:  BP:164/84 RR: 26 O2 sat: 94% on NRB. Patient was transitioned to BIPAP. Labs significant with WBC:19K, Trop 335-->171. Lactate 4.2 -->1.6. Chest x-ray showed left lower lobe pneumonia. He recived aspirin 324, vancomycin, Zosyn, Lasix 40mg IV x1. 74 yr old M with a hx of Dustin Cell leukemia (in remission), chronic sinusitis, vasculitis, and a recent diagnosis Waldenström's macroglobulinemia on Rituxan (last infusion on 1/21) presents for SOB chest pain and cough. Patient states that overall he was in his usual state of health after he received his rituximab infusion. After his infusion he reports 3 episodes of diarrhea. He does report some shortness of breath over the past week. However yesterday his SOB became significantly worst and he also developed severe chest tightness and cough. He spoke to his oncologist who told him that SOB and pneumonia are possible side effects of rituximab and he came to the hospital. Of note he does report that he has noticed worsened leg swelling over the past week. He denies fevers, chills, nausea, vomiting or dysuria.     Of note patient is a Jehovah Witness and refuses all blood products.     In the ED Tmax: 102.9  HR:  BP:164/84 RR: 26 O2 sat: 94% on NRB. Patient was transitioned to BIPAP. Labs significant with WBC:19K, Trop 335-->171. Lactate 4.2 -->1.6. Chest x-ray showed left lower lobe pneumonia. He received aspirin 324, vancomycin, Zosyn, Lasix 40mg IV x1.

## 2025-01-23 NOTE — ED ADULT NURSE REASSESSMENT NOTE - NS ED NURSE REASSESS COMMENT FT1
Pt lying in stretcher, not in acute distress, respirations are even and unlabored, NSR on monitor, IV intact. As per MD King to trial pt off BiPAP, pt placed on 2L nasal cannula. Bed in lowest position, comfort measures provided, safety maintained. Pt lying in stretcher, not in acute distress, respirations are even and unlabored, NSR on monitor, IV intact. As per MD Junior to trial pt off BiPAP, pt placed on 2L nasal cannula, o2 sat is 100%. Bed in lowest position, comfort measures provided, safety maintained.

## 2025-01-24 ENCOUNTER — RESULT REVIEW (OUTPATIENT)
Age: 75
End: 2025-01-24

## 2025-01-24 ENCOUNTER — TRANSCRIPTION ENCOUNTER (OUTPATIENT)
Age: 75
End: 2025-01-24

## 2025-01-24 LAB
ANION GAP SERPL CALC-SCNC: 16 MMOL/L — HIGH (ref 7–14)
BUN SERPL-MCNC: 32 MG/DL — HIGH (ref 7–23)
CALCIUM SERPL-MCNC: 8.1 MG/DL — LOW (ref 8.4–10.5)
CHLORIDE SERPL-SCNC: 101 MMOL/L — SIGNIFICANT CHANGE UP (ref 98–107)
CO2 SERPL-SCNC: 20 MMOL/L — LOW (ref 22–31)
CREAT SERPL-MCNC: 1.66 MG/DL — HIGH (ref 0.5–1.3)
EGFR: 43 ML/MIN/1.73M2 — LOW
FOLATE SERPL-MCNC: 7.6 NG/ML — SIGNIFICANT CHANGE UP (ref 3.1–17.5)
GLUCOSE SERPL-MCNC: 89 MG/DL — SIGNIFICANT CHANGE UP (ref 70–99)
HCT VFR BLD CALC: 31.1 % — LOW (ref 39–50)
HGB BLD-MCNC: 10 G/DL — LOW (ref 13–17)
IRON SATN MFR SERPL: 41 UG/DL — LOW (ref 45–165)
MAGNESIUM SERPL-MCNC: 2 MG/DL — SIGNIFICANT CHANGE UP (ref 1.6–2.6)
MCHC RBC-ENTMCNC: 25.5 PG — LOW (ref 27–34)
MCHC RBC-ENTMCNC: 32.2 G/DL — SIGNIFICANT CHANGE UP (ref 32–36)
MCV RBC AUTO: 79.3 FL — LOW (ref 80–100)
NRBC # BLD AUTO: 0 K/UL — SIGNIFICANT CHANGE UP (ref 0–0)
NRBC # BLD: 0 /100 WBCS — SIGNIFICANT CHANGE UP (ref 0–0)
NRBC # FLD: 0 K/UL — SIGNIFICANT CHANGE UP (ref 0–0)
NRBC BLD-RTO: 0 /100 WBCS — SIGNIFICANT CHANGE UP (ref 0–0)
PHOSPHATE SERPL-MCNC: 4.5 MG/DL — SIGNIFICANT CHANGE UP (ref 2.5–4.5)
PLATELET # BLD AUTO: 116 K/UL — LOW (ref 150–400)
POTASSIUM SERPL-MCNC: 3.8 MMOL/L — SIGNIFICANT CHANGE UP (ref 3.5–5.3)
POTASSIUM SERPL-SCNC: 3.8 MMOL/L — SIGNIFICANT CHANGE UP (ref 3.5–5.3)
RBC # BLD: 3.92 M/UL — LOW (ref 4.2–5.8)
RBC # FLD: 16.1 % — HIGH (ref 10.3–14.5)
SODIUM SERPL-SCNC: 137 MMOL/L — SIGNIFICANT CHANGE UP (ref 135–145)
VANCOMYCIN TROUGH SERPL-MCNC: 13 UG/ML — SIGNIFICANT CHANGE UP (ref 10–20)
VIT B12 SERPL-MCNC: 700 PG/ML — SIGNIFICANT CHANGE UP (ref 200–900)
WBC # BLD: 4.41 K/UL — SIGNIFICANT CHANGE UP (ref 3.8–10.5)
WBC # FLD AUTO: 4.41 K/UL — SIGNIFICANT CHANGE UP (ref 3.8–10.5)

## 2025-01-24 PROCEDURE — 76376 3D RENDER W/INTRP POSTPROCES: CPT | Mod: 26

## 2025-01-24 PROCEDURE — 93356 MYOCRD STRAIN IMG SPCKL TRCK: CPT

## 2025-01-24 PROCEDURE — 93306 TTE W/DOPPLER COMPLETE: CPT | Mod: 26

## 2025-01-24 RX ORDER — VANCOMYCIN HYDROCHLORIDE 50 MG/ML
1500 KIT ORAL ONCE
Refills: 0 | Status: COMPLETED | OUTPATIENT
Start: 2025-01-24 | End: 2025-01-24

## 2025-01-24 RX ADMIN — Medication 40 MILLIGRAM(S): at 05:29

## 2025-01-24 RX ADMIN — Medication 40 MILLIGRAM(S): at 14:12

## 2025-01-24 RX ADMIN — Medication 5000 UNIT(S): at 05:29

## 2025-01-24 RX ADMIN — Medication 5000 UNIT(S): at 17:01

## 2025-01-24 RX ADMIN — Medication 100 MILLIGRAM(S): at 05:28

## 2025-01-24 RX ADMIN — Medication 1 MILLIGRAM(S): at 12:56

## 2025-01-24 RX ADMIN — ATORVASTATIN CALCIUM 10 MILLIGRAM(S): 80 TABLET, FILM COATED ORAL at 22:01

## 2025-01-24 RX ADMIN — AZITHROMYCIN DIHYDRATE 255 MILLIGRAM(S): 500 TABLET, FILM COATED ORAL at 12:56

## 2025-01-24 RX ADMIN — MUPIROCIN 1 APPLICATION(S): 2 CREAM TOPICAL at 05:29

## 2025-01-24 RX ADMIN — VANCOMYCIN HYDROCHLORIDE 300 MILLIGRAM(S): KIT at 23:12

## 2025-01-24 RX ADMIN — MUPIROCIN 1 APPLICATION(S): 2 CREAM TOPICAL at 17:06

## 2025-01-24 RX ADMIN — Medication 100 MILLIGRAM(S): at 17:05

## 2025-01-24 NOTE — DISCHARGE NOTE PROVIDER - NSDCHHHOMEBOUND_GEN_ALL_CORE
Shortness of breath with minimal ambulation Shortness of breath with minimal ambulation/Fall risk/Pain greater than 7 on scale of 10 on ambulation

## 2025-01-24 NOTE — DISCHARGE NOTE PROVIDER - CARE PROVIDER_API CALL
Alek Jimenez  Otolaryngology  [address] [place] [address]  Phone: [phone]  Fax: [phone]  Follow Up Time:     Colby Mitchell  Dermatology  [address] [facility], [address]  [place] [address]  Phone: [phone]  Fax: [phone]  Follow Up Time:     Troy Anne  [address] [address]  Phone: [phone]  Fax: [phone]  Follow Up Time:     José Luis Cardoso  Pulmonary Disease  [address] [place] [id number]  Phone: [phone]  Fax: [phone]  Follow Up Time:

## 2025-01-24 NOTE — DISCHARGE NOTE PROVIDER - HOSPITAL COURSE
74 M PMHx chronic sinusitis, vasculitis, Dustin cell leukemia (in remission), new diagnosis of Waldenström's macroglobulinemia on Rituxan (S/P Versed infusion yesterday), HF (EF 20-25 in 3/2024) HTN, and HLD presents for SOB/cough/chest tightness     Acute sepsis likely 2/2 PNA   - T: 102.9, tachycardia, WBC: 18K and tachypnea  - Lactate 4.2 < 1.6   - RVP negative. CXR LLL PNA   - Hypoxia/hypercarbia S/P BIPAP   - U/A with few bacteria and trace LE, patient denies dysuria   - CT chest pulm edema with small pleural effusion. Superimposed infection should be considered   - completed Cefepime and Azithromycin    Acute respiratory failure with hypoxia and hypercapnia.   - Likely 2/2 pneumonia vs Rituxan side effect   - Pulm following, may consider steroids depending course    Acute decompensated heart failure.   - TTE 3/2024: EF 20-25, global LV hypokinesis. Mild LVDD   - Due to nonischemic cardiomyopathy as per NST done by outpatient card, Dr Elaine   - per Card Dr Anton - Volume status is now much improved.  - c/w Lasix 40 po BID    NSTEMI  - Due to acute CHF and hypoxia. Concern for concomitant sepsis.  - Plan for medical management at this time given that the patient is on active chemo  - HST 86 < 335 < 171. proBNP [id number]   - No need to trend HST further as per Cardio     LADI BL 1.2-1.3   - Creatinine: 1.89 -->2.08   - Likely 2/2 sepsis vs HF exacerbation     Waldenstrom macroglobulinemia.   - Currently takes Rituximab (last infusion 1/21/2024)  - Plan for weekly x 4 ( on hold during Admission )   Send iron, b12, folate studies for anemia. Jehovas witness. Follow up at [facility] with Dr Anne after discharge    HTN   - Hold BB and Lisinopril     Hairy cell Leukemia / WM   - undergoing care with Dr Anne of [facility] -S/P Rituximab 1/21  - hold MTX however can continue his folic acid    - no treatment while inpatient  - Follow up with [facility]    Cytopenias  - multifactorial related to underlying Waldenstroms and hairy cell along with infection/sepsis. His counts are stable   - Transfuse for Hgb < 7.0 and plts < 20    RLE DVT   - 1/25 BL LE US- Right lower extremity DVT involving the popliteal, gastrocnemius, posterior tibial, and peroneal veins  - in the setting of malignancy, VQ scan negative for PE  - no need for hypercoag work up   - per Heme - Hep gtt, then transition to DOAC on discharge, started Eliquis

## 2025-01-24 NOTE — DISCHARGE NOTE NURSING/CASE MANAGEMENT/SOCIAL WORK - NURSING SECTION COMPLETE
Patient/Caregiver provided printed discharge information.
Implemented All Universal Safety Interventions:  [place] to call system. Call bell, personal items and telephone within reach. Instruct patient to call for assistance. Room bathroom lighting operational. Non-slip footwear when patient is off stretcher. Physically safe environment: no spills, clutter or unnecessary equipment. Stretcher in lowest position, wheels locked, appropriate side rails in place.

## 2025-01-24 NOTE — DISCHARGE NOTE PROVIDER - NSDCFUSCHEDAPPT_GEN_ALL_CORE_FT
Alek Jimenez  [facility]  OTOLARYNG [address]  Scheduled Appointment: 01/29/2025    Colby Mitchell  [facility] Physician [facility]  DERM [address]  Scheduled Appointment: 04/10/2025

## 2025-01-24 NOTE — DISCHARGE NOTE PROVIDER - CARE PROVIDERS DIRECT ADDRESSES
[email or web address],DirectAddress_Unknown,DirectAddress_Unknown

## 2025-01-24 NOTE — DISCHARGE NOTE PROVIDER - PROVIDER TOKENS
PROVIDER:[TOKEN:[[id number]]],PROVIDER:[TOKEN:[[id number]]],PROVIDER:[TOKEN:[[id number]]],PROVIDER:[TOKEN:[[id number]]]

## 2025-01-24 NOTE — PROGRESS NOTE ADULT - ASSESSMENT
74 yr old M with a hx of Dustin Cell leukemia (in remission), chronic sinusitis, vasculitis, and a recent diagnosis Waldenström's macroglobulinemia on Rituxan (last infusion on 1/21) presents for SOB chest pain and cough  Is this CHF vs pna or a combination of the 2.  Rutuxan is usually given with steroids and that can lead to salt and water retention   LADI-Is this hemodynamic at present - improving   Subnephrotic range proteinuria       1 Renal - Change lasix to 40mg po bid as the edema and shortness of breath r dramatically improved   Renal sono noted  2 CVS-Check echo and on tele(done and results pending)   3 Onc-Eval noted   4 ID- IV abx   5 Pulm-Check pulse ox off oxygen     Sayed [facility]   [phone]

## 2025-01-24 NOTE — PROGRESS NOTE ADULT - SUBJECTIVE AND OBJECTIVE BOX
Date of Service: 01-24-25 @ 13:35    Patient is a 74y old  Male who presents with a chief complaint of sepsis (24 Jan 2025 11:57)      Any change in ROS: seems OK;  no sob;      MEDICATIONS  (STANDING):  atorvastatin 10 milliGRAM(s) Oral at bedtime  azithromycin  IVPB 500 milliGRAM(s) IV Intermittent every 24 hours  cefepime   IVPB      cefepime   IVPB 2000 milliGRAM(s) IV Intermittent every 12 hours  folic acid 1 milliGRAM(s) Oral daily  furosemide    Tablet 40 milliGRAM(s) Oral two times a day  heparin   Injectable 5000 Unit(s) SubCutaneous every 12 hours  influenza  Vaccine (HIGH DOSE) 0.5 milliLiter(s) IntraMuscular once  mupirocin 2% Ointment 1 Application(s) Both Nostrils two times a day    MEDICATIONS  (PRN):    Vital Signs Last 24 Hrs  T(C): 36.4 (24 Jan 2025 12:00), Max: 37.2 (23 Jan 2025 18:47)  T(F): 97.5 (24 Jan 2025 12:00), Max: 98.9 (23 Jan 2025 18:47)  HR: 92 (24 Jan 2025 12:00) (88 - 112)  BP: 172/81 (24 Jan 2025 12:00) (132/69 - 172/81)  BP(mean): --  RR: 17 (24 Jan 2025 12:00) (17 - 28)  SpO2: 100% (24 Jan 2025 12:00) (97% - 100%)    Parameters below as of 24 Jan 2025 12:00  Patient On (Oxygen Delivery Method): nasal cannula        I&O's Summary    23 Jan 2025 07:01  -  24 Jan 2025 07:00  --------------------------------------------------------  IN: 300 mL / OUT: 500 mL / NET: -200 mL          Physical Exam:   GENERAL: NAD, well-groomed, well-developed  HEENT: NUNU/   Atraumatic, Normocephalic  ENMT: No tonsillar erythema, exudates, or enlargement; Moist mucous membranes, Good dentition, No lesions  NECK: Supple, No JVD, Normal thyroid  CHEST/LUNG: Clear to auscultaion  CVS: Regular rate and rhythm; No murmurs, rubs, or gallops  GI: : Soft, Nontender, Nondistended; Bowel sounds present  NERVOUS SYSTEM:  Alert & Oriented X3,  EXTREMITIES: -edema  LYMPH: No lymphadenopathy noted  SKIN: No rashes or lesions  ENDOCRINOLOGY: No Thyromegaly  PSYCH: Appropriate    Labs:  23, 23  CARDIAC MARKERS ( 23 Jan 2025 06:28 )  x     / x     / x     / x     / 4.4 ng/mL                            10.0   4.41  )-----------( 116      ( 24 Jan 2025 05:50 )             31.1                         11.0   7.72  )-----------( 147      ( 23 Jan 2025 16:50 )             34.7                         9.9    7.59  )-----------( Clumped    ( 23 Jan 2025 06:28 )             31.7                         12.0   19.23 )-----------( 262      ( 22 Jan 2025 20:15 )             40.3     01-24    137  |  101  |  32[H]  ----------------------------<  89  3.8   |  20[L]  |  1.66[H]  01-23    139  |  104  |  34[H]  ----------------------------<  90  4.4   |  22  |  2.08[H]  01-22    142  |  105  |  31[H]  ----------------------------<  153[H]  5.2   |  21[L]  |  1.89[H]    Ca    8.1[L]      24 Jan 2025 05:50  Ca    8.2[L]      23 Jan 2025 06:28  Ca    9.0      22 Jan 2025 20:15  Phos  4.5     01-24  Phos  4.3     01-23  Mg     2.00     01-24  Mg     2.00     01-23    TPro  6.1  /  Alb  3.8  /  TBili  0.5  /  DBili  x   /  AST  24  /  ALT  14  /  AlkPhos  88  01-22    CAPILLARY BLOOD GLUCOSE          LIVER FUNCTIONS - ( 22 Jan 2025 20:15 )  Alb: 3.8 g/dL / Pro: 6.1 g/dL / ALK PHOS: 88 U/L / ALT: 14 U/L / AST: 24 U/L / GGT: x           PT/INR - ( 22 Jan 2025 20:15 )   PT: 12.2 sec;   INR: 1.03 ratio         PTT - ( 22 Jan 2025 20:15 )  PTT:25.8 sec  Urinalysis Basic - ( 24 Jan 2025 05:50 )    Color: x / Appearance: x / SG: x / pH: x  Gluc: 89 mg/dL / Ketone: x  / Bili: x / Urobili: x   Blood: x / Protein: x / Nitrite: x   Leuk Esterase: x / RBC: x / WBC x   Sq Epi: x / Non Sq Epi: x / Bacteria: x      Lactate, Blood: 1.9 mmol/L (01-22 @ 21:39)        RECENT CULTURES:  01-22 @ 20:24 .Blood BLOOD            rad< from: CT Chest No Cont (01.23.25 @ 12:14) >  Osseous structures and Soft Tissues: No aggressive bone lesions.    IMPRESSION:  Pulmonary edema with small pleural effusions. Superimposed infection   should be considered in the appropriate clinical setting.    --- End of Report ---            CHRIS VELÁZQUEZ M.D., Attending Radiologist  This document has been electronically signed. Jan 23 2025 12:29PM    < end of copied text >      No growth at 24 hours    01-22 @ 20:15 .Blood BLOOD       < from: US Kidney and Bladder (01.23.25 @ 13:06) >  COMPARISON: Renal Doppler 7/6/2021. Renal ultrasound 2/5/2020.    TECHNIQUE: Sonography of the kidneys and bladder.    FINDINGS:  Right kidney: 10.0 cm. No hydronephrosis.    Left kidney: 11.0 cm. No hydronephrosis.    Urinary bladder: Within normal limits.    IMPRESSION:  No hydronephrosis.    --- End of Report ---          MERCEDES HARRIS MD; Resident Radiologist  This document has been electronically signed.  MARIA R WATTS MD; Attending Radiologist  This document has been electronically signed. Jan 23 2025  2:00PM    < end of copied text >           No growth at 24 hours      < from: CT Chest No Cont (01.23.25 @ 12:14) >  Upper Abdomen: Unremarkable.    Osseous structures and Soft Tissues: No aggressive bone lesions.    IMPRESSION:  Pulmonary edema with small pleural effusions. Superimposed infection   should be considered in the appropriate clinical setting.    --- End of Report ---            CHRIS VELÁZQUEZ M.D., Attending Radiologist    < end of copied text >      RESPIRATORY CULTURES:          Studies  Chest X-RAY  CT SCAN Chest   Venous Dopplers: LE:   CT Abdomen  Others

## 2025-01-24 NOTE — DISCHARGE NOTE NURSING/CASE MANAGEMENT/SOCIAL WORK - PATIENT PORTAL LINK FT
You can access the FollowMyHealth Patient Portal offered by [facility] by registering at the following website: [email or web address]. By joining [facility]’s FollowMyHealth portal, you will also be able to view your health information using other applications (apps) compatible with our system.

## 2025-01-24 NOTE — PROGRESS NOTE ADULT - SUBJECTIVE AND OBJECTIVE BOX
NEPHROLOGY     Patient seen and examined resting comfortably on 2L NC, reports feeling better, denies sob or pain at present, in no acute distress.     MEDICATIONS  (STANDING):  atorvastatin 10 milliGRAM(s) Oral at bedtime  azithromycin  IVPB 500 milliGRAM(s) IV Intermittent every 24 hours  cefepime   IVPB      cefepime   IVPB 2000 milliGRAM(s) IV Intermittent every 12 hours  folic acid 1 milliGRAM(s) Oral daily  furosemide   Injectable 40 milliGRAM(s) IV Push two times a day  heparin   Injectable 5000 Unit(s) SubCutaneous every 12 hours  influenza  Vaccine (HIGH DOSE) 0.5 milliLiter(s) IntraMuscular once  mupirocin 2% Ointment 1 Application(s) Both Nostrils two times a day    VITALS:  T(C): , Max: 37.2 (01-23-25 @ 18:47)  T(F): , Max: 98.9 (01-23-25 @ 18:47)  HR: 88 (01-24-25 @ 09:16)  BP: 152/72 (01-24-25 @ 09:16)  RR: 18 (01-24-25 @ 09:16)  SpO2: 97% (01-24-25 @ 09:16)    I and O's:    01-23 @ 07:01  -  01-24 @ 07:00  --------------------------------------------------------  IN: 300 mL / OUT: 500 mL / NET: -200 mL    PHYSICAL EXAM:  Constitutional: NAD  HEENT: EOMI  Neck:  No JVD, supple   Respiratory: Coarse   Cardiovascular: S1 and S2, RRR  Gastrointestinal: + BS, soft, NT, ND  Extremities: No peripheral edema, + peripheral pulses  Neurological: A/O x 3, CN2-12 intact  Psychiatric: Normal mood, normal affect  : No Magallanes  Skin: No rashes, C/D/I    LABS:                        10.0   4.41  )-----------( 116      ( 24 Jan 2025 05:50 )             31.1     01-24    137  |  101  |  32[H]  ----------------------------<  89  3.8   |  20[L]  |  1.66[H]    Ca    8.1[L]      24 Jan 2025 05:50  Phos  4.5     01-24  Mg     2.00     01-24    TPro  6.1  /  Alb  3.8  /  TBili  0.5  /  DBili  x   /  AST  24  /  ALT  14  /  AlkPhos  88  01-22      Urine Studies:  Urinalysis Basic - ( 24 Jan 2025 05:50 )    Color: x / Appearance: x / SG: x / pH: x  Gluc: 89 mg/dL / Ketone: x  / Bili: x / Urobili: x   Blood: x / Protein: x / Nitrite: x   Leuk Esterase: x / RBC: x / WBC x   Sq Epi: x / Non Sq Epi: x / Bacteria: x    Creatinine, Random Urine: 234 mg/dL (01-23 @ 11:11)  Protein/Creatinine Ratio Calculation: 1.8 Ratio (01-23 @ 11:11)  Sodium, Random Urine: 80 mmol/L (01-23 @ 11:11)    RADIOLOGY & ADDITIONAL STUDIES:    rad< from: US Kidney and Bladder (01.23.25 @ 13:06) >  IMPRESSION:  No hydronephrosis.    --- End of Report ---      MERCEDES HARRIS MD; Resident Radiologist  This document has been electronically signed.  MARIA R WATTS MD; Attending Radiologist  This document has been electronically signed. Jan 23 2025  2:00PM    < end of copied text >  < from: CT Chest No Cont (01.23.25 @ 12:14) >  IMPRESSION:  Pulmonary edema with small pleural effusions. Superimposed infection   should be considered in the appropriate clinical setting.    --- End of Report ---        CHRIS VELÁZQUEZ M.D., Attending Radiologist  This document has been electronically signed. Jan 23 2025 12:29PM    < end of copied text >    74 yr old M with a hx of Dustin Cell leukemia (in remission), chronic sinusitis, vasculitis, and a recent diagnosis Waldenström's macroglobulinemia on Rituxan (last infusion on 1/21) presents for SOB chest pain and cough  Is this CHF vs pna or a combination of the 2.  Rutuxan is usually given with steroids and that can lead to salt and water retention   LADI-Is this hemodynamic at present     1 Renal - Agree with Lasix 40 mg IV bid and outs > ins;  Renal sono noted  2 CVS-Check echo and on tele  3 Onc-Eval noted   4 ID- IV abx    NEPHROLOGY     Patient seen and examined resting comfortably on 2L NC, reports feeling better, denies sob or pain at present, in no acute distress.     MEDICATIONS  (STANDING):  atorvastatin 10 milliGRAM(s) Oral at bedtime  azithromycin  IVPB 500 milliGRAM(s) IV Intermittent every 24 hours  cefepime   IVPB      cefepime   IVPB 2000 milliGRAM(s) IV Intermittent every 12 hours  folic acid 1 milliGRAM(s) Oral daily  furosemide   Injectable 40 milliGRAM(s) IV Push two times a day  heparin   Injectable 5000 Unit(s) SubCutaneous every 12 hours  influenza  Vaccine (HIGH DOSE) 0.5 milliLiter(s) IntraMuscular once  mupirocin 2% Ointment 1 Application(s) Both Nostrils two times a day    VITALS:  T(C): , Max: 37.2 (01-23-25 @ 18:47)  T(F): , Max: 98.9 (01-23-25 @ 18:47)  HR: 88 (01-24-25 @ 09:16)  BP: 152/72 (01-24-25 @ 09:16)  RR: 18 (01-24-25 @ 09:16)  SpO2: 97% (01-24-25 @ 09:16)    I and O's:    01-23 @ 07:01  -  01-24 @ 07:00  --------------------------------------------------------  IN: 300 mL / OUT: 500 mL / NET: -200 mL    PHYSICAL EXAM:  Constitutional: NAD  HEENT: EOMI  Neck:  No JVD, supple   Respiratory: Coarse   Cardiovascular: S1 and S2, RRR  Gastrointestinal: + BS, soft, NT, ND  Extremities: No peripheral edema, + peripheral pulses  Neurological: A/O x 3, CN2-12 intact  Psychiatric: Normal mood, normal affect  : No Magallanes  Skin: No rashes, C/D/I    LABS:                        10.0   4.41  )-----------( 116      ( 24 Jan 2025 05:50 )             31.1     01-24    137  |  101  |  32[H]  ----------------------------<  89  3.8   |  20[L]  |  1.66[H]    Ca    8.1[L]      24 Jan 2025 05:50  Phos  4.5     01-24  Mg     2.00     01-24    TPro  6.1  /  Alb  3.8  /  TBili  0.5  /  DBili  x   /  AST  24  /  ALT  14  /  AlkPhos  88  01-22      Urine Studies:  Urinalysis Basic - ( 24 Jan 2025 05:50 )    Color: x / Appearance: x / SG: x / pH: x  Gluc: 89 mg/dL / Ketone: x  / Bili: x / Urobili: x   Blood: x / Protein: x / Nitrite: x   Leuk Esterase: x / RBC: x / WBC x   Sq Epi: x / Non Sq Epi: x / Bacteria: x    Creatinine, Random Urine: 234 mg/dL (01-23 @ 11:11)  Protein/Creatinine Ratio Calculation: 1.8 Ratio (01-23 @ 11:11)  Sodium, Random Urine: 80 mmol/L (01-23 @ 11:11)    RADIOLOGY & ADDITIONAL STUDIES:    rad< from: US Kidney and Bladder (01.23.25 @ 13:06) >  IMPRESSION:  No hydronephrosis.    --- End of Report ---      MERCEDES HARRIS MD; Resident Radiologist  This document has been electronically signed.  MARIA R WATTS MD; Attending Radiologist  This document has been electronically signed. Jan 23 2025  2:00PM    < end of copied text >  < from: CT Chest No Cont (01.23.25 @ 12:14) >  IMPRESSION:  Pulmonary edema with small pleural effusions. Superimposed infection   should be considered in the appropriate clinical setting.    --- End of Report ---        CHRIS VELÁZQUEZ M.D., Attending Radiologist  This document has been electronically signed. Jan 23 2025 12:29PM    < end of copied text >    74 yr old M with a hx of Dustin Cell leukemia (in remission), chronic sinusitis, vasculitis, and a recent diagnosis Waldenström's macroglobulinemia on Rituxan (last infusion on 1/21) presents for SOB chest pain and cough  Is this CHF vs pna or a combination of the 2.  Rutuxan is usually given with steroids and that can lead to salt and water retention   LADI-Is this hemodynamic at present - improving     1 Renal - Agree with Lasix 40 mg IV bid and outs > ins;  Renal sono noted  2 CVS-Check echo and on tele  3 Onc-Eval noted   4 ID- IV abx    NEPHROLOGY     Patient seen and examined resting comfortably on 2L NC, reports feeling better, denies sob or pain at present, in no acute distress.     MEDICATIONS  (STANDING):.  atorvastatin 10 milliGRAM(s) Oral at bedtime  azithromycin  IVPB 500 milliGRAM(s) IV Intermittent every 24 hours  cefepime   IVPB      cefepime   IVPB 2000 milliGRAM(s) IV Intermittent every 12 hours  folic acid 1 milliGRAM(s) Oral daily  furosemide   Injectable 40 milliGRAM(s) IV Push two times a day  heparin   Injectable 5000 Unit(s) SubCutaneous every 12 hours  influenza  Vaccine (HIGH DOSE) 0.5 milliLiter(s) IntraMuscular once  mupirocin 2% Ointment 1 Application(s) Both Nostrils two times a day    VITALS:  T(C): , Max: 37.2 (01-23-25 @ 18:47)  T(F): , Max: 98.9 (01-23-25 @ 18:47)  HR: 88 (01-24-25 @ 09:16)  BP: 152/72 (01-24-25 @ 09:16)  RR: 18 (01-24-25 @ 09:16).  SpO2: 97% (01-24-25 @ 09:16)    I and O's:    01-23 @ 07:01  -  01-24 @ 07:00  --------------------------------------------------------  IN: 300 mL / OUT: 500 mL / NET: -200 mL    PHYSICAL EXAM:  Constitutional: NAD  HEENT: EOMI  Neck:  No JVD, supple   Respiratory: Coarse   Cardiovascular: S1 and S2, RRR  Gastrointestinal: + BS, soft, NT, ND  Extremities: No peripheral edema, + peripheral pulses  Neurological: A/O x 3, CN2-12 intact  Psychiatric: Normal mood, normal affect  : No Magallanes  Skin: No rashes, C/D/I    LABS:                        10.0   4.41  )-----------( 116      ( 24 Jan 2025 05:50 )             31.1     01-24    137  |  101  |  32[H].  ----------------------------<  89  3.8   |  20[L]  |  1.66[H]    Ca    8.1[L]      24 Jan 2025 05:50  Phos  4.5     01-24  Mg     2.00     01-24    TPro  6.1  /  Alb  3.8  /  TBili  0.5  /  DBili  x   /  AST  24  /  ALT  14  /  AlkPhos  88  01-22      Urine Studies:  Urinalysis Basic - ( 24 Jan 2025 05:50 )    Color: x / Appearance: x / SG: x / pH: x  Gluc: 89 mg/dL / Ketone: x  / Bili: x / Urobili: x   Blood: x / Protein: x / Nitrite: x   Leuk Esterase: x / RBC: x / WBC x   Sq Epi: x / Non Sq Epi: x / Bacteria: x    Creatinine, Random Urine: 234 mg/dL (01-23 @ 11:11)  Protein/Creatinine Ratio Calculation: 1.8 Ratio (01-23 @ 11:11)  Sodium, Random Urine: 80 mmol/L (01-23 @ 11:11)    RADIOLOGY & ADDITIONAL STUDIES:    rad< from: US Kidney and Bladder (01.23.25 @ 13:06) >  IMPRESSION:  No hydronephrosis.    --- End of Report ---      MERCEDES HARRIS MD; Resident Radiologist  This document has been electronically signed.  MARIA R WATTS MD; Attending Radiologist  This document has been electronically signed. Jan 23 2025  2:00PM    < end of copied text >  < from: CT Chest No Cont (01.23.25 @ 12:14) >  IMPRESSION:  Pulmonary edema with small pleural effusions. Superimposed infection   should be considered in the appropriate clinical setting.    --- End of Report ---        CHRIS VELÁZQUEZ M.D., Attending Radiologist  This document has been electronically signed. Jan 23 2025 12:29PM    < end of copied text >

## 2025-01-24 NOTE — DISCHARGE NOTE PROVIDER - NSDCCPCAREPLAN_GEN_ALL_CORE_FT
PRINCIPAL DISCHARGE DIAGNOSIS  Diagnosis: Pneumonia  Assessment and Plan of Treatment: Acute sepsis likely 2/2 pneumonia. Monitor for any further signs and symptoms of further infection including but not limited to fevers, rigors, chills and or difficulty breathing.    - CT chest showed pulm edema with small pleural effusion. Superimposed infection should be considered. You have completed antibiotic therapy Cefepime as directed      SECONDARY DISCHARGE DIAGNOSES  Diagnosis: Acute decompensated heart failure  Assessment and Plan of Treatment: Continue regimen from hospital. Follow heart healthy diet. Monitor weight. If you develop severe lower extremity swelling and shortness of breath please seek medial attention  - per Card Dr Anton - Volume status is now much improved, continue taking Lasix 40mg oral 2x daily. Follow up with Card as outpatient    Diagnosis: NSTEMI (non-ST elevation myocardial infarction)  Assessment and Plan of Treatment: TTE 3/2024: EF 20-25, global LV hypokinesis. Mild LVDD   - you have nonischemic cardiomyopathy as per Nuclear Stress Test done by outpatient card, Dr Elaine. Follow up with your PCP and cardiologist for further evaluation and managment. Please call to make an appointment. Concern for concomitant sepsis.  - Plan for medical management at this time given that the patient is on active chemo. Follow up with Cardiology Dr Elaine as outpatient    Diagnosis: Waldenstrom macroglobulinemia  Assessment and Plan of Treatment: Currently takes Rituximab (last infusion 1/21/2024)  - Plan for weekly x 4 ( on hold during Admission )   - Jehovas witness. Follow up at [facility] with Dr Anne after discharge    Diagnosis: Acute deep vein thrombosis (DVT) of right lower extremity  Assessment and Plan of Treatment: On 1/25/25 Leg US- showed Right lower extremity DVT (blood clot)involving the popliteal, gastrocnemius, posterior tibial, and peroneal veins  - per Heme Dr Anne clot in the setting of malignancy   - no need for hypercoagulable work up   - per Heme recommendation you were treated with Heparin drip and now transitioned to Eliquis. Continue as prescribed    Diagnosis: Hairy cell leukemia  Assessment and Plan of Treatment: Follow up with Dr Anne Hemotologist as outpatient    Diagnosis: HTN (hypertension)  Assessment and Plan of Treatment: Continue current blood pressure medication regimen as directed. Monitor for any visual changes, headaches or dizziness.  Monitor blood pressure regularly.  Follow up with your PCP for further management for high blood pressure, please call to make appointment within 1 week of discharge

## 2025-01-24 NOTE — PROGRESS NOTE ADULT - SUBJECTIVE AND OBJECTIVE BOX
Patient is a 74y old  Male who presents with a chief complaint of sepsis (24 Jan 2025 10:57)  weaned to room air from 3L NC, goal 02 >92%      MEDICATIONS  (STANDING):  atorvastatin 10 milliGRAM(s) Oral at bedtime  azithromycin  IVPB 500 milliGRAM(s) IV Intermittent every 24 hours  cefepime   IVPB      cefepime   IVPB 2000 milliGRAM(s) IV Intermittent every 12 hours  folic acid 1 milliGRAM(s) Oral daily  furosemide   Injectable 40 milliGRAM(s) IV Push two times a day  heparin   Injectable 5000 Unit(s) SubCutaneous every 12 hours  influenza  Vaccine (HIGH DOSE) 0.5 milliLiter(s) IntraMuscular once  mupirocin 2% Ointment 1 Application(s) Both Nostrils two times a day    MEDICATIONS  (PRN):        Vital Signs Last 24 Hrs  T(C): 36.6 (24 Jan 2025 09:16), Max: 37.2 (23 Jan 2025 18:47)  T(F): 97.9 (24 Jan 2025 09:16), Max: 98.9 (23 Jan 2025 18:47)  HR: 88 (24 Jan 2025 09:16) (88 - 112)  BP: 152/72 (24 Jan 2025 09:16) (132/69 - 155/90)  BP(mean): --  RR: 18 (24 Jan 2025 09:16) (18 - 28)  SpO2: 97% (24 Jan 2025 09:16) (97% - 100%)    Parameters below as of 24 Jan 2025 09:16  Patient On (Oxygen Delivery Method): nasal cannula        PE  NAD  Awake, alert  Anicteric, MMM  RRR  CTAB  Abd soft, NT, ND  No c/c/e  No rash grossly                            10.0   4.41  )-----------( 116      ( 24 Jan 2025 05:50 )             31.1       01-24    137  |  101  |  32[H]  ----------------------------<  89  3.8   |  20[L]  |  1.66[H]    Ca    8.1[L]      24 Jan 2025 05:50  Phos  4.5     01-24  Mg     2.00     01-24    TPro  6.1  /  Alb  3.8  /  TBili  0.5  /  DBili  x   /  AST  24  /  ALT  14  /  AlkPhos  88  01-22

## 2025-01-24 NOTE — DISCHARGE NOTE NURSING/CASE MANAGEMENT/SOCIAL WORK - FINANCIAL ASSISTANCE
[facility] provides services at a reduced cost to those who are determined to be eligible through [facility]’s financial assistance program. Information regarding [facility]’s financial assistance program can be found by going to [email or web address] or by calling [phone].

## 2025-01-24 NOTE — DISCHARGE NOTE NURSING/CASE MANAGEMENT/SOCIAL WORK - NSDCDMETYPESERV_GEN_ALL_CORE_FT
Portable oxygen concentrator to be delivered to bedside on 1/28/25, home oxygen concentrator to be delivered to home address.  Portable oxygen concentrator delivered to bedside on 1/28/25, home oxygen concentrator to be delivered to home address.

## 2025-01-24 NOTE — PROGRESS NOTE ADULT - ASSESSMENT
75 yo M w/ PMHx of chronic sinusitis, vasculitis, history of leukemia in remission, new diagnosis of Waldenström's macroglobulinemia on Rituxan (versed infusion yesterday), HTN, and HLD presents for SOB/cough/chest tightness since today afternoon.  Patient also endorses 3 episodes of diarrhea over the past day.  Patient also been attempting nasal rinsing/2–3 PM, but became more persistently short of breath.  He states that he has been feeling slight shortness of breath for the past week.  He was told by his oncologist (Dr. Anne) SOB and pneumonia are effects of the new infusion (rituximab).  He denies any fevers/sore throat, abdominal pain, nausea/vomiting, fall/trauma.  On chart review, patient is on rituximab for which there are multiple side effects including infusion reaction (15%), influenza (15%), headaches, bronchitis, chills/fevers, sinus infection, skin rash, throat discomfort, abdominal discomfort, anemia, blood clots, tingling/burning sensation of skin, coughs, pneumonia, back pain, and high blood pressure/heart rate.  On medication review, patient is taking metoprolol, furosemide, lisinopril, atorvastatin, and folic acid.  Vital signs remarkable for BPs 160s/80s, , T99.8, and RR 26.  He was briefly started on 8 L nasal cannula with improvement in oxygen saturation.  Physical exam is remarkable for diffuse crackles in all lung fields and 1+ pitting edema bilateral lower extremities.  Concern for respiratory distress secondary to URI versus CHF versus rituximab reaction.  Concern for flash pulm edema as patient's blood pressures is not very elevated.  Plan for BiPAP, empiric treatment with antibiotics (vancomycin/cefepime), sepsis order set, and support care.  he is alert and awake at this time and seems to be doing OK:  he is alert and awke and responding to questions well : he says he never h ad any underlying lung issues:   former smoker:  but quit in 1973     Hypoxioc resp failure  ?hx of vasculitis:   Waldenstrom's  macroglobulinemia on rituxan   HTN  HLD    Hypoxic/ Hypercarbic  resp failure  -he presented with hypoxia and hypercarbic acidosis  -he was placed on bipap and was started on antibiotics' as he is febrile  -his cxr:  showed: IMPRESSION: Left lower lobe pneumonia. : need ct chest  :   -rituxan toxicity needs to be kept in mind , ct scan chest will  clarify more:   -do pancultures  , RVP I S NEGATIVE:  -Consider ID consult as he is immunocompromised: Cont antibiotics  - he m ay need steroids too :, depending how he progresses on antibiotics and furterh clinical course:   -can try to give him a break froim bipap as he is fully alert and awake    ?hx of vasculitis / Waldenstrom's  macroglobulinemia on rituxan   -he is on rituxan : would  get onc to see     1/24:  he seems OK;   no sob  -cont antibiotics  ct chest reviewed;  Pulmonary edema with small pleural effusions. Superimposed infection should be considered in the appropriate clinical setting.  on diuresis;   hard to rule out bacterial infection;  to cont for now:       HTN  blood pressure is OK    HLD  -defer to primary team      dw acp

## 2025-01-24 NOTE — PROGRESS NOTE ADULT - ATTENDING COMMENTS
Agree with plan as outlined above by fellow.     Pt is a 74M with MHx as above presenting to [facility] on 1/23/2025 with acute onset dyspnea and nonproductive cough requiring initiation of Bilevel NIV. RCU consulted for eligibility. Pt doing well and weaned to RA at this time. Does not require RCU level of care, please call or reconsult for any questions or change in clinical status.

## 2025-01-24 NOTE — PROGRESS NOTE ADULT - ASSESSMENT
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 and now IVIG for Hypogamma    Hairy cell Leukemia  -undergoing care with Dr Anne of [facility]  -S/P Rituximab 1/21  -no treatment while inpatient  [facility]    SOB  -sent to ED for evaluation of shortness of breath  -CT Chest with nodular opacities, RVP negative  -Antibiotics, pulm recs appreciated  --weaned to room air from 3L NC, goal o2 >92%    Cytopenias  -H&H 10.0/31.1  -plts 116K  -multifactorial  -Transfuse for Hgb < 7.0 and plts < 20K      continue to follow    Sri Beltran NP  Hematology/Oncology  [facility]  [phone] (Office)  [phone] (Alt office)  Evenings and weekends please call MD on call or office        Anemia  -H&H 9.9/31.7  -check B12, Folate, Haptoglobin and retic  -Will consider epo if Hgb < 7.0 as pt is Jehovahs witness and refuses all blood products

## 2025-01-24 NOTE — PROGRESS NOTE ADULT - SUBJECTIVE AND OBJECTIVE BOX
Patient is a 74y old  Male who presents with a chief complaint of sepsis (24 Jan 2025 13:34)      DATE OF SERVICE: 01-24-25 @ 13:47    SUBJECTIVE / OVERNIGHT EVENTS: overnight events noted    ROS:  Resp: No cough no sputum production  CVS: No chest pain no palpitations no orthopnea  GI: no N/V/D  "I feel a bit better"       MEDICATIONS  (STANDING):  atorvastatin 10 milliGRAM(s) Oral at bedtime  azithromycin  IVPB 500 milliGRAM(s) IV Intermittent every 24 hours  cefepime   IVPB      cefepime   IVPB 2000 milliGRAM(s) IV Intermittent every 12 hours  folic acid 1 milliGRAM(s) Oral daily  furosemide    Tablet 40 milliGRAM(s) Oral two times a day  heparin   Injectable 5000 Unit(s) SubCutaneous every 12 hours  influenza  Vaccine (HIGH DOSE) 0.5 milliLiter(s) IntraMuscular once  mupirocin 2% Ointment 1 Application(s) Both Nostrils two times a day    MEDICATIONS  (PRN):        CAPILLARY BLOOD GLUCOSE        I&O's Summary    23 Jan 2025 07:01  -  24 Jan 2025 07:00  --------------------------------------------------------  IN: 300 mL / OUT: 500 mL / NET: -200 mL        Vital Signs Last 24 Hrs  T(C): 36.4 (24 Jan 2025 12:00), Max: 37.2 (23 Jan 2025 18:47)  T(F): 97.5 (24 Jan 2025 12:00), Max: 98.9 (23 Jan 2025 18:47)  HR: 92 (24 Jan 2025 12:00) (88 - 112)  BP: 172/81 (24 Jan 2025 12:00) (132/69 - 172/81)  BP(mean): --  RR: 17 (24 Jan 2025 12:00) (17 - 28)  SpO2: 100% (24 Jan 2025 12:00) (97% - 100%)      PHYSICAL EXAM:  NECK: Supple, No JVD  CHEST/LUNG: no wheeze   HEART: S1 S2; no murmurs   ABDOMEN: Soft, Nontender  EXTREMITIES:   1 +  edema  NEUROLOGY: AO x 3 non-focal  SKIN: No rashes or lesions    LABS:                        10.0   4.41  )-----------( 116      ( 24 Jan 2025 05:50 )             31.1     01-24    137  |  101  |  32[H]  ----------------------------<  89  3.8   |  20[L]  |  1.66[H]    Ca    8.1[L]      24 Jan 2025 05:50  Phos  4.5     01-24  Mg     2.00     01-24    TPro  6.1  /  Alb  3.8  /  TBili  0.5  /  DBili  x   /  AST  24  /  ALT  14  /  AlkPhos  88  01-22    PT/INR - ( 22 Jan 2025 20:15 )   PT: 12.2 sec;   INR: 1.03 ratio         PTT - ( 22 Jan 2025 20:15 )  PTT:25.8 sec  CARDIAC MARKERS ( 23 Jan 2025 06:28 )  x     / x     / x     / x     / 4.4 ng/mL      Urinalysis Basic - ( 24 Jan 2025 05:50 )    Color: x / Appearance: x / SG: x / pH: x  Gluc: 89 mg/dL / Ketone: x  / Bili: x / Urobili: x   Blood: x / Protein: x / Nitrite: x   Leuk Esterase: x / RBC: x / WBC x   Sq Epi: x / Non Sq Epi: x / Bacteria: x          All consultant(s) notes reviewed and care discussed with other providers        Contact Number, Dr Huang [phone]

## 2025-01-24 NOTE — DISCHARGE NOTE NURSING/CASE MANAGEMENT/SOCIAL WORK - NSDCPEFALRISK_GEN_ALL_CORE
For information on Fall & Injury Prevention, visit: [email or web address] OR  [email or web address] OR  https://www.cdc.gov/steadi/patient.html

## 2025-01-24 NOTE — PROGRESS NOTE ADULT - SUBJECTIVE AND OBJECTIVE BOX
Cardiovascular Disease Progress Note  DATE OF SERVICE: 25 @ 10:21    Overnight events: No acute events overnight.   The patient says his breathing is much better.    Otherwise review of systems negative    Objective Findings:  T(C): 36.6 (25 @ 09:16), Max: 37.2 (25 @ 18:47)  HR: 88 (25 @ 09:16) (85 - 112)  BP: 152/72 (25 @ 09:16) (132/69 - 156/99)  RR: 18 (25 @ 09:16) (18 - 28)  SpO2: 97% (25 @ 09:16) (96% - 100%)  Wt(kg): --  Daily     Daily Weight in k.1 (2025 05:27)      Physical Exam:  Gen: NAD; Patient resting comfortably  HEENT: EOMI, Normocephalic/ atraumatic  CV: RRR, normal S1 + S2, no m/r/g  Lungs:  Normal respiratory effort; fair air entry to auscultation bilaterally  Abd: soft, non-tender; bowel sounds present  Ext: No edema; warm and well perfused    Telemetry: Sinus    Laboratory Data:                        10.0   4.41  )-----------( 116      ( 2025 05:50 )             31.1         137  |  101  |  32[H]  ----------------------------<  89  3.8   |  20[L]  |  1.66[H]    Ca    8.1[L]      2025 05:50  Phos  4.5       Mg     2.00         TPro  6.1  /  Alb  3.8  /  TBili  0.5  /  DBili  x   /  AST  24  /  ALT  14  /  AlkPhos  88      PT/INR - ( 2025 20:15 )   PT: 12.2 sec;   INR: 1.03 ratio         PTT - ( 2025 20:15 )  PTT:25.8 sec  CARDIAC MARKERS ( 2025 06:28 )  x     / x     / x     / x     / 4.4 ng/mL          Inpatient Medications:  MEDICATIONS  (STANDING):  atorvastatin 10 milliGRAM(s) Oral at bedtime  azithromycin  IVPB 500 milliGRAM(s) IV Intermittent every 24 hours  cefepime   IVPB      cefepime   IVPB 2000 milliGRAM(s) IV Intermittent every 12 hours  folic acid 1 milliGRAM(s) Oral daily  furosemide   Injectable 40 milliGRAM(s) IV Push two times a day  heparin   Injectable 5000 Unit(s) SubCutaneous every 12 hours  influenza  Vaccine (HIGH DOSE) 0.5 milliLiter(s) IntraMuscular once  mupirocin 2% Ointment 1 Application(s) Both Nostrils two times a day      Assessment: 73 year old man with nonischemic cardiomyopathy, HTN, and leukemia presents with NSTEMI, acute systolic CHF and hypoxic respiratory failure.     Plan of Care:    #Acute systolic CHF-  Due to nonischemic cardiomyopathy as per NST done by outpatient cardiologist, Dr. Elaine.   Volume status is now much improved.  Continue IV Lasix for today.   Wean nasal cannula as tolerated.   Continue holding BB and ACEi for now.     #NSTEMI-  Due to acute CHF and hypoxia.  Concern for concomitant sepsis.  Plan for medical management at this time.     Care discussed at length with Mr. Ling.  All questions answered.         Over 55 minutes spent on total encounter; more than 50% of the visit was spent counseling and/or coordinating care by the attending physician.      Jim Anton MD [facility]  Cardiovascular Disease  [phone]

## 2025-01-24 NOTE — DISCHARGE NOTE PROVIDER - NSDCMRMEDTOKEN_GEN_ALL_CORE_FT
atorvastatin 10 mg oral tablet: 1 tab(s) orally once a day (at bedtime)  folic acid 1 mg oral tablet: 1 tab(s) orally once a day  furosemide 20 mg oral tablet: 1 tab(s) orally once a day  lisinopril 10 mg oral tablet: 1 tab(s) orally once a day  methotrexate 2.5 mg oral tablet: 6 tab(s) orally once a week (on Mondays)  metoprolol succinate 25 mg oral tablet, extended release: 1 tab(s) orally once a day   atorvastatin 10 mg oral tablet: 1 tab(s) orally once a day (at bedtime)  Eliquis 5 mg oral tablet: 1 tab(s) orally 2 times a day  folic acid 1 mg oral tablet: 1 tab(s) orally once a day  furosemide 40 mg oral tablet: 1 tab(s) orally 2 times a day  methotrexate 2.5 mg oral tablet: 6 tab(s) orally once a week (on Mondays)  metoprolol succinate 25 mg oral tablet, extended release: 1 tab(s) orally once a day  ocular lubricant preservative-free ophthalmic solution: 1 drop(s) to each affected eye every 6 hours As needed Dry Eyes  sacubitril-valsartan 24 mg-26 mg oral tablet: 1 tab(s) orally 2 times a day

## 2025-01-25 LAB
ANION GAP SERPL CALC-SCNC: 14 MMOL/L — SIGNIFICANT CHANGE UP (ref 7–14)
APTT BLD: >200 SEC — CRITICAL HIGH (ref 24.5–35.6)
BUN SERPL-MCNC: 31 MG/DL — HIGH (ref 7–23)
CALCIUM SERPL-MCNC: 8.6 MG/DL — SIGNIFICANT CHANGE UP (ref 8.4–10.5)
CHLORIDE SERPL-SCNC: 99 MMOL/L — SIGNIFICANT CHANGE UP (ref 98–107)
CO2 SERPL-SCNC: 22 MMOL/L — SIGNIFICANT CHANGE UP (ref 22–31)
CREAT SERPL-MCNC: 1.4 MG/DL — HIGH (ref 0.5–1.3)
EGFR: 53 ML/MIN/1.73M2 — LOW
GLUCOSE SERPL-MCNC: 97 MG/DL — SIGNIFICANT CHANGE UP (ref 70–99)
HCT VFR BLD CALC: 36 % — LOW (ref 39–50)
HGB BLD-MCNC: 11.1 G/DL — LOW (ref 13–17)
MAGNESIUM SERPL-MCNC: 1.9 MG/DL — SIGNIFICANT CHANGE UP (ref 1.6–2.6)
MCHC RBC-ENTMCNC: 24.5 PG — LOW (ref 27–34)
MCHC RBC-ENTMCNC: 30.8 G/DL — LOW (ref 32–36)
MCV RBC AUTO: 79.5 FL — LOW (ref 80–100)
NRBC # BLD AUTO: 0 K/UL — SIGNIFICANT CHANGE UP (ref 0–0)
NRBC # BLD: 0 /100 WBCS — SIGNIFICANT CHANGE UP (ref 0–0)
NRBC # FLD: 0 K/UL — SIGNIFICANT CHANGE UP (ref 0–0)
NRBC BLD-RTO: 0 /100 WBCS — SIGNIFICANT CHANGE UP (ref 0–0)
PHOSPHATE SERPL-MCNC: 3.9 MG/DL — SIGNIFICANT CHANGE UP (ref 2.5–4.5)
PLATELET # BLD AUTO: 123 K/UL — LOW (ref 150–400)
POTASSIUM SERPL-MCNC: 4 MMOL/L — SIGNIFICANT CHANGE UP (ref 3.5–5.3)
POTASSIUM SERPL-SCNC: 4 MMOL/L — SIGNIFICANT CHANGE UP (ref 3.5–5.3)
RBC # BLD: 4.53 M/UL — SIGNIFICANT CHANGE UP (ref 4.2–5.8)
RBC # FLD: 16.1 % — HIGH (ref 10.3–14.5)
SODIUM SERPL-SCNC: 135 MMOL/L — SIGNIFICANT CHANGE UP (ref 135–145)
VANCOMYCIN TROUGH SERPL-MCNC: 16.6 UG/ML — SIGNIFICANT CHANGE UP (ref 10–20)
WBC # BLD: 5.55 K/UL — SIGNIFICANT CHANGE UP (ref 3.8–10.5)
WBC # FLD AUTO: 5.55 K/UL — SIGNIFICANT CHANGE UP (ref 3.8–10.5)

## 2025-01-25 PROCEDURE — 93970 EXTREMITY STUDY: CPT | Mod: 26

## 2025-01-25 RX ORDER — HEPARIN SODIUM,PORCINE 10000/ML
4000 VIAL (ML) INJECTION EVERY 6 HOURS
Refills: 0 | Status: DISCONTINUED | OUTPATIENT
Start: 2025-01-25 | End: 2025-01-27

## 2025-01-25 RX ORDER — HEPARIN SODIUM,PORCINE 10000/ML
8000 VIAL (ML) INJECTION EVERY 6 HOURS
Refills: 0 | Status: DISCONTINUED | OUTPATIENT
Start: 2025-01-25 | End: 2025-01-27

## 2025-01-25 RX ORDER — HEPARIN SODIUM,PORCINE 10000/ML
8000 VIAL (ML) INJECTION ONCE
Refills: 0 | Status: COMPLETED | OUTPATIENT
Start: 2025-01-25 | End: 2025-01-25

## 2025-01-25 RX ORDER — METOPROLOL SUCCINATE 25 MG
25 TABLET, EXTENDED RELEASE 24 HR ORAL DAILY
Refills: 0 | Status: DISCONTINUED | OUTPATIENT
Start: 2025-01-25 | End: 2025-01-28

## 2025-01-25 RX ORDER — SACUBITRIL AND VALSARTAN 49; 51 MG/1; MG/1
1 TABLET, FILM COATED ORAL
Refills: 0 | Status: DISCONTINUED | OUTPATIENT
Start: 2025-01-25 | End: 2025-01-28

## 2025-01-25 RX ORDER — HEPARIN SODIUM,PORCINE 10000/ML
VIAL (ML) INJECTION
Qty: 25000 | Refills: 0 | Status: DISCONTINUED | OUTPATIENT
Start: 2025-01-25 | End: 2025-01-27

## 2025-01-25 RX ORDER — VANCOMYCIN HYDROCHLORIDE 50 MG/ML
1500 KIT ORAL ONCE
Refills: 0 | Status: COMPLETED | OUTPATIENT
Start: 2025-01-25 | End: 2025-01-25

## 2025-01-25 RX ADMIN — Medication 1800 UNIT(S)/HR: at 17:46

## 2025-01-25 RX ADMIN — Medication 1800 UNIT(S)/HR: at 18:54

## 2025-01-25 RX ADMIN — Medication 40 MILLIGRAM(S): at 05:18

## 2025-01-25 RX ADMIN — SACUBITRIL AND VALSARTAN 1 TABLET(S): 49; 51 TABLET, FILM COATED ORAL at 16:53

## 2025-01-25 RX ADMIN — Medication 1 MILLIGRAM(S): at 11:18

## 2025-01-25 RX ADMIN — Medication 40 MILLIGRAM(S): at 13:32

## 2025-01-25 RX ADMIN — Medication 5000 UNIT(S): at 16:50

## 2025-01-25 RX ADMIN — Medication 100 MILLIGRAM(S): at 16:52

## 2025-01-25 RX ADMIN — Medication 100 MILLIGRAM(S): at 05:18

## 2025-01-25 RX ADMIN — Medication 1500 UNIT(S)/HR: at 19:59

## 2025-01-25 RX ADMIN — Medication 8000 UNIT(S): at 17:42

## 2025-01-25 RX ADMIN — Medication 5000 UNIT(S): at 05:18

## 2025-01-25 RX ADMIN — MUPIROCIN 1 APPLICATION(S): 2 CREAM TOPICAL at 05:29

## 2025-01-25 RX ADMIN — AZITHROMYCIN DIHYDRATE 255 MILLIGRAM(S): 500 TABLET, FILM COATED ORAL at 11:20

## 2025-01-25 RX ADMIN — MUPIROCIN 1 APPLICATION(S): 2 CREAM TOPICAL at 16:49

## 2025-01-25 RX ADMIN — ATORVASTATIN CALCIUM 10 MILLIGRAM(S): 80 TABLET, FILM COATED ORAL at 21:14

## 2025-01-25 RX ADMIN — Medication 0 UNIT(S)/HR: at 18:58

## 2025-01-25 NOTE — PROGRESS NOTE ADULT - SUBJECTIVE AND OBJECTIVE BOX
Cardiovascular Disease Progress Note  DATE OF SERVICE: 01-25-25 @ 10:29    Overnight events: No acute events overnight.    The patient says his breathing is better.   Otherwise review of systems negative    Objective Findings:  T(C): 36.7 (01-25-25 @ 08:00), Max: 36.9 (01-24-25 @ 22:11)  HR: 87 (01-25-25 @ 08:00) (87 - 95)  BP: 154/99 (01-25-25 @ 08:00) (150/88 - 172/81)  RR: 18 (01-25-25 @ 08:00) (16 - 18)  SpO2: 97% (01-25-25 @ 08:00) (97% - 100%)  Wt(kg): --  Daily     Daily       Physical Exam:  Gen: NAD; Patient resting comfortably  HEENT: EOMI, Normocephalic/ atraumatic  CV: RRR, normal S1 + S2, no m/r/g  Lungs:  Normal respiratory effort; clear to auscultation bilaterally  Abd: soft, non-tender; bowel sounds present  Ext: 1+ edema; warm and well perfused    Telemetry: Sinus    Laboratory Data:                        11.1   5.55  )-----------( 123      ( 25 Jan 2025 06:59 )             36.0     01-25    135  |  99  |  31[H]  ----------------------------<  97  4.0   |  22  |  1.40[H]    Ca    8.6      25 Jan 2025 06:59  Phos  3.9     01-25  Mg     1.90     01-25                Inpatient Medications:  MEDICATIONS  (STANDING):  atorvastatin 10 milliGRAM(s) Oral at bedtime  azithromycin  IVPB 500 milliGRAM(s) IV Intermittent every 24 hours  cefepime   IVPB      cefepime   IVPB 2000 milliGRAM(s) IV Intermittent every 12 hours  folic acid 1 milliGRAM(s) Oral daily  furosemide    Tablet 40 milliGRAM(s) Oral two times a day  heparin   Injectable 5000 Unit(s) SubCutaneous every 12 hours  influenza  Vaccine (HIGH DOSE) 0.5 milliLiter(s) IntraMuscular once  mupirocin 2% Ointment 1 Application(s) Both Nostrils two times a day      Assessment: 73 year old man with nonischemic cardiomyopathy, HTN, and leukemia presents with NSTEMI, acute systolic CHF and hypoxic respiratory failure.     Plan of Care:    #Acute systolic CHF-  Due to nonischemic cardiomyopathy as per NST done by outpatient cardiologist, Dr. Elaine.   Volume status is now much improved.  I agree with PO Lasix.   I will change home dose lisinopril to Entresto.  Toprol resumed.     #NSTEMI-  Due to acute CHF and hypoxia.  Concern for concomitant sepsis.  Plan for medical management at this time given that the patient is on active chemotherapy.      Care discussed at length with Mr. Ling.  All questions answered.            Over 55 minutes spent on total encounter; more than 50% of the visit was spent counseling and/or coordinating care by the attending physician.      Jim Anton MD [facility]  Cardiovascular Disease  [phone]

## 2025-01-25 NOTE — PHYSICAL THERAPY INITIAL EVALUATION ADULT - PERTINENT HX OF CURRENT PROBLEM, REHAB EVAL
As per chart, patient is a 74 year old male with a hx of Dustin Cell leukemia (in remission), chronic sinusitis, vasculitis, and a recent diagnosis Waldenström's macroglobulinemia on Rituxan (last infusion on 1/21) presents for SOB chest pain and cough. Patient states that overall he was in his usual state of health after he received his rituximab infusion. After his infusion he reports 3 episodes of diarrhea. He does report some shortness of breath over the past week. However yesterday his SOB became significantly worst and he also developed severe chest tightness and cough. He spoke to his oncologist who told him that SOB and pneumonia are possible side effects of rituximab and he came to the hospital.

## 2025-01-25 NOTE — PROGRESS NOTE ADULT - SUBJECTIVE AND OBJECTIVE BOX
INTERVAL HPI/OVERNIGHT EVENTS:  Patient S&E at bedside. No o/n events,     VITAL SIGNS:  T(F): 98.1 (01-25-25 @ 08:00)  HR: 87 (01-25-25 @ 08:00)  BP: 154/99 (01-25-25 @ 08:00)  RR: 18 (01-25-25 @ 08:00)  SpO2: 97% (01-25-25 @ 08:00)  Wt(kg): --    PHYSICAL EXAM:    Constitutional: NAD  Eyes: EOMI, sclera non-icteric  Neck: supple  Respiratory: CTAB, no wheezes or crackles   Cardiovascular: RRR  Gastrointestinal: soft, NTND, + BS  Extremities: no cyanosis, clubbing or edema   Neurological: awake and alert      MEDICATIONS  (STANDING):  atorvastatin 10 milliGRAM(s) Oral at bedtime  azithromycin  IVPB 500 milliGRAM(s) IV Intermittent every 24 hours  cefepime   IVPB      cefepime   IVPB 2000 milliGRAM(s) IV Intermittent every 12 hours  folic acid 1 milliGRAM(s) Oral daily  furosemide    Tablet 40 milliGRAM(s) Oral two times a day  heparin   Injectable 5000 Unit(s) SubCutaneous every 12 hours  influenza  Vaccine (HIGH DOSE) 0.5 milliLiter(s) IntraMuscular once  mupirocin 2% Ointment 1 Application(s) Both Nostrils two times a day    MEDICATIONS  (PRN):      Allergies    No Known Drug Allergies  shellfish (Unknown)    Intolerances        LABS:                        11.1   5.55  )-----------( 123      ( 25 Jan 2025 06:59 )             36.0     01-24    137  |  101  |  32[H]  ----------------------------<  89  3.8   |  20[L]  |  1.66[H]    Ca    8.1[L]      24 Jan 2025 05:50  Phos  4.5     01-24  Mg     2.00     01-24        Urinalysis Basic - ( 24 Jan 2025 05:50 )    Color: x / Appearance: x / SG: x / pH: x  Gluc: 89 mg/dL / Ketone: x  / Bili: x / Urobili: x   Blood: x / Protein: x / Nitrite: x   Leuk Esterase: x / RBC: x / WBC x   Sq Epi: x / Non Sq Epi: x / Bacteria: x        RADIOLOGY & ADDITIONAL TESTS:  Studies reviewed.

## 2025-01-25 NOTE — PROGRESS NOTE ADULT - ASSESSMENT
75 yo M w/ PMHx of chronic sinusitis, vasculitis, history of leukemia in remission, new diagnosis of Waldenström's macroglobulinemia on Rituxan (versed infusion yesterday), HTN, and HLD presents for SOB/cough/chest tightness since today afternoon.  Patient also endorses 3 episodes of diarrhea over the past day.  Patient also been attempting nasal rinsing/2–3 PM, but became more persistently short of breath.  He states that he has been feeling slight shortness of breath for the past week.  He was told by his oncologist (Dr. Anne) SOB and pneumonia are effects of the new infusion (rituximab).  He denies any fevers/sore throat, abdominal pain, nausea/vomiting, fall/trauma.  On chart review, patient is on rituximab for which there are multiple side effects including infusion reaction (15%), influenza (15%), headaches, bronchitis, chills/fevers, sinus infection, skin rash, throat discomfort, abdominal discomfort, anemia, blood clots, tingling/burning sensation of skin, coughs, pneumonia, back pain, and high blood pressure/heart rate.  On medication review, patient is taking metoprolol, furosemide, lisinopril, atorvastatin, and folic acid.  Vital signs remarkable for BPs 160s/80s, , T99.8, and RR 26.  He was briefly started on 8 L nasal cannula with improvement in oxygen saturation.  Physical exam is remarkable for diffuse crackles in all lung fields and 1+ pitting edema bilateral lower extremities.  Concern for respiratory distress secondary to URI versus CHF versus rituximab reaction.  Concern for flash pulm edema as patient's blood pressures is not very elevated.  Plan for BiPAP, empiric treatment with antibiotics (vancomycin/cefepime), sepsis order set, and support care.  he is alert and awake at this time and seems to be doing OK:  he is alert and awke and responding to questions well : he says he never h ad any underlying lung issues:   former smoker:  but quit in 1973     Hypoxioc resp failure  ?hx of vasculitis:   Waldenstrom's  macroglobulinemia on rituxan   HTN  HLD    Hypoxic/ Hypercarbic  resp failure  -he presented with hypoxia and hypercarbic acidosis  -he was placed on bipap and was started on antibiotics' as he is febrile  -his cxr:  showed: IMPRESSION: Left lower lobe pneumonia. : need ct chest  :   -rituxan toxicity needs to be kept in mind , ct scan chest will  clarify more:   -do pancultures  , RVP I S NEGATIVE:  -Consider ID consult as he is immunocompromised: Cont antibiotics  - he m ay need steroids too :, depending how he progresses on antibiotics and furterh clinical course:   -can try to give him a break froim bipap as he is fully alert and awake    ?hx of vasculitis / Waldenstrom's  macroglobulinemia on rituxan   -he is on rituxan : would  get onc to see     1/24:  he seems OK;   no sob  -cont antibiotics  ct chest reviewed;  Pulmonary edema with small pleural effusions. Superimposed infection should be considered in the appropriate clinical setting.  on diuresis;   hard to rule out bacterial infection;  to cont for now:     1/25: he has some pleuritic chest pain on rigth side:   no wheezing/   being treated for pneumonia as well as chf   echo noted:  1. Left ventricular systolic function is severely decreased with an ejection fraction of 27 % by King's method of disks. Global left ventricular hypokinesis.   2. Normal right ventricular cavity size and normal right ventricular systolic function. Tricuspid annular plane systolic excursion (TAPSE) is 1.9 cm (normal >=1.7 cm).   3. Left atrium is mildly dilated.   4. No significant valvular disease.   5. Right pleural effusion noted.   6. Small pericardial effusion noted adjacent to the right ventricle and trace pericardial effusion noted adjacent to the right atrium.   7. The inferior vena cava is normal in size measuring 1.24 cm in diameter, (normal <2.1cm) with normal inspiratory collapse (normal >50%) consistent with normal right atrial pressure (~3, range 0-5mmHg).  cont diuretics   do dopplers fabby LE       HTN  blood pressure is OK  1/25: blood pressure seems stable:     HLD  -defer to primary team      alli delgado

## 2025-01-25 NOTE — PROGRESS NOTE ADULT - ASSESSMENT
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 and now IVIG for Hypogamma    Hairy cell Leukemia  -undergoing care with Dr Anne of [facility]  -S/P Rituximab 1/21  -no treatment while inpatient  -[facility]    SOB  - in the setting of ADHF- diuresis and appreciate cardio recs     -sent to ED for evaluation of shortness of breath  -CT Chest with nodular opacities, RVP negative  -cefepime and azithro, pulm recs appreciated  -weaned to room air from 3L NC, goal o2 >92%    Cytopenias  -H&H 10.0/31.1  -plts 116K  -multifactorial  -Transfuse for Hgb < 7.0 and plts < 20    continue to follow    Constantine Fletcher MD   Hematology/Oncology  [facility]  [phone] (Office)  [phone] (Alt office)  Evenings and weekends please call MD on call or office

## 2025-01-25 NOTE — PROGRESS NOTE ADULT - SUBJECTIVE AND OBJECTIVE BOX
Patient is a 74y old  Male who presents with a chief complaint of sepsis (25 Jan 2025 10:29)      DATE OF SERVICE: 01-25-25 @ 10:32    SUBJECTIVE / OVERNIGHT EVENTS: overnight events noted    ROS:  Resp: No cough no sputum production  CVS: No chest pain no palpitations no orthopnea  GI: no N/V/D  "I feel much better"     MEDICATIONS  (STANDING):  atorvastatin 10 milliGRAM(s) Oral at bedtime  azithromycin  IVPB 500 milliGRAM(s) IV Intermittent every 24 hours  cefepime   IVPB      cefepime   IVPB 2000 milliGRAM(s) IV Intermittent every 12 hours  folic acid 1 milliGRAM(s) Oral daily  furosemide    Tablet 40 milliGRAM(s) Oral two times a day  heparin   Injectable 5000 Unit(s) SubCutaneous every 12 hours  influenza  Vaccine (HIGH DOSE) 0.5 milliLiter(s) IntraMuscular once  mupirocin 2% Ointment 1 Application(s) Both Nostrils two times a day    MEDICATIONS  (PRN):        CAPILLARY BLOOD GLUCOSE        I&O's Summary    24 Jan 2025 07:01  -  25 Jan 2025 07:00  --------------------------------------------------------  IN: 0 mL / OUT: 600 mL / NET: -600 mL        Vital Signs Last 24 Hrs  T(C): 36.7 (25 Jan 2025 08:00), Max: 36.9 (24 Jan 2025 22:11)  T(F): 98.1 (25 Jan 2025 08:00), Max: 98.5 (24 Jan 2025 22:11)  HR: 87 (25 Jan 2025 08:00) (87 - 95)  BP: 154/99 (25 Jan 2025 08:00) (150/88 - 172/81)  BP(mean): --  RR: 18 (25 Jan 2025 08:00) (16 - 18)  SpO2: 97% (25 Jan 2025 08:00) (97% - 100%)      PHYSICAL EXAM:  CHEST/LUNG: clear  HEART: S1 S2; no murmurs   ABDOMEN: Soft, Nontender  EXTREMITIES:   1 +  edema  NEUROLOGY: AO x 3 non-focal  SKIN: No rashes or lesions    LABS:                        11.1   5.55  )-----------( 123      ( 25 Jan 2025 06:59 )             36.0     01-25    135  |  99  |  31[H]  ----------------------------<  97  4.0   |  22  |  1.40[H]    Ca    8.6      25 Jan 2025 06:59  Phos  3.9     01-25  Mg     1.90     01-25            Urinalysis Basic - ( 25 Jan 2025 06:59 )    Color: x / Appearance: x / SG: x / pH: x  Gluc: 97 mg/dL / Ketone: x  / Bili: x / Urobili: x   Blood: x / Protein: x / Nitrite: x   Leuk Esterase: x / RBC: x / WBC x   Sq Epi: x / Non Sq Epi: x / Bacteria: x          All consultant(s) notes reviewed and care discussed with other providers        Contact Number, Dr Huang [phone]

## 2025-01-25 NOTE — PROGRESS NOTE ADULT - SUBJECTIVE AND OBJECTIVE BOX
NEPHROLOGY     Patient seen and examined resting comfortably on room air, reports feeling tired, denies pain, no sob, in no acute distress.     MEDICATIONS  (STANDING):  atorvastatin 10 milliGRAM(s) Oral at bedtime  azithromycin  IVPB 500 milliGRAM(s) IV Intermittent every 24 hours  cefepime   IVPB      cefepime   IVPB 2000 milliGRAM(s) IV Intermittent every 12 hours  folic acid 1 milliGRAM(s) Oral daily  furosemide    Tablet 40 milliGRAM(s) Oral two times a day  heparin   Injectable 5000 Unit(s) SubCutaneous every 12 hours  influenza  Vaccine (HIGH DOSE) 0.5 milliLiter(s) IntraMuscular once  mupirocin 2% Ointment 1 Application(s) Both Nostrils two times a day    VITALS:  T(C): , Max: 36.9 (01-24-25 @ 22:11)  T(F): , Max: 98.5 (01-24-25 @ 22:11)  HR: 87 (01-25-25 @ 08:00)  BP: 154/99 (01-25-25 @ 08:00)  RR: 18 (01-25-25 @ 08:00)  SpO2: 97% (01-25-25 @ 08:00)    I and O's:    01-24 @ 07:01  -  01-25 @ 07:00  --------------------------------------------------------  IN: 0 mL / OUT: 600 mL / NET: -600 mL    PHYSICAL EXAM:  Constitutional: NAD  HEENT: EOMI  Neck:  No JVD, supple   Respiratory: Coarse   Cardiovascular: S1 and S2, RRR  Gastrointestinal: + BS, soft, NT, ND  Extremities: No peripheral edema, + peripheral pulses  Neurological: A/O x 3, CN2-12 intact  Psychiatric: Normal mood, normal affect  : No Magallanes  Skin: No rashes, C/D/I    LABS:                        11.1   5.55  )-----------( 123      ( 25 Jan 2025 06:59 )             36.0     01-24    137  |  101  |  32[H]  ----------------------------<  89  3.8   |  20[L]  |  1.66[H]    Ca    8.1[L]      24 Jan 2025 05:50  Phos  4.5     01-24  Mg     2.00     01-24      Urine Studies:    Creatinine, Random Urine: 234 mg/dL (01-23 @ 11:11)  Protein/Creatinine Ratio Calculation: 1.8 Ratio (01-23 @ 11:11)  Sodium, Random Urine: 80 mmol/L (01-23 @ 11:11)      RADIOLOGIC STUDIES:   _______________________________________________________________________________________  Referring Physician:    Abdulkadir Huang M.D.  Interpreting Physician: Wale Vidal MD  Primary Sonographer:    Theresa Peterson [facility]    CPT:               ECHO TTE WO CON COMP W DOPP - 82664.m;MYOCARDIAL STRAIN                     IMAGING - 40145.m;3D RECONST W/O WKSTATION - 57501.m  Indication(s):     Heart failure, unspecified - I50.9  Procedure:         Transthoracic echocardiogram with 2-D, M-mode and complete                     spectral and color flow Doppler. Strain imaging performed for                     evaluation of regional and global myocardial shape and                     dimensions. Real time and full volume 3-dimensional imaging                     performed at the echo machine.  [facility]  Admission Status:  Inpatient  Study Information: Image quality for this study is good.    _______________________________________________________________________________________     CONCLUSIONS:      1. Left ventricular systolic function is severely decreased with an ejection fraction of 27 % by King's method of disks. Global left ventricular hypokinesis.   2. Normal right ventricular cavity size and normal right ventricular systolic function. Tricuspid annular plane systolic excursion (TAPSE) is 1.9 cm (normal >=1.7 cm).   3. Left atrium is mildly dilated.   4. No significant valvular disease.   5. Right pleural effusion noted.   6. Small pericardial effusion noted adjacent to the right ventricle and trace pericardial effusion noted adjacent to the right atrium.   7. The inferior vena cava is normal in size measuring 1.24 cm in diameter, (normal <2.1cm) with normal inspiratory collapse (normal >50%) consistent with normal right atrial pressure (~3, range 0-5mmHg).    __________________________________________________________    ASSESSMENT/PLAN:   74 yr old M with a hx of Dustin Cell leukemia (in remission), chronic sinusitis, vasculitis, and a recent diagnosis Waldenström's macroglobulinemia on Rituxan (last infusion on 1/21) presents for SOB chest pain and cough  Is this CHF vs pna or a combination of the 2.  Rutuxan is usually given with steroids and that can lead to salt and water retention   LADI-Is this hemodynamic at present - improving   Subnephrotic range proteinuria     1 Renal - Continue lasix 40 mg po bid.  Renal sono noted  2 CVS- echo noted and on tele  3 Onc-Eval noted   4 ID- on IV abx   5 Pulm-on room air, monitor pulse ox off oxygen     Ama Barrow [facility]  [phone]

## 2025-01-25 NOTE — PROGRESS NOTE ADULT - SUBJECTIVE AND OBJECTIVE BOX
Date of Service: 01-25-25 @ 11:36    Patient is a 74y old  Male who presents with a chief complaint of sepsis (25 Jan 2025 10:32)      Any change in ROS: seems OK:  today has slight pain on left side of chest especially on deep inspiration:  yesterday he did not have this pain :   on oxygen : breathing wise looks comfortable     MEDICATIONS  (STANDING):  atorvastatin 10 milliGRAM(s) Oral at bedtime  azithromycin  IVPB 500 milliGRAM(s) IV Intermittent every 24 hours  cefepime   IVPB      cefepime   IVPB 2000 milliGRAM(s) IV Intermittent every 12 hours  folic acid 1 milliGRAM(s) Oral daily  furosemide    Tablet 40 milliGRAM(s) Oral two times a day  heparin   Injectable 5000 Unit(s) SubCutaneous every 12 hours  influenza  Vaccine (HIGH DOSE) 0.5 milliLiter(s) IntraMuscular once  metoprolol succinate ER 25 milliGRAM(s) Oral daily  mupirocin 2% Ointment 1 Application(s) Both Nostrils two times a day  sacubitril 24 mG/valsartan 26 mG 1 Tablet(s) Oral two times a day    MEDICATIONS  (PRN):    Vital Signs Last 24 Hrs  T(C): 36.7 (25 Jan 2025 08:00), Max: 36.9 (24 Jan 2025 22:11)  T(F): 98.1 (25 Jan 2025 08:00), Max: 98.5 (24 Jan 2025 22:11)  HR: 87 (25 Jan 2025 08:00) (87 - 95)  BP: 154/99 (25 Jan 2025 08:00) (150/88 - 172/81)  BP(mean): --  RR: 18 (25 Jan 2025 08:00) (16 - 18)  SpO2: 97% (25 Jan 2025 08:00) (97% - 100%)    Parameters below as of 25 Jan 2025 08:00  Patient On (Oxygen Delivery Method): room air        I&O's Summary    24 Jan 2025 07:01  -  25 Jan 2025 07:00  --------------------------------------------------------  IN: 0 mL / OUT: 600 mL / NET: -600 mL          Physical Exam:   GENERAL: NAD, well-groomed, well-developed  HEENT: NUNU/   Atraumatic, Normocephalic  ENMT: No tonsillar erythema, exudates, or enlargement; Moist mucous membranes, Good dentition, No lesions  NECK: Supple, No JVD, Normal thyroid  CHEST/LUNG: Clear to auscultaion  CVS: Regular rate and rhythm; No murmurs, rubs, or gallops  GI: : Soft, Nontender, Nondistended; Bowel sounds present  NERVOUS SYSTEM:  Alert & Oriented X3  EXTREMITIES: -edema  LYMPH: No lymphadenopathy noted  SKIN: No rashes or lesions  ENDOCRINOLOGY: No Thyromegaly  PSYCH: Appropriate    Labs:  23, 23                            11.1   5.55  )-----------( 123      ( 25 Jan 2025 06:59 )             36.0                         10.0   4.41  )-----------( 116      ( 24 Jan 2025 05:50 )             31.1                         11.0   7.72  )-----------( 147      ( 23 Jan 2025 16:50 )             34.7                         9.9    7.59  )-----------( Clumped    ( 23 Jan 2025 06:28 )             31.7                         12.0   19.23 )-----------( 262      ( 22 Jan 2025 20:15 )             40.3     01-25    135  |  99  |  31[H]  ----------------------------<  97  4.0   |  22  |  1.40[H]  01-24    137  |  101  |  32[H]  ----------------------------<  89  3.8   |  20[L]  |  1.66[H]  01-23    139  |  104  |  34[H]  ----------------------------<  90  4.4   |  22  |  2.08[H]  01-22    142  |  105  |  31[H]  ----------------------------<  153[H]  5.2   |  21[L]  |  1.89[H]    Ca    8.6      25 Jan 2025 06:59  Ca    8.1[L]      24 Jan 2025 05:50  Phos  3.9     01-25  Phos  4.5     01-24  Mg     1.90     01-25  Mg     2.00     01-24    TPro  6.1  /  Alb  3.8  /  TBili  0.5  /  DBili  x   /  AST  24  /  ALT  14  /  AlkPhos  88  01-22    CAPILLARY BLOOD GLUCOSE              Urinalysis Basic - ( 25 Jan 2025 06:59 )    Color: x / Appearance: x / SG: x / pH: x  Gluc: 97 mg/dL / Ketone: x  / Bili: x / Urobili: x   Blood: x / Protein: x / Nitrite: x   Leuk Esterase: x / RBC: x / WBC x   Sq Epi: x / Non Sq Epi: x / Bacteria: x      Lactate, Blood: 1.9 mmol/L (01-22 @ 21:39)        RECENT CULTURES:  01-22 @ 20:24 .Blood BLOOD                No growth at 48 Hours    01-22 @ 20:15 .Blood BLOOD         rad< from: CT Chest No Cont (01.23.25 @ 12:14) >  throughout both lungs. The central airways are patent.    Mediastinum/Lymph nodes: No thoracic adenopathy.    Heart and Vessels: The great vessels are normal in size. The heart is   normal in size. New trace pericardial effusion. Qualitatively mild   coronary artery calcification.    Upper Abdomen: Unremarkable.    Osseous structures and Soft Tissues: No aggressive bone lesions.    IMPRESSION:  Pulmonary edema with small pleural effusions. Superimposed infection   should be considered in the appropriate clinical setting.    --- End of Report ---            CHRIS VELÁZQUEZ M.D., Attending Radiologist  This document has been electronically signed. Jan 23 2025 12:29PM    < end of copied text >         No growth at 48 Hours          RESPIRATORY CULTURES:          Studies  Chest X-RAY  CT SCAN Chest   Venous Dopplers: LE:   CT Abdomen  Others      < from: TTE W or WO Ultrasound Enhancing Agent (01.24.25 @ 10:49) >      1. Left ventricular systolic function is severely decreased with an ejection fraction of 27 % by King's method of disks. Global left ventricular hypokinesis.   2. Normal right ventricular cavity size and normal right ventricular systolic function. Tricuspid annular plane systolic excursion (TAPSE) is 1.9 cm (normal >=1.7 cm).   3. Left atrium is mildly dilated.   4. No significant valvular disease.   5. Right pleural effusion noted.   6. Small pericardial effusion noted adjacent to the right ventricle and trace pericardial effusion noted adjacent to the right atrium.   7. The inferior vena cava is normal in size measuring 1.24 cm in diameter, (normal <2.1cm) with normal inspiratory collapse (normal >50%) consistent with normal right atrial pressure (~3, range 0-5mmHg).    < end of copied text >

## 2025-01-25 NOTE — PHYSICAL THERAPY INITIAL EVALUATION ADULT - GENERAL OBSERVATIONS, REHAB EVAL
Pt found sitting in chair in NAD; +telemetry monitor; HR 78bpm; spoke with HARRISON Zabala, who advised patient may participate.

## 2025-01-26 DIAGNOSIS — I82.409 ACUTE EMBOLISM AND THROMBOSIS OF UNSPECIFIED DEEP VEINS OF UNSPECIFIED LOWER EXTREMITY: ICD-10-CM

## 2025-01-26 LAB
ANION GAP SERPL CALC-SCNC: 13 MMOL/L — SIGNIFICANT CHANGE UP (ref 7–14)
APTT BLD: 115.3 SEC — HIGH (ref 24.5–35.6)
APTT BLD: 60.4 SEC — HIGH (ref 24.5–35.6)
APTT BLD: 72.9 SEC — HIGH (ref 24.5–35.6)
BUN SERPL-MCNC: 26 MG/DL — HIGH (ref 7–23)
CALCIUM SERPL-MCNC: 8.4 MG/DL — SIGNIFICANT CHANGE UP (ref 8.4–10.5)
CHLORIDE SERPL-SCNC: 99 MMOL/L — SIGNIFICANT CHANGE UP (ref 98–107)
CO2 SERPL-SCNC: 25 MMOL/L — SIGNIFICANT CHANGE UP (ref 22–31)
CREAT SERPL-MCNC: 1.39 MG/DL — HIGH (ref 0.5–1.3)
EGFR: 53 ML/MIN/1.73M2 — LOW
GLUCOSE SERPL-MCNC: 93 MG/DL — SIGNIFICANT CHANGE UP (ref 70–99)
HCT VFR BLD CALC: 33.7 % — LOW (ref 39–50)
HCT VFR BLD CALC: 33.8 % — LOW (ref 39–50)
HGB BLD-MCNC: 10.5 G/DL — LOW (ref 13–17)
HGB BLD-MCNC: 10.7 G/DL — LOW (ref 13–17)
LEGIONELLA AG UR QL: NEGATIVE — SIGNIFICANT CHANGE UP
MAGNESIUM SERPL-MCNC: 1.9 MG/DL — SIGNIFICANT CHANGE UP (ref 1.6–2.6)
MCHC RBC-ENTMCNC: 24.5 PG — LOW (ref 27–34)
MCHC RBC-ENTMCNC: 25 PG — LOW (ref 27–34)
MCHC RBC-ENTMCNC: 31.1 G/DL — LOW (ref 32–36)
MCHC RBC-ENTMCNC: 31.8 G/DL — LOW (ref 32–36)
MCV RBC AUTO: 78.7 FL — LOW (ref 80–100)
MCV RBC AUTO: 79 FL — LOW (ref 80–100)
NRBC # BLD AUTO: 0 K/UL — SIGNIFICANT CHANGE UP (ref 0–0)
NRBC # BLD AUTO: 0 K/UL — SIGNIFICANT CHANGE UP (ref 0–0)
NRBC # BLD: 0 /100 WBCS — SIGNIFICANT CHANGE UP (ref 0–0)
NRBC # BLD: 0 /100 WBCS — SIGNIFICANT CHANGE UP (ref 0–0)
NRBC # FLD: 0 K/UL — SIGNIFICANT CHANGE UP (ref 0–0)
NRBC # FLD: 0 K/UL — SIGNIFICANT CHANGE UP (ref 0–0)
NRBC BLD-RTO: 0 /100 WBCS — SIGNIFICANT CHANGE UP (ref 0–0)
NRBC BLD-RTO: 0 /100 WBCS — SIGNIFICANT CHANGE UP (ref 0–0)
PHOSPHATE SERPL-MCNC: 3.4 MG/DL — SIGNIFICANT CHANGE UP (ref 2.5–4.5)
PLATELET # BLD AUTO: 119 K/UL — LOW (ref 150–400)
PLATELET # BLD AUTO: 129 K/UL — LOW (ref 150–400)
POTASSIUM SERPL-MCNC: 3.9 MMOL/L — SIGNIFICANT CHANGE UP (ref 3.5–5.3)
POTASSIUM SERPL-SCNC: 3.9 MMOL/L — SIGNIFICANT CHANGE UP (ref 3.5–5.3)
RBC # BLD: 4.28 M/UL — SIGNIFICANT CHANGE UP (ref 4.2–5.8)
RBC # BLD: 4.28 M/UL — SIGNIFICANT CHANGE UP (ref 4.2–5.8)
RBC # FLD: 15.6 % — HIGH (ref 10.3–14.5)
RBC # FLD: 15.6 % — HIGH (ref 10.3–14.5)
SODIUM SERPL-SCNC: 137 MMOL/L — SIGNIFICANT CHANGE UP (ref 135–145)
WBC # BLD: 3.96 K/UL — SIGNIFICANT CHANGE UP (ref 3.8–10.5)
WBC # BLD: 4.12 K/UL — SIGNIFICANT CHANGE UP (ref 3.8–10.5)
WBC # FLD AUTO: 3.96 K/UL — SIGNIFICANT CHANGE UP (ref 3.8–10.5)
WBC # FLD AUTO: 4.12 K/UL — SIGNIFICANT CHANGE UP (ref 3.8–10.5)

## 2025-01-26 RX ADMIN — Medication 1300 UNIT(S)/HR: at 01:37

## 2025-01-26 RX ADMIN — MUPIROCIN 1 APPLICATION(S): 2 CREAM TOPICAL at 17:08

## 2025-01-26 RX ADMIN — Medication 100 MILLIGRAM(S): at 17:09

## 2025-01-26 RX ADMIN — Medication 100 MILLIGRAM(S): at 05:24

## 2025-01-26 RX ADMIN — VANCOMYCIN HYDROCHLORIDE 300 MILLIGRAM(S): KIT at 00:28

## 2025-01-26 RX ADMIN — ATORVASTATIN CALCIUM 10 MILLIGRAM(S): 80 TABLET, FILM COATED ORAL at 21:09

## 2025-01-26 RX ADMIN — Medication 1300 UNIT(S)/HR: at 19:09

## 2025-01-26 RX ADMIN — Medication 25 MILLIGRAM(S): at 05:15

## 2025-01-26 RX ADMIN — Medication 40 MILLIGRAM(S): at 15:07

## 2025-01-26 RX ADMIN — SACUBITRIL AND VALSARTAN 1 TABLET(S): 49; 51 TABLET, FILM COATED ORAL at 17:08

## 2025-01-26 RX ADMIN — AZITHROMYCIN DIHYDRATE 255 MILLIGRAM(S): 500 TABLET, FILM COATED ORAL at 11:55

## 2025-01-26 RX ADMIN — Medication 1300 UNIT(S)/HR: at 07:00

## 2025-01-26 RX ADMIN — Medication 1300 UNIT(S)/HR: at 07:25

## 2025-01-26 RX ADMIN — SACUBITRIL AND VALSARTAN 1 TABLET(S): 49; 51 TABLET, FILM COATED ORAL at 05:14

## 2025-01-26 RX ADMIN — Medication 1300 UNIT(S)/HR: at 15:06

## 2025-01-26 RX ADMIN — MUPIROCIN 1 APPLICATION(S): 2 CREAM TOPICAL at 05:25

## 2025-01-26 RX ADMIN — Medication 1 MILLIGRAM(S): at 08:59

## 2025-01-26 RX ADMIN — Medication 1300 UNIT(S)/HR: at 16:01

## 2025-01-26 RX ADMIN — Medication 40 MILLIGRAM(S): at 05:15

## 2025-01-26 NOTE — PROGRESS NOTE ADULT - SUBJECTIVE AND OBJECTIVE BOX
INTERVAL HPI/OVERNIGHT EVENTS:  Patient S&E at bedside. No o/n events. Respiratory status improved. He remains on PO lasix and abx- day 4.      VITAL SIGNS:  T(F): 97.5 (01-26-25 @ 04:00)  HR: 87 (01-26-25 @ 04:00)  BP: 143/86 (01-26-25 @ 04:00)  RR: 18 (01-26-25 @ 04:00)  SpO2: 99% (01-26-25 @ 04:00)  Wt(kg): --    PHYSICAL EXAM:    Constitutional: NAD  Eyes: EOMI, sclera non-icteric  Neck: supple  Respiratory: CTAB, no wheezes or crackles   Cardiovascular: RRR  Gastrointestinal: soft, NTND, + BS  Extremities: +LE edema   Neurological: awake and alert      MEDICATIONS  (STANDING):  atorvastatin 10 milliGRAM(s) Oral at bedtime  azithromycin  IVPB 500 milliGRAM(s) IV Intermittent every 24 hours  cefepime   IVPB      cefepime   IVPB 2000 milliGRAM(s) IV Intermittent every 12 hours  folic acid 1 milliGRAM(s) Oral daily  furosemide    Tablet 40 milliGRAM(s) Oral two times a day  heparin  Infusion.  Unit(s)/Hr (18 mL/Hr) IV Continuous <Continuous>  influenza  Vaccine (HIGH DOSE) 0.5 milliLiter(s) IntraMuscular once  metoprolol succinate ER 25 milliGRAM(s) Oral daily  mupirocin 2% Ointment 1 Application(s) Both Nostrils two times a day  sacubitril 24 mG/valsartan 26 mG 1 Tablet(s) Oral two times a day    MEDICATIONS  (PRN):  heparin   Injectable 8000 Unit(s) IV Push every 6 hours PRN For aPTT less than 40  heparin   Injectable 4000 Unit(s) IV Push every 6 hours PRN For aPTT between 40 - 57      Allergies    No Known Drug Allergies  shellfish (Unknown)    Intolerances        LABS:                        10.7   4.12  )-----------( 119      ( 26 Jan 2025 06:51 )             33.7     01-26    137  |  99  |  26[H]  ----------------------------<  93  3.9   |  25  |  1.39[H]    Ca    8.4      26 Jan 2025 06:51  Phos  3.4     01-26  Mg     1.90     01-26      PTT - ( 26 Jan 2025 06:51 )  PTT:72.9 sec  Urinalysis Basic - ( 26 Jan 2025 06:51 )    Color: x / Appearance: x / SG: x / pH: x  Gluc: 93 mg/dL / Ketone: x  / Bili: x / Urobili: x   Blood: x / Protein: x / Nitrite: x   Leuk Esterase: x / RBC: x / WBC x   Sq Epi: x / Non Sq Epi: x / Bacteria: x        RADIOLOGY & ADDITIONAL TESTS:  Studies reviewed.   INTERVAL HPI/OVERNIGHT EVENTS:  Patient S&E at bedside. No o/n events. Respiratory status improved. He remains on PO lasix and abx- day 4.  US LE doppler with RLE DVT on hep gtt     VITAL SIGNS:  T(F): 97.5 (01-26-25 @ 04:00)  HR: 87 (01-26-25 @ 04:00)  BP: 143/86 (01-26-25 @ 04:00)  RR: 18 (01-26-25 @ 04:00)  SpO2: 99% (01-26-25 @ 04:00)  Wt(kg): --    PHYSICAL EXAM:    Constitutional: NAD  Eyes: EOMI, sclera non-icteric  Neck: supple  Respiratory: CTAB, no wheezes or crackles   Cardiovascular: RRR  Gastrointestinal: soft, NTND, + BS  Extremities: +LE edema   Neurological: awake and alert      MEDICATIONS  (STANDING):  atorvastatin 10 milliGRAM(s) Oral at bedtime  azithromycin  IVPB 500 milliGRAM(s) IV Intermittent every 24 hours  cefepime   IVPB      cefepime   IVPB 2000 milliGRAM(s) IV Intermittent every 12 hours  folic acid 1 milliGRAM(s) Oral daily  furosemide    Tablet 40 milliGRAM(s) Oral two times a day  heparin  Infusion.  Unit(s)/Hr (18 mL/Hr) IV Continuous <Continuous>  influenza  Vaccine (HIGH DOSE) 0.5 milliLiter(s) IntraMuscular once  metoprolol succinate ER 25 milliGRAM(s) Oral daily  mupirocin 2% Ointment 1 Application(s) Both Nostrils two times a day  sacubitril 24 mG/valsartan 26 mG 1 Tablet(s) Oral two times a day    MEDICATIONS  (PRN):  heparin   Injectable 8000 Unit(s) IV Push every 6 hours PRN For aPTT less than 40  heparin   Injectable 4000 Unit(s) IV Push every 6 hours PRN For aPTT between 40 - 57      Allergies    No Known Drug Allergies  shellfish (Unknown)    Intolerances        LABS:                        10.7   4.12  )-----------( 119      ( 26 Jan 2025 06:51 )             33.7     01-26    137  |  99  |  26[H]  ----------------------------<  93  3.9   |  25  |  1.39[H]    Ca    8.4      26 Jan 2025 06:51  Phos  3.4     01-26  Mg     1.90     01-26      PTT - ( 26 Jan 2025 06:51 )  PTT:72.9 sec  Urinalysis Basic - ( 26 Jan 2025 06:51 )    Color: x / Appearance: x / SG: x / pH: x  Gluc: 93 mg/dL / Ketone: x  / Bili: x / Urobili: x   Blood: x / Protein: x / Nitrite: x   Leuk Esterase: x / RBC: x / WBC x   Sq Epi: x / Non Sq Epi: x / Bacteria: x        RADIOLOGY & ADDITIONAL TESTS:  Studies reviewed.

## 2025-01-26 NOTE — PROGRESS NOTE ADULT - SUBJECTIVE AND OBJECTIVE BOX
Cardiovascular Disease Progress Note  Date of service: 01-26-25 @ 13:22    Overnight events: No acute events overnight.  Patient denies chest pain   Otherwise review of systems negative    Objective Findings:  T(C): 36.5 (01-26-25 @ 12:00), Max: 36.7 (01-25-25 @ 16:00)  HR: 84 (01-26-25 @ 12:00) (84 - 98)  BP: 148/88 (01-26-25 @ 12:00) (105/79 - 152/86)  RR: 18 (01-26-25 @ 12:00) (18 - 18)  SpO2: 98% (01-26-25 @ 12:00) (98% - 100%)  Wt(kg): --  Daily     Daily       Physical Exam:  Gen: NAD; Patient resting comfortably  HEENT: EOMI, Normocephalic/ atraumatic  CV: RRR, normal S1 + S2, no m/r/g  Lungs:  Normal respiratory effort; clear to auscultation bilaterally  Abd: soft, non-tender; bowel sounds present  Ext: No edema; warm and well perfused    Telemetry: Sinus with PVCs    Laboratory Data:                        10.7   4.12  )-----------( 119      ( 26 Jan 2025 06:51 )             33.7     01-26    137  |  99  |  26[H]  ----------------------------<  93  3.9   |  25  |  1.39[H]    Ca    8.4      26 Jan 2025 06:51  Phos  3.4     01-26  Mg     1.90     01-26      PTT - ( 26 Jan 2025 06:51 )  PTT:72.9 sec          Inpatient Medications:  MEDICATIONS  (STANDING):  atorvastatin 10 milliGRAM(s) Oral at bedtime  azithromycin  IVPB 500 milliGRAM(s) IV Intermittent every 24 hours  cefepime   IVPB      cefepime   IVPB 2000 milliGRAM(s) IV Intermittent every 12 hours  folic acid 1 milliGRAM(s) Oral daily  furosemide    Tablet 40 milliGRAM(s) Oral two times a day  heparin  Infusion.  Unit(s)/Hr (18 mL/Hr) IV Continuous <Continuous>  influenza  Vaccine (HIGH DOSE) 0.5 milliLiter(s) IntraMuscular once  metoprolol succinate ER 25 milliGRAM(s) Oral daily  mupirocin 2% Ointment 1 Application(s) Both Nostrils two times a day  sacubitril 24 mG/valsartan 26 mG 1 Tablet(s) Oral two times a day      Assessment:  73 year old man with nonischemic cardiomyopathy, HTN, and leukemia presents with NSTEMI, acute systolic CHF and hypoxic respiratory failure.     Plan of Care:    #Acute systolic CHF-  Due to nonischemic cardiomyopathy as per NST done by outpatient cardiologist, Dr. Elaine.   Volume status is now much improved.  Continue PO Lasix.   GDMT with BB and FADI    #NSTEMI-  Due to acute CHF and hypoxia.  Concern for concomitant sepsis.  Plan for medical management at this time given that the patient is on active chemotherapy.      #Acute DVT  - In setting of malignancy  - AC started  - Defer management to Heme/onc           Over 55 minutes spent on total encounter; more than 50% of the visit was spent counseling and/or coordinating care by the attending physician.      Ruddy Anton DO [facility]  Cardiovascular Disease  [phone]

## 2025-01-26 NOTE — PROGRESS NOTE ADULT - SUBJECTIVE AND OBJECTIVE BOX
Date of Service: 01-26-25 @ 12:55    Patient is a 74y old  Male who presents with a chief complaint of sepsis (26 Jan 2025 10:09)      Any change in ROS: seems better today:  found to have dvt:  on heparin ;  left sided chest pain is less;       MEDICATIONS  (STANDING):  atorvastatin 10 milliGRAM(s) Oral at bedtime  azithromycin  IVPB 500 milliGRAM(s) IV Intermittent every 24 hours  cefepime   IVPB      cefepime   IVPB 2000 milliGRAM(s) IV Intermittent every 12 hours  folic acid 1 milliGRAM(s) Oral daily  furosemide    Tablet 40 milliGRAM(s) Oral two times a day  heparin  Infusion.  Unit(s)/Hr (18 mL/Hr) IV Continuous <Continuous>  influenza  Vaccine (HIGH DOSE) 0.5 milliLiter(s) IntraMuscular once  metoprolol succinate ER 25 milliGRAM(s) Oral daily  mupirocin 2% Ointment 1 Application(s) Both Nostrils two times a day  sacubitril 24 mG/valsartan 26 mG 1 Tablet(s) Oral two times a day    MEDICATIONS  (PRN):  heparin   Injectable 8000 Unit(s) IV Push every 6 hours PRN For aPTT less than 40  heparin   Injectable 4000 Unit(s) IV Push every 6 hours PRN For aPTT between 40 - 57    Vital Signs Last 24 Hrs  T(C): 36.5 (26 Jan 2025 12:00), Max: 36.7 (25 Jan 2025 16:00)  T(F): 97.7 (26 Jan 2025 12:00), Max: 98 (25 Jan 2025 16:00)  HR: 84 (26 Jan 2025 12:00) (84 - 98)  BP: 148/88 (26 Jan 2025 12:00) (105/79 - 152/86)  BP(mean): --  RR: 18 (26 Jan 2025 12:00) (18 - 18)  SpO2: 98% (26 Jan 2025 12:00) (98% - 100%)    Parameters below as of 26 Jan 2025 12:00  Patient On (Oxygen Delivery Method): room air        I&O's Summary    25 Jan 2025 07:01  -  26 Jan 2025 07:00  --------------------------------------------------------  IN: 0 mL / OUT: 470 mL / NET: -470 mL    26 Jan 2025 07:01  -  26 Jan 2025 12:55  --------------------------------------------------------  IN: 0 mL / OUT: 1000 mL / NET: -1000 mL          Physical Exam:   GENERAL: NAD, well-groomed, well-developed  HEENT: NUNU/   Atraumatic, Normocephalic  ENMT: No tonsillar erythema, exudates, or enlargement; Moist mucous membranes, Good dentition, No lesions  NECK: Supple, No JVD, Normal thyroid  CHEST/LUNG: Clear to auscultaion  CVS: Regular rate and rhythm; No murmurs, rubs, or gallops  GI: : Soft, Nontender, Nondistended; Bowel sounds present  NERVOUS SYSTEM:  Alert & Oriented X3  EXTREMITIES:  no sign  edema  LYMPH: No lymphadenopathy noted  SKIN: No rashes or lesions  ENDOCRINOLOGY: No Thyromegaly  PSYCH: Appropriate    Labs:  23, 23                            10.7   4.12  )-----------( 119      ( 26 Jan 2025 06:51 )             33.7                         10.5   3.96  )-----------( 129      ( 26 Jan 2025 00:10 )             33.8                         11.1   5.55  )-----------( 123      ( 25 Jan 2025 06:59 )             36.0                         10.0   4.41  )-----------( 116      ( 24 Jan 2025 05:50 )             31.1                         11.0   7.72  )-----------( 147      ( 23 Jan 2025 16:50 )             34.7                         9.9    7.59  )-----------( Clumped    ( 23 Jan 2025 06:28 )             31.7                         12.0   19.23 )-----------( 262      ( 22 Jan 2025 20:15 )             40.3     01-26    137  |  99  |  26[H]  ----------------------------<  93  3.9   |  25  |  1.39[H]  01-25    135  |  99  |  31[H]  ----------------------------<  97  4.0   |  22  |  1.40[H]  01-24    137  |  101  |  32[H]  ----------------------------<  89  3.8   |  20[L]  |  1.66[H]  01-23    139  |  104  |  34[H]  ----------------------------<  90  4.4   |  22  |  2.08[H]  01-22    142  |  105  |  31[H]  ----------------------------<  153[H]  5.2   |  21[L]  |  1.89[H]    Ca    8.4      26 Jan 2025 06:51  Ca    8.6      25 Jan 2025 06:59  Phos  3.4     01-26  Phos  3.9     01-25  Mg     1.90     01-26  Mg     1.90     01-25    TPro  6.1  /  Alb  3.8  /  TBili  0.5  /  DBili  x   /  AST  24  /  ALT  14  /  AlkPhos  88  01-22    CAPILLARY BLOOD GLUCOSE            PTT - ( 26 Jan 2025 06:51 )  PTT:72.9 sec  Urinalysis Basic - ( 26 Jan 2025 06:51 )    Color: x / Appearance: x / SG: x / pH: x  Gluc: 93 mg/dL / Ketone: x  / Bili: x / Urobili: x   Blood: x / Protein: x / Nitrite: x   Leuk Esterase: x / RBC: x / WBC x   Sq Epi: x / Non Sq Epi: x / Bacteria: x      Lactate, Blood: 1.9 mmol/L (01-22 @ 21:39)        RECENT CULTURES:  01-22 @ 20:24 .Blood BLOOD       rad< from: US Duplex Venous Lower Ext Complete, Bilateral (01.25.25 @ 16:36) >    Right lower extremity DVT involving the popliteal, gastrocnemius,   posterior tibial, and peroneal veins.    No evidence of DVT in the left lower extremity.    These findings were discussed with NP TACO GRANT at 1/25/2025   4:43 PM by Dr. Leal with read back confirmation.    --- End of Report ---          PRAMOD LEAL DO; Resident Radiologist  This document has been electronically signed.  CALISTA MTZ MD; Attending Radiologist  This document has been electronically signed. Jan 25 2025  4:46PM    < end of copied text >           No growth at 72 Hours    01-22 @ 20:15 .Blood BLOOD                No growth at 72 Hours          RESPIRATORY CULTURES:          Studies  Chest X-RAY  CT SCAN Chest   Venous Dopplers: LE:   CT Abdomen  Others

## 2025-01-26 NOTE — PROGRESS NOTE ADULT - ASSESSMENT
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 and now IVIG for Hypogamma    Hairy cell Leukemia  WM   -undergoing care with Dr Anne of [facility]  -S/P Rituximab 1/21  - hold MTX however can continue his folic acid    -no treatment while inpatient  [facility]    SOB  - in the setting of ADHF- diuresis and appreciate cardio recs, on PO diuresis   -sent to ED for evaluation of shortness of breath  -CT Chest with nodular opacities, RVP negative  -cefepime and azithro, pulm recs appreciated, day 4 of abx   - currently on room air    Cytopenias  -multifactorial related to underlying waldenstroms and hairy cell along with infection/sepsis. 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  -S/P Rituximab 1/21  - hold MTX however can continue his folic acid    -no treatment while inpatient  [facility]    SOB  - in the setting of ADHF- diuresis and appreciate cardio recs, on PO diuresis   -sent to ED for evaluation of shortness of breath  -CT Chest with nodular opacities, RVP negative  -cefepime and azithro, pulm recs appreciated, day 4 of abx   - currently on room air    Cytopenias  -multifactorial related to underlying waldenstroms and hairy cell along with infection/sepsis. His counts are stable   -Transfuse for Hgb < 7.0 and plts < 20    RLE DVT   - in the setting of malignancy   - no need for hypercoag work up   - hep gtt, can transition to DOAC on discharge     continue to follow    Constantine Fletcher MD   Hematology/Oncology  [facility]  [phone] (Office)  [phone] (Alt office)  Evenings and weekends please call MD on call or office

## 2025-01-26 NOTE — PROGRESS NOTE ADULT - SUBJECTIVE AND OBJECTIVE BOX
Patient is a 74y old  Male who presents with a chief complaint of sepsis (26 Jan 2025 07:41)      DATE OF SERVICE: 01-26-25 @ 10:09    SUBJECTIVE / OVERNIGHT EVENTS: overnight events noted    ROS:  Resp: No cough no sputum production  CVS: No chest pain no palpitations no orthopnea  GI: no N/V/D  : no dysuria, no hematuria  "I feel better"       MEDICATIONS  (STANDING):  atorvastatin 10 milliGRAM(s) Oral at bedtime  azithromycin  IVPB 500 milliGRAM(s) IV Intermittent every 24 hours  cefepime   IVPB      cefepime   IVPB 2000 milliGRAM(s) IV Intermittent every 12 hours  folic acid 1 milliGRAM(s) Oral daily  furosemide    Tablet 40 milliGRAM(s) Oral two times a day  heparin  Infusion.  Unit(s)/Hr (18 mL/Hr) IV Continuous <Continuous>  influenza  Vaccine (HIGH DOSE) 0.5 milliLiter(s) IntraMuscular once  metoprolol succinate ER 25 milliGRAM(s) Oral daily  mupirocin 2% Ointment 1 Application(s) Both Nostrils two times a day  sacubitril 24 mG/valsartan 26 mG 1 Tablet(s) Oral two times a day    MEDICATIONS  (PRN):  heparin   Injectable 8000 Unit(s) IV Push every 6 hours PRN For aPTT less than 40  heparin   Injectable 4000 Unit(s) IV Push every 6 hours PRN For aPTT between 40 - 57        CAPILLARY BLOOD GLUCOSE        I&O's Summary    25 Jan 2025 07:01  -  26 Jan 2025 07:00  --------------------------------------------------------  IN: 0 mL / OUT: 470 mL / NET: -470 mL        Vital Signs Last 24 Hrs  T(C): 36.5 (26 Jan 2025 08:00), Max: 36.7 (25 Jan 2025 16:00)  T(F): 97.7 (26 Jan 2025 08:00), Max: 98 (25 Jan 2025 16:00)  HR: 90 (26 Jan 2025 08:00) (86 - 98)  BP: 105/79 (26 Jan 2025 08:00) (105/79 - 154/98)  BP(mean): --  RR: 18 (26 Jan 2025 08:00) (18 - 18)  SpO2: 98% (26 Jan 2025 08:00) (98% - 100%)    PHYSICAL EXAM:  CHEST/LUNG: clear  HEART: S1 S2; no murmurs   ABDOMEN: Soft, Nontender  EXTREMITIES:   1 +  edema  NEUROLOGY: AO x 3 non-focal  SKIN: No rashes or lesions    LABS:                        10.7   4.12  )-----------( 119      ( 26 Jan 2025 06:51 )             33.7     01-26    137  |  99  |  26[H]  ----------------------------<  93  3.9   |  25  |  1.39[H]    Ca    8.4      26 Jan 2025 06:51  Phos  3.4     01-26  Mg     1.90     01-26      PTT - ( 26 Jan 2025 06:51 )  PTT:72.9 sec      Urinalysis Basic - ( 26 Jan 2025 06:51 )    Color: x / Appearance: x / SG: x / pH: x  Gluc: 93 mg/dL / Ketone: x  / Bili: x / Urobili: x   Blood: x / Protein: x / Nitrite: x   Leuk Esterase: x / RBC: x / WBC x   Sq Epi: x / Non Sq Epi: x / Bacteria: x          All consultant(s) notes reviewed and care discussed with other providers        Contact Number, Dr Huang [phone]

## 2025-01-26 NOTE — PROGRESS NOTE ADULT - ASSESSMENT
75 yo M w/ PMHx of chronic sinusitis, vasculitis, history of leukemia in remission, new diagnosis of Waldenström's macroglobulinemia on Rituxan (versed infusion yesterday), HTN, and HLD presents for SOB/cough/chest tightness since today afternoon.  Patient also endorses 3 episodes of diarrhea over the past day.  Patient also been attempting nasal rinsing/2–3 PM, but became more persistently short of breath.  He states that he has been feeling slight shortness of breath for the past week.  He was told by his oncologist (Dr. Anne) SOB and pneumonia are effects of the new infusion (rituximab).  He denies any fevers/sore throat, abdominal pain, nausea/vomiting, fall/trauma.  On chart review, patient is on rituximab for which there are multiple side effects including infusion reaction (15%), influenza (15%), headaches, bronchitis, chills/fevers, sinus infection, skin rash, throat discomfort, abdominal discomfort, anemia, blood clots, tingling/burning sensation of skin, coughs, pneumonia, back pain, and high blood pressure/heart rate.  On medication review, patient is taking metoprolol, furosemide, lisinopril, atorvastatin, and folic acid.  Vital signs remarkable for BPs 160s/80s, , T99.8, and RR 26.  He was briefly started on 8 L nasal cannula with improvement in oxygen saturation.  Physical exam is remarkable for diffuse crackles in all lung fields and 1+ pitting edema bilateral lower extremities.  Concern for respiratory distress secondary to URI versus CHF versus rituximab reaction.  Concern for flash pulm edema as patient's blood pressures is not very elevated.  Plan for BiPAP, empiric treatment with antibiotics (vancomycin/cefepime), sepsis order set, and support care.  he is alert and awake at this time and seems to be doing OK:  he is alert and awke and responding to questions well : he says he never h ad any underlying lung issues:   former smoker:  but quit in 1973     Hypoxioc resp failure  ?hx of vasculitis:   Waldenstrom's  macroglobulinemia on rituxan   HTN  HLD    Hypoxic/ Hypercarbic  resp failure  -he presented with hypoxia and hypercarbic acidosis  -he was placed on bipap and was started on antibiotics' as he is febrile  -his cxr:  showed: IMPRESSION: Left lower lobe pneumonia. : need ct chest  :   -rituxan toxicity needs to be kept in mind , ct scan chest will  clarify more:   -do pancultures  , RVP I S NEGATIVE:  -Consider ID consult as he is immunocompromised: Cont antibiotics  - he m ay need steroids too :, depending how he progresses on antibiotics and furterh clinical course:   -can try to give him a break froim bipap as he is fully alert and awake    ?hx of vasculitis / Waldenstrom's  macroglobulinemia on rituxan   -he is on rituxan : would  get onc to see     1/24:  he seems OK;   no sob  -cont antibiotics  ct chest reviewed;  Pulmonary edema with small pleural effusions. Superimposed infection should be considered in the appropriate clinical setting.  on diuresis;   hard to rule out bacterial infection;  to cont for now:     1/25: he has some pleuritic chest pain on rigth side:   no wheezing/   being treated for pneumonia as well as chf   echo noted:  1. Left ventricular systolic function is severely decreased with an ejection fraction of 27 % by King's method of disks. Global left ventricular hypokinesis.   2. Normal right ventricular cavity size and normal right ventricular systolic function. Tricuspid annular plane systolic excursion (TAPSE) is 1.9 cm (normal >=1.7 cm).   3. Left atrium is mildly dilated.   4. No significant valvular disease.   5. Right pleural effusion noted.   6. Small pericardial effusion noted adjacent to the right ventricle and trace pericardial effusion noted adjacent to the right atrium.   7. The inferior vena cava is normal in size measuring 1.24 cm in diameter, (normal <2.1cm) with normal inspiratory collapse (normal >50%) consistent with normal right atrial pressure (~3, range 0-5mmHg).  cont diuretics   do dopplers fabby LE     1/26; now found to have dvt:  on ac:   hem onc following;   monitor h/h so far seems stable:!  would prefer to do cta if renal allows otherwise will need vq scan       HTN  blood pressure is OK  1/25: blood pressure seems stable:   1/26 controlled    HLD  -defer to primary team      alli acp

## 2025-01-27 LAB
ANION GAP SERPL CALC-SCNC: 12 MMOL/L — SIGNIFICANT CHANGE UP (ref 7–14)
APTT BLD: 82.2 SEC — HIGH (ref 24.5–35.6)
BUN SERPL-MCNC: 23 MG/DL — SIGNIFICANT CHANGE UP (ref 7–23)
CALCIUM SERPL-MCNC: 8.5 MG/DL — SIGNIFICANT CHANGE UP (ref 8.4–10.5)
CHLORIDE SERPL-SCNC: 99 MMOL/L — SIGNIFICANT CHANGE UP (ref 98–107)
CO2 SERPL-SCNC: 25 MMOL/L — SIGNIFICANT CHANGE UP (ref 22–31)
CREAT SERPL-MCNC: 1.3 MG/DL — SIGNIFICANT CHANGE UP (ref 0.5–1.3)
EGFR: 58 ML/MIN/1.73M2 — LOW
GLUCOSE SERPL-MCNC: 96 MG/DL — SIGNIFICANT CHANGE UP (ref 70–99)
HCT VFR BLD CALC: 33.3 % — LOW (ref 39–50)
HGB BLD-MCNC: 10.9 G/DL — LOW (ref 13–17)
MAGNESIUM SERPL-MCNC: 2 MG/DL — SIGNIFICANT CHANGE UP (ref 1.6–2.6)
MCHC RBC-ENTMCNC: 25.6 PG — LOW (ref 27–34)
MCHC RBC-ENTMCNC: 32.7 G/DL — SIGNIFICANT CHANGE UP (ref 32–36)
MCV RBC AUTO: 78.2 FL — LOW (ref 80–100)
NRBC # BLD AUTO: 0 K/UL — SIGNIFICANT CHANGE UP (ref 0–0)
NRBC # BLD: 0 /100 WBCS — SIGNIFICANT CHANGE UP (ref 0–0)
NRBC # FLD: 0 K/UL — SIGNIFICANT CHANGE UP (ref 0–0)
NRBC BLD-RTO: 0 /100 WBCS — SIGNIFICANT CHANGE UP (ref 0–0)
PHOSPHATE SERPL-MCNC: 3.3 MG/DL — SIGNIFICANT CHANGE UP (ref 2.5–4.5)
PLATELET # BLD AUTO: 101 K/UL — LOW (ref 150–400)
POTASSIUM SERPL-MCNC: 3.9 MMOL/L — SIGNIFICANT CHANGE UP (ref 3.5–5.3)
POTASSIUM SERPL-SCNC: 3.9 MMOL/L — SIGNIFICANT CHANGE UP (ref 3.5–5.3)
RBC # BLD: 4.26 M/UL — SIGNIFICANT CHANGE UP (ref 4.2–5.8)
RBC # FLD: 15.2 % — HIGH (ref 10.3–14.5)
SODIUM SERPL-SCNC: 136 MMOL/L — SIGNIFICANT CHANGE UP (ref 135–145)
WBC # BLD: 3.71 K/UL — LOW (ref 3.8–10.5)
WBC # FLD AUTO: 3.71 K/UL — LOW (ref 3.8–10.5)

## 2025-01-27 PROCEDURE — 71045 X-RAY EXAM CHEST 1 VIEW: CPT | Mod: 26

## 2025-01-27 PROCEDURE — 78582 LUNG VENTILAT&PERFUS IMAGING: CPT | Mod: 26,GC

## 2025-01-27 RX ORDER — APIXABAN 5 MG/1
1 TABLET, FILM COATED ORAL
Qty: 60 | Refills: 0
Start: 2025-01-27 | End: 2025-02-25

## 2025-01-27 RX ORDER — APIXABAN 5 MG/1
10 TABLET, FILM COATED ORAL EVERY 12 HOURS
Refills: 0 | Status: DISCONTINUED | OUTPATIENT
Start: 2025-01-27 | End: 2025-01-28

## 2025-01-27 RX ADMIN — Medication 100 MILLIGRAM(S): at 05:11

## 2025-01-27 RX ADMIN — MUPIROCIN 1 APPLICATION(S): 2 CREAM TOPICAL at 05:12

## 2025-01-27 RX ADMIN — Medication 1 MILLIGRAM(S): at 08:41

## 2025-01-27 RX ADMIN — Medication 25 MILLIGRAM(S): at 05:12

## 2025-01-27 RX ADMIN — Medication 40 MILLIGRAM(S): at 14:21

## 2025-01-27 RX ADMIN — SACUBITRIL AND VALSARTAN 1 TABLET(S): 49; 51 TABLET, FILM COATED ORAL at 17:28

## 2025-01-27 RX ADMIN — Medication 100 MILLIGRAM(S): at 17:29

## 2025-01-27 RX ADMIN — Medication 1300 UNIT(S)/HR: at 08:49

## 2025-01-27 RX ADMIN — MUPIROCIN 1 APPLICATION(S): 2 CREAM TOPICAL at 17:28

## 2025-01-27 RX ADMIN — APIXABAN 10 MILLIGRAM(S): 5 TABLET, FILM COATED ORAL at 18:41

## 2025-01-27 RX ADMIN — SACUBITRIL AND VALSARTAN 1 TABLET(S): 49; 51 TABLET, FILM COATED ORAL at 05:12

## 2025-01-27 RX ADMIN — Medication 1300 UNIT(S)/HR: at 11:03

## 2025-01-27 RX ADMIN — ATORVASTATIN CALCIUM 10 MILLIGRAM(S): 80 TABLET, FILM COATED ORAL at 20:48

## 2025-01-27 RX ADMIN — Medication 1300 UNIT(S)/HR: at 07:08

## 2025-01-27 RX ADMIN — Medication 40 MILLIGRAM(S): at 05:12

## 2025-01-27 NOTE — PROGRESS NOTE ADULT - ASSESSMENT
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 Admitted for HF exacerbation.     Hairy cell Leukemia  WM   - Follows w/ Dr. Anne at [facility]  - S/P Rituximab 1/21  - Hold MTX, can continue his folic acid    - No treatment while inpatient  - Cytopenias likely 2/2 to underlying disease process and treatment   -Transfuse for Hgb < 7.0 and plts < 20  - Follow up with Dr. Anne after discharge     Acute hypoxic/hypercarbic respiratory failure   Pleuritic chest pain   - Acute CHF exacerbation and NSTEMI, EF 27%, cardiology following  - Pulmonary following also, pt required BIPAP on presentation, recommending rule out PE - V/Q scan ordered d/t LADI although now improved   - On antibiotics for possible infectious component     RLE DVT   - In the setting of malignancy   - No need for hypercoag work up inpatient   - On hep gtt, can transition to DOAC on discharge   - V/Q scan ordered    Continue to follow    Conchita Ryan MD  Hematology/Oncology  [facility]  [phone] (Office)  [phone] (Alt office)  Evenings and weekends please call MD on call or office

## 2025-01-27 NOTE — PROGRESS NOTE ADULT - SUBJECTIVE AND OBJECTIVE BOX
Cardiovascular Disease Progress Note  DATE OF SERVICE: 01-27-25 @ 11:28    Overnight events: No acute events overnight.    The patient denies SOB.  Chest pain is better.   Otherwise review of systems negative    Objective Findings:  T(C): 36.4 (01-27-25 @ 10:50), Max: 36.6 (01-27-25 @ 05:00)  HR: 93 (01-27-25 @ 10:50) (78 - 93)  BP: 109/68 (01-27-25 @ 10:50) (109/68 - 148/88)  RR: 18 (01-27-25 @ 10:50) (17 - 18)  SpO2: 100% (01-27-25 @ 10:50) (98% - 100%)  Wt(kg): --  Daily     Daily       Physical Exam:  Gen: NAD; Patient resting comfortably  HEENT: EOMI, Normocephalic/ atraumatic  CV: RRR, normal S1 + S2, no m/r/g  Lungs:  Normal respiratory effort; clear to auscultation bilaterally  Abd: soft, non-tender; bowel sounds present  Ext: No edema; warm and well perfused    Telemetry: Sinus    Laboratory Data:                        10.9   3.71  )-----------( 101      ( 27 Jan 2025 05:53 )             33.3     01-27    136  |  99  |  23  ----------------------------<  96  3.9   |  25  |  1.30    Ca    8.5      27 Jan 2025 05:53  Phos  3.3     01-27  Mg     2.00     01-27      PTT - ( 27 Jan 2025 05:53 )  PTT:82.2 sec          Inpatient Medications:  MEDICATIONS  (STANDING):  atorvastatin 10 milliGRAM(s) Oral at bedtime  cefepime   IVPB      cefepime   IVPB 2000 milliGRAM(s) IV Intermittent every 12 hours  folic acid 1 milliGRAM(s) Oral daily  furosemide    Tablet 40 milliGRAM(s) Oral two times a day  heparin  Infusion.  Unit(s)/Hr (18 mL/Hr) IV Continuous <Continuous>  influenza  Vaccine (HIGH DOSE) 0.5 milliLiter(s) IntraMuscular once  metoprolol succinate ER 25 milliGRAM(s) Oral daily  mupirocin 2% Ointment 1 Application(s) Both Nostrils two times a day  sacubitril 24 mG/valsartan 26 mG 1 Tablet(s) Oral two times a day      Assessment: 73 year old man with nonischemic cardiomyopathy, HTN, and leukemia presents with NSTEMI, acute systolic CHF and hypoxic respiratory failure.     Plan of Care:    #Acute systolic CHF-  Due to nonischemic cardiomyopathy as per NST done by outpatient cardiologist, Dr. Elaine.   Volume status is now much improved.  Continue PO Lasix.   GDMT with BB and FADI    #NSTEMI-  Due to acute CHF and hypoxia.  Concern for concomitant sepsis.  Plan for medical management at this time given acute DVT and the patient is on active chemotherapy.      #Acute DVT  - In setting of malignancy  - AC started  - Defer management to the primary team.            Over 55 minutes spent on total encounter; more than 50% of the visit was spent counseling and/or coordinating care by the attending physician.      Jim Anton MD [facility]  Cardiovascular Disease  [phone]

## 2025-01-27 NOTE — PROGRESS NOTE ADULT - SUBJECTIVE AND OBJECTIVE BOX
INTERVAL HPI/OVERNIGHT EVENTS:  Patient S&E at bedside. No o/n events, patient resting comfortably. No complaints at this time. Patient denies fever, chills, dizziness, weakness, CP, palpitations, SOB, cough, N/V/D/C, dysuria, changes in bowel movements, LE edema.    VITAL SIGNS:  T(F): 97.5 (01-27-25 @ 10:50)  HR: 93 (01-27-25 @ 10:50)  BP: 109/68 (01-27-25 @ 10:50)  RR: 18 (01-27-25 @ 10:50)  SpO2: 100% (01-27-25 @ 10:50)  Wt(kg): --    PHYSICAL EXAM:    Constitutional: NAD  Eyes: EOMI, sclera non-icteric  Neck: supple, no masses, no JVD  Respiratory: CTA b/l, good air entry b/l  Cardiovascular: RRR, no M/R/G  Gastrointestinal: soft, NTND, no masses palpable, + BS, no hepatosplenomegaly  Extremities: no c/c/e  Neurological: AAOx3      MEDICATIONS  (STANDING):  apixaban 10 milliGRAM(s) Oral every 12 hours  atorvastatin 10 milliGRAM(s) Oral at bedtime  cefepime   IVPB      cefepime   IVPB 2000 milliGRAM(s) IV Intermittent every 12 hours  folic acid 1 milliGRAM(s) Oral daily  furosemide    Tablet 40 milliGRAM(s) Oral two times a day  heparin  Infusion.  Unit(s)/Hr (18 mL/Hr) IV Continuous <Continuous>  influenza  Vaccine (HIGH DOSE) 0.5 milliLiter(s) IntraMuscular once  metoprolol succinate ER 25 milliGRAM(s) Oral daily  mupirocin 2% Ointment 1 Application(s) Both Nostrils two times a day  sacubitril 24 mG/valsartan 26 mG 1 Tablet(s) Oral two times a day    MEDICATIONS  (PRN):  heparin   Injectable 8000 Unit(s) IV Push every 6 hours PRN For aPTT less than 40  heparin   Injectable 4000 Unit(s) IV Push every 6 hours PRN For aPTT between 40 - 57      Allergies    No Known Drug Allergies  shellfish (Unknown)    Intolerances        LABS:                        10.9   3.71  )-----------( 101      ( 27 Jan 2025 05:53 )             33.3     01-27    136  |  99  |  23  ----------------------------<  96  3.9   |  25  |  1.30    Ca    8.5      27 Jan 2025 05:53  Phos  3.3     01-27  Mg     2.00     01-27      PTT - ( 27 Jan 2025 05:53 )  PTT:82.2 sec  Urinalysis Basic - ( 27 Jan 2025 05:53 )    Color: x / Appearance: x / SG: x / pH: x  Gluc: 96 mg/dL / Ketone: x  / Bili: x / Urobili: x   Blood: x / Protein: x / Nitrite: x   Leuk Esterase: x / RBC: x / WBC x   Sq Epi: x / Non Sq Epi: x / Bacteria: x        RADIOLOGY & ADDITIONAL TESTS:  Studies reviewed.

## 2025-01-27 NOTE — PROGRESS NOTE ADULT - ASSESSMENT
74 yr old M with a hx of Dustin Cell leukemia (in remission), chronic sinusitis, vasculitis, and a recent diagnosis Waldenström's macroglobulinemia on Rituxan (last infusion on 1/21) presents for SOB chest pain and cough  Is this CHF vs pna or a combination of the 2.  Rutuxan is usually given with steroids and that can lead to salt and water retention   LADI-Is this hemodynamic at present - improving   Subnephrotic range proteinuria   + DV T     1 Renal - Continue lasix 40 mg po bid.  Renal sono noted  2 CVS- echo noted and on tele, now on entresto   3 Onc-Eval noted;  On NOAC   4 ID- on IV abx   5 Pulm-on room air     Sayed [facility]  [phone]

## 2025-01-27 NOTE — PROGRESS NOTE ADULT - SUBJECTIVE AND OBJECTIVE BOX
Date of Service: 01-27-25 @ 12:14    Patient is a 74y old  Male who presents with a chief complaint of sepsis (27 Jan 2025 09:28)      Any change in ROS: seems pretty good;  no sob:  on room ai     MEDICATIONS  (STANDING):  atorvastatin 10 milliGRAM(s) Oral at bedtime  cefepime   IVPB      cefepime   IVPB 2000 milliGRAM(s) IV Intermittent every 12 hours  folic acid 1 milliGRAM(s) Oral daily  furosemide    Tablet 40 milliGRAM(s) Oral two times a day  heparin  Infusion.  Unit(s)/Hr (18 mL/Hr) IV Continuous <Continuous>  influenza  Vaccine (HIGH DOSE) 0.5 milliLiter(s) IntraMuscular once  metoprolol succinate ER 25 milliGRAM(s) Oral daily  mupirocin 2% Ointment 1 Application(s) Both Nostrils two times a day  sacubitril 24 mG/valsartan 26 mG 1 Tablet(s) Oral two times a day    MEDICATIONS  (PRN):  heparin   Injectable 8000 Unit(s) IV Push every 6 hours PRN For aPTT less than 40  heparin   Injectable 4000 Unit(s) IV Push every 6 hours PRN For aPTT between 40 - 57    Vital Signs Last 24 Hrs  T(C): 36.4 (27 Jan 2025 10:50), Max: 36.6 (27 Jan 2025 05:00)  T(F): 97.5 (27 Jan 2025 10:50), Max: 97.8 (27 Jan 2025 05:00)  HR: 93 (27 Jan 2025 10:50) (78 - 93)  BP: 109/68 (27 Jan 2025 10:50) (109/68 - 145/91)  BP(mean): 77 (27 Jan 2025 10:50) (77 - 77)  RR: 18 (27 Jan 2025 10:50) (17 - 18)  SpO2: 100% (27 Jan 2025 10:50) (98% - 100%)    Parameters below as of 27 Jan 2025 10:50  Patient On (Oxygen Delivery Method): room air        I&O's Summary    26 Jan 2025 07:01  -  27 Jan 2025 07:00  --------------------------------------------------------  IN: 0 mL / OUT: 1000 mL / NET: -1000 mL    27 Jan 2025 07:01  -  27 Jan 2025 12:14  --------------------------------------------------------  IN: 0 mL / OUT: 600 mL / NET: -600 mL          Physical Exam:   GENERAL: NAD, well-groomed, well-developed  HEENT: NUNU/   Atraumatic, Normocephalic  ENMT: No tonsillar erythema, exudates, or enlargement; Moist mucous membranes, Good dentition, No lesions  NECK: Supple, No JVD, Normal thyroid  CHEST/LUNG: Clear to auscultaion  CVS: Regular rate and rhythm; No murmurs, rubs, or gallops  GI: : Soft, Nontender, Nondistended; Bowel sounds present  NERVOUS SYSTEM:  Alert & Oriented X3  EXTREMITIES: - edema  LYMPH: No lymphadenopathy noted  SKIN: No rashes or lesions  ENDOCRINOLOGY: No Thyromegaly  PSYCH: Appropriate    Labs:  23, 23                            10.9   3.71  )-----------( 101      ( 27 Jan 2025 05:53 )             33.3                         10.7   4.12  )-----------( 119      ( 26 Jan 2025 06:51 )             33.7                         10.5   3.96  )-----------( 129      ( 26 Jan 2025 00:10 )             33.8                         11.1   5.55  )-----------( 123      ( 25 Jan 2025 06:59 )             36.0                         10.0   4.41  )-----------( 116      ( 24 Jan 2025 05:50 )             31.1                         11.0   7.72  )-----------( 147      ( 23 Jan 2025 16:50 )             34.7     01-27    136  |  99  |  23  ----------------------------<  96  3.9   |  25  |  1.30  01-26    137  |  99  |  26[H]  ----------------------------<  93  3.9   |  25  |  1.39[H]  01-25    135  |  99  |  31[H]  ----------------------------<  97  4.0   |  22  |  1.40[H]  01-24    137  |  101  |  32[H]  ----------------------------<  89  3.8   |  20[L]  |  1.66[H]    Ca    8.5      27 Jan 2025 05:53  Ca    8.4      26 Jan 2025 06:51  Phos  3.3     01-27  Phos  3.4     01-26  Mg     2.00     01-27  Mg     1.90     01-26      CAPILLARY BLOOD GLUCOSE            PTT - ( 27 Jan 2025 05:53 )  PTT:82.2 sec  Urinalysis Basic - ( 27 Jan 2025 05:53 )    Color: x / Appearance: x / SG: x / pH: x  Gluc: 96 mg/dL / Ketone: x  / Bili: x / Urobili: x   Blood: x / Protein: x / Nitrite: x   Leuk Esterase: x / RBC: x / WBC x   Sq Epi: x / Non Sq Epi: x / Bacteria: x        rad< from: US Duplex Venous Lower Ext Complete, Bilateral (01.25.25 @ 16:36) >  veins.  Doppler examination shows normal spontaneous and phasic flow.  No LEFT calf vein thrombosis is detected.    IMPRESSION:    Right lower extremity DVT involving the popliteal, gastrocnemius,   posterior tibial, and peroneal veins.    No evidence of DVT in the left lower extremity.    These findings were discussed with ARIAS GRANT at 1/25/2025   4:43 PM by Dr. Leal with read back confirmation.    --- End of Report ---          PRAMOD LEAL DO; Resident Radiologist  This document has been electronically signed.  CALISTA MTZ MD; Attending Radiologist  This document has been electronically signed. Jan 25 2025  4:46PM    < end of copied text >      RECENT CULTURES:  01-22 @ 20:24 .Blood BLOOD                No growth at 4 days    01-22 @ 20:15 .Blood BLOOD                No growth at 4 days          RESPIRATORY CULTURES:          Studies  Chest X-RAY  CT SCAN Chest   Venous Dopplers: LE:   CT Abdomen  Others

## 2025-01-27 NOTE — PROGRESS NOTE ADULT - SUBJECTIVE AND OBJECTIVE BOX
Patient is a 74y old  Male who presents with a chief complaint of sepsis (27 Jan 2025 12:13)      DATE OF SERVICE: 01-27-25 @ 12:52    SUBJECTIVE / OVERNIGHT EVENTS: overnight events noted    ROS:  Resp: No cough no sputum production  CVS: No chest pain no palpitations no orthopnea  GI: no N/V/D  "I feel much better"   pleuritic pain resolved       MEDICATIONS  (STANDING):  atorvastatin 10 milliGRAM(s) Oral at bedtime  cefepime   IVPB      cefepime   IVPB 2000 milliGRAM(s) IV Intermittent every 12 hours  folic acid 1 milliGRAM(s) Oral daily  furosemide    Tablet 40 milliGRAM(s) Oral two times a day  heparin  Infusion.  Unit(s)/Hr (18 mL/Hr) IV Continuous <Continuous>  influenza  Vaccine (HIGH DOSE) 0.5 milliLiter(s) IntraMuscular once  metoprolol succinate ER 25 milliGRAM(s) Oral daily  mupirocin 2% Ointment 1 Application(s) Both Nostrils two times a day  sacubitril 24 mG/valsartan 26 mG 1 Tablet(s) Oral two times a day    MEDICATIONS  (PRN):  heparin   Injectable 8000 Unit(s) IV Push every 6 hours PRN For aPTT less than 40  heparin   Injectable 4000 Unit(s) IV Push every 6 hours PRN For aPTT between 40 - 57        CAPILLARY BLOOD GLUCOSE        I&O's Summary    26 Jan 2025 07:01  -  27 Jan 2025 07:00  --------------------------------------------------------  IN: 0 mL / OUT: 1000 mL / NET: -1000 mL    27 Jan 2025 07:01  -  27 Jan 2025 12:52  --------------------------------------------------------  IN: 0 mL / OUT: 600 mL / NET: -600 mL        Vital Signs Last 24 Hrs  T(C): 36.4 (27 Jan 2025 10:50), Max: 36.6 (27 Jan 2025 05:00)  T(F): 97.5 (27 Jan 2025 10:50), Max: 97.8 (27 Jan 2025 05:00)  HR: 93 (27 Jan 2025 10:50) (78 - 93)  BP: 109/68 (27 Jan 2025 10:50) (109/68 - 145/91)  BP(mean): 77 (27 Jan 2025 10:50) (77 - 77)  RR: 18 (27 Jan 2025 10:50) (17 - 18)  SpO2: 100% (27 Jan 2025 10:50) (98% - 100%)    PHYSICAL EXAM:  CHEST/LUNG: clear  HEART: S1 S2; no murmurs   ABDOMEN: Soft, Nontender  EXTREMITIES:   1 +  edema  NEUROLOGY: AO x 3 non-focal  SKIN: No rashes or lesions    LABS:                        10.9   3.71  )-----------( 101      ( 27 Jan 2025 05:53 )             33.3     01-27    136  |  99  |  23  ----------------------------<  96  3.9   |  25  |  1.30    Ca    8.5      27 Jan 2025 05:53  Phos  3.3     01-27  Mg     2.00     01-27      PTT - ( 27 Jan 2025 05:53 )  PTT:82.2 sec      Urinalysis Basic - ( 27 Jan 2025 05:53 )    Color: x / Appearance: x / SG: x / pH: x  Gluc: 96 mg/dL / Ketone: x  / Bili: x / Urobili: x   Blood: x / Protein: x / Nitrite: x   Leuk Esterase: x / RBC: x / WBC x   Sq Epi: x / Non Sq Epi: x / Bacteria: x          All consultant(s) notes reviewed and care discussed with other providers        Contact Number, Dr Huang [phone]

## 2025-01-27 NOTE — PROGRESS NOTE ADULT - ASSESSMENT
73 yo M w/ PMHx of chronic sinusitis, vasculitis, history of leukemia in remission, new diagnosis of Waldenström's macroglobulinemia on Rituxan (versed infusion yesterday), HTN, and HLD presents for SOB/cough/chest tightness since today afternoon.  Patient also endorses 3 episodes of diarrhea over the past day.  Patient also been attempting nasal rinsing/2–3 PM, but became more persistently short of breath.  He states that he has been feeling slight shortness of breath for the past week.  He was told by his oncologist (Dr. Anne) SOB and pneumonia are effects of the new infusion (rituximab).  He denies any fevers/sore throat, abdominal pain, nausea/vomiting, fall/trauma.  On chart review, patient is on rituximab for which there are multiple side effects including infusion reaction (15%), influenza (15%), headaches, bronchitis, chills/fevers, sinus infection, skin rash, throat discomfort, abdominal discomfort, anemia, blood clots, tingling/burning sensation of skin, coughs, pneumonia, back pain, and high blood pressure/heart rate.  On medication review, patient is taking metoprolol, furosemide, lisinopril, atorvastatin, and folic acid.  Vital signs remarkable for BPs 160s/80s, , T99.8, and RR 26.  He was briefly started on 8 L nasal cannula with improvement in oxygen saturation.  Physical exam is remarkable for diffuse crackles in all lung fields and 1+ pitting edema bilateral lower extremities.  Concern for respiratory distress secondary to URI versus CHF versus rituximab reaction.  Concern for flash pulm edema as patient's blood pressures is not very elevated.  Plan for BiPAP, empiric treatment with antibiotics (vancomycin/cefepime), sepsis order set, and support care.  he is alert and awake at this time and seems to be doing OK:  he is alert and awke and responding to questions well : he says he never h ad any underlying lung issues:   former smoker:  but quit in 1973     Hypoxioc resp failure  ?hx of vasculitis:   Waldenstrom's  macroglobulinemia on rituxan   HTN  HLD    Hypoxic/ Hypercarbic  resp failure  -he presented with hypoxia and hypercarbic acidosis  -he was placed on bipap and was started on antibiotics' as he is febrile  -his cxr:  showed: IMPRESSION: Left lower lobe pneumonia. : need ct chest  :   -rituxan toxicity needs to be kept in mind , ct scan chest will  clarify more:   -do pancultures  , RVP I S NEGATIVE:  -Consider ID consult as he is immunocompromised: Cont antibiotics  - he m ay need steroids too :, depending how he progresses on antibiotics and furterh clinical course:   -can try to give him a break froim bipap as he is fully alert and awake    ?hx of vasculitis / Waldenstrom's  macroglobulinemia on rituxan   -he is on rituxan : would  get onc to see     1/24:  he seems OK;   no sob  -cont antibiotics  ct chest reviewed;  Pulmonary edema with small pleural effusions. Superimposed infection should be considered in the appropriate clinical setting.  on diuresis;   hard to rule out bacterial infection;  to cont for now:     1/25: he has some pleuritic chest pain on rigth side:   no wheezing/   being treated for pneumonia as well as chf   echo noted:  1. Left ventricular systolic function is severely decreased with an ejection fraction of 27 % by King's method of disks. Global left ventricular hypokinesis.   2. Normal right ventricular cavity size and normal right ventricular systolic function. Tricuspid annular plane systolic excursion (TAPSE) is 1.9 cm (normal >=1.7 cm).   3. Left atrium is mildly dilated.   4. No significant valvular disease.   5. Right pleural effusion noted.   6. Small pericardial effusion noted adjacent to the right ventricle and trace pericardial effusion noted adjacent to the right atrium.   7. The inferior vena cava is normal in size measuring 1.24 cm in diameter, (normal <2.1cm) with normal inspiratory collapse (normal >50%) consistent with normal right atrial pressure (~3, range 0-5mmHg).  cont diuretics   do dopplers fabby LE     1/26; now found to have dvt:  on ac:   hem onc following;   monitor h/h so far seems stable:!  would prefer to do cta if renal allows otherwise will need vq scan     1/17:  on 100% Room air:   his creat is normal ;? do cta :  if cta is allowed then will hold diuretics tonight and do it in am        HTN  blood pressure is OK      HLD  -defer to primary team      dw acp

## 2025-01-27 NOTE — PROGRESS NOTE ADULT - SUBJECTIVE AND OBJECTIVE BOX
NEPHROLOGY-[facility] [phone]        Patient seen and examined no new complaints, on room air.         MEDICATIONS  (STANDING):  atorvastatin 10 milliGRAM(s) Oral at bedtime  cefepime   IVPB      cefepime   IVPB 2000 milliGRAM(s) IV Intermittent every 12 hours  folic acid 1 milliGRAM(s) Oral daily  furosemide    Tablet 40 milliGRAM(s) Oral two times a day  heparin  Infusion.  Unit(s)/Hr (18 mL/Hr) IV Continuous <Continuous>  influenza  Vaccine (HIGH DOSE) 0.5 milliLiter(s) IntraMuscular once  metoprolol succinate ER 25 milliGRAM(s) Oral daily  mupirocin 2% Ointment 1 Application(s) Both Nostrils two times a day  sacubitril 24 mG/valsartan 26 mG 1 Tablet(s) Oral two times a day      VITAL:  T(C): , Max: 36.6 (01-27-25 @ 05:00)  T(F): , Max: 97.8 (01-27-25 @ 05:00)  HR: 93 (01-27-25 @ 10:50)  BP: 109/68 (01-27-25 @ 10:50)  BP(mean): 77 (01-27-25 @ 10:50)  RR: 18 (01-27-25 @ 10:50)  SpO2: 100% (01-27-25 @ 10:50)  Wt(kg): --    I and O's:    01-26 @ 07:01  -  01-27 @ 07:00  --------------------------------------------------------  IN: 0 mL / OUT: 1000 mL / NET: -1000 mL    01-27 @ 07:01  -  01-27 @ 14:27  --------------------------------------------------------  IN: 0 mL / OUT: 600 mL / NET: -600 mL          PHYSICAL EXAM:    Constitutional: NAD  Neck:  No JVD  Respiratory: CTAB/L  Cardiovascular: S1 and S2  Gastrointestinal: BS+, soft, NT/ND  Extremities: No peripheral edema  Neurological: A/O x 3, no focal deficits  Psychiatric: Normal mood, normal affect  : No Magallanes  Skin: No rashes  Access: Not applicable    LABS:                        10.9   3.71  )-----------( 101      ( 27 Jan 2025 05:53 )             33.3     01-27    136  |  99  |  23  ----------------------------<  96  3.9   |  25  |  1.30    Ca    8.5      27 Jan 2025 05:53  Phos  3.3     01-27  Mg     2.00     01-27            Urine Studies:  Urinalysis Basic - ( 27 Jan 2025 05:53 )    Color: x / Appearance: x / SG: x / pH: x  Gluc: 96 mg/dL / Ketone: x  / Bili: x / Urobili: x   Blood: x / Protein: x / Nitrite: x   Leuk Esterase: x / RBC: x / WBC x   Sq Epi: x / Non Sq Epi: x / Bacteria: x          ASSESSMENT/PLAN:   74 yr old M with a hx of Dustin Cell leukemia (in remission), chronic sinusitis, vasculitis, and a recent diagnosis Waldenström's macroglobulinemia on Rituxan (last infusion on 1/21) presents for SOB chest pain and cough  Is this CHF vs pna or a combination of the 2.  Rutuxan is usually given with steroids and that can lead to salt and water retention   LADI-Is this hemodynamic at present - improving   Subnephrotic range proteinuria     1 Renal - Continue lasix 40 mg po bid.  Renal sono noted  2 CVS- echo noted and on tele, now on entresto   3 Onc-Eval noted   4 ID- on IV abx   5 Pulm-on room air     [facility]ayed [facility]  [phone]                NEPHROLOGY-[facility] [phone]        Patient seen and examined no new complaints, on room air.         MEDICATIONS  (STANDING):  atorvastatin 10 milliGRAM(s) Oral at bedtime  cefepime   IVPB      cefepime   IVPB 2000 milliGRAM(s) IV Intermittent every 12 hours  folic acid 1 milliGRAM(s) Oral daily  furosemide    Tablet 40 milliGRAM(s) Oral two times a day  heparin  Infusion.  Unit(s)/Hr (18 mL/Hr) IV Continuous <Continuous>  influenza  Vaccine (HIGH DOSE) 0.5 milliLiter(s) IntraMuscular once  metoprolol succinate ER 25 milliGRAM(s) Oral daily  mupirocin 2% Ointment 1 Application(s) Both Nostrils two times a day  sacubitril 24 mG/valsartan 26 mG 1 Tablet(s) Oral two times a day      VITAL:  T(C): , Max: 36.6 (01-27-25 @ 05:00)  T(F): , Max: 97.8 (01-27-25 @ 05:00)  HR: 93 (01-27-25 @ 10:50)  BP: 109/68 (01-27-25 @ 10:50)  BP(mean): 77 (01-27-25 @ 10:50)  RR: 18 (01-27-25 @ 10:50)  SpO2: 100% (01-27-25 @ 10:50)  Wt(kg): --    I and O's:    01-26 @ 07:01  -  01-27 @ 07:00  --------------------------------------------------------  IN: 0 mL / OUT: 1000 mL / NET: -1000 mL    01-27 @ 07:01  -  01-27 @ 14:27  --------------------------------------------------------  IN: 0 mL / OUT: 600 mL / NET: -600 mL          PHYSICAL EXAM:    Constitutional: NAD  Neck:  No JVD  Respiratory: CTAB/L  Cardiovascular: S1 and S2  Gastrointestinal: BS+, soft, NT/ND  Extremities: No peripheral edema  Neurological: A/O x 3, no focal deficits  Psychiatric: Normal mood, normal affect  : No Magallanes  Skin: No rashes  Access: Not applicable    LABS:                        10.9   3.71  )-----------( 101      ( 27 Jan 2025 05:53 )             33.3     01-27    136  |  99  |  23  ----------------------------<  96  3.9   |  25  |  1.30    Ca    8.5      27 Jan 2025 05:53  Phos  3.3     01-27  Mg     2.00     01-27            Urine Studies:  Urinalysis Basic - ( 27 Jan 2025 05:53 )    Color: x / Appearance: x / SG: x / pH: x  Gluc: 96 mg/dL / Ketone: x  / Bili: x / Urobili: x   Blood: x / Protein: x / Nitrite: x   Leuk Esterase: x / RBC: x / WBC x   Sq Epi: x / Non Sq Epi: x / Bacteria: x        < from: TTE W or WO Ultrasound Enhancing Agent (01.24.25 @ 10:49) >    TRANSTHORACIC ECHOCARDIOGRAM REPORT  ________________________________________________________________________________                                      _______       Pt. Name:       ANNE GRISSOM Study Date:    1/24/2025  MRN:            [id number]  YOB: 1950  Accession #:    [id number]   Age:           74 years  Account#:       [id number]    Gender:        M  Heart Rate:                 Height:        67.00 in (170.18 cm)  Rhythm:                     Weight:        212.00 lb (96.16 kg)  Blood Pressure: 152/72 mmHg BSA/BMI:       2.07 m² / 33.20 kg/m²  ________________________________________________________________________________________  Referring Physician:    Abdulkadir Huang M.D.  Interpreting Physician: Wale Vidal MD  Primary Sonographer:    Theresa Peterson RDCS    CPT:               ECHO TTE WO CON COMP W DOPP - 26405.m;MYOCARDIAL STRAIN                     IMAGING - 05183.m;3D RECONST W/O WKSTATION - 62094.m  Indication(s):     Heart failure, unspecified - I50.9  Procedure:         Transthoracic echocardiogram with 2-D, M-mode and complete                     spectral and color flow Doppler. Strain imaging performed for                     evaluation of regional and global myocardial shape and      dimensions. Real time and full volume 3-dimensional imaging                     performed at the echo machine.  Ordering Location: [facility]  Admission Status:  Inpatient  Study Information: Image quality for this study is good.    _______________________________________________________________________________________     CONCLUSIONS:      1. Left ventricular systolic function is severely decreased with an ejection fraction of 27 % by King's method of disks. Global left ventricular hypokinesis.   2. Normal right ventricular cavity size and normal right ventricular systolic function. Tricuspid annular plane systolic excursion (TAPSE) is 1.9 cm (normal >=1.7 cm).   3. Left atrium is mildly dilated.   4. No significant valvular disease.   5. Right pleural effusion noted.   6. Small pericardial effusion noted adjacent to the right ventricle and trace pericardial effusion noted adjacent to the right atrium.   7. The inferior vena cava is normal in size measuring 1.24 cm in diameter, (normal <2.1cm) with normal inspiratory collapse (normal >50%) consistent with normal right atrial pressure (~3, range 0-5mmHg).    ________________________________________________________________________________________  FINDINGS:     Left Ventricle:  Left ventricular systolic function is severely decreased with a calculated ejection fraction of 27 % by the King's biplane method of disks. There is global left ventricular hypokinesis. There is mild (grade 1) left ventricular diastolic dysfunction.     Right Ventricle:  The right ventricular cavity is normal in size and right ventricular systolic function is normal. Tricuspid annular plane systolic excursion (TAPSE) is 1.9 cm (normal >=1.7 cm).     Left Atrium:  The left atrium is mildly dilated with an indexed volume of 40.67 ml/m².     Right Atrium:  The right atrium is normal in size with an indexed volume of 13.51 ml/m².     Interatrial Septum:  The interatrial septum appears intact.     Aortic Valve:  The aortic valve appears trileaflet with normal systolic excursion. There is calcification of the aortic valve leaflets.     Mitral Valve:  Structurally normal mitral valve with normal leaflet excursion. There is calcification of the mitral valve annulus. There is moderate mitral regurgitation.     Tricuspid Valve:  Structurally normal tricuspid valve with normal leaflet excursion. There is insufficient tricuspid regurgitation detected to calculate pulmonary artery systolic pressure.     Pulmonic Valve:  Structurally normal pulmonic valve with normal leaflet excursion.     Aorta:  The aortic root at the sinuses of Valsalva is normal in size, measuring 3.00 cm (indexed 1.45 cm/m²). The ascending aorta is normal in size, measuring 3.40 cm (indexed 1.64 cm/m²).     Pericardium:  Thereis a small pericardial effusion noted adjacent to the right ventricle and a trace pericardial effusion noted adjacent to the right atrium.     Pleura:  Right pleural effusion noted.     Systemic Veins:  The inferior vena cava is normal in size measuring 1.24 cm in diameter, (normal <2.1cm) with normal inspiratory collapse (normal >50%) consistent with normal right atrial pressure (~3, range 0-5mmHg).  ____________________________________________________________________  QUANTITATIVE DATA:  Left Ventricle Measurements: (Indexed to BSA)     IVSd (2D):   1.4 cm  LVPWd (2D):  1.4 cm  LVIDd (2D):  3.4 cm  LVIDs (2D):  3.2 cm  LV Mass:     164 g  78.9 g/m²  LV Vol d, MOD A2C: 130.0 ml 62.72 ml/m²  LV Vol d, MOD A4C: 118.0 ml 56.93 ml/m²  LV Vol d, MOD BP:  125.6 ml 60.58 ml/m²  LV Vol s, MOD A2C: 97.9 ml  47.23 ml/m²  LV Vol s, MOD A4C: 83.3 ml  40.19 ml/m²  LV Vol s, MOD BP:  91.5 ml  44.13 ml/m²  LVOT SV MOD BP:    34.1 ml  LV EF% MOD BP:     27 %     MV E Vmax:    0.93 m/s  MV A Vmax:    1.05 m/s  MV E/A:       0.89  e' lateral:   5.11 cm/s  e' medial:    4.24 cm/s  E/e' lateral: 18.26  E/e' medial:  22.00  E/e' Average: 19.96  MV DT:        196 msec    Aorta Measurements: (Normal range) (Indexed to BSA)     Ao Root d     3.00 cm (3.1 - 3.7 cm) 1.45 cm/m²  Ao Asc d, 2D:    < end of copied text >

## 2025-01-28 VITALS — SYSTOLIC BLOOD PRESSURE: 145 MMHG | HEART RATE: 81 BPM | DIASTOLIC BLOOD PRESSURE: 94 MMHG

## 2025-01-28 LAB
ANION GAP SERPL CALC-SCNC: 14 MMOL/L — SIGNIFICANT CHANGE UP (ref 7–14)
APTT BLD: 40.4 SEC — HIGH (ref 24.5–35.6)
BUN SERPL-MCNC: 20 MG/DL — SIGNIFICANT CHANGE UP (ref 7–23)
CALCIUM SERPL-MCNC: 9 MG/DL — SIGNIFICANT CHANGE UP (ref 8.4–10.5)
CHLORIDE SERPL-SCNC: 100 MMOL/L — SIGNIFICANT CHANGE UP (ref 98–107)
CO2 SERPL-SCNC: 24 MMOL/L — SIGNIFICANT CHANGE UP (ref 22–31)
CREAT SERPL-MCNC: 1.39 MG/DL — HIGH (ref 0.5–1.3)
CULTURE RESULTS: SIGNIFICANT CHANGE UP
CULTURE RESULTS: SIGNIFICANT CHANGE UP
EGFR: 53 ML/MIN/1.73M2 — LOW
GLUCOSE SERPL-MCNC: 101 MG/DL — HIGH (ref 70–99)
MAGNESIUM SERPL-MCNC: 2.3 MG/DL — SIGNIFICANT CHANGE UP (ref 1.6–2.6)
PHOSPHATE SERPL-MCNC: 3.8 MG/DL — SIGNIFICANT CHANGE UP (ref 2.5–4.5)
POTASSIUM SERPL-MCNC: 4.4 MMOL/L — SIGNIFICANT CHANGE UP (ref 3.5–5.3)
POTASSIUM SERPL-SCNC: 4.4 MMOL/L — SIGNIFICANT CHANGE UP (ref 3.5–5.3)
SODIUM SERPL-SCNC: 138 MMOL/L — SIGNIFICANT CHANGE UP (ref 135–145)
SPECIMEN SOURCE: SIGNIFICANT CHANGE UP
SPECIMEN SOURCE: SIGNIFICANT CHANGE UP

## 2025-01-28 RX ORDER — SACUBITRIL AND VALSARTAN 49; 51 MG/1; MG/1
1 TABLET, FILM COATED ORAL
Qty: 60 | Refills: 0
Start: 2025-01-28 | End: 2025-02-26

## 2025-01-28 RX ORDER — APIXABAN 5 MG/1
1 TABLET, FILM COATED ORAL
Qty: 76 | Refills: 0
Start: 2025-01-28 | End: 2025-02-26

## 2025-01-28 RX ADMIN — MUPIROCIN 1 APPLICATION(S): 2 CREAM TOPICAL at 05:33

## 2025-01-28 RX ADMIN — Medication 1 DROP(S): at 17:31

## 2025-01-28 RX ADMIN — SACUBITRIL AND VALSARTAN 1 TABLET(S): 49; 51 TABLET, FILM COATED ORAL at 17:30

## 2025-01-28 RX ADMIN — MUPIROCIN 1 APPLICATION(S): 2 CREAM TOPICAL at 17:31

## 2025-01-28 RX ADMIN — APIXABAN 10 MILLIGRAM(S): 5 TABLET, FILM COATED ORAL at 17:30

## 2025-01-28 RX ADMIN — Medication 40 MILLIGRAM(S): at 05:29

## 2025-01-28 RX ADMIN — Medication 100 MILLIGRAM(S): at 05:29

## 2025-01-28 RX ADMIN — Medication 1 MILLIGRAM(S): at 09:31

## 2025-01-28 RX ADMIN — Medication 40 MILLIGRAM(S): at 13:02

## 2025-01-28 RX ADMIN — APIXABAN 10 MILLIGRAM(S): 5 TABLET, FILM COATED ORAL at 05:29

## 2025-01-28 RX ADMIN — SACUBITRIL AND VALSARTAN 1 TABLET(S): 49; 51 TABLET, FILM COATED ORAL at 05:30

## 2025-01-28 RX ADMIN — Medication 25 MILLIGRAM(S): at 05:29

## 2025-01-28 NOTE — PROGRESS NOTE ADULT - PROBLEM SELECTOR PROBLEM 11
Need for prophylactic measure
HLD (hyperlipidemia)
HLD (hyperlipidemia)
Need for prophylactic measure
Need for prophylactic measure
HLD (hyperlipidemia)

## 2025-01-28 NOTE — PHARMACOTHERAPY INTERVENTION NOTE - COMMENTS
Discharge medications reviewed with the patient. Current medication schedule was discussed in detail including: medication name, indication, dose, administration times, treatment duration, side effects, and special instructions. Educated patient on apixaban including the purpose and administration. Discussed adverse effects in detail and when to seek medical attention (blood in stool, vomit, etc.). Discussed dose change from 10mg BID to 5mg BID. Informed patient to notify all providers he is on this and before any planned procedures. Advised to avoid NSAIDs to limit risk of bleeding. Also discussed CHF management. Patient counseled on heart failure medication indications, administration, and side effects. Also discussed fluid and salt restrictions, and maintaining a log of daily weights. Discussed warning signs of fluid overload (SOB, orthopnea, HAZEL, edema, etc.) and when to seek medical attention. Patient verbalized understanding. Questions and concerns were answered and addressed. Patient provided with CHF booklet and educational handout.     New medications: Cooper Funk, PharmD, BCPS  Clinical Pharmacy Specialist  [phone]

## 2025-01-28 NOTE — PROGRESS NOTE ADULT - PROBLEM SELECTOR PLAN 1
CT equivocal but clinically improving on antibiotics  will treat as clinical pneumonia   continue azithromycin and cefepime day 4 today  discussed with pulmonary
CT equivocal but clinically improving on antibiotics  will treat as clinical pneumonia   completed 5 days antibiotics
CT equivocal but clinically improving on antibiotics  will treat as clinical pneumonia   continue azithromycin and cefepime day 5 today  discussed with pulmonary  discontinue antibiotics after today's doses
patient presented with SOB, chest pain and cough   Meets SIRS criteria with T: 102.9, Tachycardia, WBC: 18K and tachypnea  RVP negative  Chest x-ray: left lower lobe pneumonia  presented with hypoxia and hypercarbia  currently on BIPAP   U/A with few bacteria and trace leukocyte esterase, patient denies dysuria   most likely 2/2 pneumonia  s/p Vanc and Zosyn   -start cefepime and azithromycin   -c/w Vanc by level given LADI   -f/u MRSA  -f/u sputum culture  -pulm recs. appreciated  -F/u urine legionella and strep  -f/u BCx   -f/u CT chest
CT equivocal but clinically improving on antibiotics  will treat as clinical pneumonia   continue azithromycin and cefepime day 3 today  discussed with pulmonary
resolving  CT equivocal but clinically improving on antibiotics  will treat as clinical pneumonia   continue azithromycin and cefepime day 2 today  discussed with pulmonary

## 2025-01-28 NOTE — PROGRESS NOTE ADULT - SUBJECTIVE AND OBJECTIVE BOX
NEPHROLOGY     Patient seen and examined having breakfast, comfortable on room air, no new complaints, in no acute distress.      MEDICATIONS  (STANDING):  apixaban 10 milliGRAM(s) Oral every 12 hours  atorvastatin 10 milliGRAM(s) Oral at bedtime  cefepime   IVPB      cefepime   IVPB 2000 milliGRAM(s) IV Intermittent every 12 hours  folic acid 1 milliGRAM(s) Oral daily  furosemide    Tablet 40 milliGRAM(s) Oral two times a day  influenza  Vaccine (HIGH DOSE) 0.5 milliLiter(s) IntraMuscular once  metoprolol succinate ER 25 milliGRAM(s) Oral daily  mupirocin 2% Ointment 1 Application(s) Both Nostrils two times a day  sacubitril 24 mG/valsartan 26 mG 1 Tablet(s) Oral two times a day    VITALS:  T(C): , Max: 37 (01-27-25 @ 20:47)  T(F): , Max: 98.6 (01-27-25 @ 20:47)  HR: 81 (01-28-25 @ 05:15)  BP: 130/69 (01-28-25 @ 05:15)  BP(mean): 77 (01-27-25 @ 10:50)  RR: 18 (01-28-25 @ 05:15)  SpO2: 98% (01-28-25 @ 05:15)    I and O's:    01-27 @ 07:01  -  01-28 @ 07:00  --------------------------------------------------------  IN: 0 mL / OUT: 600 mL / NET: -600 mL    Height (cm): 170.2 (01-27 @ 18:44)    PHYSICAL EXAM:    Constitutional: NAD  Neck:  No JVD  Respiratory: CTAB/L  Cardiovascular: S1 and S2  Gastrointestinal: BS+, soft, NT/ND  Extremities: No peripheral edema  Neurological: A/O x 3, no focal deficits  Psychiatric: Normal mood, normal affect  : No Magallanes  Skin: No rashes    LABS:                        10.9   3.71  )-----------( 101      ( 27 Jan 2025 05:53 )             33.3     01-28    138  |  100  |  20  ----------------------------<  101[H]  4.4   |  24  |  1.39[H]    Ca    9.0      28 Jan 2025 07:05  Phos  3.8     01-28  Mg     2.30     01-28      RADIOLOGY & ADDITIONAL STUDIES:  rad< from: NM Pulmonary Ventilation/Perfusion Scan (01.27.25 @ 18:22) >  IMPRESSION:    Very low probability of pulmonary embolus.        --- End of Report ---      HASMUKH KNOWLES MD; Attending Radiologist  This document has been electronically signed. Jan 27 2025  6:28PM      74 yr old M with a hx of Dustin Cell leukemia (in remission), chronic sinusitis, vasculitis, and a recent diagnosis Waldenström's macroglobulinemia on Rituxan (last infusion on 1/21) presents for SOB chest pain and cough  Is this CHF vs pna or a combination of the 2.  Rutuxan is usually given with steroids and that can lead to salt and water retention   LADI-Is this hemodynamic at present - improving   Subnephrotic range proteinuria   + DV T     1 Renal - Continue lasix 40 mg po bid.  Renal sono noted  2 CVS- echo noted and on tele, now on entresto bid   3 Onc-Eval noted;  On NOAC   4 ID- on IV abx   5 Pulm-on room air      NEPHROLOGY     Patient seen and examined having breakfast, comfortable on room air, no new complaints, in no acute distress.      MEDICATIONS  (STANDING):  apixaban 10 milliGRAM(s) Oral every 12 hours  atorvastatin 10 milliGRAM(s) Oral at bedtime  cefepime   IVPB    .  cefepime   IVPB 2000 milliGRAM(s) IV Intermittent every 12 hours  folic acid 1 milliGRAM(s) Oral daily  furosemide    Tablet 40 milliGRAM(s) Oral two times a day  influenza  Vaccine (HIGH DOSE) 0.5 milliLiter(s) IntraMuscular once  metoprolol succinate ER 25 milliGRAM(s) Oral daily  mupirocin 2% Ointment 1 Application(s) Both Nostrils two times a day  sacubitril 24 mG/valsartan 26 mG 1 Tablet(s) Oral two times a day    VITALS:  T(C): , Max: 37 (01-27-25 @ 20:47)  T(F): , Max: 98.6 (01-27-25 @ 20:47)  HR: 81 (01-28-25 @ 05:15)  BP: 130/69 (01-28-25 @ 05:15)  BP(mean): 77 (01-27-25 @ 10:50)  RR: 18 (01-28-25 @ 05:15)  SpO2: 98% (01-28-25 @ 05:15)    I and O's:    01-27 @ 07:01  -  01-28 @ 07:00  --------------------------------------------------------  IN: 0 mL / OUT: 600 mL / NET: -600 mL    Height (cm): 170.2 (01-27 @ 18:44)    PHYSICAL EXAM:    Constitutional: NAD  Neck:  No JVD  Respiratory: CTAB/L  Cardiovascular: S1 and S2  Gastrointestinal: BS+, soft, NT/ND  Extremities: No peripheral edema  Neurological: A/O x 3, no focal deficits  Psychiatric: Normal mood, normal affect  : No Magallanes  Skin: No rashes    LABS:                        10.9   3.71  )-----------( 101      ( 27 Jan 2025 05:53 )             33.3     01-28    138  |  100  |  20  ----------------------------<  101[H]  4.4   |  24  |  1.39[H]    Ca    9.0      28 Jan 2025 07:05  Phos  3.8     01-28  Mg     2.30     01-28      RADIOLOGY & ADDITIONAL STUDIES:  rad< from: NM Pulmonary Ventilation/Perfusion Scan (01.27.25 @ 18:22) >  IMPRESSION:    Very low probability of pulmonary embolus.        --- End of Report ---      HASMUKH KNOWLES MD; Attending Radiologist  This document has been electronically signed. Jan 27 2025  6:28PM

## 2025-01-28 NOTE — PROGRESS NOTE ADULT - PROBLEM SELECTOR PROBLEM 8
Waldenstrom macroglobulinemia
HTN (hypertension)
HTN (hypertension)
Waldenstrom macroglobulinemia
Waldenstrom macroglobulinemia
HTN (hypertension)

## 2025-01-28 NOTE — PROGRESS NOTE ADULT - SUBJECTIVE AND OBJECTIVE BOX
Patient is a 74y old  Male who presents with a chief complaint of sepsis (28 Jan 2025 11:48)      MEDICATIONS  (STANDING):  apixaban 10 milliGRAM(s) Oral every 12 hours  atorvastatin 10 milliGRAM(s) Oral at bedtime  cefepime   IVPB      cefepime   IVPB 2000 milliGRAM(s) IV Intermittent every 12 hours  folic acid 1 milliGRAM(s) Oral daily  furosemide    Tablet 40 milliGRAM(s) Oral two times a day  influenza  Vaccine (HIGH DOSE) 0.5 milliLiter(s) IntraMuscular once  metoprolol succinate ER 25 milliGRAM(s) Oral daily  mupirocin 2% Ointment 1 Application(s) Both Nostrils two times a day  sacubitril 24 mG/valsartan 26 mG 1 Tablet(s) Oral two times a day    MEDICATIONS  (PRN):    Vital Signs Last 24 Hrs  T(C): 36.6 (28 Jan 2025 11:45), Max: 37 (27 Jan 2025 20:47)  T(F): 97.8 (28 Jan 2025 11:45), Max: 98.6 (27 Jan 2025 20:47)  HR: 82 (28 Jan 2025 11:45) (79 - 82)  BP: 120/77 (28 Jan 2025 11:45) (120/77 - 148/92)  BP(mean): --  RR: 18 (28 Jan 2025 11:45) (18 - 18)  SpO2: 100% (28 Jan 2025 11:45) (98% - 100%)    Parameters below as of 28 Jan 2025 11:45  Patient On (Oxygen Delivery Method): room air    PE  NAD  Awake, alert  Anicteric, MMM  RRR  CTAB  Abd soft, NT, ND                        10.9   3.71  )-----------( 101      ( 27 Jan 2025 05:53 )             33.3       01-28    138  |  100  |  20  ----------------------------<  101[H]  4.4   |  24  |  1.39[H]    Ca    9.0      28 Jan 2025 07:05  Phos  3.8     01-28  Mg     2.30     01-28      NM Pulmonary Ventilation/Perfusion Scan (01.27.25 @ 18:22) >  PROCEDURE DATE:  01/27/2025      INTERPRETATION:  CLINICAL INFORMATION: DVT. Evaluate for pulmonary   embolus.    FINDINGS: There is heterogeneous distribution of radiopharmaceuticals in   both lungs on the ventilation and perfusion images. There is no segmental   perfusion defect.    IMPRESSION:    Very low probability of pulmonary embolus.

## 2025-01-28 NOTE — PROGRESS NOTE ADULT - ASSESSMENT
74 yr old M with a hx of Dustin Cell leukemia (in remission), chronic sinusitis, vasculitis, and a recent diagnosis Waldenström's macroglobulinemia on Rituxan (last infusion on 1/21) presents for SOB chest pain and cough  Is this CHF vs pna or a combination of the 2.  Rutuxan is usually given with steroids and that can lead to salt and water retention   LADI-Is this hemodynamic at present - improving   Subnephrotic range proteinuria   + DVT     1 Renal - Continue lasix 40 mg po bid.  Renal sono noted  2 CVS- echo noted and on tele, now on entresto bid   3 Onc-Eval noted;  On NOAC   4 ID- on IV abx   5 Pulm-on room air     Sayed [facility]   [phone]

## 2025-01-28 NOTE — PROGRESS NOTE ADULT - PROBLEM SELECTOR PLAN 12
DVT: Subq Heparin   Diet: DASH    Dispo: pending clinical improvement

## 2025-01-28 NOTE — PROGRESS NOTE ADULT - PROBLEM SELECTOR PLAN 5
likely secondary to Type II MI from stress/sepsis  no intervention at this time
repeat echocardiogram severe cardiomyopathy  appears euvolemic at present  tolerating Entresto and home dose metoprolol   cardiology follow up appreciated    continue po furosemide  discharge on same regimen
Trop 335-->171  s/p aspirin 324  nicolas 2/2 sepsis and CHF  -cards recs appreciated   -ctm
repeat echocardiogram severe cardiomyopathy  appears euvolemic at present  tolerating Entresto and home dose metoprolol   cardiology follow up appreciated    continue po furosemide
likely secondary to Type II MI from stress/sepsis  no intervention at this time
repeat echocardiogram severe cardiomyopathy  not on GDMT  discussed with cardiology   appears fairly euvolemic at present  start Entresto and resume home dose metoprolol   cardiology follow up appreciated    continue po furosemide

## 2025-01-28 NOTE — PROGRESS NOTE ADULT - PROBLEM SELECTOR PROBLEM 5
Acute decompensated heart failure
Acute decompensated heart failure
Elevated troponin
Acute decompensated heart failure

## 2025-01-28 NOTE — PROGRESS NOTE ADULT - PROBLEM SELECTOR PROBLEM 9
Hairy cell leukemia
HTN (hypertension)

## 2025-01-28 NOTE — PROGRESS NOTE ADULT - ASSESSMENT
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 Admitted for HF exacerbation.     Hairy cell Leukemia  WM   - Follows w/ Dr. Anne at [facility]  - S/P Rituximab 1/21  - Hold MTX, can continue his folic acid    - No treatment while inpatient  - Cytopenias likely 2/2 to underlying disease process and treatment   -Transfuse for Hgb < 7.0 and plts < 20  - Follow up with Dr. Anne after discharge     Acute hypoxic/hypercarbic respiratory failure   Pleuritic chest pain   - Acute CHF exacerbation and NSTEMI, EF 27%, cardiology following  - Pulmonary following also, pt required BIPAP on presentation, recommending rule out PE  - vq scan with low probability of pulmonary embolus: on Eliquis  : desats on room air on walking. Needs oxygen on ambulation, hypoxia secondary to pe  - may be discharged home with home O2  - On antibiotics for possible infectious component     RLE DVT   - In the setting of malignancy   - No need for hypercoag work up inpatient   - transition to DOAC on discharge   - his vq scan with low probability of pulmonary embolus    Continue to follow    Qi Browning NP  Hematology/Oncology  [facility]  [phone] (office)

## 2025-01-28 NOTE — PROGRESS NOTE ADULT - PROBLEM SELECTOR PLAN 11
home medication: atorvastatin 10 mg daily  -c/w home medication
DVT: Subq Heparin   Diet: DASH    Dispo: pending clinical improvement
home medication: atorvastatin 10 mg daily  -c/w home medication
DVT: Subq Heparin   Diet: DASH    Dispo: pending clinical improvement
DVT: Subq Heparin   Diet: DASH    Dispo: pending clinical improvement
home medication: atorvastatin 10 mg daily  -c/w home medication

## 2025-01-28 NOTE — PROGRESS NOTE ADULT - PROBLEM SELECTOR PLAN 10
home medication: atorvastatin 10 mg daily  -c/w home medication
home medication: methotrexate 2.5 mg oral tablet: 6 tab orally once a week (on Mondays), folic acid 1 mg daily
home medication: atorvastatin 10 mg daily  -c/w home medication
home medication: atorvastatin 10 mg daily  -c/w home medication
home medication: methotrexate 2.5 mg oral tablet: 6 tab orally once a week (on Mondays), folic acid 1 mg daily
home medication: methotrexate 2.5 mg oral tablet: 6 tab orally once a week (on Mondays), folic acid 1 mg daily

## 2025-01-28 NOTE — PROGRESS NOTE ADULT - NS ATTEND AMEND GEN_ALL_CORE FT
Agree with above assessment and plan.   74 year old male with h/o HCL, WM on Rituxan x1, planned for weekly x4 with sob and chest pain. Found to have nodular opacities on abx. No plan for inpatient treatment of his WM. Now on room air.   Send iron, b12, folate studies for anemia. Jehovas witness. Follow up at [facility] with Dr Anne after discharge.
Patient seen and examined with the NP during rounds  I agree with her assessment and plan

## 2025-01-28 NOTE — PROGRESS NOTE ADULT - SUBJECTIVE AND OBJECTIVE BOX
Cardiovascular Disease Progress Note  DATE OF SERVICE: 01-28-25 @ 10:35    Overnight events: No acute events overnight.    The patient denies chest pain or SOB.   Otherwise review of systems negative    Objective Findings:  T(C): 36.7 (01-28-25 @ 05:15), Max: 37 (01-27-25 @ 20:47)  HR: 81 (01-28-25 @ 05:15) (79 - 93)  BP: 130/69 (01-28-25 @ 05:15) (109/68 - 148/92)  RR: 18 (01-28-25 @ 05:15) (18 - 18)  SpO2: 98% (01-28-25 @ 05:15) (98% - 100%)  Wt(kg): --  Daily Height in cm: 170.18 (27 Jan 2025 18:44)    Daily       Physical Exam:  Gen: NAD; Patient resting comfortably  HEENT: EOMI, Normocephalic/ atraumatic  CV: RRR, normal S1 + S2, no m/r/g  Lungs:  Normal respiratory effort; clear to auscultation bilaterally  Abd: soft, non-tender; bowel sounds present  Ext: No edema; warm and well perfused    Telemetry: Sinus    Laboratory Data:                        10.9   3.71  )-----------( 101      ( 27 Jan 2025 05:53 )             33.3     01-28    138  |  100  |  20  ----------------------------<  101[H]  4.4   |  24  |  1.39[H]    Ca    9.0      28 Jan 2025 07:05  Phos  3.8     01-28  Mg     2.30     01-28      PTT - ( 28 Jan 2025 07:05 )  PTT:40.4 sec          Inpatient Medications:  MEDICATIONS  (STANDING):  apixaban 10 milliGRAM(s) Oral every 12 hours  atorvastatin 10 milliGRAM(s) Oral at bedtime  cefepime   IVPB      cefepime   IVPB 2000 milliGRAM(s) IV Intermittent every 12 hours  folic acid 1 milliGRAM(s) Oral daily  furosemide    Tablet 40 milliGRAM(s) Oral two times a day  influenza  Vaccine (HIGH DOSE) 0.5 milliLiter(s) IntraMuscular once  metoprolol succinate ER 25 milliGRAM(s) Oral daily  mupirocin 2% Ointment 1 Application(s) Both Nostrils two times a day  sacubitril 24 mG/valsartan 26 mG 1 Tablet(s) Oral two times a day      Assessment: 73 year old man with nonischemic cardiomyopathy, HTN, and leukemia presents with NSTEMI, acute systolic CHF and hypoxic respiratory failure.     Plan of Care:    #Acute systolic CHF-  Due to nonischemic cardiomyopathy as per NST done by outpatient cardiologist, Dr. Elaine.   Volume status is now much improved.  Continue PO Lasix.   GDMT with BB and ARNI    #NSTEMI-  Due to acute CHF and hypoxia.  Concern for concomitant sepsis.  Plan for medical management at this time given acute DVT and the patient is on active chemotherapy.      #Acute DVT  - In setting of malignancy  - AC started  - Defer management to the primary team.           Over 55 minutes spent on total encounter; more than 50% of the visit was spent counseling and/or coordinating care by the attending physician.      Jim Anton MD [facility]  Cardiovascular Disease  [phone]

## 2025-01-28 NOTE — PROGRESS NOTE ADULT - PROBLEM SELECTOR PROBLEM 3
Acute respiratory failure with hypoxia and hypercapnia
Pneumonia

## 2025-01-28 NOTE — PROGRESS NOTE ADULT - PROBLEM SELECTOR PROBLEM 2
Acute respiratory failure with hypoxia and hypercapnia
Acute respiratory failure with hypoxia and hypercapnia
DVT, lower extremity
Acute respiratory failure with hypoxia and hypercapnia
DVT, lower extremity
DVT, lower extremity

## 2025-01-28 NOTE — PROGRESS NOTE ADULT - PROBLEM SELECTOR PLAN 9
home medication: methotrexate 2.5 mg oral tablet: 6 tab orally once a week (on Mondays), folic acid 1 mg daily
home medication: methotrexate 2.5 mg oral tablet: 6 tab orally once a week (on Mondays), folic acid 1 mg daily
home medication: metoprolol succinate 25 mg oral tablet  discontinue lisinopril   Entresto started  resume home meds on discharge
home medication: methotrexate 2.5 mg oral tablet: 6 tab orally once a week (on Mondays), folic acid 1 mg daily
home medication: metoprolol succinate 25 mg oral tablet  Entresto started
home medication: metoprolol succinate 25 mg oral tablet  Entresto started

## 2025-01-28 NOTE — PROGRESS NOTE ADULT - PROBLEM SELECTOR PLAN 2
likely has associated PE as well since the patient was complaining of left pleuritic chest pain   discussed with oncology   pulmonary requested VQ scan   continue heparin  transition to apixaban load if covered by his insurance
likely has associated PE as well since the patient was complaining of left pleuritic chest pain   discussed with oncology   likely no need for CTA unless pulmonary feels necessary ( the patient has CKD)  we will presume he has a PE and management will likely not change with CTA  however will still await pulmonary recommendations
presented with hypoxia and hypercarbia  likely 2/2 pneumonia vs Rituxan side effect   On BIPAP with improvement  -pulm recs appreciated  -wean to nasal cannula   -may consider steroids depending course  -rest of care as stated above
VQ scan low prob however clinical suspicion high for PE as well  since management will not change will not subject the patient to CTA contrasd  tolerating apixaban  cleared for discharge   will need home O2 as O2 after ambulation < 88
resolved  off BiPAP   pulmonary recommendations appreciated  continue to monitor closely
resolved  off BiPAP   pulmonary recommendations appreciated  continue to monitor closely

## 2025-01-28 NOTE — PROGRESS NOTE ADULT - PROVIDER SPECIALTY LIST ADULT
Cardiology
Cardiology
Internal Medicine
Nephrology
Nephrology
Cardiology
Heme/Onc
Nephrology
Pulmonology
Cardiology
Cardiology
Heme/Onc
Heme/Onc
Internal Medicine
Nephrology
Nephrology
Pulmonology
Heme/Onc
Heme/Onc
Pulmonology
Pulmonology
Internal Medicine
Internal Medicine
Pulmonology
Internal Medicine
Internal Medicine

## 2025-01-28 NOTE — PROGRESS NOTE ADULT - PROBLEM SELECTOR PLAN 4
repeat echocardiogram done awaiting results  appears fairly euvolemic at present  cardiology follow up appreciated    continue po furosemide
see above HPI  continue antibiotics x 5 days
see above HPI  continue antibiotics x 5 days
repeat echocardiogram severe cardiomyopathy  not on GDMT  discussed with cardiology   appears fairly euvolemic at present  start Entresto and resume home dose metoprolol   cardiology follow up appreciated    continue po furosemide
completed antibiotics x 5 days
TTE 3/2024:  Left ventricular systolic function is severely decreased with an ejection fraction visually estimated at 20 to 25%, global left ventricular hypokinesis. There is mild (grade 1) left ventricular diastolic dysfunction.  overloaded on exam  Home medication: furosemide 20mg daily   s/p IV lasix IV 40   -cards recs appreciated  -start Lasix IV 40 BID  -TTE

## 2025-01-28 NOTE — PROGRESS NOTE ADULT - PROBLEM SELECTOR PLAN 6
likely secondary to Type II MI from stress/sepsis  no intervention at this time
creatinine at baseline  will continue to follow renal recommendations
likely secondary to Type II MI from stress/sepsis  no intervention at this time
likely secondary to Type II MI from stress/sepsis  no intervention at this time
creatinine at baseline  will continue to follow renal recommendations
Baseline creatine 1.2-1.3   Creatinine: 1.89 -->2.08   likely 2/2 sepsis vs HF exacerbation   -f/u urine studies   -f/u Renal US

## 2025-01-28 NOTE — PROGRESS NOTE ADULT - PROBLEM SELECTOR PLAN 7
Currently takes Rituximab (last infusion 1/21/2024  Follow with Dr. Troy Anne   -will reach out to heme/onc given concern of possible side effect of rituximab
creatinine at baseline  will continue to follow renal recommendations
heme help appreciated  likely hold outpatient meds for now
creatinine at baseline  will continue to follow renal recommendations
creatinine at baseline  will continue to follow renal recommendations
heme help appreciated  likely hold outpatient meds for now

## 2025-01-28 NOTE — PROGRESS NOTE ADULT - NSPROGADDITIONALINFOA_GEN_ALL_CORE
discussed with patient, expresses understanding of treatment plans.    encounter 50 min including PE, progress note, medication adjustment and d/w ACP and patient
discussed with patient, expresses understanding of treatment plans.
discussed with patient, expresses understanding of treatment plans.
discussed with patient, expresses understanding of treatment plans.  discussed with covering ACP  discussed with pulmonary
discussed with patient, expresses understanding of treatment plans.  discussed with heme  discussed with pulmonary   discussed with cardiology     encounter 50 min including PE, progress note, medication adjustment and d/w ACP and patient

## 2025-01-28 NOTE — PROGRESS NOTE ADULT - PROBLEM SELECTOR PROBLEM 7
LADI (acute kidney injury)
Waldenstrom macroglobulinemia
Waldenstrom macroglobulinemia
LADI (acute kidney injury)
LADI (acute kidney injury)
Waldenstrom macroglobulinemia

## 2025-01-28 NOTE — PROGRESS NOTE ADULT - PROBLEM SELECTOR PLAN 3
Chest x-ray: left lower lobe pneumonia  -pulm recs appreciated  -f/u CT Chest   -management as stated above
see above HPI  continue antibiotics x 5 days
likely secondary to concomitant likely PE as well  pulmonary recommendations appreciated  continue to monitor closely
likely secondary to concomitant likely PE as well  pulmonary recommendations appreciated  continue to monitor closely
likely secondary to concomitant likely PE as well  pulmonary recommendations appreciated  continue to monitor closely'  home O2
see above HPI  continue antibiotics x 5 days

## 2025-01-28 NOTE — PROGRESS NOTE ADULT - PROBLEM SELECTOR PROBLEM 10
Hairy cell leukemia
HLD (hyperlipidemia)
Hairy cell leukemia
Hairy cell leukemia

## 2025-01-28 NOTE — PROGRESS NOTE ADULT - PROBLEM SELECTOR PROBLEM 6
LADI (acute kidney injury)
LADI (acute kidney injury)
Elevated troponin
LADI (acute kidney injury)
Elevated troponin
Elevated troponin

## 2025-01-28 NOTE — PROGRESS NOTE ADULT - PROBLEM SELECTOR PROBLEM 4
Pneumonia
Acute decompensated heart failure
Pneumonia
Acute decompensated heart failure
Acute decompensated heart failure
Pneumonia

## 2025-01-28 NOTE — PROGRESS NOTE ADULT - SUBJECTIVE AND OBJECTIVE BOX
Patient is a 74y old  Male who presents with a chief complaint of sepsis (28 Jan 2025 12:46)      DATE OF SERVICE: 01-28-25 @ 14:29    SUBJECTIVE / OVERNIGHT EVENTS: overnight events noted    ROS:  Resp: No cough no sputum production  CVS: No chest pain no palpitations no orthopnea  GI: no N/V/D  "I feel much better"       MEDICATIONS  (STANDING):  apixaban 10 milliGRAM(s) Oral every 12 hours  atorvastatin 10 milliGRAM(s) Oral at bedtime  cefepime   IVPB      cefepime   IVPB 2000 milliGRAM(s) IV Intermittent every 12 hours  folic acid 1 milliGRAM(s) Oral daily  furosemide    Tablet 40 milliGRAM(s) Oral two times a day  influenza  Vaccine (HIGH DOSE) 0.5 milliLiter(s) IntraMuscular once  metoprolol succinate ER 25 milliGRAM(s) Oral daily  mupirocin 2% Ointment 1 Application(s) Both Nostrils two times a day  sacubitril 24 mG/valsartan 26 mG 1 Tablet(s) Oral two times a day    MEDICATIONS  (PRN):        CAPILLARY BLOOD GLUCOSE        I&O's Summary    27 Jan 2025 07:01  -  28 Jan 2025 07:00  --------------------------------------------------------  IN: 0 mL / OUT: 600 mL / NET: -600 mL        Vital Signs Last 24 Hrs  T(C): 36.6 (28 Jan 2025 11:45), Max: 37 (27 Jan 2025 20:47)  T(F): 97.8 (28 Jan 2025 11:45), Max: 98.6 (27 Jan 2025 20:47)  HR: 82 (28 Jan 2025 11:45) (79 - 82)  BP: 120/77 (28 Jan 2025 11:45) (120/77 - 148/92)  BP(mean): --  RR: 18 (28 Jan 2025 11:45) (18 - 18)  SpO2: 100% (28 Jan 2025 11:45) (98% - 100%)    PHYSICAL EXAM:  CHEST/LUNG: clear  HEART: S1 S2; no murmurs   ABDOMEN: Soft, Nontender  EXTREMITIES:  no edema  NEUROLOGY: AO x 3 non-focal  SKIN: No rashes or lesions    LABS:                        10.9   3.71  )-----------( 101      ( 27 Jan 2025 05:53 )             33.3     01-28    138  |  100  |  20  ----------------------------<  101[H]  4.4   |  24  |  1.39[H]    Ca    9.0      28 Jan 2025 07:05  Phos  3.8     01-28  Mg     2.30     01-28      PTT - ( 28 Jan 2025 07:05 )  PTT:40.4 sec      Urinalysis Basic - ( 28 Jan 2025 07:05 )    Color: x / Appearance: x / SG: x / pH: x  Gluc: 101 mg/dL / Ketone: x  / Bili: x / Urobili: x   Blood: x / Protein: x / Nitrite: x   Leuk Esterase: x / RBC: x / WBC x   Sq Epi: x / Non Sq Epi: x / Bacteria: x          All consultant(s) notes reviewed and care discussed with other providers        Contact Number, Dr Huang [phone]

## 2025-01-28 NOTE — PROGRESS NOTE ADULT - SUBJECTIVE AND OBJECTIVE BOX
Date of Service: 01-28-25 @ 11:49    Patient is a 74y old  Male who presents with a chief complaint of sepsis (28 Jan 2025 10:02)      Any change in ROS: seems pretty good:  no sob:  no cough : no phlegm   : on room air at rest : he desaturates on walking     MEDICATIONS  (STANDING):  apixaban 10 milliGRAM(s) Oral every 12 hours  atorvastatin 10 milliGRAM(s) Oral at bedtime  cefepime   IVPB      cefepime   IVPB 2000 milliGRAM(s) IV Intermittent every 12 hours  folic acid 1 milliGRAM(s) Oral daily  furosemide    Tablet 40 milliGRAM(s) Oral two times a day  influenza  Vaccine (HIGH DOSE) 0.5 milliLiter(s) IntraMuscular once  metoprolol succinate ER 25 milliGRAM(s) Oral daily  mupirocin 2% Ointment 1 Application(s) Both Nostrils two times a day  sacubitril 24 mG/valsartan 26 mG 1 Tablet(s) Oral two times a day    MEDICATIONS  (PRN):    Vital Signs Last 24 Hrs  T(C): 36.6 (28 Jan 2025 11:45), Max: 37 (27 Jan 2025 20:47)  T(F): 97.8 (28 Jan 2025 11:45), Max: 98.6 (27 Jan 2025 20:47)  HR: 82 (28 Jan 2025 11:45) (79 - 82)  BP: 120/77 (28 Jan 2025 11:45) (120/77 - 148/92)  BP(mean): --  RR: 18 (28 Jan 2025 11:45) (18 - 18)  SpO2: 100% (28 Jan 2025 11:45) (98% - 100%)    Parameters below as of 28 Jan 2025 11:45  Patient On (Oxygen Delivery Method): room air        I&O's Summary    27 Jan 2025 07:01  -  28 Jan 2025 07:00  --------------------------------------------------------  IN: 0 mL / OUT: 600 mL / NET: -600 mL          Physical Exam:   GENERAL: NAD, well-groomed, well-developed  HEENT: NUNU/   Atraumatic, Normocephalic  ENMT: No tonsillar erythema, exudates, or enlargement; Moist mucous membranes, Good dentition, No lesions  NECK: Supple, No JVD, Normal thyroid  CHEST/LUNG: Clear to auscultaion  CVS: Regular rate and rhythm; No murmurs, rubs, or gallops  GI: : Soft, Nontender, Nondistended; Bowel sounds present  NERVOUS SYSTEM:  Alert & Oriented X3  EXTREMITIES: -edema  LYMPH: No lymphadenopathy noted  SKIN: No rashes or lesions  ENDOCRINOLOGY: No Thyromegaly  PSYCH: Appropriate    Labs:  23, 23                            10.9   3.71  )-----------( 101      ( 27 Jan 2025 05:53 )             33.3                         10.7   4.12  )-----------( 119      ( 26 Jan 2025 06:51 )             33.7                         10.5   3.96  )-----------( 129      ( 26 Jan 2025 00:10 )             33.8                         11.1   5.55  )-----------( 123      ( 25 Jan 2025 06:59 )             36.0     01-28    138  |  100  |  20  ----------------------------<  101[H]  4.4   |  24  |  1.39[H]  01-27    136  |  99  |  23  ----------------------------<  96  3.9   |  25  |  1.30  01-26    137  |  99  |  26[H]  ----------------------------<  93  3.9   |  25  |  1.39[H]  01-25    135  |  99  |  31[H]  ----------------------------<  97  4.0   |  22  |  1.40[H]    Ca    9.0      28 Jan 2025 07:05  Ca    8.5      27 Jan 2025 05:53  Phos  3.8     01-28  Phos  3.3     01-27  Mg     2.30     01-28  Mg     2.00     01-27      CAPILLARY BLOOD GLUCOSE            PTT - ( 28 Jan 2025 07:05 )  PTT:40.4 sec  Urinalysis Basic - ( 28 Jan 2025 07:05 )    Color: x / Appearance: x / SG: x / pH: x  Gluc: 101 mg/dL / Ketone: x  / Bili: x / Urobili: x   Blood: x / Protein: x / Nitrite: x   Leuk Esterase: x / RBC: x / WBC x   Sq Epi: x / Non Sq Epi: x / Bacteria: x            RECENT CULTURES:  01-22 @ 20:24 .Blood BLOOD     rad< from: NM Pulmonary Ventilation/Perfusion Scan (01.27.25 @ 18:22) >  PROCEDURE DATE:  01/27/2025          INTERPRETATION:  CLINICAL INFORMATION: DVT. Evaluate for pulmonary   embolus.    RADIOPHARMACEUTICAL: 1 mCi Tc-99m-DTPA via aerosol;  6.4 mCi Tc-99m-MAA,   I.V.    TECHNIQUE:  Ventilation and perfusion images of the lungs were obtained   following administration of Tc-99m-DTPA and Tc-99m-MAA. Images were   obtained in the anterior, posterior, both lateral, and all 4 oblique   views. The study was interpreted in conjunction with chest radiograph of   1/27/2025.    COMPARISON: None    OTHER STUDIES USED FOR CORRELATION: None    FINDINGS: There is heterogeneous distribution of radiopharmaceuticals in   both lungs on the ventilation and perfusion images. There is no segmental   perfusion defect.    IMPRESSION:    Very low probability of pulmonary embolus.        --- End of Report ---            HASMUKH KNOWLES MD; Attending Radiologist  This document has been electronically signed. Jan 27 2025  6:28PM    < end of copied text >  < from: Xray Chest 1 View- PORTABLE-Urgent (Xray Chest 1 View- PORTABLE-Urgent .) (01.27.25 @ 15:24) >    INTERPRETATION:  CLINICAL INFORMATION: Pending VQ scan    TIME OF EXAMINATION: January 27, 2025 at 3:16 PM    EXAM: Portable chest    FINDINGS:    The lungs are clear. The heart is not enlarged.  No effusions or pneumothorax.        COMPARISON: January 22        IMPRESSION: Clear lungs.    --- End of Report ---            ALLEY CELESTE MD; Attending Radiologist  This document has been electronically signed. Jan 27 2025  5:26PM    < end of copied text >  < from: US Duplex Venous Lower Ext Complete, Bilateral (01.25.25 @ 16:36) >    LEFT:  Normalcompressibility of the LEFT common femoral, femoral and popliteal   veins.  Doppler examination shows normal spontaneous and phasic flow.  No LEFT calf vein thrombosis is detected.    IMPRESSION:    Right lower extremity DVT involving the popliteal, gastrocnemius,   posterior tibial, and peroneal veins.    No evidence of DVT in the left lower extremity.    These findings were discussed with NP TACO GRANT at 1/25/2025   4:43 PM by Dr. Leal with read back confirmation.    --- End of Report ---          PRAMOD LEAL DO; Resident Radiologist  This document has been electronically signed.  CALISTA MTZ MD; Attending Radiologist  This document has been electronically signed. Jan 25 2025  4:46PM    < end of copied text >             No growth at 5 days    01-22 @ 20:15 .Blood BLOOD                No growth at 5 days          RESPIRATORY CULTURES:          Studies  Chest X-RAY  CT SCAN Chest   Venous Dopplers: LE:   CT Abdomen  Others

## 2025-01-28 NOTE — PROGRESS NOTE ADULT - ASSESSMENT
73 yo M w/ PMHx of chronic sinusitis, vasculitis, history of leukemia in remission, new diagnosis of Waldenström's macroglobulinemia on Rituxan (versed infusion yesterday), HTN, and HLD presents for SOB/cough/chest tightness since today afternoon.  Patient also endorses 3 episodes of diarrhea over the past day.  Patient also been attempting nasal rinsing/2–3 PM, but became more persistently short of breath.  He states that he has been feeling slight shortness of breath for the past week.  He was told by his oncologist (Dr. Anne) SOB and pneumonia are effects of the new infusion (rituximab).  He denies any fevers/sore throat, abdominal pain, nausea/vomiting, fall/trauma.  On chart review, patient is on rituximab for which there are multiple side effects including infusion reaction (15%), influenza (15%), headaches, bronchitis, chills/fevers, sinus infection, skin rash, throat discomfort, abdominal discomfort, anemia, blood clots, tingling/burning sensation of skin, coughs, pneumonia, back pain, and high blood pressure/heart rate.  On medication review, patient is taking metoprolol, furosemide, lisinopril, atorvastatin, and folic acid.  Vital signs remarkable for BPs 160s/80s, , T99.8, and RR 26.  He was briefly started on 8 L nasal cannula with improvement in oxygen saturation.  Physical exam is remarkable for diffuse crackles in all lung fields and 1+ pitting edema bilateral lower extremities.  Concern for respiratory distress secondary to URI versus CHF versus rituximab reaction.  Concern for flash pulm edema as patient's blood pressures is not very elevated.  Plan for BiPAP, empiric treatment with antibiotics (vancomycin/cefepime), sepsis order set, and support care.  he is alert and awake at this time and seems to be doing OK:  he is alert and awke and responding to questions well : he says he never h ad any underlying lung issues:   former smoker:  but quit in 1973     Hypoxioc resp failure  ?hx of vasculitis:   Waldenstrom's  macroglobulinemia on rituxan   HTN  HLD    Hypoxic/ Hypercarbic  resp failure  -he presented with hypoxia and hypercarbic acidosis  -he was placed on bipap and was started on antibiotics' as he is febrile  -his cxr:  showed: IMPRESSION: Left lower lobe pneumonia. : need ct chest  :   -rituxan toxicity needs to be kept in mind , ct scan chest will  clarify more:   -do pancultures  , RVP I S NEGATIVE:  -Consider ID consult as he is immunocompromised: Cont antibiotics  - he m ay need steroids too :, depending how he progresses on antibiotics and furterh clinical course:   -can try to give him a break froim bipap as he is fully alert and awake    ?hx of vasculitis / Waldenstrom's  macroglobulinemia on rituxan   -he is on rituxan : would  get onc to see     1/24:  he seems OK;   no sob  -cont antibiotics  ct chest reviewed;  Pulmonary edema with small pleural effusions. Superimposed infection should be considered in the appropriate clinical setting.  on diuresis;   hard to rule out bacterial infection;  to cont for now:     1/25: he has some pleuritic chest pain on rigth side:   no wheezing/   being treated for pneumonia as well as chf   echo noted:  1. Left ventricular systolic function is severely decreased with an ejection fraction of 27 % by King's method of disks. Global left ventricular hypokinesis.   2. Normal right ventricular cavity size and normal right ventricular systolic function. Tricuspid annular plane systolic excursion (TAPSE) is 1.9 cm (normal >=1.7 cm).   3. Left atrium is mildly dilated.   4. No significant valvular disease.   5. Right pleural effusion noted.   6. Small pericardial effusion noted adjacent to the right ventricle and trace pericardial effusion noted adjacent to the right atrium.   7. The inferior vena cava is normal in size measuring 1.24 cm in diameter, (normal <2.1cm) with normal inspiratory collapse (normal >50%) consistent with normal right atrial pressure (~3, range 0-5mmHg).  cont diuretics   do dopplers fabby LE     1/26; now found to have dvt:  on ac:   hem onc following;   monitor h/h so far seems stable:!  would prefer to do cta if renal allows otherwise will need vq scan     1/27:  on 100% Room air:   his creat is normal ;? do cta :  if cta is allowed then will hold diuretics tonight and do it in am      1/28:  his vq scan is low probability:  on xarelto  : desats on room air on walking:   needs oxygen om ambulation:   hypoxia secondary to pe       HTN  blood pressure is OK      HLD  -defer to primary team      dw acp

## 2025-01-28 NOTE — PROGRESS NOTE ADULT - PROBLEM SELECTOR PLAN 8
home medication: metoprolol succinate 25 mg oral tablet, lisinopril 10 mg daily   -hold beta blocker and lisinopril   -if SBP>180 can give IV hydral
heme help appreciated  likely hold outpatient meds for now
home medication: metoprolol succinate 25 mg oral tablet  discontinue lisinopril   Entresto started  resume home meds on discharge
home medication: metoprolol succinate 25 mg oral tablet, lisinopril 10 mg daily   resume home meds on discharge

## 2025-01-29 ENCOUNTER — NON-APPOINTMENT (OUTPATIENT)
Age: 75
End: 2025-01-29

## 2025-01-29 ENCOUNTER — APPOINTMENT (OUTPATIENT)
Dept: OTOLARYNGOLOGY | Facility: CLINIC | Age: 75
End: 2025-01-29

## 2025-01-29 PROBLEM — C88.00: Chronic | Status: ACTIVE | Noted: 2025-01-23

## 2025-02-06 ENCOUNTER — APPOINTMENT (OUTPATIENT)
Dept: OTOLARYNGOLOGY | Facility: CLINIC | Age: 75
End: 2025-02-06

## 2025-02-17 ENCOUNTER — EMERGENCY (EMERGENCY)
Facility: HOSPITAL | Age: 75
LOS: 1 days | Discharge: HOME | End: 2025-02-17
Attending: EMERGENCY MEDICINE | Admitting: EMERGENCY MEDICINE
Payer: MEDICARE

## 2025-02-17 VITALS
TEMPERATURE: 97 F | HEART RATE: 97 BPM | OXYGEN SATURATION: 98 % | RESPIRATION RATE: 18 BRPM | SYSTOLIC BLOOD PRESSURE: 95 MMHG | WEIGHT: 182.98 LBS | DIASTOLIC BLOOD PRESSURE: 60 MMHG | HEIGHT: 67 IN

## 2025-02-17 VITALS — TEMPERATURE: 98 F

## 2025-02-17 LAB
ALBUMIN SERPL ELPH-MCNC: 4 G/DL — SIGNIFICANT CHANGE UP (ref 3.3–5)
ALP SERPL-CCNC: 64 U/L — SIGNIFICANT CHANGE UP (ref 40–120)
ALT FLD-CCNC: 22 U/L — SIGNIFICANT CHANGE UP (ref 4–41)
ANION GAP SERPL CALC-SCNC: 14 MMOL/L — SIGNIFICANT CHANGE UP (ref 7–14)
ANION GAP SERPL CALC-SCNC: 17 MMOL/L — HIGH (ref 7–14)
AST SERPL-CCNC: 35 U/L — SIGNIFICANT CHANGE UP (ref 4–40)
BASOPHILS # BLD AUTO: 0 K/UL — SIGNIFICANT CHANGE UP (ref 0–0.2)
BASOPHILS NFR BLD AUTO: 0 % — SIGNIFICANT CHANGE UP (ref 0–2)
BILIRUB SERPL-MCNC: 0.4 MG/DL — SIGNIFICANT CHANGE UP (ref 0.2–1.2)
BUN SERPL-MCNC: 62 MG/DL — HIGH (ref 7–23)
BUN SERPL-MCNC: 64 MG/DL — HIGH (ref 7–23)
CALCIUM SERPL-MCNC: 8.7 MG/DL — SIGNIFICANT CHANGE UP (ref 8.4–10.5)
CALCIUM SERPL-MCNC: 9.3 MG/DL — SIGNIFICANT CHANGE UP (ref 8.4–10.5)
CHLORIDE SERPL-SCNC: 102 MMOL/L — SIGNIFICANT CHANGE UP (ref 98–107)
CHLORIDE SERPL-SCNC: 103 MMOL/L — SIGNIFICANT CHANGE UP (ref 98–107)
CO2 SERPL-SCNC: 22 MMOL/L — SIGNIFICANT CHANGE UP (ref 22–31)
CO2 SERPL-SCNC: 22 MMOL/L — SIGNIFICANT CHANGE UP (ref 22–31)
CREAT SERPL-MCNC: 2.22 MG/DL — HIGH (ref 0.5–1.3)
CREAT SERPL-MCNC: 2.39 MG/DL — HIGH (ref 0.5–1.3)
EGFR: 28 ML/MIN/1.73M2 — LOW
EGFR: 30 ML/MIN/1.73M2 — LOW
EOSINOPHIL # BLD AUTO: 0 K/UL — SIGNIFICANT CHANGE UP (ref 0–0.5)
EOSINOPHIL NFR BLD AUTO: 0 % — SIGNIFICANT CHANGE UP (ref 0–6)
FLUAV AG NPH QL: DETECTED
FLUBV AG NPH QL: SIGNIFICANT CHANGE UP
GAS PNL BLDV: SIGNIFICANT CHANGE UP
GLUCOSE SERPL-MCNC: 85 MG/DL — SIGNIFICANT CHANGE UP (ref 70–99)
GLUCOSE SERPL-MCNC: 86 MG/DL — SIGNIFICANT CHANGE UP (ref 70–99)
HCT VFR BLD CALC: 43.6 % — SIGNIFICANT CHANGE UP (ref 39–50)
HGB BLD-MCNC: 13.8 G/DL — SIGNIFICANT CHANGE UP (ref 13–17)
IANC: 2.7 K/UL — SIGNIFICANT CHANGE UP (ref 1.8–7.4)
LIDOCAIN IGE QN: 63 U/L — HIGH (ref 7–60)
LYMPHOCYTES # BLD AUTO: 0.46 K/UL — LOW (ref 1–3.3)
LYMPHOCYTES # BLD AUTO: 10.8 % — LOW (ref 13–44)
MAGNESIUM SERPL-MCNC: 2.4 MG/DL — SIGNIFICANT CHANGE UP (ref 1.6–2.6)
MCHC RBC-ENTMCNC: 25.3 PG — LOW (ref 27–34)
MCHC RBC-ENTMCNC: 31.7 G/DL — LOW (ref 32–36)
MCV RBC AUTO: 79.9 FL — LOW (ref 80–100)
MONOCYTES # BLD AUTO: 0.3 K/UL — SIGNIFICANT CHANGE UP (ref 0–0.9)
MONOCYTES NFR BLD AUTO: 7.2 % — SIGNIFICANT CHANGE UP (ref 2–14)
NEUTROPHILS # BLD AUTO: 3.04 K/UL — SIGNIFICANT CHANGE UP (ref 1.8–7.4)
NEUTROPHILS NFR BLD AUTO: 71.2 % — SIGNIFICANT CHANGE UP (ref 43–77)
NT-PROBNP SERPL-SCNC: 289 PG/ML — SIGNIFICANT CHANGE UP
PHOSPHATE SERPL-MCNC: 4.6 MG/DL — HIGH (ref 2.5–4.5)
PLATELET # BLD AUTO: 97 K/UL — LOW (ref 150–400)
POTASSIUM SERPL-MCNC: 5.9 MMOL/L — HIGH (ref 3.5–5.3)
POTASSIUM SERPL-MCNC: 6.3 MMOL/L — CRITICAL HIGH (ref 3.5–5.3)
POTASSIUM SERPL-SCNC: 5.9 MMOL/L — HIGH (ref 3.5–5.3)
POTASSIUM SERPL-SCNC: 6.3 MMOL/L — CRITICAL HIGH (ref 3.5–5.3)
PROT SERPL-MCNC: 6.2 G/DL — SIGNIFICANT CHANGE UP (ref 6–8.3)
RBC # BLD: 5.46 M/UL — SIGNIFICANT CHANGE UP (ref 4.2–5.8)
RBC # FLD: 15.7 % — HIGH (ref 10.3–14.5)
RSV RNA NPH QL NAA+NON-PROBE: DETECTED
SARS-COV-2 RNA SPEC QL NAA+PROBE: SIGNIFICANT CHANGE UP
SODIUM SERPL-SCNC: 138 MMOL/L — SIGNIFICANT CHANGE UP (ref 135–145)
SODIUM SERPL-SCNC: 142 MMOL/L — SIGNIFICANT CHANGE UP (ref 135–145)
TROPONIN T, HIGH SENSITIVITY RESULT: 32 NG/L — SIGNIFICANT CHANGE UP
TROPONIN T, HIGH SENSITIVITY RESULT: 35 NG/L — SIGNIFICANT CHANGE UP
WBC # BLD: 4.22 K/UL — SIGNIFICANT CHANGE UP (ref 3.8–10.5)
WBC # FLD AUTO: 4.22 K/UL — SIGNIFICANT CHANGE UP (ref 3.8–10.5)

## 2025-02-17 PROCEDURE — 71045 X-RAY EXAM CHEST 1 VIEW: CPT | Mod: 26

## 2025-02-17 PROCEDURE — 99285 EMERGENCY DEPT VISIT HI MDM: CPT

## 2025-02-17 PROCEDURE — 71275 CT ANGIOGRAPHY CHEST: CPT | Mod: 26

## 2025-02-17 RX ORDER — FAMOTIDINE 10 MG/ML
20 INJECTION INTRAVENOUS ONCE
Refills: 0 | Status: COMPLETED | OUTPATIENT
Start: 2025-02-17 | End: 2025-02-17

## 2025-02-17 RX ORDER — ALBUTEROL 90 MCG
2.5 AEROSOL REFILL (GRAM) INHALATION ONCE
Refills: 0 | Status: DISCONTINUED | OUTPATIENT
Start: 2025-02-17 | End: 2025-02-17

## 2025-02-17 RX ORDER — MAGNESIUM, ALUMINUM HYDROXIDE 200-225/5
30 SUSPENSION, ORAL (FINAL DOSE FORM) ORAL ONCE
Refills: 0 | Status: COMPLETED | OUTPATIENT
Start: 2025-02-17 | End: 2025-02-17

## 2025-02-17 RX ORDER — ALBUTEROL 90 MCG
1 AEROSOL REFILL (GRAM) INHALATION ONCE
Refills: 0 | Status: DISCONTINUED | OUTPATIENT
Start: 2025-02-17 | End: 2025-02-17

## 2025-02-17 RX ORDER — IPRATROPIUM BROMIDE AND ALBUTEROL SULFATE .5; 2.5 MG/3ML; MG/3ML
3 SOLUTION RESPIRATORY (INHALATION) ONCE
Refills: 0 | Status: COMPLETED | OUTPATIENT
Start: 2025-02-17 | End: 2025-02-17

## 2025-02-17 RX ORDER — ALBUTEROL 90 MCG
1 AEROSOL REFILL (GRAM) INHALATION ONCE
Refills: 0 | Status: COMPLETED | OUTPATIENT
Start: 2025-02-17 | End: 2025-02-17

## 2025-02-17 RX ADMIN — Medication 30 MILLILITER(S): at 16:01

## 2025-02-17 RX ADMIN — FAMOTIDINE 20 MILLIGRAM(S): 10 INJECTION INTRAVENOUS at 16:01

## 2025-02-17 RX ADMIN — Medication 1 PUFF(S): at 23:39

## 2025-02-17 RX ADMIN — IPRATROPIUM BROMIDE AND ALBUTEROL SULFATE 3 MILLILITER(S): .5; 2.5 SOLUTION RESPIRATORY (INHALATION) at 20:58

## 2025-02-17 NOTE — ED PROVIDER NOTE - PROGRESS NOTE DETAILS
Saint Cuong, DO (PGY2): Patient signed out to my change. Briefly, he is a 74-year-old male, with a history of CHF (last echo in January 2025 showing ejection fraction of 27%), recent leukemia in remission, remission, recent diagnosis of an Waldenström's macroglobulinemia with recent admission to the hospital for CHF exacerbation and pneumonia , who presented for worsening shortness of breath, weakness and fatigue.  Lab work notable for hyperkalemia, however hemolyzed.  Creatinine 2.22, seems to be higher than patient's baseline. Pron , low concerns for CHF exacerbation. Chest x-ray negative for pneumonia.  CT negative for PE.  Patient positive for influenza A and RSV.  Patient reassessed, on 2L NC (states that 2L is his baseline) and satting well, however visibly short of breath even with standing. Patient reports that he is unable to perform ADLs without SOB. Offered admission, however patient would like to go home.  Lung exam significant for diffuse wheezing, concerns for RAD given viral infections.  Will trial DuoNeb and reassess for dispo.  Patient agreeable with this plan. Saint Cuong, DO (PGY2): s/p albuterol. States he feels better. Able to stand and walk a few steps without feeling short of breath. Lungs are cta bilaterally and patient moving good air. Satting well. Will d/c with albuterol. Patient feels comfortable going home. Granddaughter and daughter at bedside and will be taking him home. Return precautions and follow up instructions communicated. All questions answered.

## 2025-02-17 NOTE — ED ADULT NURSE NOTE - HIV OFFER
Patient advised via portal.  Notifications on. Results seen by pt. Previously Declined (within the last year)

## 2025-02-17 NOTE — ED PROVIDER NOTE - NSICDXPASTMEDICALHX_GEN_ALL_CORE_FT
PAST MEDICAL HISTORY:  Benign essential HTN     H/O splenomegaly     Hairy cell leukemia     History of ITP     Hx of thrombocytopenia refused blood products transfusion- [employer]    Waldenstrom macroglobulinemia

## 2025-02-17 NOTE — ED ADULT NURSE NOTE - OBJECTIVE STATEMENT
A&Ox4. Past medical history of CHF, Pneumonia (recently released), and on 2L NC a cont at home. Presents to the ED for  c/o lethargy, cough and SOB worsening since last week. Pt states when he was last hospitalized, the discovered blood clots in his left leg and placed him on blood thinners. Denies pain at this time, diarrhea or vomiting. Respirations even and unlabored. 20 gauge IV placed in right AC. Labs obtained. Medications given as per current care plan. Awaiting diagnostic testing. Safety precautions in place.

## 2025-02-17 NOTE — ED PROVIDER NOTE - NSFOLLOWUPINSTRUCTIONS_ED_ALL_ED_FT
You were seen in the emergency department today for shortness of breath and weakness. You have the flu and are also positive for another virus called respiratory syncitial virus(RSV).     We provided your with an albuterol inhaler. Please use it every 4 hours as needed for wheezing/shortness of breath.    Please follow up with your primary care provider regarding today's visit.      -Rest and stay well hydrated  -Take over the counter acetaminophen (Tylenol) 650-1000 mg every 4-6 hours as needed for fever/pain. Do not take more than 4000 mg in a 24 hour period. Be aware many over the counter and prescription medications also contain acetaminophen (Tylenol).    RETURN TO  THE EMERGENCY DEPARTMENT for any symptoms such as worsening shortness of breath, significant worsening cough, high fevers despite over the counter fever-reducing medications, or severe weakness/malaise.

## 2025-02-17 NOTE — ED PROVIDER NOTE - PATIENT PORTAL LINK FT
You can access the FollowMyHealth Patient Portal offered by [facility] by registering at the following website: [email or web address]. By joining [facility] FollowMyHealth portal, you will also be able to view your health information using other applications (apps) compatible with our system.

## 2025-02-17 NOTE — ED PROVIDER NOTE - CLINICAL SUMMARY MEDICAL DECISION MAKING FREE TEXT BOX
Patient presents emergency department complaining of worsening shortness of breath,, lethargy.  Patient is hemodynamically stable afebrile presentation.  Patient physical exam significant for bilateral lower extremity swelling.  Dry oral mucosa.  Otherwise alert and oriented.  Abdomen soft nontender.  Lungs with mild crackles.  Given patient presentation we will obtain lab work and imaging to evaluate for acute pathologies.  Pending labs and imaging for disposition.

## 2025-02-17 NOTE — ED PROVIDER NOTE - OBJECTIVE STATEMENT
Patient is a 74-year-old male with a past medical history of hairy cell leukemia in remission, recent diagnosis of an Waldenström's macroglobulinemia with recent admission to the hospital for CHF exacerbation and pneumonia who presents emergency department complaining of weakness and fatigue.  Patient is accompanied by family member who is also providing collateral.  According to the patient he has been more lethargic and weak since leaving the hospital.  Patient was initially okay after discharge with progressively worsened.  Patient states that he is becoming increasingly short of breath.  Patient also complaining of mild diarrhea.  Denies any fevers, chills.  Denies any chest pain.  Denies any worsening lower extremity swelling.  Patient states he spoke with his primary care physician and was recommended to come into the hospital. Patient is a 74-year-old male with a past medical history of hairy cell leukemia in remission, recent diagnosis of an Waldenström's macroglobulinemia with recent admission to the hospital for CHF exacerbation and pneumonia who presents emergency department complaining of weakness and fatigue.  Patient is accompanied by family member who is also providing collateral.  According to the patient he has been more lethargic and weak since leaving the hospital.  Patient was initially okay after discharge with progressively worsened.  Patient states that he is becoming increasingly short of breath.  Patient also complaining of mild diarrhea.  Denies any fevers, chills.  Denies any chest pain.  Denies any worsening lower extremity swelling.  Patient states he spoke with his primary care physician and was recommended to come into the hospital.    Attendinyo male presents with cough, weakness, fatigue and body aches.  no fever or chills.  has been getting worse since leaving hospital last month.

## 2025-02-17 NOTE — ED ADULT NURSE NOTE - NS ED NOTE ABUSE RESPONSE YN
Encounter Summary
  Created on: 2020
 
 Viviana Ricci
 External Reference #: [id number]
 : 46
 Sex: Female
 
 Demographics
 
 
+-----------------------+----------------------+
| Address               | [address]      |
|                       | [place]  [address] |
+-----------------------+----------------------+
| Home Phone            | [phone]      |
+-----------------------+----------------------+
| Preferred Language    | Unknown              |
+-----------------------+----------------------+
| Marital Status        | Single               |
+-----------------------+----------------------+
| Gnosticist Affiliation | [id number]                 |
+-----------------------+----------------------+
| Race                  | Unknown              |
+-----------------------+----------------------+
| Ethnic Group          | Unknown              |
+-----------------------+----------------------+
 
 
 Author
 
 
+--------------+--------------------------------------------+
| Author       | [facility]  |
|              | and [facility]                                |
+--------------+--------------------------------------------+
| Organization | [facility]  |
|              | and [facility]                                |
+--------------+--------------------------------------------+
| Address      | Unknown                                    |
+--------------+--------------------------------------------+
| Phone        | Unavailable                                |
+--------------+--------------------------------------------+
 
 
 
 Support
 
 
+----------------+--------------+---------------------+-----------------+
| Name           | Relationship | Address             | Phone           |
+----------------+--------------+---------------------+-----------------+
| Ada/Ed Radhames | ECON         | [place]              | [phone] |
|                |              | [place], [place]  |                 |
|                |              |  [id number]              |                 |
+----------------+--------------+---------------------+-----------------+
 
 
 
 
 Care Team Providers
 
 
+-----------------------+------+-------------+
| Care Team Member Name | Role | Phone       |
+-----------------------+------+-------------+
 PCP  | Unavailable |
+-----------------------+------+-------------+
 
 
 
 Encounter Details
 
 
+--------+-----------+----------------------+----------------+-------------+
| Date   | Type      | Department           | Care Team      | Description |
+--------+-----------+----------------------+----------------+-------------+
| / | [facility]  |   [facility] |   [place]cret[place],  |             |
|    | Encounter |  MED [facility] GENERIC OP  | Nena GUSMAN MD  |             |
|        |           | CONV DEPT  [address]     |                |             |
|        |           | [place], |                |             |
|        |           |  [place] [address]       |                |             |
|        |           | [phone]         |                |             |
+--------+-----------+----------------------+----------------+-------------+
 
 
 
 Social History
 
 
+--------------+-------+-----------+--------+------+
| Tobacco Use  | Types | Packs/Day | Years  | Date |
|              |       |           | Used   |      |
+--------------+-------+-----------+--------+------+
| Never Smoker |       |           |        |      |
+--------------+-------+-----------+--------+------+
 
 
 
+---------------------+---+---+---+
| Smokeless Tobacco:  |   |   |   |
| Never Used          |   |   |   |
+---------------------+---+---+---+
 
 
 
+---------------------------------------------------------------+
| Comments: some second hand smoke exposure, but fairly minimal |
+---------------------------------------------------------------+
 
 
 
+-------------+-------------+---------+----------+
| Alcohol Use | Drinks/Week | oz/Week | Comments |
+-------------+-------------+---------+----------+
| No          |             |         |          |
+-------------+-------------+---------+----------+
 
 
 
 
+------------------+---------------+
| Sex Assigned at  | Date Recorded |
| Birth            |               |
+------------------+---------------+
| Not on file      |               |
+------------------+---------------+
 
 
 
+----------------+-------------+-------------+
| Job Start Date | Occupation  | Industry    |
+----------------+-------------+-------------+
| Not on file    | Not on file | Not on file |
+----------------+-------------+-------------+
 
 
 
+----------------+--------------+------------+
| Travel History | Travel Start | Travel End |
+----------------+--------------+------------+
 
 
 
+-------------------------------------+
| No recent travel history available. |
+-------------------------------------+
 documented as of this encounter
 
 Medications at Time of Discharge
 
 
+----------------------+----------------------+-----------+---------+----------+-----------+
| Medication           | Sig                  | Dispensed | Refills | Start    | End Date  |
|                      |                      |           |         | Date     |           |
+----------------------+----------------------+-----------+---------+----------+-----------+
|   glucose blood VI   | Check blood sugar    |   100     | 12      | 20 |           |
| test strips (ONE     | before each meal and | each      |         | 12       |           |
| TOUCH ULTRA TEST)    |  as directed         |           |         |          |           |
| stripIndications:    |                      |           |         |          |           |
| Unspecified          |                      |           |         |          |           |
| hypertensive kidney  |                      |           |         |          |           |
| disease with chronic |                      |           |         |          |           |
|  kidney disease      |                      |           |         |          |           |
| stage I through      |                      |           |         |          |           |
| stage IV, or         |                      |           |         |          |           |
| unspecified(403.90), |                      |           |         |          |           |
|  Complications of    |                      |           |         |          |           |
| transplanted kidney, |                      |           |         |          |           |
|  Diabetes mellitus   |                      |           |         |          |           |
| type II,             |                      |           |         |          |           |
| uncontrolled (HCC),  |                      |           |         |          |           |
| Hyperlipidemia       |                      |           |         |          |           |
+----------------------+----------------------+-----------+---------+----------+-----------+
|   Respiratory        | Decrease CPAP to     |   1 each  | 0       | 20 |           |
| Therapy Supplies     | 12-18 cmH2O          |           |         | 13       |           |
| MISC                 | Diagnosis            |           |         |          |           |
|                      | Code(s)327.23.       |           |         |          |           |
|                      | Please send order to |           |         |          |           |
|                      |  [facility].     |           |         |          |           |
 
+----------------------+----------------------+-----------+---------+----------+-----------+
|   albuterol (PROAIR  | Inhale 2 puffs into  |   1       | 2       | 20 |  |
| HFA) 90 mcg/puff     | the lungs every 6    | Inhaler   |         | 13       | 4         |
| inhalerIndications:  | hours as needed for  |           |         |          |           |
| Cough                | Wheezing.            |           |         |          |           |
+----------------------+----------------------+-----------+---------+----------+-----------+
|   allopurinol        | Take 100 mg by mouth |           | 0       | 20 |  |
| (ZYLOPRIM) 100 mg    |  Daily.              |           |         | 12       | 3         |
| tablet               |                      |           |         |          |           |
+----------------------+----------------------+-----------+---------+----------+-----------+
|   aspirin 81 MG EC   | Take 81 mg by mouth  |           | 0       | 20 |  |
| tablet               | Daily.               |           |         | 12       | 5         |
+----------------------+----------------------+-----------+---------+----------+-----------+
|   Cholecalciferol    | Take 5,000 Units by  |           | 0       | 20 |  |
| (VITAMIN D3) 5000    | mouth Once a week.   |           |         | 12       | 4         |
| UNITS CAPS           |                      |           |         |          |           |
+----------------------+----------------------+-----------+---------+----------+-----------+
|   cinacalcet         | Take 1 tablet by     |   30      | 12      | 10/29/20 |  |
| (SENSIPAR) 30 mg     | mouth Daily.         | tablet    |         | 12       | 3         |
| tablet               |                      |           |         |          |           |
+----------------------+----------------------+-----------+---------+----------+-----------+
|   fludrocortisone    | Take 1 tablet by     |   30      | 11      | 10/01/20 |  |
| (FLORINEF) 0.1 mg    | mouth Every other    | tablet    |         | 12       | 4         |
| tablet               | day.                 |           |         |          |           |
+----------------------+----------------------+-----------+---------+----------+-----------+
|   flunisolide        | 2 sprays by Nasal    |   25 mL   | 12      | 20 |  |
| (NASAREL) 29 MCG/ACT | route Daily. Dose is |           |         | 13       | 4         |
|  (0.025%) nasal      |  for each nostril.   |           |         |          |           |
| spray                |                      |           |         |          |           |
+----------------------+----------------------+-----------+---------+----------+-----------+
|   fluticasone        | Inhale 1 puff into   |   1       | 11      | 20 |  |
| (FLOVENT HFA) 220    | the lungs 2 times    | Inhaler   |         | 13       | 5         |
| mcg/puff inhaler     | daily. Rinse mouth   |           |         |          |           |
|                      | after use.           |           |         |          |           |
+----------------------+----------------------+-----------+---------+----------+-----------+
|   furosemide (LASIX) | Take two tablets by  |   60      | 5       | 20 | 07/15/201 |
|  40 mg tablet        | mouth daily.         | tablet    |         | 12       | 3         |
+----------------------+----------------------+-----------+---------+----------+-----------+
|   insulin glargine   | Inject 20 Units      |           | 0       | 20 |  |
| (LANTUS) 100         | under the skin every |           |         | 12       | 3         |
| units/mL             |  morning.            |           |         |          |           |
| injectionIndications |                      |           |         |          |           |
| : Unspecified        |                      |           |         |          |           |
| hypertensive kidney  |                      |           |         |          |           |
| disease with chronic |                      |           |         |          |           |
|  kidney disease      |                      |           |         |          |           |
| stage I through      |                      |           |         |          |           |
| stage IV, or         |                      |           |         |          |           |
| unspecified(403.90), |                      |           |         |          |           |
|  Complications of    |                      |           |         |          |           |
| transplanted kidney, |                      |           |         |          |           |
|  Diabetes mellitus   |                      |           |         |          |           |
| type II,             |                      |           |         |          |           |
| uncontrolled ([facility]),  |                      |           |         |          |           |
| Hyperlipidemia       |                      |           |         |          |           |
+----------------------+----------------------+-----------+---------+----------+-----------+
|   insulin lispro     | Inject               |   10 pen  | 5       | 20 |  |
| (HUMALOG) 100        | subcutaneously       |           |         | 13       | 4         |
| units/mL injection   | before meals         |           |         |          |           |
|                      | according to sliding |           |         |          |           |
 
|                      |  scale               |           |         |          |           |
+----------------------+----------------------+-----------+---------+----------+-----------+
|   Insulin            | Use before meals and |   100     | 11      | 20 |  |
| Syringe-Needle U-100 |  as directed.        | each      |         | 13       | 4         |
|  (BD INSULIN SYRINGE |                      |           |         |          |           |
|  ULTRAFINE) 31G X    |                      |           |         |          |           |
| " 0.5 ML MISC    |                      |           |         |          |           |
+----------------------+----------------------+-----------+---------+----------+-----------+
|   levothyroxine      | Take 1 tablet by     |   30      | 11      | 10/01/20 |  |
| (LEVOTHROID) 50 mcg  | mouth Daily.         | tablet    |         | 12       | 3         |
| tablet               |                      |           |         |          |           |
+----------------------+----------------------+-----------+---------+----------+-----------+
|   lisinopril         | Take 1 tablet by     |   90      | 3       | 20 |  |
| (PRINIVIL,ZESTRIL)   | mouth Daily.         | tablet    |         | 12       | 3         |
| 30 MG tablet         |                      |           |         |          |           |
+----------------------+----------------------+-----------+---------+----------+-----------+
|   loperamide         | Take 2 mg by mouth   |           | 0       | 20 |  |
| (ANTI-DIARRHEAL) 2   | Daily as needed.     |           |         | 12       | 4         |
| mg capsule           |                      |           |         |          |           |
+----------------------+----------------------+-----------+---------+----------+-----------+
|   magnesium oxide    | Take 1 tablet by     |   62      | 12      | 20 |  |
| (MAG-OX) 400 mg      | mouth 2 times daily. | tablet    |         | 13       | 4         |
| tablet               |                      |           |         |          |           |
+----------------------+----------------------+-----------+---------+----------+-----------+
|   metoprolol         | Take 1 tablet by     |   60      | 11      | 20 |  |
| tartrate (LOPRESSOR) | mouth 2 times daily. | tablet    |         | 13       | 4         |
|  25 mg tablet        |                      |           |         |          |           |
+----------------------+----------------------+-----------+---------+----------+-----------+
|   mycophenolate      | Take 250 mg by mouth |           | 0       | 20 | 10/14/201 |
| (CELLCEPT) 250 mg    |  3 times daily.      |           |         | 11       | 5         |
| capsule              |                      |           |         |          |           |
+----------------------+----------------------+-----------+---------+----------+-----------+
|   omeprazole         | Take one capsule by  |           | 0       | 20 |  |
| (PRILOSEC) 20 mg     | mouth once daily on  |           |         | 12       | 3         |
| capsule              | an empty stomach     |           |         |          |           |
+----------------------+----------------------+-----------+---------+----------+-----------+
|   predniSONE         | Take  by mouth See   |           | 0       |          | 07/15/201 |
| (DELTASONE) 10 mg    | Admin Instructions.  |           |         |          | 3         |
| tablet               | Take 40 mg by mouth  |           |         |          |           |
|                      | daily for five days, |           |         |          |           |
|                      |  then decrease to 30 |           |         |          |           |
|                      |  mg for two days,    |           |         |          |           |
|                      | then decrease to 20  |           |         |          |           |
|                      | mg for two days,     |           |         |          |           |
|                      | then decrease to 10  |           |         |          |           |
|                      | mg for two days,     |           |         |          |           |
|                      | then discontinue and |           |         |          |           |
|                      |  resume daily dose   |           |         |          |           |
|                      | of 5 mg daily.       |           |         |          |           |
+----------------------+----------------------+-----------+---------+----------+-----------+
|   predniSONE         | Take 1 tablet by     |   90      | 3       | 20 |  |
| (DELTASONE) 5 mg     | mouth Daily.         | tablet    |         | 12       | 4         |
| tablet               |                      |           |         |          |           |
+----------------------+----------------------+-----------+---------+----------+-----------+
|   Prenatal           | Take 0.8 mg by mouth |   30 each | 11      | 20 |  |
| Multivit-Min-Fe-FA   |  Daily.              |           |         | 13       | 3         |
| (PRENATAL VITAMINS)  |                      |           |         |          |           |
| 0.8 MG TABS          |                      |           |         |          |           |
+----------------------+----------------------+-----------+---------+----------+-----------+
|   rosuvastatin       | Take 20 mg by mouth  |           | 0       |          |  |
 
| (CRESTOR) 40 MG      | nightly.             |           |         |          | 3         |
| tabletIndications:   |                      |           |         |          |           |
| Unspecified          |                      |           |         |          |           |
| hypertensive kidney  |                      |           |         |          |           |
| disease with chronic |                      |           |         |          |           |
|  kidney disease      |                      |           |         |          |           |
| stage I through      |                      |           |         |          |           |
| stage IV, or         |                      |           |         |          |           |
| unspecified(403.90), |                      |           |         |          |           |
|  Complications of    |                      |           |         |          |           |
| transplanted kidney, |                      |           |         |          |           |
|  Diabetes mellitus   |                      |           |         |          |           |
| type II,             |                      |           |         |          |           |
| uncontrolled (HCC),  |                      |           |         |          |           |
| Hyperlipidemia       |                      |           |         |          |           |
+----------------------+----------------------+-----------+---------+----------+-----------+
|   tacrolimus         | TAD.                 |           | 0       | 20 | 07/15/201 |
| (PROGRAF) 0.5 mg     |                      |           |         | 12       | 4         |
| capsuleIndications:  |                      |           |         |          |           |
| Unspecified          |                      |           |         |          |           |
| hypertensive kidney  |                      |           |         |          |           |
| disease with chronic |                      |           |         |          |           |
|  kidney disease      |                      |           |         |          |           |
| stage I through      |                      |           |         |          |           |
| stage IV, or         |                      |           |         |          |           |
| unspecified(403.90), |                      |           |         |          |           |
|  Complications of    |                      |           |         |          |           |
| transplanted kidney, |                      |           |         |          |           |
|  Diabetes mellitus   |                      |           |         |          |           |
| type II,             |                      |           |         |          |           |
| uncontrolled (HCC),  |                      |           |         |          |           |
| Hyperlipidemia       |                      |           |         |          |           |
+----------------------+----------------------+-----------+---------+----------+-----------+
|   tacrolimus         | Take 1.5 mg by mouth |           | 0       | 20 |  |
| (PROGRAF) 1 mg       |  2 times daily.      |           |         | 13       | 4         |
| capsuleIndications:  |                      |           |         |          |           |
| Unspecified          |                      |           |         |          |           |
| hypertensive kidney  |                      |           |         |          |           |
| disease with chronic |                      |           |         |          |           |
|  kidney disease      |                      |           |         |          |           |
| stage I through      |                      |           |         |          |           |
| stage IV, or         |                      |           |         |          |           |
| unspecified(403.90), |                      |           |         |          |           |
|  FSGS (focal         |                      |           |         |          |           |
| segmental            |                      |           |         |          |           |
| glomerulosclerosis), |                      |           |         |          |           |
|  Hyperlipidemia,     |                      |           |         |          |           |
| Complications of     |                      |           |         |          |           |
| transplanted kidney  |                      |           |         |          |           |
+----------------------+----------------------+-----------+---------+----------+-----------+
|   tacrolimus         | Take 1 capsule by    |   62      | 12      | 20 |  |
| (PROGRAF) 1 mg       | mouth 2 times daily. | capsule   |         | 13       | 4         |
| capsuleIndications:  |                      |           |         |          |           |
| Unspecified          |                      |           |         |          |           |
| hypertensive kidney  |                      |           |         |          |           |
| disease with chronic |                      |           |         |          |           |
|  kidney disease      |                      |           |         |          |           |
| stage I through      |                      |           |         |          |           |
| stage IV, or         |                      |           |         |          |           |
| unspecified(403.90), |                      |           |         |          |           |
 
|  Complications of    |                      |           |         |          |           |
| transplanted kidney, |                      |           |         |          |           |
|  Diabetes mellitus   |                      |           |         |          |           |
| type II,             |                      |           |         |          |           |
| uncontrolled (HCC),  |                      |           |         |          |           |
| Hyperlipidemia       |                      |           |         |          |           |
+----------------------+----------------------+-----------+---------+----------+-----------+
|   valsartan (DIOVAN) | Take 1 tablet by     |   30      | 11      | 20 |  |
|  160 mg tablet       | mouth Daily.         | tablet    |         | 13       | 4         |
+----------------------+----------------------+-----------+---------+----------+-----------+
 documented as of this encounter
 
 Plan of Treatment
 
 
+--------+-----------+------------+----------------------+-------------------+
| Date   | Type      | Specialty  | Care Team            | Description       |
+--------+-----------+------------+----------------------+-------------------+
| / | Office    | Cardiology |   Tonia Wilson,    |                   |
|    | Visit     |            | [address]   |                   |
|        |           |            | St  [place], [place]  |                   |
|        |           |            | [address]  [phone]  |                   |
|        |           |            |  [phone] (Fax)  |                   |
+--------+-----------+------------+----------------------+-------------------+
| / | Hospital  | Radiology  |   Popeye Townsend, |                   |
|    | Encounter |            |  MD  [address] |                   |
|        |           |            |  St.  [place],   |                   |
|        |           |            | [place] [address]             |                   |
|        |           |            | [phone]         |                   |
|        |           |            | [phone] (Fax)   |                   |
+--------+-----------+------------+----------------------+-------------------+
| / | Surgery   | Radiology  |   Popeye Townsend, | CV EP PPM SYSTEM  |
|    |           |            |  MD  [address] | IMPLANT           |
|        |           |            |  St.  [place],   |                   |
|        |           |            | [place] [address]             |                   |
|        |           |            | [phone]         |                   |
|        |           |            | [phone] (Fax)   |                   |
+--------+-----------+------------+----------------------+-------------------+
| 02/10/ | Clinical  | Cardiology |                      |                   |
|    | Support   |            |                      |                   |
+--------+-----------+------------+----------------------+-------------------+
| / | Office    | Cardiology |   Tonia Wilson,    |                   |
|    | Visit     |            | ARNP  [address]   |                   |
|        |           |            | St  [place], [place]  |                   |
|        |           |            | [address]  [phone]  |                   |
|        |           |            |  [phone] (Fax)  |                   |
+--------+-----------+------------+----------------------+-------------------+
| / | Off-Site  | Nephrology |   Marzena Moser  |                   |
|    | Visit     |            | DO ETIENNE  [address] [place] [address]      |                   |
|        |           |            | [address]      |                   |
|        |           |            | [place]      |                   |
|        |           |            | [address]  [phone]  |                   |
|        |           |            |  [phone] (Fax)  |                   |
+--------+-----------+------------+----------------------+-------------------+
 documented as of this encounter
 
 Visit Diagnoses
 Not on filedocumented in this encounter Yes

## 2025-02-17 NOTE — ED ADULT NURSE NOTE - NSFALLHARMRISKINTERV_ED_ALL_ED

## 2025-02-17 NOTE — ED ADULT NURSE REASSESSMENT NOTE - NS ED NURSE REASSESS COMMENT FT1
BREAK RN: Pt received, awake verbally responsive. Pt is hard of hearing. Family at the bedside. on home o2 @ 2LPM via NC, no sob noted. Fareed tx administered as ordered. NAD. Pending dispo.

## 2025-02-17 NOTE — ED ADULT TRIAGE NOTE - CHIEF COMPLAINT QUOTE
Pt. c/o lethargy, cough and SOB worsening since last week. Seen here a few weeks ago for pneumonia. hx of CHF. uses 2L NC daily.

## 2025-02-17 NOTE — ED ADULT NURSE NOTE - ISOLATION TYPE:
[facility] PROGRESS NOTE:  Date: 3/31/2020    SUBJECTIVE:   Patient seen and examined.    Patient is complaining of weakness.  He denies any new cough, shortness of breath or abdominal pain.    CURRENT MEDICATIONS:    Current Facility-Administered Medications   Medication Dose Route Frequency Provider Last Rate Last Dose   • sodium chloride 0.9 % flush bag 25 mL  25 mL Intravenous PRN Tamar Fairchild CNP       • sodium chloride (PF) 0.9 % injection 2 mL  2 mL Intracatheter 2 times per day Tamar Fairchild CNP   2 mL at 03/30/20 2159   • enoxaparin (LOVENOX) injection 40 mg  40 mg Subcutaneous Daily Tamar Fairchild CNP   40 mg at 03/30/20 0841   • morphine injection 2 mg  2 mg Intravenous Q4H PRN Tamar Fairchild CNP   2 mg at 03/30/20 0538   • ondansetron (ZOFRAN) injection 4 mg  4 mg Intravenous Q6H PRN Tamar Fairchild CNP       • acetaminophen (TYLENOL) tablet 650 mg  650 mg Oral Q6H PRN Tamar Fairchild CNP   650 mg at 03/31/20 0120   • docusate sodium-sennosides (SENOKOT S) 50-8.6 MG 1 tablet  1 tablet Oral BID PRN Tamar Fairchild CNP   1 tablet at 03/29/20 2057   • aspirin chewable 81 mg  81 mg Oral Daily Tamar Fairchild CNP   81 mg at 03/30/20 0841   • calcium carbonate-vitamin D (CALTRATE+D) 600-400 MG-UNIT tablet 1 tablet  1 tablet Oral Daily Tamar Fairchild CNP   1 tablet at 03/30/20 0841   • carvedilol (COREG) tablet 3.125 mg  3.125 mg Oral 2 times per day Tamar Fairchild CNP   3.125 mg at 03/30/20 2205   • clopidogrel (PLAVIX) tablet 75 mg  75 mg Oral Q24H Tamar Fairchild CNP   75 mg at 03/31/20 0120   • brinzolamide (AZOPT) 1 % ophthalmic suspension 1 drop  1 drop Both Eyes TID Tamar Fairchild CNP   1 drop at 03/30/20 2205   • furosemide (LASIX) tablet 40 mg  40 mg Oral Q3 Days Tamar Fairchild CNP       • levothyroxine (SYNTHROID, LEVOTHROID) tablet 88 mcg  88 mcg Oral Daily Tamar Fairchild, CNP   88 mcg at 03/30/20 0637   • latanoprost (XALATAN) 0.005 % ophthalmic solution 1 drop  1 drop Both Eyes  Nightly Tamar Fairchild, CNP   1 drop at 03/30/20 2205   • lisinopril (ZESTRIL) tablet 10 mg  10 mg Oral Daily Tamar HARVINDER Fairchild CNP   10 mg at 03/30/20 0633   • vitamin - therapeutic multivitamins w/minerals tablet 1 tablet  1 tablet Oral Daily Tamar HARVINDER Fairchild CNP   1 tablet at 03/30/20 0841   • polyethylene glycol (MIRALAX) packet 17 g  17 g Oral Every Other Day Tamarcathy Fairchild CNP   17 g at 03/30/20 0841   • atorvastatin (LIPITOR) tablet 20 mg  20 mg Oral Nightly Tamar Fairchild CNP   20 mg at 03/30/20 2205   • hydrALAZINE (APRESOLINE) injection 10 mg  10 mg Intravenous Q6H PRN Tamar Fairchild CNP   10 mg at 03/31/20 0615   • brimonidine (ALPHAGAN) 0.2 % ophthalmic solution 1 drop  1 drop Both Eyes BID Tamar Fairchild CNP   1 drop at 03/30/20 0846    And   • timolol (TIMOPTIC) 0.5 % ophthalmic solution 1 drop  1 drop Both Eyes BID Tamar Fairchild CNP   1 drop at 03/30/20 0847        HOME MEDICATIONS:  Medications Prior to Admission   Medication Sig Dispense Refill   • aspirin (ASPIRIN 81) 81 MG chewable tablet Chew 81 mg by mouth daily.      • simvastatin (ZOCOR) 40 MG tablet Take 40 mg by mouth at bedtime.      • carvedilol (COREG) 3.125 MG tablet Take 3.125 mg by mouth every 12 hours.      • clopidogrel (PLAVIX) 75 MG tablet Take 75 mg by mouth every 24 hours.      • furosemide (LASIX) 40 MG tablet Take 1 tablet by mouth every 3 days.      • lisinopril (ZESTRIL) 10 MG tablet Take 10 mg by mouth daily.     • levothyroxine 88 MCG tablet Take 88 mcg by mouth daily.     • azelastine (ASTELIN) 0.1 % nasal spray Spray 1 spray in each nostril 2 times daily. Use in each nostril as directed     • brimonidine-timolol 0.2-0.5 % ophthalmic solution Place 1 drop into both eyes 2 times daily.     • dorzolamide (TRUSOPT) 2 % ophthalmic solution Place 1 drop into both eyes 3 times daily.     • polyethylene glycol (MIRALAX) packet Take 17 g by mouth every other day.     • calcium carbonate-vitamin D (CALTRATE+D) 600-400  MG-UNIT per tablet Take 1 tablet by mouth daily.     • Multiple Vitamins-Minerals (VITAMIN - THERAPEUTIC MULTIVITAMINS W/MINERALS) tablet Take 1 tablet by mouth daily.     • Cyanocobalamin (B-12) 2000 MCG Tab Take 2,000 mcg by mouth daily.     • latanoprost (XALATAN) 0.005 % ophthalmic solution Place 1 drop into both eyes nightly.            OBJECTIVE:    VITAL SIGNS:     Vital Last Value 24 Hour Range   Temperature 97.2 °F (36.2 °C) (03/31/20 0555) Temp  Min: 97.2 °F (36.2 °C)  Max: 97.7 °F (36.5 °C)   Pulse 76 (03/31/20 0555) Pulse  Min: 75  Max: 76   Respiratory 18 (03/31/20 0555) Resp  Min: 17  Max: 20   Non-Invasive  Blood Pressure (!) 178/78(Hydralazine given ) (03/31/20 0615) BP  Min: 150/76  Max: 178/78   Pulse Oximetry 95 % (03/31/20 0555) SpO2  Min: 94 %  Max: 96 %     Vital Today Admitted   Weight 75.7 kg (166 lb 14.2 oz) (03/29/20 1149) Weight: 81.6 kg (180 lb) (03/29/20 0857)   Height N/A Height: 5' 6\" (167.6 cm) (03/29/20 0857)   BMI N/A BMI (Calculated): 29.05 (03/29/20 0857)       INTAKE/OUTPUT:      Intake/Output Summary (Last 24 hours) at 3/31/2020 0918  Last data filed at 3/30/2020 1800  Gross per 24 hour   Intake 360 ml   Output 525 ml   Net -165 ml         PHYSICAL EXAM:    Physical Exam   Constitutional: He is oriented to person, place, and time. He appears well-developed and well-nourished. No distress.   HENT:   Head: Normocephalic and atraumatic.   Eyes: Conjunctivae and EOM are normal.   Neck: Neck supple.   Cardiovascular: Normal rate, regular rhythm, normal heart sounds and intact distal pulses.   Pulmonary/Chest: Effort normal and breath sounds normal. No respiratory distress.   Abdominal: Soft. Bowel sounds are normal. There is no abdominal tenderness.   Musculoskeletal: Normal range of motion.      Lumbar back: He exhibits tenderness.   Neurological: He is alert and oriented to person, place, and time.   Skin: Skin is warm and dry. Laceration noted.        Psychiatric: He has a normal  mood and affect. His behavior is normal. Thought content normal.   Nursing note and vitals reviewed.       LABORATORY DATA:      Labs:  Recent Labs   Lab 03/30/20  0523 03/29/20  0940 03/26/20  1220   WBC 10.1 9.2 10.7   HGB 11.6* 11.7* 12.5*   HCT 35.5* 36.0* 39.0    159 185     Recent Labs   Lab 03/30/20  0523 03/29/20  0940 03/26/20  1220 03/26/20  1130   SODIUM 141 139 144  --    POTASSIUM 4.0 3.9 4.4  --    CHLORIDE 105 105 106  --    CO2 28 26 30  --    BUN 22* 26* 30*  --    CREATININE 0.87 0.92 1.05  --    ANIONGAP 12 12 12  --    CALCIUM 9.0 8.9 9.3  --    ALBUMIN  --  3.0* 3.4*  --    BILIRUBIN  --  0.4 0.2  --    ALKPT  --  55 59  --    AST  --  18 22  --    GLUCOSE 135* 131* 124*  --    MG 2.0  --  1.9  --    RAPDTR  --   --   --  0.03   PT  --   --  10.3  --    PTT  --   --  25  --    INR  --   --  1.0  --          MICROBIOLOGY  No results found for: SDES No results found for: CULT             IMAGING STUDIES:    No results found.      ASSESSMENT/PLAN:    Intractable back pain 2/2 Acute compression deformity T12 vertebral body   CT L-Spine (3/29) noted Acute compression deformity of the T12 vertebral body resulting in 25% vertebral body height loss.  Slight bulging of the posterior root tubal body with effacement of the ventral thecal sac.  No retropulsed osseous fragments. Multilevel degenerative changes of the lumbar spine most significant at the L5-L6 level.   Pain control as ordered prn; Supportive care via prn Zofran   PT to eval and work with patient as tolerable   Ortho Sx (Dr. Parsons) following - Weightbearing as tolerated. Avoid flexion. Patient is admitted for pain control.  The patient is neurologically intact.  He will likely need to go to rehab     Recent fall with unknown LOC   Pt states he is not sure how he fell and he does not remember   CT Head and C-spine (3/26) both negative     Primary Hypertension, uncontrolled   SBP 150s- 170s    Continue with home Coreg, Lasix,  lisinopril, and prn hydralazine     Impaired fasting glucose   HgbA1C 3/10/20 measured 6.2    BS remains in low 100s    Hyperlipidemia -Continue with home atorvastatin  CAD with Hx/o CABG -Continue with ASA/Plavix and atorvastatin  Hypothyroidism -Continue with home Levothyroxine  Glaucoma -Continue with timolol and xalatan eye drops      Code Status:   Code Status: Do Not Resuscitate  DVT PPX: SCDs and Lovenox    DISPOSITION:  Pending clinical improvement in above. Patient is aware he can no longer live alone, per . Awaiting PT eval.     PCP:  Alonzo Mackay MD       Charting performed by arley Mata for Dr. Anastasiya Bhatt.    All medical record entries made by the scribe were at my direction. I have reviewed the chart and agree that the record accurately reflects my personal performance of the history, physical exam, hospital course, and assessment and plan.     None

## 2025-04-10 ENCOUNTER — APPOINTMENT (OUTPATIENT)
Dept: DERMATOLOGY | Facility: CLINIC | Age: 75
End: 2025-04-10